# Patient Record
Sex: MALE | Race: WHITE | Employment: UNEMPLOYED | URBAN - NONMETROPOLITAN AREA
[De-identification: names, ages, dates, MRNs, and addresses within clinical notes are randomized per-mention and may not be internally consistent; named-entity substitution may affect disease eponyms.]

---

## 2017-01-10 ENCOUNTER — OFFICE VISIT (OUTPATIENT)
Dept: PRIMARY CARE CLINIC | Age: 37
End: 2017-01-10

## 2017-01-10 VITALS
DIASTOLIC BLOOD PRESSURE: 78 MMHG | BODY MASS INDEX: 25.91 KG/M2 | TEMPERATURE: 99.6 F | SYSTOLIC BLOOD PRESSURE: 118 MMHG | WEIGHT: 161.2 LBS | HEIGHT: 66 IN | HEART RATE: 110 BPM

## 2017-01-10 DIAGNOSIS — R50.9 FEVER, UNSPECIFIED FEVER CAUSE: ICD-10-CM

## 2017-01-10 DIAGNOSIS — R52 BODY ACHES: ICD-10-CM

## 2017-01-10 DIAGNOSIS — R05.9 COUGH: ICD-10-CM

## 2017-01-10 DIAGNOSIS — J11.1 INFLUENZA-LIKE ILLNESS: Primary | ICD-10-CM

## 2017-01-10 LAB
INFLUENZA A ANTIBODY: NORMAL
INFLUENZA B ANTIBODY: NORMAL

## 2017-01-10 PROCEDURE — 87804 INFLUENZA ASSAY W/OPTIC: CPT | Performed by: NURSE PRACTITIONER

## 2017-01-10 PROCEDURE — 99213 OFFICE O/P EST LOW 20 MIN: CPT | Performed by: NURSE PRACTITIONER

## 2017-01-10 RX ORDER — BENZONATATE 100 MG/1
100 CAPSULE ORAL 3 TIMES DAILY PRN
Qty: 30 CAPSULE | Refills: 1 | Status: SHIPPED | OUTPATIENT
Start: 2017-01-10 | End: 2017-08-03 | Stop reason: ALTCHOICE

## 2017-01-10 RX ORDER — OSELTAMIVIR PHOSPHATE 75 MG/1
75 CAPSULE ORAL 2 TIMES DAILY
Qty: 10 CAPSULE | Refills: 0 | Status: SHIPPED | OUTPATIENT
Start: 2017-01-10 | End: 2017-01-15

## 2017-01-10 RX ORDER — CEPHALEXIN 500 MG/1
CAPSULE ORAL
COMMUNITY
Start: 2016-10-27 | End: 2016-10-31

## 2017-01-10 RX ORDER — PREDNISONE 20 MG/1
TABLET ORAL
COMMUNITY
Start: 2016-12-31 | End: 2017-01-06

## 2017-01-10 RX ORDER — DEXTROMETHORPHAN HYDROBROMIDE AND PROMETHAZINE HYDROCHLORIDE 15; 6.25 MG/5ML; MG/5ML
SYRUP ORAL
COMMUNITY
Start: 2016-12-31 | End: 2017-08-03 | Stop reason: ALTCHOICE

## 2017-01-10 ASSESSMENT — ENCOUNTER SYMPTOMS
VOMITING: 0
RHINORRHEA: 1
WHEEZING: 0
DIARRHEA: 0
SINUS PRESSURE: 1
COUGH: 1
SHORTNESS OF BREATH: 0
SORE THROAT: 1
NAUSEA: 0

## 2017-02-25 DIAGNOSIS — G47.9 SLEEP DIFFICULTIES: ICD-10-CM

## 2017-02-25 DIAGNOSIS — F41.9 ANXIETY, MILD: ICD-10-CM

## 2017-03-01 RX ORDER — CLONAZEPAM 0.5 MG/1
TABLET ORAL
Qty: 30 TABLET | Refills: 2 | Status: SHIPPED | OUTPATIENT
Start: 2017-03-01 | End: 2017-06-14 | Stop reason: SDUPTHER

## 2017-03-10 ENCOUNTER — PROCEDURE VISIT (OUTPATIENT)
Dept: NEUROLOGY | Age: 37
End: 2017-03-10

## 2017-03-10 DIAGNOSIS — F32.A DEPRESSION, UNSPECIFIED DEPRESSION TYPE: ICD-10-CM

## 2017-03-10 DIAGNOSIS — R53.83 FATIGUE, UNSPECIFIED TYPE: ICD-10-CM

## 2017-03-10 DIAGNOSIS — G44.89 OTHER HEADACHE SYNDROME: ICD-10-CM

## 2017-03-10 DIAGNOSIS — G43.901 STATUS MIGRAINOSUS: ICD-10-CM

## 2017-03-10 DIAGNOSIS — G43.009 MIGRAINE WITHOUT AURA AND WITHOUT STATUS MIGRAINOSUS, NOT INTRACTABLE: ICD-10-CM

## 2017-03-10 DIAGNOSIS — R56.9 CONVULSIONS, UNSPECIFIED CONVULSION TYPE (HCC): ICD-10-CM

## 2017-03-10 DIAGNOSIS — R42 DIZZINESS: ICD-10-CM

## 2017-03-10 DIAGNOSIS — S09.90XS HEAD INJURY, SEQUELA: ICD-10-CM

## 2017-03-10 DIAGNOSIS — G40.909 SEIZURE DISORDER (HCC): ICD-10-CM

## 2017-03-10 DIAGNOSIS — Z91.81 H/O FALL: ICD-10-CM

## 2017-03-10 DIAGNOSIS — F41.9 ANXIETY, MILD: ICD-10-CM

## 2017-03-10 DIAGNOSIS — R53.1 WEAKNESS: ICD-10-CM

## 2017-03-10 DIAGNOSIS — G47.9 DISTURBANCE, SLEEP: ICD-10-CM

## 2017-03-10 DIAGNOSIS — M54.12 CERVICAL RADICULOPATHY: ICD-10-CM

## 2017-03-10 DIAGNOSIS — R41.0 CONFUSION: Primary | ICD-10-CM

## 2017-03-10 DIAGNOSIS — G40.909 RECURRENT SEIZURES (HCC): ICD-10-CM

## 2017-03-10 DIAGNOSIS — R41.3 MEMORY LOSS: ICD-10-CM

## 2017-03-10 DIAGNOSIS — G47.9 SLEEP DIFFICULTIES: ICD-10-CM

## 2017-03-10 PROCEDURE — 95957 EEG DIGITAL ANALYSIS: CPT | Performed by: PSYCHIATRY & NEUROLOGY

## 2017-03-10 PROCEDURE — 95813 EEG EXTND MNTR 61-119 MIN: CPT | Performed by: PSYCHIATRY & NEUROLOGY

## 2017-03-11 ENCOUNTER — HOSPITAL ENCOUNTER (EMERGENCY)
Age: 37
Discharge: HOME | End: 2017-03-11
Attending: EMERGENCY MEDICINE
Payer: COMMERCIAL

## 2017-03-11 VITALS
HEART RATE: 82 BPM | OXYGEN SATURATION: 95 % | SYSTOLIC BLOOD PRESSURE: 201 MMHG | DIASTOLIC BLOOD PRESSURE: 71 MMHG | TEMPERATURE: 99 F | RESPIRATION RATE: 14 BRPM | WEIGHT: 155 LBS | BODY MASS INDEX: 25.02 KG/M2

## 2017-03-11 DIAGNOSIS — G40.919 BREAKTHROUGH SEIZURE (HCC): Primary | ICD-10-CM

## 2017-03-11 LAB
ABSOLUTE EOS #: 0 K/UL (ref 0–0.4)
ABSOLUTE LYMPH #: 2.3 K/UL (ref 1–4.8)
ABSOLUTE MONO #: 0.5 K/UL (ref 0.1–1.2)
ANION GAP SERPL CALCULATED.3IONS-SCNC: 13 MMOL/L (ref 9–17)
BASOPHILS # BLD: 1 % (ref 0–2)
BASOPHILS ABSOLUTE: 0 K/UL (ref 0–0.2)
BUN BLDV-MCNC: 13 MG/DL (ref 6–20)
BUN/CREAT BLD: 15 (ref 9–20)
CALCIUM SERPL-MCNC: 8.9 MG/DL (ref 8.6–10.4)
CHLORIDE BLD-SCNC: 105 MMOL/L (ref 98–107)
CO2: 21 MMOL/L (ref 20–31)
CREAT SERPL-MCNC: 0.85 MG/DL (ref 0.7–1.2)
DIFFERENTIAL TYPE: ABNORMAL
EOSINOPHILS RELATIVE PERCENT: 1 % (ref 1–4)
GFR AFRICAN AMERICAN: >60 ML/MIN
GFR NON-AFRICAN AMERICAN: >60 ML/MIN
GFR SERPL CREATININE-BSD FRML MDRD: NORMAL ML/MIN/{1.73_M2}
GFR SERPL CREATININE-BSD FRML MDRD: NORMAL ML/MIN/{1.73_M2}
GLUCOSE BLD-MCNC: 94 MG/DL (ref 70–99)
HCT VFR BLD CALC: 43.9 % (ref 41–53)
HEMOGLOBIN: 14.8 G/DL (ref 13.5–17.5)
LYMPHOCYTES # BLD: 33 % (ref 24–44)
MCH RBC QN AUTO: 34.2 PG (ref 26–34)
MCHC RBC AUTO-ENTMCNC: 33.8 G/DL (ref 31–37)
MCV RBC AUTO: 101.3 FL (ref 80–100)
MONOCYTES # BLD: 8 % (ref 1–7)
PDW BLD-RTO: 13.2 % (ref 11–14.5)
PLATELET # BLD: 165 K/UL (ref 140–450)
PLATELET ESTIMATE: ABNORMAL
PMV BLD AUTO: 7.9 FL (ref 6–12)
POTASSIUM SERPL-SCNC: 3.9 MMOL/L (ref 3.7–5.3)
RBC # BLD: 4.34 M/UL (ref 4.5–5.9)
RBC # BLD: ABNORMAL 10*6/UL
SEG NEUTROPHILS: 57 % (ref 36–66)
SEGMENTED NEUTROPHILS ABSOLUTE COUNT: 4.1 K/UL (ref 1.8–7.7)
SODIUM BLD-SCNC: 139 MMOL/L (ref 135–144)
VALPROIC ACID LEVEL: 95 UG/ML (ref 50–100)
VALPROIC DATE LAST DOSE: NORMAL
VALPROIC DOSE AMOUNT: NORMAL
VALPROIC TIME LAST DOSE: NORMAL
WBC # BLD: 7 K/UL (ref 3.5–11)
WBC # BLD: ABNORMAL 10*3/UL

## 2017-03-11 PROCEDURE — 6360000002 HC RX W HCPCS: Performed by: EMERGENCY MEDICINE

## 2017-03-11 PROCEDURE — 36415 COLL VENOUS BLD VENIPUNCTURE: CPT

## 2017-03-11 PROCEDURE — 80164 ASSAY DIPROPYLACETIC ACD TOT: CPT

## 2017-03-11 PROCEDURE — 6370000000 HC RX 637 (ALT 250 FOR IP): Performed by: EMERGENCY MEDICINE

## 2017-03-11 PROCEDURE — 85025 COMPLETE CBC W/AUTO DIFF WBC: CPT

## 2017-03-11 PROCEDURE — 80048 BASIC METABOLIC PNL TOTAL CA: CPT

## 2017-03-11 PROCEDURE — 99284 EMERGENCY DEPT VISIT MOD MDM: CPT

## 2017-03-11 PROCEDURE — 96374 THER/PROPH/DIAG INJ IV PUSH: CPT

## 2017-03-11 RX ORDER — LORAZEPAM 1 MG/1
0.5 TABLET ORAL EVERY 6 HOURS PRN
Qty: 10 TABLET | Refills: 0 | Status: SHIPPED | OUTPATIENT
Start: 2017-03-11 | End: 2017-03-21

## 2017-03-11 RX ORDER — ACETAMINOPHEN 500 MG
1000 TABLET ORAL ONCE
Status: COMPLETED | OUTPATIENT
Start: 2017-03-11 | End: 2017-03-11

## 2017-03-11 RX ORDER — ONDANSETRON 2 MG/ML
4 INJECTION INTRAMUSCULAR; INTRAVENOUS ONCE
Status: COMPLETED | OUTPATIENT
Start: 2017-03-11 | End: 2017-03-11

## 2017-03-11 RX ADMIN — ONDANSETRON 4 MG: 2 INJECTION INTRAMUSCULAR; INTRAVENOUS at 17:14

## 2017-03-11 RX ADMIN — ACETAMINOPHEN 1000 MG: 500 TABLET ORAL at 17:13

## 2017-03-11 ASSESSMENT — PAIN DESCRIPTION - LOCATION: LOCATION: HEAD

## 2017-03-11 ASSESSMENT — PAIN SCALES - GENERAL
PAINLEVEL_OUTOF10: 2
PAINLEVEL_OUTOF10: 5
PAINLEVEL_OUTOF10: 5
PAINLEVEL_OUTOF10: 2

## 2017-04-03 ENCOUNTER — OFFICE VISIT (OUTPATIENT)
Dept: NEUROLOGY | Age: 37
End: 2017-04-03
Payer: MEDICARE

## 2017-04-03 VITALS
DIASTOLIC BLOOD PRESSURE: 66 MMHG | HEART RATE: 87 BPM | BODY MASS INDEX: 26.13 KG/M2 | HEIGHT: 66 IN | WEIGHT: 162.6 LBS | SYSTOLIC BLOOD PRESSURE: 110 MMHG

## 2017-04-03 DIAGNOSIS — Z91.81 H/O FALL: ICD-10-CM

## 2017-04-03 DIAGNOSIS — F41.9 ANXIETY, MILD: ICD-10-CM

## 2017-04-03 DIAGNOSIS — I49.3 VENTRICULAR PREMATURE BEATS: ICD-10-CM

## 2017-04-03 DIAGNOSIS — F32.A DEPRESSION, UNSPECIFIED DEPRESSION TYPE: ICD-10-CM

## 2017-04-03 DIAGNOSIS — G47.9 SLEEP DIFFICULTIES: ICD-10-CM

## 2017-04-03 DIAGNOSIS — R56.9 CONVULSIONS, UNSPECIFIED CONVULSION TYPE (HCC): ICD-10-CM

## 2017-04-03 DIAGNOSIS — G44.1 OTHER VASCULAR HEADACHE: ICD-10-CM

## 2017-04-03 DIAGNOSIS — R42 DIZZINESS: ICD-10-CM

## 2017-04-03 DIAGNOSIS — S33.5XXS LUMBAR SPRAIN, SEQUELA: ICD-10-CM

## 2017-04-03 DIAGNOSIS — M54.12 CERVICAL RADICULOPATHY: ICD-10-CM

## 2017-04-03 DIAGNOSIS — G47.9 DISTURBANCE, SLEEP: ICD-10-CM

## 2017-04-03 DIAGNOSIS — Z72.0 TOBACCO USE: ICD-10-CM

## 2017-04-03 DIAGNOSIS — G43.901 STATUS MIGRAINOSUS: ICD-10-CM

## 2017-04-03 DIAGNOSIS — R41.3 MEMORY LOSS: ICD-10-CM

## 2017-04-03 DIAGNOSIS — R53.83 FATIGUE, UNSPECIFIED TYPE: ICD-10-CM

## 2017-04-03 DIAGNOSIS — G40.909 SEIZURE DISORDER (HCC): ICD-10-CM

## 2017-04-03 DIAGNOSIS — M54.5 LOW BACK PAIN, UNSPECIFIED BACK PAIN LATERALITY, UNSPECIFIED CHRONICITY, WITH SCIATICA PRESENCE UNSPECIFIED: ICD-10-CM

## 2017-04-03 DIAGNOSIS — G44.89 OTHER HEADACHE SYNDROME: ICD-10-CM

## 2017-04-03 DIAGNOSIS — G43.009 MIGRAINE WITHOUT AURA AND WITHOUT STATUS MIGRAINOSUS, NOT INTRACTABLE: ICD-10-CM

## 2017-04-03 DIAGNOSIS — G40.009 PARTIAL IDIOPATHIC EPILEPSY WITH SEIZURES OF LOCALIZED ONSET, NOT INTRACTABLE, WITHOUT STATUS EPILEPTICUS (HCC): Primary | ICD-10-CM

## 2017-04-03 DIAGNOSIS — K21.9 GASTROESOPHAGEAL REFLUX DISEASE WITHOUT ESOPHAGITIS: ICD-10-CM

## 2017-04-03 DIAGNOSIS — R53.1 WEAKNESS: ICD-10-CM

## 2017-04-03 PROCEDURE — 99215 OFFICE O/P EST HI 40 MIN: CPT | Performed by: PSYCHIATRY & NEUROLOGY

## 2017-04-03 RX ORDER — CITALOPRAM 20 MG/1
20 TABLET ORAL DAILY
Qty: 30 TABLET | Refills: 2 | Status: SHIPPED | OUTPATIENT
Start: 2017-04-03 | End: 2017-07-06 | Stop reason: SDUPTHER

## 2017-04-03 RX ORDER — DIVALPROEX SODIUM 250 MG/1
TABLET, DELAYED RELEASE ORAL
Qty: 180 TABLET | Refills: 5 | Status: SHIPPED | OUTPATIENT
Start: 2017-04-03 | End: 2017-09-28 | Stop reason: SDUPTHER

## 2017-04-03 RX ORDER — SUMATRIPTAN 50 MG/1
50 TABLET, FILM COATED ORAL
Qty: 30 TABLET | Refills: 2 | Status: SHIPPED | OUTPATIENT
Start: 2017-04-03 | End: 2017-07-06 | Stop reason: SDUPTHER

## 2017-04-03 ASSESSMENT — ENCOUNTER SYMPTOMS
SHORTNESS OF BREATH: 0
FACIAL SWELLING: 0
VOICE CHANGE: 0
COLOR CHANGE: 0
APNEA: 0
BLOOD IN STOOL: 0
CHEST TIGHTNESS: 0
EYE DISCHARGE: 0
CHOKING: 0
EYE ITCHING: 0
WHEEZING: 0
TROUBLE SWALLOWING: 0
CONSTIPATION: 0
ABDOMINAL DISTENTION: 0
DIARRHEA: 0

## 2017-04-11 ENCOUNTER — TELEPHONE (OUTPATIENT)
Dept: NEUROLOGY | Age: 37
End: 2017-04-11

## 2017-05-19 ENCOUNTER — TELEPHONE (OUTPATIENT)
Dept: NEUROLOGY | Age: 37
End: 2017-05-19

## 2017-06-02 ENCOUNTER — INITIAL CONSULT (OUTPATIENT)
Dept: SURGERY | Age: 37
End: 2017-06-02
Payer: MEDICARE

## 2017-06-02 VITALS
DIASTOLIC BLOOD PRESSURE: 96 MMHG | HEIGHT: 66 IN | WEIGHT: 164 LBS | RESPIRATION RATE: 16 BRPM | HEART RATE: 96 BPM | TEMPERATURE: 98.1 F | BODY MASS INDEX: 26.36 KG/M2 | SYSTOLIC BLOOD PRESSURE: 104 MMHG

## 2017-06-02 DIAGNOSIS — K40.91 RECURRENT LEFT INGUINAL HERNIA: Primary | ICD-10-CM

## 2017-06-02 DIAGNOSIS — K57.30 SIGMOID DIVERTICULOSIS: ICD-10-CM

## 2017-06-02 DIAGNOSIS — G40.909 SEIZURE DISORDER (HCC): ICD-10-CM

## 2017-06-02 DIAGNOSIS — K59.01 SLOW TRANSIT CONSTIPATION: ICD-10-CM

## 2017-06-02 PROCEDURE — G8419 CALC BMI OUT NRM PARAM NOF/U: HCPCS | Performed by: SURGERY

## 2017-06-02 PROCEDURE — G8427 DOCREV CUR MEDS BY ELIG CLIN: HCPCS | Performed by: SURGERY

## 2017-06-02 PROCEDURE — 4004F PT TOBACCO SCREEN RCVD TLK: CPT | Performed by: SURGERY

## 2017-06-02 PROCEDURE — 99214 OFFICE O/P EST MOD 30 MIN: CPT | Performed by: SURGERY

## 2017-06-05 ENCOUNTER — HOSPITAL ENCOUNTER (OUTPATIENT)
Age: 37
Setting detail: SPECIMEN
Discharge: HOME | End: 2017-06-05
Payer: MEDICARE

## 2017-06-05 LAB
FOLATE: 3 NG/ML
VITAMIN B-12: 427 PG/ML (ref 211–946)

## 2017-06-08 LAB — LACOSAMIDE: 1.6 UG/ML (ref 5–10)

## 2017-06-14 DIAGNOSIS — F41.9 ANXIETY, MILD: ICD-10-CM

## 2017-06-14 DIAGNOSIS — G47.9 SLEEP DIFFICULTIES: ICD-10-CM

## 2017-06-19 RX ORDER — CLONAZEPAM 0.5 MG/1
TABLET ORAL
Qty: 30 TABLET | Refills: 2 | Status: SHIPPED | OUTPATIENT
Start: 2017-06-19 | End: 2017-09-28 | Stop reason: SDUPTHER

## 2017-07-06 ENCOUNTER — OFFICE VISIT (OUTPATIENT)
Dept: NEUROLOGY | Age: 37
End: 2017-07-06
Payer: MEDICARE

## 2017-07-06 ENCOUNTER — HOSPITAL ENCOUNTER (OUTPATIENT)
Age: 37
Setting detail: SPECIMEN
Discharge: HOME | End: 2017-07-06
Payer: MEDICARE

## 2017-07-06 VITALS
DIASTOLIC BLOOD PRESSURE: 76 MMHG | HEART RATE: 80 BPM | HEIGHT: 66 IN | BODY MASS INDEX: 25.55 KG/M2 | WEIGHT: 159 LBS | SYSTOLIC BLOOD PRESSURE: 118 MMHG

## 2017-07-06 DIAGNOSIS — F32.A DEPRESSION, UNSPECIFIED DEPRESSION TYPE: ICD-10-CM

## 2017-07-06 DIAGNOSIS — R41.0 CONFUSION: ICD-10-CM

## 2017-07-06 DIAGNOSIS — G43.009 MIGRAINE WITHOUT AURA AND WITHOUT STATUS MIGRAINOSUS, NOT INTRACTABLE: ICD-10-CM

## 2017-07-06 DIAGNOSIS — R09.89 OTHER SPECIFIED SYMPTOMS AND SIGNS INVOLVING THE CIRCULATORY AND RESPIRATORY SYSTEMS: ICD-10-CM

## 2017-07-06 DIAGNOSIS — G93.89 OTHER SPECIFIED DISORDERS OF BRAIN: ICD-10-CM

## 2017-07-06 DIAGNOSIS — G43.901 STATUS MIGRAINOSUS: ICD-10-CM

## 2017-07-06 DIAGNOSIS — G44.1 OTHER VASCULAR HEADACHE: ICD-10-CM

## 2017-07-06 DIAGNOSIS — G47.9 SLEEP DIFFICULTIES: ICD-10-CM

## 2017-07-06 DIAGNOSIS — Z86.79 PERSONAL HISTORY OF OTHER DISEASES OF THE CIRCULATORY SYSTEM: ICD-10-CM

## 2017-07-06 DIAGNOSIS — F41.9 ANXIETY, MILD: ICD-10-CM

## 2017-07-06 DIAGNOSIS — Z72.0 TOBACCO USE: ICD-10-CM

## 2017-07-06 DIAGNOSIS — G44.89 OTHER HEADACHE SYNDROME: ICD-10-CM

## 2017-07-06 DIAGNOSIS — R42 DIZZINESS: ICD-10-CM

## 2017-07-06 DIAGNOSIS — M54.12 CERVICAL RADICULOPATHY: ICD-10-CM

## 2017-07-06 DIAGNOSIS — G45.9 TRANSIENT CEREBRAL ISCHEMIA, UNSPECIFIED TYPE: Primary | ICD-10-CM

## 2017-07-06 DIAGNOSIS — G40.909 RECURRENT SEIZURES (HCC): ICD-10-CM

## 2017-07-06 DIAGNOSIS — R56.9 CONVULSIONS, UNSPECIFIED CONVULSION TYPE (HCC): ICD-10-CM

## 2017-07-06 DIAGNOSIS — M54.5 LOW BACK PAIN, UNSPECIFIED BACK PAIN LATERALITY, UNSPECIFIED CHRONICITY, WITH SCIATICA PRESENCE UNSPECIFIED: ICD-10-CM

## 2017-07-06 DIAGNOSIS — G47.9 DISTURBANCE, SLEEP: ICD-10-CM

## 2017-07-06 DIAGNOSIS — G40.009 PARTIAL IDIOPATHIC EPILEPSY WITH SEIZURES OF LOCALIZED ONSET, NOT INTRACTABLE, WITHOUT STATUS EPILEPTICUS (HCC): ICD-10-CM

## 2017-07-06 DIAGNOSIS — Z91.81 H/O FALL: ICD-10-CM

## 2017-07-06 DIAGNOSIS — R41.3 MEMORY LOSS: ICD-10-CM

## 2017-07-06 PROBLEM — G45.0 VBI (VERTEBROBASILAR INSUFFICIENCY): Status: ACTIVE | Noted: 2017-07-06

## 2017-07-06 PROCEDURE — 99215 OFFICE O/P EST HI 40 MIN: CPT | Performed by: PSYCHIATRY & NEUROLOGY

## 2017-07-06 PROCEDURE — 4004F PT TOBACCO SCREEN RCVD TLK: CPT | Performed by: PSYCHIATRY & NEUROLOGY

## 2017-07-06 PROCEDURE — 93886 INTRACRANIAL COMPLETE STUDY: CPT | Performed by: PSYCHIATRY & NEUROLOGY

## 2017-07-06 PROCEDURE — G8419 CALC BMI OUT NRM PARAM NOF/U: HCPCS | Performed by: PSYCHIATRY & NEUROLOGY

## 2017-07-06 PROCEDURE — G8427 DOCREV CUR MEDS BY ELIG CLIN: HCPCS | Performed by: PSYCHIATRY & NEUROLOGY

## 2017-07-06 RX ORDER — HYDROCODONE BITARTRATE AND ACETAMINOPHEN 5; 325 MG/1; MG/1
1 TABLET ORAL
COMMUNITY
Start: 2017-07-01 | End: 2017-07-11

## 2017-07-06 RX ORDER — CITALOPRAM 20 MG/1
20 TABLET ORAL DAILY
Qty: 30 TABLET | Refills: 2 | Status: SHIPPED | OUTPATIENT
Start: 2017-07-06 | End: 2017-10-03

## 2017-07-06 RX ORDER — PANTOPRAZOLE SODIUM 20 MG/1
20 TABLET, DELAYED RELEASE ORAL
COMMUNITY
Start: 2017-07-01 | End: 2017-10-03

## 2017-07-06 RX ORDER — SUMATRIPTAN 50 MG/1
50 TABLET, FILM COATED ORAL
Qty: 30 TABLET | Refills: 2 | Status: SHIPPED | OUTPATIENT
Start: 2017-07-06 | End: 2017-09-28 | Stop reason: SDUPTHER

## 2017-07-06 ASSESSMENT — ENCOUNTER SYMPTOMS
SHORTNESS OF BREATH: 0
COLOR CHANGE: 0
BLOOD IN STOOL: 0
TROUBLE SWALLOWING: 0
FACIAL SWELLING: 0
DIARRHEA: 0
EYE DISCHARGE: 0
CONSTIPATION: 0
EYE ITCHING: 0
CHEST TIGHTNESS: 0
APNEA: 0
CHOKING: 0
ABDOMINAL DISTENTION: 0
WHEEZING: 0
VOICE CHANGE: 0

## 2017-07-07 ENCOUNTER — TELEPHONE (OUTPATIENT)
Dept: NEUROLOGY | Age: 37
End: 2017-07-07

## 2017-07-07 LAB
HOMOCYSTEINE: 27.7 UMOL/L
RHEUMATOID FACTOR: <10 IU/ML

## 2017-07-10 LAB
ANA REFERENCE RANGE:: ABNORMAL
ANTI DNA DOUBLE STRANDED: 61 IU/ML
ANTI JO-1 IGG: 23 U/ML
ANTI RNP AB: 27 U/ML
ANTI SSA: 150 U/ML
ANTI SSB: 12 U/ML
ANTI-CENTROMERE: 11 U/ML
ANTI-NUCLEAR ANTIBODY (ANA): POSITIVE
ANTI-SCLERODERMA: 19 U/ML
ANTI-SMITH: 32 U/ML
HISTONE ANTIBODY: 23 U/ML

## 2017-07-14 LAB
FACTOR V LEIDEN MUTATION: NORMAL
PROTEIN C ACTIVITY: 93 %
PROTEIN S ACTIVITY: 127 % (ref 77–116)

## 2017-07-28 RX ORDER — LACOSAMIDE 200 MG/1
TABLET, FILM COATED ORAL
Qty: 60 TABLET | Refills: 0 | Status: SHIPPED | OUTPATIENT
Start: 2017-07-28 | End: 2017-09-07 | Stop reason: SDUPTHER

## 2017-08-03 ENCOUNTER — OFFICE VISIT (OUTPATIENT)
Dept: FAMILY MEDICINE CLINIC | Age: 37
End: 2017-08-03
Payer: MEDICARE

## 2017-08-03 VITALS
HEART RATE: 68 BPM | DIASTOLIC BLOOD PRESSURE: 64 MMHG | HEIGHT: 66 IN | SYSTOLIC BLOOD PRESSURE: 116 MMHG | BODY MASS INDEX: 25.55 KG/M2 | WEIGHT: 158.95 LBS

## 2017-08-03 DIAGNOSIS — D22.9 BENIGN NEVUS: ICD-10-CM

## 2017-08-03 DIAGNOSIS — M54.2 NECK PAIN ON RIGHT SIDE: ICD-10-CM

## 2017-08-03 DIAGNOSIS — G56.22 CUBITAL TUNNEL SYNDROME ON LEFT: Primary | ICD-10-CM

## 2017-08-03 PROCEDURE — G8427 DOCREV CUR MEDS BY ELIG CLIN: HCPCS | Performed by: FAMILY MEDICINE

## 2017-08-03 PROCEDURE — 99214 OFFICE O/P EST MOD 30 MIN: CPT | Performed by: FAMILY MEDICINE

## 2017-08-03 PROCEDURE — G8419 CALC BMI OUT NRM PARAM NOF/U: HCPCS | Performed by: FAMILY MEDICINE

## 2017-08-03 PROCEDURE — 4004F PT TOBACCO SCREEN RCVD TLK: CPT | Performed by: FAMILY MEDICINE

## 2017-08-03 ASSESSMENT — ENCOUNTER SYMPTOMS
COLOR CHANGE: 1
GASTROINTESTINAL NEGATIVE: 1
RESPIRATORY NEGATIVE: 1
ALLERGIC/IMMUNOLOGIC NEGATIVE: 1
EYES NEGATIVE: 1

## 2017-08-04 ENCOUNTER — PROCEDURE VISIT (OUTPATIENT)
Dept: NEUROLOGY | Age: 37
End: 2017-08-04
Payer: MEDICARE

## 2017-08-04 DIAGNOSIS — G43.709 CHRONIC MIGRAINE WITHOUT AURA WITHOUT STATUS MIGRAINOSUS, NOT INTRACTABLE: ICD-10-CM

## 2017-08-04 DIAGNOSIS — I67.82 CEREBRAL ISCHEMIA: ICD-10-CM

## 2017-08-04 DIAGNOSIS — R42 VERTIGO: ICD-10-CM

## 2017-08-04 DIAGNOSIS — G45.8 OTHER SPECIFIED TRANSIENT CEREBRAL ISCHEMIAS: Primary | ICD-10-CM

## 2017-08-04 DIAGNOSIS — G45.0 VBI (VERTEBROBASILAR INSUFFICIENCY): ICD-10-CM

## 2017-08-04 DIAGNOSIS — G45.9 TRANSIENT CEREBRAL ISCHEMIA, UNSPECIFIED TYPE: ICD-10-CM

## 2017-08-04 DIAGNOSIS — R42 DIZZINESS: ICD-10-CM

## 2017-08-04 DIAGNOSIS — Z87.828 OLD HEAD INJURY: ICD-10-CM

## 2017-08-04 DIAGNOSIS — R47.9 SPEECH DISTURBANCE, UNSPECIFIED TYPE: ICD-10-CM

## 2017-08-04 DIAGNOSIS — G44.209 TENSION HEADACHE: ICD-10-CM

## 2017-08-04 PROCEDURE — 93892 TCD EMBOLI DETECT W/O INJ: CPT | Performed by: PSYCHIATRY & NEUROLOGY

## 2017-08-04 PROCEDURE — 93886 INTRACRANIAL COMPLETE STUDY: CPT | Performed by: PSYCHIATRY & NEUROLOGY

## 2017-08-28 ENCOUNTER — TELEPHONE (OUTPATIENT)
Dept: FAMILY MEDICINE CLINIC | Age: 37
End: 2017-08-28

## 2017-08-28 DIAGNOSIS — M54.2 NECK DISCOMFORT: Primary | ICD-10-CM

## 2017-09-01 ENCOUNTER — HOSPITAL ENCOUNTER (OUTPATIENT)
Dept: GENERAL RADIOLOGY | Age: 37
Discharge: HOME | End: 2017-09-01
Payer: MEDICARE

## 2017-09-01 DIAGNOSIS — M54.2 NECK DISCOMFORT: ICD-10-CM

## 2017-09-01 PROCEDURE — 72050 X-RAY EXAM NECK SPINE 4/5VWS: CPT

## 2017-09-07 RX ORDER — LACOSAMIDE 200 MG/1
TABLET, FILM COATED ORAL
Qty: 60 TABLET | Refills: 3 | Status: SHIPPED | OUTPATIENT
Start: 2017-09-07 | End: 2018-08-29

## 2017-09-28 ENCOUNTER — TELEPHONE (OUTPATIENT)
Dept: FAMILY MEDICINE CLINIC | Age: 37
End: 2017-09-28

## 2017-09-28 ENCOUNTER — HOSPITAL ENCOUNTER (OUTPATIENT)
Dept: LAB | Age: 37
Setting detail: SPECIMEN
Discharge: HOME | End: 2017-09-28
Payer: MEDICARE

## 2017-09-28 ENCOUNTER — TELEPHONE (OUTPATIENT)
Dept: NEUROLOGY | Age: 37
End: 2017-09-28

## 2017-09-28 ENCOUNTER — OFFICE VISIT (OUTPATIENT)
Dept: NEUROLOGY | Age: 37
End: 2017-09-28
Payer: MEDICARE

## 2017-09-28 VITALS
HEART RATE: 101 BPM | HEIGHT: 66 IN | DIASTOLIC BLOOD PRESSURE: 68 MMHG | BODY MASS INDEX: 25.55 KG/M2 | SYSTOLIC BLOOD PRESSURE: 124 MMHG | WEIGHT: 158.95 LBS | OXYGEN SATURATION: 98 %

## 2017-09-28 DIAGNOSIS — R56.9 CONVULSIONS, UNSPECIFIED CONVULSION TYPE (HCC): ICD-10-CM

## 2017-09-28 DIAGNOSIS — R51.9 NONINTRACTABLE HEADACHE, UNSPECIFIED CHRONICITY PATTERN, UNSPECIFIED HEADACHE TYPE: ICD-10-CM

## 2017-09-28 DIAGNOSIS — G47.9 DISTURBANCE, SLEEP: ICD-10-CM

## 2017-09-28 DIAGNOSIS — F41.9 ANXIETY, MILD: ICD-10-CM

## 2017-09-28 DIAGNOSIS — I67.82 CEREBRAL ISCHEMIA: ICD-10-CM

## 2017-09-28 DIAGNOSIS — G44.209 TENSION HEADACHE: ICD-10-CM

## 2017-09-28 DIAGNOSIS — G44.039 EPISODIC PAROXYSMAL HEMICRANIA, NOT INTRACTABLE: ICD-10-CM

## 2017-09-28 DIAGNOSIS — R00.0 TACHYCARDIA: ICD-10-CM

## 2017-09-28 DIAGNOSIS — K21.9 GASTROESOPHAGEAL REFLUX DISEASE WITHOUT ESOPHAGITIS: ICD-10-CM

## 2017-09-28 DIAGNOSIS — Z72.0 TOBACCO USE: ICD-10-CM

## 2017-09-28 DIAGNOSIS — R53.1 WEAKNESS OF RIGHT SIDE OF BODY: Primary | ICD-10-CM

## 2017-09-28 DIAGNOSIS — G43.709 CHRONIC MIGRAINE WITHOUT AURA WITHOUT STATUS MIGRAINOSUS, NOT INTRACTABLE: ICD-10-CM

## 2017-09-28 DIAGNOSIS — M54.12 CERVICAL RADICULOPATHY: ICD-10-CM

## 2017-09-28 DIAGNOSIS — F34.1 DYSTHYMIA: ICD-10-CM

## 2017-09-28 DIAGNOSIS — G43.901 STATUS MIGRAINOSUS: ICD-10-CM

## 2017-09-28 DIAGNOSIS — R41.3 MEMORY LOSS: ICD-10-CM

## 2017-09-28 DIAGNOSIS — G47.9 SLEEP DIFFICULTIES: ICD-10-CM

## 2017-09-28 DIAGNOSIS — R47.9 SPEECH DISTURBANCE, UNSPECIFIED TYPE: ICD-10-CM

## 2017-09-28 DIAGNOSIS — Z87.828 OLD HEAD INJURY: ICD-10-CM

## 2017-09-28 DIAGNOSIS — R42 DIZZINESS: ICD-10-CM

## 2017-09-28 DIAGNOSIS — G43.009 MIGRAINE WITHOUT AURA AND WITHOUT STATUS MIGRAINOSUS, NOT INTRACTABLE: ICD-10-CM

## 2017-09-28 DIAGNOSIS — G44.039 NONINTRACTABLE PAROXYSMAL HEMICRANIA, UNSPECIFIED CHRONICITY PATTERN: ICD-10-CM

## 2017-09-28 DIAGNOSIS — G40.009 PARTIAL IDIOPATHIC EPILEPSY WITH SEIZURES OF LOCALIZED ONSET, NOT INTRACTABLE, WITHOUT STATUS EPILEPTICUS (HCC): ICD-10-CM

## 2017-09-28 DIAGNOSIS — G40.909 RECURRENT SEIZURES (HCC): ICD-10-CM

## 2017-09-28 DIAGNOSIS — M54.40 LOW BACK PAIN WITH SCIATICA, SCIATICA LATERALITY UNSPECIFIED, UNSPECIFIED BACK PAIN LATERALITY, UNSPECIFIED CHRONICITY: ICD-10-CM

## 2017-09-28 DIAGNOSIS — G45.9 TRANSIENT CEREBRAL ISCHEMIA, UNSPECIFIED TYPE: Primary | ICD-10-CM

## 2017-09-28 DIAGNOSIS — K44.9 HIATAL HERNIA: ICD-10-CM

## 2017-09-28 DIAGNOSIS — G45.9 TRANSIENT CEREBRAL ISCHEMIA, UNSPECIFIED TYPE: ICD-10-CM

## 2017-09-28 DIAGNOSIS — G40.909 SEIZURE DISORDER (HCC): ICD-10-CM

## 2017-09-28 DIAGNOSIS — S33.5XXD LUMBAR SPRAIN, SUBSEQUENT ENCOUNTER: ICD-10-CM

## 2017-09-28 DIAGNOSIS — R29.898 WEAKNESS OF RIGHT LOWER EXTREMITY: ICD-10-CM

## 2017-09-28 LAB
ABSOLUTE EOS #: 0.1 K/UL (ref 0–0.4)
ABSOLUTE LYMPH #: 2.5 K/UL (ref 1–4.8)
ABSOLUTE MONO #: 0.6 K/UL (ref 0.1–1.2)
BASOPHILS # BLD: 0 % (ref 0–2)
BASOPHILS ABSOLUTE: 0 K/UL (ref 0–0.2)
DIFFERENTIAL TYPE: ABNORMAL
EOSINOPHILS RELATIVE PERCENT: 1 % (ref 1–8)
HCT VFR BLD CALC: 47.4 % (ref 41–53)
HEMOGLOBIN: 16.1 G/DL (ref 13.5–17.5)
LYMPHOCYTES # BLD: 27 % (ref 15–43)
MCH RBC QN AUTO: 34.1 PG (ref 26–34)
MCHC RBC AUTO-ENTMCNC: 33.9 G/DL (ref 31–37)
MCV RBC AUTO: 100.5 FL (ref 80–100)
MONOCYTES # BLD: 7 % (ref 6–14)
PDW BLD-RTO: 12.9 % (ref 11–14.5)
PHENYTOIN DATE LAST DOSE: ABNORMAL
PHENYTOIN DOSE AMOUNT: ABNORMAL
PHENYTOIN DOSE TIME: ABNORMAL
PHENYTOIN LEVEL: <0.8 UG/ML (ref 10–20)
PLATELET # BLD: 203 K/UL (ref 140–450)
PLATELET ESTIMATE: ABNORMAL
PMV BLD AUTO: 8.5 FL (ref 6–12)
RBC # BLD: 4.71 M/UL (ref 4.5–5.9)
RBC # BLD: ABNORMAL 10*6/UL
SEG NEUTROPHILS: 65 % (ref 44–74)
SEGMENTED NEUTROPHILS ABSOLUTE COUNT: 6 K/UL (ref 1.8–7.7)
WBC # BLD: 9.3 K/UL (ref 3.5–11)
WBC # BLD: ABNORMAL 10*3/UL

## 2017-09-28 PROCEDURE — G8427 DOCREV CUR MEDS BY ELIG CLIN: HCPCS | Performed by: PSYCHIATRY & NEUROLOGY

## 2017-09-28 PROCEDURE — G8417 CALC BMI ABV UP PARAM F/U: HCPCS | Performed by: PSYCHIATRY & NEUROLOGY

## 2017-09-28 PROCEDURE — G0480 DRUG TEST DEF 1-7 CLASSES: HCPCS

## 2017-09-28 PROCEDURE — 99215 OFFICE O/P EST HI 40 MIN: CPT | Performed by: PSYCHIATRY & NEUROLOGY

## 2017-09-28 PROCEDURE — 80185 ASSAY OF PHENYTOIN TOTAL: CPT

## 2017-09-28 PROCEDURE — 85025 COMPLETE CBC W/AUTO DIFF WBC: CPT

## 2017-09-28 PROCEDURE — 4004F PT TOBACCO SCREEN RCVD TLK: CPT | Performed by: PSYCHIATRY & NEUROLOGY

## 2017-09-28 PROCEDURE — 36415 COLL VENOUS BLD VENIPUNCTURE: CPT

## 2017-09-28 RX ORDER — PROMETHAZINE HYDROCHLORIDE 25 MG/1
25 TABLET ORAL EVERY 8 HOURS PRN
Qty: 60 TABLET | Refills: 2 | Status: SHIPPED | OUTPATIENT
Start: 2017-09-28 | End: 2018-08-29

## 2017-09-28 RX ORDER — SUMATRIPTAN 50 MG/1
50 TABLET, FILM COATED ORAL
Qty: 30 TABLET | Refills: 2 | Status: SHIPPED | OUTPATIENT
Start: 2017-09-28 | End: 2018-06-28 | Stop reason: SDUPTHER

## 2017-09-28 RX ORDER — DIVALPROEX SODIUM 250 MG/1
TABLET, DELAYED RELEASE ORAL
Qty: 180 TABLET | Refills: 5 | Status: SHIPPED | OUTPATIENT
Start: 2017-09-28 | End: 2018-05-12 | Stop reason: SDUPTHER

## 2017-09-28 ASSESSMENT — ENCOUNTER SYMPTOMS
TROUBLE SWALLOWING: 0
RHINORRHEA: 0
VISUAL CHANGE: 0
FACIAL SWELLING: 0
EYE REDNESS: 0
EYE ITCHING: 0
VOICE CHANGE: 0
WHEEZING: 0
EYE DISCHARGE: 0
ABDOMINAL DISTENTION: 0
SCALP TENDERNESS: 0
CHEST TIGHTNESS: 0
APNEA: 0
SWOLLEN GLANDS: 0
BACK PAIN: 0
EYE PAIN: 0
CONSTIPATION: 0
SHORTNESS OF BREATH: 0
SINUS PRESSURE: 0
VOMITING: 0
ABDOMINAL PAIN: 0
COUGH: 0
SORE THROAT: 0
EYE WATERING: 0
PHOTOPHOBIA: 1
COLOR CHANGE: 0
NAUSEA: 1
DIARRHEA: 0
CHOKING: 0
BLOOD IN STOOL: 0
CHANGE IN BOWEL HABIT: 0

## 2017-10-02 RX ORDER — CLONAZEPAM 0.5 MG/1
TABLET ORAL
Qty: 30 TABLET | Refills: 1 | Status: SHIPPED | OUTPATIENT
Start: 2017-10-02 | End: 2018-08-29 | Stop reason: ALTCHOICE

## 2017-10-03 ENCOUNTER — OFFICE VISIT (OUTPATIENT)
Dept: CARDIOLOGY | Age: 37
End: 2017-10-03
Payer: MEDICARE

## 2017-10-03 VITALS
BODY MASS INDEX: 26.36 KG/M2 | DIASTOLIC BLOOD PRESSURE: 78 MMHG | WEIGHT: 164 LBS | HEIGHT: 66 IN | HEART RATE: 95 BPM | SYSTOLIC BLOOD PRESSURE: 104 MMHG

## 2017-10-03 DIAGNOSIS — R56.9 CONVULSIONS, UNSPECIFIED CONVULSION TYPE (HCC): ICD-10-CM

## 2017-10-03 DIAGNOSIS — R07.9 CHEST PAIN, UNSPECIFIED TYPE: Primary | ICD-10-CM

## 2017-10-03 DIAGNOSIS — G45.8 OTHER SPECIFIED TRANSIENT CEREBRAL ISCHEMIAS: ICD-10-CM

## 2017-10-03 LAB — LACOSAMIDE: 9.6 UG/ML (ref 5–10)

## 2017-10-03 PROCEDURE — 4004F PT TOBACCO SCREEN RCVD TLK: CPT | Performed by: INTERNAL MEDICINE

## 2017-10-03 PROCEDURE — G8427 DOCREV CUR MEDS BY ELIG CLIN: HCPCS | Performed by: INTERNAL MEDICINE

## 2017-10-03 PROCEDURE — G8484 FLU IMMUNIZE NO ADMIN: HCPCS | Performed by: INTERNAL MEDICINE

## 2017-10-03 PROCEDURE — 93000 ELECTROCARDIOGRAM COMPLETE: CPT | Performed by: INTERNAL MEDICINE

## 2017-10-03 PROCEDURE — 99203 OFFICE O/P NEW LOW 30 MIN: CPT | Performed by: INTERNAL MEDICINE

## 2017-10-03 PROCEDURE — G8417 CALC BMI ABV UP PARAM F/U: HCPCS | Performed by: INTERNAL MEDICINE

## 2017-10-03 RX ORDER — ATORVASTATIN CALCIUM 20 MG/1
20 TABLET, FILM COATED ORAL DAILY
COMMUNITY
End: 2018-08-29

## 2017-10-03 NOTE — PROGRESS NOTES
regular rate and rhythm, S1, S2 normal, no murmur, click, rub or gallop  Abdomen: soft, non-tender; bowel sounds normal; no masses,  no organomegaly  Extremities: extremities normal, atraumatic, no cyanosis or edema  Neurologic: Mental status: Alert, oriented, thought content appropriate      EKG: Normal Sinus Rhythm with no ischemic changes. LAST ECHO:  9/23/17  · Left ventricular ejection fraction is measured at 67%. Normal LV   function. · Estimated right ventricular systolic pressure equals 21 mmHg. · Prior echo 9/19/2013: EF 55% RVSP 33 mmHg. LAST STRESS:    LAST CATH:      Past Medical and Surgical History, Problem List, Allergies, Medications, Labs, Imaging, all reviewed extensively in EMR and with the patient. Assessment and Plan:    1. Hx of cardiac arrhythmia and TIA like symptoms. Check Holter and MITCHELL for PFO, LAXMI, cardioembolic CVAs. 2. Seizures- per neuro  3. Chronic severe migraines  4. Anxiety/Depression  5. Tobacco abuse. Chronic. Discussed extensively and gave options to help with quitting. Thank you for allowing me to participate in the care of this patient, please do not hesitate to call if you have any questions. Mirza Hill, [address] [facility]  [facility]. [facility]  [phone]

## 2017-10-03 NOTE — MR AVS SNAPSHOT
After Visit Summary             Maria R Fernandez   10/3/2017 11:15 AM   Office Visit    Description:  Male : 1980   Provider:  Babar Stuart DO   Department:  [address] Cardiology              Your Follow-Up and Future Appointments         Below is a list of your follow-up and future appointments. This may not be a complete list as you may have made appointments directly with providers that we are not aware of or your providers may have made some for you. Please call your providers to confirm appointments. It is important to keep your appointments. Please bring your current insurance card, photo ID, co-pay, and all medication bottles to your appointment. If self-pay, payment is expected at the time of service. Your To-Do List     Future Appointments Provider Department Dept Phone    4/3/2018 10:30 AM Belen Holland [address] [phone]    Please arrive 15 minutes prior to appointment, bring photo ID and insurance card. Future Orders Complete By Expires    ECHO Transesophageal [[id number] Custom]  10/3/2017 10/3/2018         Information from Your Visit        Department     Name Address Phone Fax    [address] [id number] [facility] Ino Jessica [phone]      You Were Seen for:         Comments    Chest pain, unspecified type   [[id number]]         Vital Signs     Blood Pressure Pulse Height Weight Body Mass Index Smoking Status    104/78 95 5' 6\" (1.676 m) 164 lb (74.4 kg) 26.47 kg/m2 Current Every Day Smoker      Additional Information about your Body Mass Index (BMI)           Your BMI as listed above is considered overweight (25.0-29.9). BMI is an estimate of body fat, calculated from your height and weight. The higher your BMI, the greater your risk of heart disease, high blood pressure, type 2 diabetes, stroke, gallstones, arthritis, sleep apnea, and certain cancers. BMI is not perfect.   It may overestimate body fat in athletes and people who are more muscular. If your body fat is high you can improve your BMI by decreasing your calorie intake and becoming more physically active. Learn more at: LevelUpco[employer]uk             Today's Medication Changes          These changes are accurate as of: 10/3/17 11:51 AM.  If you have any questions, ask your nurse or doctor. STOP taking these medications           citalopram 20 MG tablet   Commonly known as:  CELEXA   Stopped by: Be Vargas, DO       pantoprazole 20 MG tablet   Commonly known as:  PROTONIX   Stopped by:   Be Barry, DO               Your Current Medications Are              atorvastatin (LIPITOR) 20 MG tablet Take 20 mg by mouth daily    clonazePAM (KLONOPIN) 0.5 MG tablet take 1 tablet by mouth at bedtime    divalproex (DEPAKOTE) 250 MG DR tablet take 3 tablets by mouth twice a day    promethazine (PHENERGAN) 25 MG tablet Take 1 tablet by mouth every 8 hours as needed for Nausea    SUMAtriptan (IMITREX) 50 MG tablet Take 1 tablet by mouth once as needed for Migraine    topiramate (TOPAMAX) 200 MG tablet take 1 tablet by mouth twice a day    VIMPAT 200 MG tablet take 1 tablet by mouth twice a day    acetaminophen (TYLENOL) 500 MG tablet Take 500 mg by mouth every 4 hours as needed for Pain    triamcinolone (ARISTOCORT) 0.5 % cream APPLY TOPICALLY THREE TIMES DAILY      Allergies           No Known Allergies      We Ordered/Performed the following           EKG 12 Lead          Result Summary for EKG 12 Lead      Result Information     Status          Final result (Resulted: 10/3/2017)           10/3/2017 11:32 AM      Scans on Order [id number]            ECG on 10/3/2017 11:32 AM : ECG Report                     Additional Information        Basic Information     Date Of Birth Sex Race Ethnicity Preferred Language    1980 Male White Non-/Non  [place]

## 2017-10-11 ENCOUNTER — HOSPITAL ENCOUNTER (OUTPATIENT)
Dept: NON INVASIVE DIAGNOSTICS | Age: 37
Discharge: HOME | End: 2017-10-11
Payer: MEDICARE

## 2017-10-11 DIAGNOSIS — G45.8 OTHER SPECIFIED TRANSIENT CEREBRAL ISCHEMIAS: ICD-10-CM

## 2017-10-11 DIAGNOSIS — R56.9 CONVULSIONS, UNSPECIFIED CONVULSION TYPE (HCC): ICD-10-CM

## 2017-10-11 DIAGNOSIS — R07.9 CHEST PAIN, UNSPECIFIED TYPE: ICD-10-CM

## 2017-10-11 PROCEDURE — 93226 XTRNL ECG REC<48 HR SCAN A/R: CPT

## 2017-10-11 PROCEDURE — 93225 XTRNL ECG REC<48 HRS REC: CPT

## 2017-10-18 ENCOUNTER — TELEPHONE (OUTPATIENT)
Dept: CARDIOLOGY CLINIC | Age: 37
End: 2017-10-18

## 2017-10-18 NOTE — TELEPHONE ENCOUNTER
Spoke to Clayton at [facility]. She stated patient has [employer] that needs pre authorized. Patient is listed as Medicare on file . Clayton will check with patient and let us know.

## 2017-10-19 ENCOUNTER — TELEPHONE (OUTPATIENT)
Dept: CARDIOLOGY | Age: 37
End: 2017-10-19

## 2017-10-23 NOTE — TELEPHONE ENCOUNTER
Pt returned call. He wants to be seen at [facility]. Scheduled for 10/30 at 10 AM-post MITCHELL per Dr. Maci Patel at [facility]. I was unable to reach pt by phone to notify him of this appt.

## 2017-10-23 NOTE — TELEPHONE ENCOUNTER
Pt is being scheduled at the [facility] office, next available for POST MITCHELL per Sam at [facility].

## 2017-10-24 ENCOUNTER — TELEPHONE (OUTPATIENT)
Dept: FAMILY MEDICINE CLINIC | Age: 37
End: 2017-10-24

## 2017-10-24 NOTE — TELEPHONE ENCOUNTER
Prescription for Vimpat 200 mg tablet printed on 9/7/17 was never picked up from . Prescription shredded.

## 2017-10-30 ENCOUNTER — TELEPHONE (OUTPATIENT)
Dept: CARDIOLOGY | Age: 37
End: 2017-10-30

## 2017-10-30 NOTE — TELEPHONE ENCOUNTER
Nurse called to have writer contact pt who had a no show appt to have him come in for a   wound check and to find out when he was scheduled to see Dr. Aida Flynn in [facility]. Pt phone number is no longer in service,same for emergency contact number. Unable to contact pt at this time will mail letter to have pt call the office.

## 2017-11-02 RX ORDER — ATORVASTATIN CALCIUM 40 MG/1
TABLET, FILM COATED ORAL
Qty: 30 TABLET | Refills: 0 | Status: ON HOLD | OUTPATIENT
Start: 2017-11-02 | End: 2017-11-30 | Stop reason: DRUGHIGH

## 2017-11-06 ENCOUNTER — TELEPHONE (OUTPATIENT)
Dept: FAMILY MEDICINE CLINIC | Age: 37
End: 2017-11-06

## 2017-11-06 DIAGNOSIS — G45.9 TRANSIENT CEREBRAL ISCHEMIA, UNSPECIFIED TYPE: ICD-10-CM

## 2017-11-06 DIAGNOSIS — G40.009 PARTIAL IDIOPATHIC EPILEPSY WITH SEIZURES OF LOCALIZED ONSET, NOT INTRACTABLE, WITHOUT STATUS EPILEPTICUS (HCC): Primary | ICD-10-CM

## 2017-11-06 NOTE — TELEPHONE ENCOUNTER
Patient is request a new neurologist. He states he would like to try and start with Dr. Arielle Rodriguez from [facility] if possible, but please just place external referral to Neurology so a new provider can be found for patient.

## 2017-11-07 ENCOUNTER — TELEPHONE (OUTPATIENT)
Dept: FAMILY MEDICINE CLINIC | Age: 37
End: 2017-11-07

## 2017-11-14 ENCOUNTER — OFFICE VISIT (OUTPATIENT)
Dept: SURGERY | Age: 37
End: 2017-11-14
Payer: MEDICARE

## 2017-11-14 VITALS
WEIGHT: 165 LBS | RESPIRATION RATE: 12 BRPM | SYSTOLIC BLOOD PRESSURE: 126 MMHG | DIASTOLIC BLOOD PRESSURE: 70 MMHG | TEMPERATURE: 97.3 F | HEART RATE: 84 BPM | HEIGHT: 66 IN | BODY MASS INDEX: 26.52 KG/M2

## 2017-11-14 DIAGNOSIS — L73.9 FOLLICULITIS: ICD-10-CM

## 2017-11-14 DIAGNOSIS — K40.91 RECURRENT LEFT INGUINAL HERNIA: Primary | ICD-10-CM

## 2017-11-14 PROCEDURE — G8427 DOCREV CUR MEDS BY ELIG CLIN: HCPCS | Performed by: SURGERY

## 2017-11-14 PROCEDURE — G8484 FLU IMMUNIZE NO ADMIN: HCPCS | Performed by: SURGERY

## 2017-11-14 PROCEDURE — G8417 CALC BMI ABV UP PARAM F/U: HCPCS | Performed by: SURGERY

## 2017-11-14 PROCEDURE — 4004F PT TOBACCO SCREEN RCVD TLK: CPT | Performed by: SURGERY

## 2017-11-14 PROCEDURE — 99215 OFFICE O/P EST HI 40 MIN: CPT | Performed by: SURGERY

## 2017-11-14 RX ORDER — DOXYCYCLINE HYCLATE 100 MG/1
100 CAPSULE ORAL 2 TIMES DAILY
Qty: 20 CAPSULE | Refills: 0 | Status: SHIPPED | OUTPATIENT
Start: 2017-11-14 | End: 2017-11-24

## 2017-11-14 RX ORDER — ACETAMINOPHEN 650 MG
TABLET, EXTENDED RELEASE ORAL
Qty: 472 ML | Refills: 2 | Status: SHIPPED | OUTPATIENT
Start: 2017-11-14 | End: 2017-11-21

## 2017-11-14 NOTE — PROGRESS NOTES
Name:  Jerome Celeste  Age:  44 y.o.   :  1980    Physician: Jessie Grace MD       Chief Complaint: This 44years old white male is seen for a repair of recurrent left inguinal hernia. He had repair of left inguinal hernia several years ago and had recurrent bulge in the left groin for the last 2-3 years. He also complains of lumps in the groin that she usually squeezes and drain. He is known to have a petite mal seizure disorder. History of head injury. Has a history of hiatal hernia repair of hiatal hernia and history of sigmoid diverticulosis and multiple polyps. He smokes a package of cigarettes per day. He drinks alcohol socially    Social History: @  Social History     Social History    Marital status:      Spouse name: N/A    Number of children: N/A    Years of education: N/A     Occupational History    Not on file.      Social History Main Topics    Smoking status: Current Every Day Smoker     Packs/day: 0.75     Years: 20.00     Types: Cigarettes     Last attempt to quit: 2016    Smokeless tobacco: Never Used      Comment: 2-3 weekly    Alcohol use No    Drug use: No    Sexual activity: Not on file     Other Topics Concern    Not on file     Social History Narrative    No narrative on file       Family History: @  Family History   Problem Relation Age of Onset    COPD Mother     High Blood Pressure Mother     Cancer Father      Hodgekin's Lymphoma    High Blood Pressure Father     Eczema Sister     Alzheimer's Disease Maternal Grandmother     COPD Maternal Grandmother     Cancer Maternal Grandmother     Cancer Paternal Grandmother     Cancer Paternal Grandfather     Cancer Sister      colon cancer    High Blood Pressure Sister     Arthritis Sister     Asthma Daughter     Eczema Daughter     Glaucoma Neg Hx        Past Medical History:        Diagnosis Date    Anxiety, mild     Colon polyps     Depression     Dizziness     Eczema     Epilepsy tablet take 1 tablet by mouth twice a day 60 tablet 3    acetaminophen (TYLENOL) 500 MG tablet Take 500 mg by mouth every 4 hours as needed for Pain      triamcinolone (ARISTOCORT) 0.5 % cream APPLY TOPICALLY THREE TIMES DAILY 60 g 0    SUMAtriptan (IMITREX) 50 MG tablet Take 1 tablet by mouth once as needed for Migraine 30 tablet 2     No current facility-administered medications on file prior to visit. No Known Allergies     reports that he has been smoking Cigarettes. He has a 15.00 pack-year smoking history. He has never used smokeless tobacco.  reports that he does not drink alcohol. Review of Systems - History obtained from chart review and the patient  General ROS: negative  Psychological ROS: Depression  Ophthalmic ROS: negative  Hematological and Lymphatic ROS: negative  Endocrine ROS: negative  Respiratory ROS: no cough, shortness of breath, or wheezing  Cardiovascular ROS: no chest pain or dyspnea on exertion  Gastrointestinal ROS: Hiatal hernia repair  Genito-Urinary ROS: no dysuria, trouble voiding, or hematuria  Musculoskeletal ROS: negative  Neurological ROS: History of head injury and seizure disorder  Dermatological ROS: Acne        Physical Exam:    /70   Pulse 84   Temp 97.3 °F (36.3 °C) (Tympanic)   Resp 12   Ht 5' 6\" (1.676 m)   Wt 165 lb (74.8 kg)   BMI 26.63 kg/m²   Weight: 165 lb (74.8 kg)     General Appearance:  awake, alert, oriented, in no acute distress, well developed, well nourished, in no acute distress, cooperative and ambulatory  Head/face:  NCAT  Neck:  neck- supple, no mass, non-tender and no bruits  Lungs:  Normal expansion. Clear to auscultation. No rales, rhonchi, or wheezing. Heart:  Heart sounds are normal.  Regular rate and rhythm without murmur, gallop or rub. Heart regular rate and rhythm  Abdomen:  Soft, non-tender, normal bowel sounds. No bruits, organomegaly or masses.   There is a visible bulge in the left groin with palpable impulse through the floor of the inguinal canal representing a direct recurrent inguinal hernia. There is mild impulse through the internal ring on the right side. There are multiple areas of resolving folliculitis of the left groin and left scrotum. Extremities: Extremities warm to touch, pink, with no edema. Musculoskeletal:  No joint swelling, deformity, or tenderness. Assessment:  Recurrent left inguinal hernia, folliculitis of the left groin and scrotum. The patient is advised to wash with Betadine soap daily. He is advised to have left inguinal hernia repair possibly with mesh. Plan:  Betadine wash daily. Vibra tab 100 mg 2 times daily for 10 days. Return after 2 weeks will scheduled for hernia repair after resolution of the skin infection.     Electronically signed by Corrie Hough MD on 11/14/2017 at 3:47 PM

## 2017-11-28 ENCOUNTER — APPOINTMENT (OUTPATIENT)
Dept: LAB | Age: 37
End: 2017-11-28
Payer: MEDICARE

## 2017-11-28 ENCOUNTER — HOSPITAL ENCOUNTER (OUTPATIENT)
Dept: GENERAL RADIOLOGY | Age: 37
Discharge: HOME | End: 2017-11-28
Payer: MEDICARE

## 2017-11-28 ENCOUNTER — TELEPHONE (OUTPATIENT)
Dept: SURGERY | Age: 37
End: 2017-11-28

## 2017-11-28 ENCOUNTER — OFFICE VISIT (OUTPATIENT)
Dept: SURGERY | Age: 37
End: 2017-11-28
Payer: MEDICARE

## 2017-11-28 VITALS
TEMPERATURE: 98.4 F | DIASTOLIC BLOOD PRESSURE: 78 MMHG | SYSTOLIC BLOOD PRESSURE: 108 MMHG | HEIGHT: 66 IN | WEIGHT: 163 LBS | RESPIRATION RATE: 16 BRPM | BODY MASS INDEX: 26.2 KG/M2 | HEART RATE: 82 BPM

## 2017-11-28 DIAGNOSIS — K40.91 RECURRENT LEFT INGUINAL HERNIA: ICD-10-CM

## 2017-11-28 DIAGNOSIS — Z13.0 SCREENING FOR DISORDER OF BLOOD AND BLOOD-FORMING ORGANS: ICD-10-CM

## 2017-11-28 DIAGNOSIS — Z01.818 PRE-OP TESTING: ICD-10-CM

## 2017-11-28 DIAGNOSIS — Z72.0 TOBACCO USE: Primary | ICD-10-CM

## 2017-11-28 DIAGNOSIS — Z72.0 TOBACCO USE: ICD-10-CM

## 2017-11-28 PROCEDURE — 71020 XR CHEST STANDARD TWO VW: CPT

## 2017-11-28 PROCEDURE — G8427 DOCREV CUR MEDS BY ELIG CLIN: HCPCS | Performed by: SURGERY

## 2017-11-28 PROCEDURE — G8484 FLU IMMUNIZE NO ADMIN: HCPCS | Performed by: SURGERY

## 2017-11-28 PROCEDURE — 4004F PT TOBACCO SCREEN RCVD TLK: CPT | Performed by: SURGERY

## 2017-11-28 PROCEDURE — G8417 CALC BMI ABV UP PARAM F/U: HCPCS | Performed by: SURGERY

## 2017-11-28 PROCEDURE — 99214 OFFICE O/P EST MOD 30 MIN: CPT | Performed by: SURGERY

## 2017-11-28 NOTE — TELEPHONE ENCOUNTER
[facility] SPECIALTY [facility]    Pre-Operative Evaluation/Consultation    Name:  Bautista Steele                                         Age:  44 y.o. MRN:  [id number]       :  1980   Date:  2017         Sex: male    There were no encounter diagnoses. Surgeon:  Dr. Pam Radford  Procedure (Planned):  Left inguinal hernia with mesh   Date Scheduled surgery: 17    Attending : No att. providers found    Primary Physician: Roseanna Barrientos  Cardiologist: Zen Cabrera    Type of Anesthesia Requested: General    Patient Medical history:  No Known Allergies  Patient Active Problem List   Diagnosis    Low back pain    Migraine    Anxiety, mild    Disturbance, sleep    Tobacco use    GERD (gastroesophageal reflux disease)    Hiatal hernia    Lumbar sprain    Head ache    Status migrainosus    Dizziness    Sleep difficulties    Depression    H/O fall    Head injury    Epilepsy ([address]    Seizure disorder ([address]    Astigmatism    Headache    Rectal bleed    Recurrent seizures ([address]    Cervical radiculopathy    Ventricular premature beats    Convulsions ([address]    Nausea and vomiting    Colon polyps    Convulsion, 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  Low back pain     Lumbar sprain     Migraine     Plantar fasciitis, bilateral     Seizure disorder ([address]     Sigmoid diverticulosis     Sleep difficulties     Status migrainosus     Tobacco use     Chronic. Past Surgical History:   Procedure Laterality Date    COLONOSCOPY  2011    Internal hemorrhoids, possible anal fissure, few scattered diverticula.     COLONOSCOPY  2010    Mild sigmoid diverticulosis.  COLONOSCOPY  08/28/2009    Sigmoid diverticulosis, internal hemorrhoids.  COLONOSCOPY  05/27/2008    Internal hemorrhoids.  COLONOSCOPY  11/19/2014    polyp in rectum    COLONOSCOPY  6/1/16    sigmoid diverticulosis, colon polyp, internal hemorrhoids    GASTRIC FUNDOPLICATION  83/11/4530    INGUINAL HERNIA REPAIR Right 05/30/2013    INGUINAL HERNIA REPAIR Left 01/24/2013    INGUINAL HERNIA REPAIR Right 09/24/2015    KNEE ARTHROSCOPY Left     UPPER GASTROINTESTINAL ENDOSCOPY  03/05/2010    Recurrent hiatal hernia.     UPPER GASTROINTESTINAL ENDOSCOPY  6/1/16    S/P brenda fundoplication, good repair, retained gastric fluid    VASECTOMY       Social History   Substance Use Topics    Smoking status: Current Every Day Smoker     Packs/day: 0.75     Years: 20.00     Types: Cigarettes     Last attempt to quit: 5/5/2016    Smokeless tobacco: Never Used      Comment: 2-3 weekly    Alcohol use No     Medications:  Current Outpatient Prescriptions   Medication Sig Dispense Refill    atorvastatin (LIPITOR) 40 MG tablet take 1 tablet by mouth once daily 30 tablet 0    atorvastatin (LIPITOR) 20 MG tablet Take 20 mg by mouth daily      clonazePAM (KLONOPIN) 0.5 MG tablet take 1 tablet by mouth at bedtime (Patient taking differently: Taking 0.25 mg daily) 30 tablet 1    divalproex (DEPAKOTE) 250 MG DR tablet take 3 tablets by mouth twice a day 180 tablet 5    promethazine (PHENERGAN) 25 MG tablet Take 1 tablet by mouth every 8 hours as needed for Nausea 60 tablet 2    SUMAtriptan (IMITREX) 50 MG tablet Take 1 tablet by mouth once as needed for Migraine 30 tablet 2    topiramate (TOPAMAX) 200 MG tablet take 1 tablet by mouth twice a day 60 tablet 0    VIMPAT 200 MG tablet take 1 tablet by mouth twice a day 60 tablet 3    acetaminophen (TYLENOL) 500 MG tablet Take 500 mg by mouth every 4 hours as needed for Pain      triamcinolone (ARISTOCORT) 0.5 % cream APPLY TOPICALLY THREE Medical Clearance:None   Appointment for surgery Clearance scheduled for:None     Preoperative Testing: These are the current and completed labs:  CBC:   Lab Results   Component Value Date    WBC 9.3 09/28/2017    RBC 4.71 09/28/2017    HGB 16.1 09/28/2017    HCT 47.4 09/28/2017    .5 09/28/2017    RDW 12.9 09/28/2017     09/28/2017     CMP:   Lab Results   Component Value Date     06/05/2017    K 4.2 06/05/2017     06/05/2017    CO2 22 06/05/2017    BUN 13 03/11/2017    CREATININE 0.85 03/11/2017    GFRAA >60 03/11/2017    LABGLOM >60 03/11/2017    GLUCOSE 94 03/11/2017    PROT 7.6 04/08/2014    CALCIUM 8.9 03/11/2017    BILITOT 0.45 04/08/2014    ALKPHOS 80 04/08/2014    AST 25 04/04/2016    ALT 32 04/04/2016     POC Tests: No results for input(s): POCGLU, POCNA, POCK, POCCL, POCBUN, POCHEMO, POCHCT in the last 72 hours.   Coags    Lab Results   Component Value Date    PROTIME 10.7 09/16/2015    INR 1.0 09/16/2015    APTT 26.2 37/49/6908     HCG (If Applicable) No results found for: PREGTESTUR, PREGSERUM, HCG, HCGQUANT   ABGs No results found for: PHART, PO2ART, FBP5LGM, XHL0YFQ, BEART, S6BBPSZH   Type & Screen (If Applicable)  No results found for: Emily Rico    Additional ordered pre-operative testing:  [x]CBC    []ABG      [x] BMP   []URINALYSIS   []CMP    []HCG   [x]COAGS PT/INR  []T&C  []LFTs   []TYPE AND SCREEN    [x] EKG  [x] Chest X-Ray  [] Other Radiology    [] Sent to Hospitalist None  [] Sent to Anesthesia for your review: None   [] Additional Orders: None     Comments:see epic   Requests: None    Signed: Ivan Lowe MA 11/28/2017 4:18 PM

## 2017-11-28 NOTE — PROGRESS NOTES
Name:  Carolina Porras  Age:  44 y.o.   :  1980     Physician: Mally Coronado MD         Chief Complaint: This 44years old white male is seen for a repair of recurrent left inguinal hernia. He had repair of left inguinal hernia several years ago and had recurrent bulge in the left groin for the last 2-3 years. He also complains of lumps in the groin that she usually squeezes and drain. He is known to have a petite mal seizure disorder. History of head injury. Has a history of hiatal hernia repair of hiatal hernia and history of sigmoid diverticulosis and multiple polyps. He smokes a package of cigarettes per day.   He drinks alcohol socially     Social History: @  Social History   Social History            Social History    Marital status:        Spouse name: N/A    Number of children: N/A    Years of education: N/A          Occupational History    Not on file.             Social History Main Topics    Smoking status: Current Every Day Smoker       Packs/day: 0.75       Years: 20.00       Types: Cigarettes       Last attempt to quit: 2016    Smokeless tobacco: Never Used         Comment: 2-3 weekly    Alcohol use No    Drug use: No    Sexual activity: Not on file           Other Topics Concern    Not on file          Social History Narrative    No narrative on file            Family History: @  Family History          Family History   Problem Relation Age of Onset    COPD Mother      High Blood Pressure Mother      Cancer Father         Hodgekin's Lymphoma    High Blood Pressure Father      Eczema Sister      Alzheimer's Disease Maternal Grandmother      COPD Maternal Grandmother      Cancer Maternal Grandmother      Cancer Paternal Grandmother      Cancer Paternal Grandfather      Cancer Sister         colon cancer    High Blood Pressure Sister      Arthritis Sister      Asthma Daughter      Eczema Daughter      Glaucoma Neg Hx              Past Medical twice a day 180 tablet 5    promethazine (PHENERGAN) 25 MG tablet Take 1 tablet by mouth every 8 hours as needed for Nausea 60 tablet 2    topiramate (TOPAMAX) 200 MG tablet take 1 tablet by mouth twice a day 60 tablet 0    VIMPAT 200 MG tablet take 1 tablet by mouth twice a day 60 tablet 3    acetaminophen (TYLENOL) 500 MG tablet Take 500 mg by mouth every 4 hours as needed for Pain        triamcinolone (ARISTOCORT) 0.5 % cream APPLY TOPICALLY THREE TIMES DAILY 60 g 0    SUMAtriptan (IMITREX) 50 MG tablet Take 1 tablet by mouth once as needed for Migraine 30 tablet 2      No current facility-administered medications on file prior to visit.          No Known Allergies      reports that he has been smoking Cigarettes. He has a 15.00 pack-year smoking history. He has never used smokeless tobacco.  reports that he does not drink alcohol.       Review of Systems - History obtained from chart review and the patient  General ROS: negative  Psychological ROS: Depression  Ophthalmic ROS: negative  Hematological and Lymphatic ROS: negative  Endocrine ROS: negative  Respiratory ROS: no cough, shortness of breath, or wheezing  Cardiovascular ROS: no chest pain or dyspnea on exertion  Gastrointestinal ROS: Hiatal hernia repair  Genito-Urinary ROS: no dysuria, trouble voiding, or hematuria  Musculoskeletal ROS: negative  Neurological ROS: History of head injury and seizure disorder  Dermatological ROS: Acne           Physical Exam:     /70   Pulse 84   Temp 97.3 °F (36.3 °C) (Tympanic)   Resp 12   Ht 5' 6\" (1.676 m)   Wt 165 lb (74.8 kg)   BMI 26.63 kg/m²   Weight: 165 lb (74.8 kg)      General Appearance:  awake, alert, oriented, in no acute distress, well developed, well nourished, in no acute distress, cooperative and ambulatory  Head/face:  NCAT  Neck:  neck- supple, no mass, non-tender and no bruits  Lungs:  Normal expansion. Clear to auscultation. No rales, rhonchi, or wheezing.   Heart:  Heart sounds are normal.  Regular rate and rhythm without murmur, gallop or rub. Heart regular rate and rhythm  Abdomen:  Soft, non-tender, normal bowel sounds. No bruits, organomegaly or masses. There is a visible bulge in the left groin with palpable impulse through the floor of the inguinal canal representing a direct recurrent inguinal hernia. There is mild impulse through the internal ring on the right side. There are multiple areas of resolving folliculitis of the left groin and left scrotum. Extremities: Extremities warm to touch, pink, with no edema. Musculoskeletal:  No joint swelling, deformity, or tenderness.          Assessment:  Recurrent left inguinal hernia, folliculitis of the left groin and scrotum. The patient is advised to wash with Betadine soap daily. He is advised to have left inguinal hernia repair possibly with mesh.     Plan:  Betadine wash daily. Vibra tab 100 mg 2 times daily for 10 days. Return after 2 weeks will scheduled for hernia repair after resolution of the skin infection.     Electronically signed by Mally Coronado MD on 11/14/2017 at 3:47 PM        Progress note November 28 2017  The patient finished his antibiotic and has been washing the groins with Betadine soap daily and the rash in the groin has completely resolved. He is ready to be scheduled for repair of recurrent left inguinal hernia with mesh. Scheduled for the above.

## 2017-11-29 ENCOUNTER — ANESTHESIA EVENT (OUTPATIENT)
Dept: OPERATING ROOM | Age: 37
End: 2017-11-29
Payer: MEDICARE

## 2017-11-29 NOTE — H&P
Name:  Carolina Porras  Age:  44 y.o.   :  1980     Physician: Mally Coronado MD         Chief Complaint: This 44years old white male is seen for a repair of recurrent left inguinal hernia. He had repair of left inguinal hernia several years ago and had recurrent bulge in the left groin for the last 2-3 years. He also complains of lumps in the groin that she usually squeezes and drain. He is known to have a petite mal seizure disorder. History of head injury. Has a history of hiatal hernia repair of hiatal hernia and history of sigmoid diverticulosis and multiple polyps. He smokes a package of cigarettes per day.   He drinks alcohol socially     Social History: @  Social History   Social History            Social History    Marital status:        Spouse name: N/A    Number of children: N/A    Years of education: N/A          Occupational History    Not on file.             Social History Main Topics    Smoking status: Current Every Day Smoker       Packs/day: 0.75       Years: 20.00       Types: Cigarettes       Last attempt to quit: 2016    Smokeless tobacco: Never Used         Comment: 2-3 weekly    Alcohol use No    Drug use: No    Sexual activity: Not on file           Other Topics Concern    Not on file          Social History Narrative    No narrative on file            Family History: @  Family History          Family History   Problem Relation Age of Onset    COPD Mother      High Blood Pressure Mother      Cancer Father         Hodgekin's Lymphoma    High Blood Pressure Father      Eczema Sister      Alzheimer's Disease Maternal Grandmother      COPD Maternal Grandmother      Cancer Maternal Grandmother      Cancer Paternal Grandmother      Cancer Paternal Grandfather      Cancer Sister         colon cancer    High Blood Pressure Sister      Arthritis Sister      Asthma Daughter      Eczema Daughter      Glaucoma Neg Hx              Past Medical are normal.  Regular rate and rhythm without murmur, gallop or rub. Heart regular rate and rhythm  Abdomen:  Soft, non-tender, normal bowel sounds. No bruits, organomegaly or masses. There is a visible bulge in the left groin with palpable impulse through the floor of the inguinal canal representing a direct recurrent inguinal hernia. There is mild impulse through the internal ring on the right side. There are multiple areas of resolving folliculitis of the left groin and left scrotum. Extremities: Extremities warm to touch, pink, with no edema. Musculoskeletal:  No joint swelling, deformity, or tenderness.          Assessment:  Recurrent left inguinal hernia, folliculitis of the left groin and scrotum. The patient is advised to wash with Betadine soap daily. He is advised to have left inguinal hernia repair possibly with mesh.     Plan:  Betadine wash daily. Vibra tab 100 mg 2 times daily for 10 days. Return after 2 weeks will scheduled for hernia repair after resolution of the skin infection. Plan:  For repair of recurrent left inguinal hernia with mesh

## 2017-11-30 ENCOUNTER — HOSPITAL ENCOUNTER (OUTPATIENT)
Age: 37
Setting detail: OUTPATIENT SURGERY
Discharge: HOME | End: 2017-11-30
Attending: SURGERY | Admitting: SURGERY
Payer: MEDICARE

## 2017-11-30 ENCOUNTER — ANESTHESIA (OUTPATIENT)
Dept: OPERATING ROOM | Age: 37
End: 2017-11-30
Payer: MEDICARE

## 2017-11-30 VITALS
RESPIRATION RATE: 16 BRPM | SYSTOLIC BLOOD PRESSURE: 113 MMHG | WEIGHT: 162.4 LBS | TEMPERATURE: 99.2 F | DIASTOLIC BLOOD PRESSURE: 81 MMHG | OXYGEN SATURATION: 97 % | HEIGHT: 66 IN | HEART RATE: 87 BPM | BODY MASS INDEX: 26.1 KG/M2

## 2017-11-30 VITALS
SYSTOLIC BLOOD PRESSURE: 106 MMHG | TEMPERATURE: 86.9 F | DIASTOLIC BLOOD PRESSURE: 59 MMHG | RESPIRATION RATE: 3 BRPM | OXYGEN SATURATION: 97 %

## 2017-11-30 PROCEDURE — 7100000001 HC PACU RECOVERY - ADDTL 15 MIN: Performed by: SURGERY

## 2017-11-30 PROCEDURE — 6360000002 HC RX W HCPCS: Performed by: NURSE ANESTHETIST, CERTIFIED REGISTERED

## 2017-11-30 PROCEDURE — 6370000000 HC RX 637 (ALT 250 FOR IP): Performed by: SURGERY

## 2017-11-30 PROCEDURE — A6402 STERILE GAUZE <= 16 SQ IN: HCPCS | Performed by: SURGERY

## 2017-11-30 PROCEDURE — 00830 ANES HERNIA RPR LWR ABD NOS: CPT | Performed by: NURSE ANESTHETIST, CERTIFIED REGISTERED

## 2017-11-30 PROCEDURE — 3600000002 HC SURGERY LEVEL 2 BASE: Performed by: SURGERY

## 2017-11-30 PROCEDURE — 3600000012 HC SURGERY LEVEL 2 ADDTL 15MIN: Performed by: SURGERY

## 2017-11-30 PROCEDURE — 2500000003 HC RX 250 WO HCPCS: Performed by: NURSE ANESTHETIST, CERTIFIED REGISTERED

## 2017-11-30 PROCEDURE — 7100000000 HC PACU RECOVERY - FIRST 15 MIN: Performed by: SURGERY

## 2017-11-30 PROCEDURE — 2580000003 HC RX 258: Performed by: SURGERY

## 2017-11-30 PROCEDURE — 88302 TISSUE EXAM BY PATHOLOGIST: CPT

## 2017-11-30 PROCEDURE — 3700000001 HC ADD 15 MINUTES (ANESTHESIA): Performed by: SURGERY

## 2017-11-30 PROCEDURE — 7100000010 HC PHASE II RECOVERY - FIRST 15 MIN: Performed by: SURGERY

## 2017-11-30 PROCEDURE — 7100000011 HC PHASE II RECOVERY - ADDTL 15 MIN: Performed by: SURGERY

## 2017-11-30 PROCEDURE — A6257 TRANSPARENT FILM <= 16 SQ IN: HCPCS | Performed by: SURGERY

## 2017-11-30 PROCEDURE — C1781 MESH (IMPLANTABLE): HCPCS | Performed by: SURGERY

## 2017-11-30 PROCEDURE — 3700000000 HC ANESTHESIA ATTENDED CARE: Performed by: SURGERY

## 2017-11-30 PROCEDURE — 49520 REREPAIR ING HERNIA REDUCE: CPT | Performed by: SURGERY

## 2017-11-30 PROCEDURE — 2500000003 HC RX 250 WO HCPCS

## 2017-11-30 DEVICE — MESH HERN W7.5XL15CM INGUINAL POLYPR SYN FLAT L PORE MFIL: Type: IMPLANTABLE DEVICE | Site: GROIN | Status: FUNCTIONAL

## 2017-11-30 RX ORDER — METOCLOPRAMIDE HYDROCHLORIDE 5 MG/ML
INJECTION INTRAMUSCULAR; INTRAVENOUS PRN
Status: DISCONTINUED | OUTPATIENT
Start: 2017-11-30 | End: 2017-11-30 | Stop reason: SDUPTHER

## 2017-11-30 RX ORDER — FENTANYL CITRATE 50 UG/ML
INJECTION, SOLUTION INTRAMUSCULAR; INTRAVENOUS PRN
Status: DISCONTINUED | OUTPATIENT
Start: 2017-11-30 | End: 2017-11-30 | Stop reason: SDUPTHER

## 2017-11-30 RX ORDER — SODIUM CHLORIDE, SODIUM LACTATE, POTASSIUM CHLORIDE, CALCIUM CHLORIDE 600; 310; 30; 20 MG/100ML; MG/100ML; MG/100ML; MG/100ML
INJECTION, SOLUTION INTRAVENOUS CONTINUOUS
Status: DISCONTINUED | OUTPATIENT
Start: 2017-11-30 | End: 2017-11-30 | Stop reason: HOSPADM

## 2017-11-30 RX ORDER — HYDROCODONE BITARTRATE AND ACETAMINOPHEN 5; 325 MG/1; MG/1
1 TABLET ORAL EVERY 6 HOURS PRN
Qty: 20 TABLET | Refills: 0 | Status: SHIPPED | OUTPATIENT
Start: 2017-11-30 | End: 2017-12-05

## 2017-11-30 RX ORDER — MORPHINE SULFATE 10 MG/ML
INJECTION, SOLUTION INTRAMUSCULAR; INTRAVENOUS PRN
Status: DISCONTINUED | OUTPATIENT
Start: 2017-11-30 | End: 2017-11-30 | Stop reason: SDUPTHER

## 2017-11-30 RX ORDER — ONDANSETRON 2 MG/ML
INJECTION INTRAMUSCULAR; INTRAVENOUS PRN
Status: DISCONTINUED | OUTPATIENT
Start: 2017-11-30 | End: 2017-11-30 | Stop reason: SDUPTHER

## 2017-11-30 RX ORDER — MIDAZOLAM HYDROCHLORIDE 1 MG/ML
INJECTION INTRAMUSCULAR; INTRAVENOUS PRN
Status: DISCONTINUED | OUTPATIENT
Start: 2017-11-30 | End: 2017-11-30 | Stop reason: SDUPTHER

## 2017-11-30 RX ORDER — HYDROCODONE BITARTRATE AND ACETAMINOPHEN 5; 325 MG/1; MG/1
2 TABLET ORAL ONCE
Status: COMPLETED | OUTPATIENT
Start: 2017-11-30 | End: 2017-11-30

## 2017-11-30 RX ORDER — PROPOFOL 10 MG/ML
INJECTION, EMULSION INTRAVENOUS PRN
Status: DISCONTINUED | OUTPATIENT
Start: 2017-11-30 | End: 2017-11-30 | Stop reason: SDUPTHER

## 2017-11-30 RX ORDER — LIDOCAINE HYDROCHLORIDE 40 MG/ML
INJECTION, SOLUTION RETROBULBAR; TOPICAL PRN
Status: DISCONTINUED | OUTPATIENT
Start: 2017-11-30 | End: 2017-11-30 | Stop reason: SDUPTHER

## 2017-11-30 RX ORDER — CEFAZOLIN SODIUM 1 G/3ML
INJECTION, POWDER, FOR SOLUTION INTRAMUSCULAR; INTRAVENOUS PRN
Status: DISCONTINUED | OUTPATIENT
Start: 2017-11-30 | End: 2017-11-30 | Stop reason: SDUPTHER

## 2017-11-30 RX ADMIN — PROPOFOL 150 MG: 10 INJECTION, EMULSION INTRAVENOUS at 14:07

## 2017-11-30 RX ADMIN — SODIUM CHLORIDE, POTASSIUM CHLORIDE, SODIUM LACTATE AND CALCIUM CHLORIDE: 600; 310; 30; 20 INJECTION, SOLUTION INTRAVENOUS at 14:40

## 2017-11-30 RX ADMIN — MIDAZOLAM HYDROCHLORIDE 2 MG: 1 INJECTION, SOLUTION INTRAMUSCULAR; INTRAVENOUS at 14:00

## 2017-11-30 RX ADMIN — FENTANYL CITRATE 50 MCG: 50 INJECTION, SOLUTION INTRAMUSCULAR; INTRAVENOUS at 14:00

## 2017-11-30 RX ADMIN — FENTANYL CITRATE 50 MCG: 50 INJECTION, SOLUTION INTRAMUSCULAR; INTRAVENOUS at 14:36

## 2017-11-30 RX ADMIN — MORPHINE SULFATE 3 MG: 10 INJECTION, SOLUTION INTRAMUSCULAR; INTRAVENOUS at 15:47

## 2017-11-30 RX ADMIN — SODIUM CHLORIDE, POTASSIUM CHLORIDE, SODIUM LACTATE AND CALCIUM CHLORIDE: 600; 310; 30; 20 INJECTION, SOLUTION INTRAVENOUS at 13:32

## 2017-11-30 RX ADMIN — LIDOCAINE HYDROCHLORIDE 80 MG: 40 INJECTION, SOLUTION RETROBULBAR; TOPICAL at 14:07

## 2017-11-30 RX ADMIN — HYDROCODONE BITARTRATE AND ACETAMINOPHEN 2 TABLET: 5; 325 TABLET ORAL at 16:31

## 2017-11-30 RX ADMIN — METOCLOPRAMIDE 10 MG: 5 INJECTION, SOLUTION INTRAMUSCULAR; INTRAVENOUS at 14:19

## 2017-11-30 RX ADMIN — ONDANSETRON 4 MG: 2 INJECTION INTRAMUSCULAR; INTRAVENOUS at 14:28

## 2017-11-30 RX ADMIN — CEFAZOLIN 1000 MG: 1 INJECTION, POWDER, FOR SOLUTION INTRAMUSCULAR; INTRAVENOUS; PARENTERAL at 15:52

## 2017-11-30 RX ADMIN — MORPHINE SULFATE 3 MG: 10 INJECTION, SOLUTION INTRAMUSCULAR; INTRAVENOUS at 15:50

## 2017-11-30 RX ADMIN — MORPHINE SULFATE 4 MG: 10 INJECTION, SOLUTION INTRAMUSCULAR; INTRAVENOUS at 15:12

## 2017-11-30 ASSESSMENT — PULMONARY FUNCTION TESTS
PIF_VALUE: 13
PIF_VALUE: 11
PIF_VALUE: 16
PIF_VALUE: 14
PIF_VALUE: 16
PIF_VALUE: 13
PIF_VALUE: 0
PIF_VALUE: 0
PIF_VALUE: 13
PIF_VALUE: 16
PIF_VALUE: 14
PIF_VALUE: 13
PIF_VALUE: 14
PIF_VALUE: 13
PIF_VALUE: 13
PIF_VALUE: 1
PIF_VALUE: 13
PIF_VALUE: 2
PIF_VALUE: 16
PIF_VALUE: 13
PIF_VALUE: 13
PIF_VALUE: 16
PIF_VALUE: 16
PIF_VALUE: 15
PIF_VALUE: 13
PIF_VALUE: 13
PIF_VALUE: 11
PIF_VALUE: 13
PIF_VALUE: 13
PIF_VALUE: 16
PIF_VALUE: 13
PIF_VALUE: 13
PIF_VALUE: 11
PIF_VALUE: 2
PIF_VALUE: 14
PIF_VALUE: 15
PIF_VALUE: 13
PIF_VALUE: 11
PIF_VALUE: 13
PIF_VALUE: 16
PIF_VALUE: 13
PIF_VALUE: 16
PIF_VALUE: 13
PIF_VALUE: 16
PIF_VALUE: 13
PIF_VALUE: 4
PIF_VALUE: 13
PIF_VALUE: 11
PIF_VALUE: 14
PIF_VALUE: 14
PIF_VALUE: 17
PIF_VALUE: 13
PIF_VALUE: 14
PIF_VALUE: 14
PIF_VALUE: 3
PIF_VALUE: 13
PIF_VALUE: 16
PIF_VALUE: 13
PIF_VALUE: 1
PIF_VALUE: 11
PIF_VALUE: 13
PIF_VALUE: 11
PIF_VALUE: 13
PIF_VALUE: 10
PIF_VALUE: 17
PIF_VALUE: 10
PIF_VALUE: 13
PIF_VALUE: 16
PIF_VALUE: 14
PIF_VALUE: 16
PIF_VALUE: 14
PIF_VALUE: 16
PIF_VALUE: 13
PIF_VALUE: 0
PIF_VALUE: 14
PIF_VALUE: 2
PIF_VALUE: 17
PIF_VALUE: 15
PIF_VALUE: 16
PIF_VALUE: 16
PIF_VALUE: 14
PIF_VALUE: 13
PIF_VALUE: 16
PIF_VALUE: 13
PIF_VALUE: 13
PIF_VALUE: 14
PIF_VALUE: 16
PIF_VALUE: 13
PIF_VALUE: 13
PIF_VALUE: 14
PIF_VALUE: 13

## 2017-11-30 ASSESSMENT — PAIN DESCRIPTION - LOCATION
LOCATION: GROIN

## 2017-11-30 ASSESSMENT — PAIN SCALES - GENERAL
PAINLEVEL_OUTOF10: 5
PAINLEVEL_OUTOF10: 5
PAINLEVEL_OUTOF10: 6
PAINLEVEL_OUTOF10: 4
PAINLEVEL_OUTOF10: 5
PAINLEVEL_OUTOF10: 2
PAINLEVEL_OUTOF10: 4

## 2017-11-30 ASSESSMENT — PAIN DESCRIPTION - ORIENTATION
ORIENTATION: LEFT

## 2017-11-30 ASSESSMENT — PAIN - FUNCTIONAL ASSESSMENT: PAIN_FUNCTIONAL_ASSESSMENT: 0-10

## 2017-11-30 NOTE — ANESTHESIA PRE PROCEDURE
Department of Anesthesiology  Preprocedure Note       Name:  Nazario Dyer   Age:  44 y.o.  :  1980                                          MRN:  [id number]         Date:  2017      Surgeon: Celia Park):  Caroline Geiger MD    Procedure: Procedure(s):  Left Inguinal Hernia Repair w/ Mesh    Medications prior to admission:   Prior to Admission medications    Medication Sig Start Date End Date Taking?  Authorizing Provider   atorvastatin (LIPITOR) 20 MG tablet Take 20 mg by mouth daily   Yes Historical Provider, MD   clonazePAM (KLONOPIN) 0.5 MG tablet take 1 tablet by mouth at bedtime  Patient taking differently: Taking 0.25 mg daily 10/2/17  Yes Gordy Oliver MD   divalproex (DEPAKOTE) 250 MG DR tablet take 3 tablets by mouth twice a day 17  Yes Gordy Oliver MD   promethazine (PHENERGAN) 25 MG tablet Take 1 tablet by mouth every 8 hours as needed for Nausea 17  Yes Gordy Oliver MD   SUMAtriptan (IMITREX) 50 MG tablet Take 1 tablet by mouth once as needed for Migraine 17 35/32/04 Yes Gordy Oliver MD   topiramate (TOPAMAX) 200 MG tablet take 1 tablet by mouth twice a day 17  Yes Gordy Oliver MD   VIMPAT 200 MG tablet take 1 tablet by mouth twice a day 17  Yes Johnnie Patel MD   acetaminophen (TYLENOL) 500 MG tablet Take 500 mg by mouth every 4 hours as needed for Pain   Yes Historical Provider, MD   triamcinolone (ARISTOCORT) 0.5 % cream APPLY TOPICALLY THREE TIMES DAILY 9/24/15   Johnnie Patel MD       Current medications:    Current Facility-Administered Medications   Medication Dose Route Frequency Provider Last Rate Last Dose    lactated ringers infusion   Intravenous Continuous Sourashad Ramos  mL/hr at 17 1332         Allergies:  No Known Allergies    Problem List:    Patient Active Problem List   Diagnosis Code    Low back pain M54.5    Migraine G43.909    Anxiety, mild F41.9    Disturbance, sleep G47.9    Tobacco use Z72.0    GERD (gastroesophageal reflux disease) K21.9    Hiatal hernia K44.9    Lumbar sprain S33. 5XXA    Head ache R51    Status migrainosus G43.901    Dizziness R42    Sleep difficulties G47.9    Depression F32.9    H/O fall Z91.81    Head injury S09.90XA    Epilepsy (HCC) G40.909    Seizure disorder ([facility]) G40.909    Astigmatism H52.209    Headache R51    Rectal bleed K62.5    Recurrent seizures ([facility]) G40.909    Cervical radiculopathy M54.12    Ventricular premature beats I49.3    Convulsions ([facility]) R56.9    Nausea and vomiting R11.2    Colon polyps K63.5    Convulsion, non-epileptic ([address] R56.9    TIA (transient ischemic attack) G45.9    VBI (vertebrobasilar insufficiency) G45.0       Past Medical History:        Diagnosis Date    Anxiety, mild     Colon polyps     Depression     Dizziness     Eczema     Epilepsy (HCC)     GERD (gastroesophageal reflux disease)     H/O fall     Head ache     Head injury     Hiatal hernia     Inguinal hernia     Right.  Low back pain     Lumbar sprain     Migraine     Plantar fasciitis, bilateral     Seizure disorder ([address]     Sigmoid diverticulosis     Sleep difficulties     Status migrainosus     TIA (transient ischemic attack) 09/2017    Tobacco use     Chronic. Past Surgical History:        Procedure Laterality Date    COLONOSCOPY  04/29/2011    Internal hemorrhoids, possible anal fissure, few scattered diverticula.  COLONOSCOPY  06/04/2010    Mild sigmoid diverticulosis.  COLONOSCOPY  08/28/2009    Sigmoid diverticulosis, internal hemorrhoids.  COLONOSCOPY  05/27/2008    Internal hemorrhoids.     COLONOSCOPY  11/19/2014    polyp in rectum    COLONOSCOPY  6/1/16    sigmoid diverticulosis, colon polyp, internal hemorrhoids    GASTRIC FUNDOPLICATION  21/02/8641    INGUINAL HERNIA REPAIR Right 05/30/2013    INGUINAL HERNIA REPAIR Left 01/24/2013    INGUINAL HERNIA REPAIR Right 09/24/2015    KNEE medications is complete and the patient has returned to baseline status. Electronically signed by:  John Cerrato CRNA,MSN,11/30/2017 1:47 PM  Staff Anesthetist  [facility]  Dept of Anesthesia  )  Induction: intravenous. Anesthetic plan and risks discussed with patient. Use of blood products discussed with patient whom consented to blood products.    Plan discussed with surgical team.                  John Cerrato CRNA   11/30/2017

## 2017-11-30 NOTE — OP NOTE
Operative note    [facility]  YOB: 1980  [id number]    Pre-operative Diagnosis: Recurrent left inguinal hernia    Post-operative Diagnosis: Same    Procedure: Repair of recurrent left inguinal hernia with insertion of soft mesh overlay    Anesthesia: General with local infiltration with 1% Xylocaine solution 0.5% Marcaine solution using approximately 30 mL    Anesthetist: YOLANDE Reynoso    Surgeons/Assistants: Bing Arredondo    Estimated Blood Loss: Less than 25 mL    Complications: None    Specimens: Was Obtained: Direct hernia sac and scar tissue    Findings: There was the hernia sac through the floor of the inguinal canal and defect approximately 2-1/2 cm in diameter below the internal inguinal ring. There was significant scarring from previous repair    The procedure: The patient was put in supine position and given general anesthesia. The lower abdomen and genitalia were prepped with Betadine soap and solution and draped in the usual manner exposing the left groin. The skin and subcu tissue were infiltrated at the level of the external inguinal ring or the anesthetic solution and a transverse incision was made through the previous scar at the level of the external inguinal ring. The skin was carried through the subcutaneous tissue down to the fascia. The spermatic cord was identified and was elevated off of the floor of the inguinal canal where the hernia defect is identified and dissected free completely from the surrounding fascia and the overlying cord. The sac was then excised and the hernia was reduced utilizing a #0 Vicryl pursestring suture. The edges of the fascia wasn't enforced with a continuous #0 Vicryl suture applied to the transversalis fascia.   A Lin T piece of soft mesh was then inserted and an overlay fashion over the repair anchoring the mesh laterally to the shelving edge of the Poupart ligament and medially to the sinus transversalis fascia and conjoined tendon. The mesh was also extended around the internal inguinal ring loosely. The wound was then irrigated with saline and with Ancef solution utilizing 1 g and 500 mL of saline. The spermatic cord was then laid in its natural position and the septal incision was closed with a continuous and interrupted #3-0 Vicryl sutures. The skin was closed with staples proper sterile dry dressing was applied and the patient was transferred to the recovery room in satisfactory condition    Recommendations: Discharge home on Norco 5/325 mg every 6 hours as needed for pain and the patient may use Tylenol or ibuprofen between the Norco doses. He is to review the dressing on until she comes back to the office for removal of skin staples. He is to avoid strenuous efforts for 6 weeks. .    Electronically signed by Malachi Cuello MD on 11/30/2017 at 4:03 PM

## 2017-12-01 NOTE — TELEPHONE ENCOUNTER
Last Appt:  9/28/2017  Next Appt:   Visit date not found  Med verified in [facility] 10-2-17

## 2017-12-04 LAB — SURGICAL PATHOLOGY REPORT: NORMAL

## 2017-12-08 ENCOUNTER — OFFICE VISIT (OUTPATIENT)
Dept: SURGERY | Age: 37
End: 2017-12-08

## 2017-12-08 VITALS
HEIGHT: 66 IN | WEIGHT: 170 LBS | BODY MASS INDEX: 27.32 KG/M2 | SYSTOLIC BLOOD PRESSURE: 102 MMHG | RESPIRATION RATE: 16 BRPM | DIASTOLIC BLOOD PRESSURE: 78 MMHG | HEART RATE: 82 BPM | TEMPERATURE: 98 F

## 2017-12-08 DIAGNOSIS — Z87.19 STATUS POST INGUINAL HERNIA REPAIR: Primary | ICD-10-CM

## 2017-12-08 DIAGNOSIS — Z98.890 STATUS POST INGUINAL HERNIA REPAIR: Primary | ICD-10-CM

## 2017-12-08 PROCEDURE — 99024 POSTOP FOLLOW-UP VISIT: CPT | Performed by: SURGERY

## 2017-12-08 RX ORDER — HYDROCODONE BITARTRATE AND ACETAMINOPHEN 5; 325 MG/1; MG/1
1 TABLET ORAL EVERY 6 HOURS PRN
Qty: 28 TABLET | Refills: 0 | Status: SHIPPED | OUTPATIENT
Start: 2017-12-08 | End: 2017-12-15

## 2017-12-08 NOTE — PROGRESS NOTES
This patient is status one-week post repair of  recurrent left inguinal hernia with mesh. He complains of soreness in the operative site. He smokes and he has been coughing. He complains of constipation and slow flow of his urine. His wound is clean and dry and all staples were removed and Steri-Strips were applied. There is moderate induration in the area of the operative field however the repair appears to be intact. There is no swelling in the scrotum. He is advised to avoid strenuous efforts for the next 4  He is advised to return after 4 weeks for follow-up.

## 2017-12-08 NOTE — PATIENT INSTRUCTIONS
1 tablespoon of metamucil three times a day in a 8 ounce glass of juice or water. Return in 1 month.

## 2017-12-15 RX ORDER — ATORVASTATIN CALCIUM 40 MG/1
TABLET, FILM COATED ORAL
Qty: 30 TABLET | Refills: 5 | Status: SHIPPED | OUTPATIENT
Start: 2017-12-15 | End: 2018-01-17 | Stop reason: SDUPTHER

## 2018-01-17 ENCOUNTER — OFFICE VISIT (OUTPATIENT)
Dept: FAMILY MEDICINE CLINIC | Age: 38
End: 2018-01-17
Payer: MEDICARE

## 2018-01-17 ENCOUNTER — HOSPITAL ENCOUNTER (OUTPATIENT)
Dept: PHYSICAL THERAPY | Age: 38
Setting detail: THERAPIES SERIES
Discharge: HOME | End: 2018-01-17
Payer: MEDICARE

## 2018-01-17 VITALS
RESPIRATION RATE: 16 BRPM | HEIGHT: 65 IN | HEART RATE: 96 BPM | DIASTOLIC BLOOD PRESSURE: 76 MMHG | WEIGHT: 163 LBS | BODY MASS INDEX: 27.16 KG/M2 | SYSTOLIC BLOOD PRESSURE: 112 MMHG

## 2018-01-17 DIAGNOSIS — M54.2 CHRONIC MIDLINE POSTERIOR NECK PAIN: Primary | ICD-10-CM

## 2018-01-17 DIAGNOSIS — G89.29 CHRONIC MIDLINE POSTERIOR NECK PAIN: Primary | ICD-10-CM

## 2018-01-17 PROCEDURE — G8427 DOCREV CUR MEDS BY ELIG CLIN: HCPCS | Performed by: FAMILY MEDICINE

## 2018-01-17 PROCEDURE — 97110 THERAPEUTIC EXERCISES: CPT | Performed by: PHYSICAL THERAPIST

## 2018-01-17 PROCEDURE — G8484 FLU IMMUNIZE NO ADMIN: HCPCS | Performed by: FAMILY MEDICINE

## 2018-01-17 PROCEDURE — G8417 CALC BMI ABV UP PARAM F/U: HCPCS | Performed by: FAMILY MEDICINE

## 2018-01-17 PROCEDURE — 97162 PT EVAL MOD COMPLEX 30 MIN: CPT | Performed by: PHYSICAL THERAPIST

## 2018-01-17 PROCEDURE — G8982 BODY POS GOAL STATUS: HCPCS | Performed by: PHYSICAL THERAPIST

## 2018-01-17 PROCEDURE — 4004F PT TOBACCO SCREEN RCVD TLK: CPT | Performed by: FAMILY MEDICINE

## 2018-01-17 PROCEDURE — G8981 BODY POS CURRENT STATUS: HCPCS | Performed by: PHYSICAL THERAPIST

## 2018-01-17 PROCEDURE — 99214 OFFICE O/P EST MOD 30 MIN: CPT | Performed by: FAMILY MEDICINE

## 2018-01-17 ASSESSMENT — PATIENT HEALTH QUESTIONNAIRE - PHQ9
4. FEELING TIRED OR HAVING LITTLE ENERGY: 1
3. TROUBLE FALLING OR STAYING ASLEEP: 1
9. THOUGHTS THAT YOU WOULD BE BETTER OFF DEAD, OR OF HURTING YOURSELF: 0
1. LITTLE INTEREST OR PLEASURE IN DOING THINGS: 0
2. FEELING DOWN, DEPRESSED OR HOPELESS: 2
SUM OF ALL RESPONSES TO PHQ9 QUESTIONS 1 & 2: 2
6. FEELING BAD ABOUT YOURSELF - OR THAT YOU ARE A FAILURE OR HAVE LET YOURSELF OR YOUR FAMILY DOWN: 1
5. POOR APPETITE OR OVEREATING: 0
8. MOVING OR SPEAKING SO SLOWLY THAT OTHER PEOPLE COULD HAVE NOTICED. OR THE OPPOSITE, BEING SO FIGETY OR RESTLESS THAT YOU HAVE BEEN MOVING AROUND A LOT MORE THAN USUAL: 1
10. IF YOU CHECKED OFF ANY PROBLEMS, HOW DIFFICULT HAVE THESE PROBLEMS MADE IT FOR YOU TO DO YOUR WORK, TAKE CARE OF THINGS AT HOME, OR GET ALONG WITH OTHER PEOPLE: 1
SUM OF ALL RESPONSES TO PHQ QUESTIONS 1-9: 7
7. TROUBLE CONCENTRATING ON THINGS, SUCH AS READING THE NEWSPAPER OR WATCHING TELEVISION: 1

## 2018-01-17 ASSESSMENT — PAIN DESCRIPTION - ORIENTATION: ORIENTATION: RIGHT;LEFT

## 2018-01-17 ASSESSMENT — PAIN DESCRIPTION - FREQUENCY: FREQUENCY: CONTINUOUS

## 2018-01-17 ASSESSMENT — ENCOUNTER SYMPTOMS
ALLERGIC/IMMUNOLOGIC NEGATIVE: 1
COLOR CHANGE: 0
GASTROINTESTINAL NEGATIVE: 1
RESPIRATORY NEGATIVE: 1
EYES NEGATIVE: 1

## 2018-01-17 ASSESSMENT — PAIN DESCRIPTION - PROGRESSION: CLINICAL_PROGRESSION: NOT CHANGED

## 2018-01-17 ASSESSMENT — PAIN DESCRIPTION - PAIN TYPE: TYPE: CHRONIC PAIN

## 2018-01-17 ASSESSMENT — PAIN DESCRIPTION - ONSET: ONSET: ON-GOING

## 2018-01-17 ASSESSMENT — PAIN DESCRIPTION - LOCATION: LOCATION: NECK;HEAD

## 2018-01-17 ASSESSMENT — PAIN DESCRIPTION - DESCRIPTORS: DESCRIPTORS: ACHING;SHARP

## 2018-01-17 ASSESSMENT — PAIN SCALES - GENERAL: PAINLEVEL_OUTOF10: 7

## 2018-01-17 NOTE — PROGRESS NOTES
dizziness  Joint Mobility  Spine: OA joint flexion with sub occ mm tightness    Strength RUE  Comment: 4-  Strength LUE  Comment: 4-     Additional Measures  Special Tests: Mechanical exam yields to caution due to sensation of dizziness and history of seizures  Other: Relief with supine manual traction     Assessment   Conditions Requiring Skilled Therapeutic Intervention  Body structures, Functions, Activity limitations: Decreased ROM  Assessment: posture  syndrome  Treatment Diagnosis: M54.2, G89.29 chronic neck pain  Prognosis: Fair  Decision Making: Medium Complexity  History: Seizures  Clinical Presentation: c/o dizziness with C-spine AROM test  REQUIRES PT FOLLOW UP: Yes  Activity Tolerance  Activity Tolerance: Patient limited by pain  Activity Tolerance: History of seizures         Plan   Plan  Times per week: 2  Current Treatment Recommendations: ROM, Home Exercise Program    G-Code  PT G-Codes  Functional Assessment Tool Used: Neck pain Disability Index  Score: 33/50 = 66%  Functional Limitation: Changing and maintaining body position  Changing and Maintaining Body Position Current Status (): At least 60 percent but less than 80 percent impaired, limited or restricted  Changing and Maintaining Body Position Goal Status ():  At least 1 percent but less than 20 percent impaired, limited or restricted              Goals  Short term goals  Time Frame for Short term goals: 1 week  Short term goal 1: Start light HEP for posture correction  Long term goals  Time Frame for Long term goals : 4 weeks  Long term goal 1: Active rotation thru 60 deg B  Long term goal 2: Pain controlled 3/10 for basic ADL completeion  Long term goal 3: Able to lift 2# thru shld height       Therapy Time   Individual Concurrent Group Co-treatment   Time In 1410         Time Out 1455         Minutes 45                 [place], [place]

## 2018-01-17 NOTE — PROGRESS NOTES
tissue masses or abnormal fluid collections.       No cerebellar tonsillar ectopia. Visualized portions of posterior cranial fossa are normal.       Bone marrow, disc, CSF and cord signal is normal on all sequences.       Impression:       C6-C7: Broad-based disc protrusion asymmetric to the right has mass effect on the anterior roots of bilateral C7 nerves.          VIEWS OF THE CERVICAL SPINE       9/1/2017 11:14 am       COMPARISON:   None.       HISTORY:   ORDERING SYSTEM PROVIDED HISTORY: Neck discomfort   TECHNOLOGIST PROVIDED HISTORY:   With flexion ext   Reason for exam:->R/O atlanto occipital instability   Ordering Physician Provided Reason for Exam: Posterior neck pain for 2-3   months, no known injury, hx seizures   Acuity: Acute   Type of Exam: Initial       FINDINGS:   There is no fracture or traumatic malalignment of the cervical spine.  Spinal   alignment is unchanged in flexion and extension.  The vertebral heights are   normal.  The disc spaces are normal.  A small calcification is in the ventral   disc space at C5-6.  Prevertebral soft tissues are normal.  The lung apices   are clear.           Impression   No acute abnormality.       No change in alignment during flexion and extension             Review of Systems   Constitutional: Negative. Eyes: Negative. Respiratory: Negative. Cardiovascular: Negative. Gastrointestinal: Negative. Endocrine: Negative. Genitourinary: Negative. Musculoskeletal: Positive for neck pain and neck stiffness (harder to turn to the right.). Skin: Negative. Negative for color change. Allergic/Immunologic: Negative. Neurological: Positive for numbness (pain/numbness, increased sensitivity left lateral hand) and headaches. Hematological: Negative. Psychiatric/Behavioral: Negative. Objective:   Physical Exam   Constitutional: He is oriented to person, place, and time. He appears well-developed and well-nourished. No distress.    HENT: Head: Normocephalic and atraumatic. Eyes: Pupils are equal, round, and reactive to light. Neck: Neck supple. No thyromegaly present. Cardiovascular: Normal rate. No murmur heard. Pulmonary/Chest: Effort normal. No respiratory distress. Abdominal: He exhibits no distension. Neurological: He is alert and oriented to person, place, and time. Skin: No rash noted. Psychiatric: He has a normal mood and affect. His speech is normal and behavior is normal. Thought content is not delusional. Cognition and memory are normal. He does not express impulsivity (anxiety plays a role in his thought patterns. ). /76 (Site: Left Arm, Position: Sitting, Cuff Size: Medium Adult)   Pulse 96   Resp 16   Ht 5' 5\" (1.651 m)   Wt 163 lb (73.9 kg)   BMI 27.12 kg/m²     Assessment:      Acute on chronic neck pain. Prior MRI and xrays as above. Complicated by anxiety/perseveration/some malingering       Plan:          Cont. nsaids in the absence of any side effects. Rec. Continuing to focus on mechanical interventions: chiropractic and massage. Cont. Strengthening of neck musculature, stretching/improving rom. His insurance/disability wont pay for chiropractic. Ordering PT.

## 2018-01-17 NOTE — FLOWSHEET NOTE
coordination, kinesthetic sense, posture, motor skill, proprioception. (02061)    Therapeutic Activities:     [] Therapeutic activities, direct (one-on-one) patient contact (use of dynamic activities to improve functional performance). (87648)    Gait:   [] Provided training and instruction to the patient for ambulation re-education. (60456)    Self-Care/ADL's  [] Self-care/home management training and compensatory training, meal preparation, safety procedures, and instructions in use of assistive technology devices/adaptive equipment, direct one-on-one contact. (02394)    Home Exercise Program:   Sitting posture correction  [] Reviewed/Progressed HEP activities related to strengthening, flexibility, endurance, ROM. (49817)  [] Reviewed/Progressed HEP activities related to improving balance, coordination, kinesthetic sense, posture, motor skill, proprioception.  (69272)    Manual Treatments:    [] Provided manual therapy to mobilize soft tissue/joints for the purpose of modulating pain, promoting relaxation,  increasing ROM, reducing/eliminating soft tissue swelling/inflammation/restriction, improving soft tissue extensibility. (63614)    Service Based Modalities:      Timed Code Treatment Minutes:    There ex 25'    Total Treatment Minutes:   25'    Treatment/Activity Tolerance:  [] Patient tolerated treatment well [] Patient limited by fatique  [x] Patient limited by pain  [] Patient limited by other medical complications  [] Other:     Prognosis: [] Good [x] Fair  [] Poor    Patient Requires Follow-up: [x] Yes  [] No      Goals:  Short term goals  Time Frame for Short term goals: 1 week  Short term goal 1: Start light HEP for posture correction    Long term goals  Time Frame for Long term goals : 4 weeks  Long term goal 1: Active rotation thru 60 deg B  Long term goal 2: Pain controlled 3/10 for basic ADL completeion  Long term goal 3: Able to lift 2# thru shld height          Plan:   [] Continue per plan of

## 2018-01-23 ENCOUNTER — TELEPHONE (OUTPATIENT)
Dept: FAMILY MEDICINE CLINIC | Age: 38
End: 2018-01-23

## 2018-01-23 DIAGNOSIS — M54.2 CHRONIC MIDLINE POSTERIOR NECK PAIN: Primary | ICD-10-CM

## 2018-01-23 DIAGNOSIS — G89.29 CHRONIC MIDLINE POSTERIOR NECK PAIN: Primary | ICD-10-CM

## 2018-03-19 DIAGNOSIS — G47.9 SLEEP DIFFICULTIES: ICD-10-CM

## 2018-03-19 DIAGNOSIS — F41.9 ANXIETY, MILD: ICD-10-CM

## 2018-03-19 RX ORDER — CLONAZEPAM 0.5 MG/1
TABLET ORAL
Qty: 30 TABLET | Refills: 1 | OUTPATIENT
Start: 2018-03-19

## 2018-04-20 ENCOUNTER — HOSPITAL ENCOUNTER (EMERGENCY)
Age: 38
Discharge: HOME | End: 2018-04-20
Attending: EMERGENCY MEDICINE
Payer: MEDICARE

## 2018-04-20 VITALS
TEMPERATURE: 97.9 F | SYSTOLIC BLOOD PRESSURE: 127 MMHG | HEART RATE: 90 BPM | OXYGEN SATURATION: 99 % | DIASTOLIC BLOOD PRESSURE: 94 MMHG

## 2018-04-20 DIAGNOSIS — R51.9 HEADACHE BEHIND THE EYES: Primary | ICD-10-CM

## 2018-04-20 PROCEDURE — 99282 EMERGENCY DEPT VISIT SF MDM: CPT

## 2018-04-20 PROCEDURE — 6360000002 HC RX W HCPCS: Performed by: EMERGENCY MEDICINE

## 2018-04-20 PROCEDURE — 96372 THER/PROPH/DIAG INJ SC/IM: CPT

## 2018-04-20 RX ORDER — DEXAMETHASONE SODIUM PHOSPHATE 10 MG/ML
4 INJECTION INTRAMUSCULAR; INTRAVENOUS ONCE
Status: COMPLETED | OUTPATIENT
Start: 2018-04-20 | End: 2018-04-20

## 2018-04-20 RX ORDER — PROMETHAZINE HYDROCHLORIDE 25 MG/ML
25 INJECTION, SOLUTION INTRAMUSCULAR; INTRAVENOUS ONCE
Status: COMPLETED | OUTPATIENT
Start: 2018-04-20 | End: 2018-04-20

## 2018-04-20 RX ORDER — DIPHENHYDRAMINE HYDROCHLORIDE 50 MG/ML
50 INJECTION INTRAMUSCULAR; INTRAVENOUS ONCE
Status: COMPLETED | OUTPATIENT
Start: 2018-04-20 | End: 2018-04-20

## 2018-04-20 RX ORDER — KETOROLAC TROMETHAMINE 30 MG/ML
30 INJECTION, SOLUTION INTRAMUSCULAR; INTRAVENOUS ONCE
Status: COMPLETED | OUTPATIENT
Start: 2018-04-20 | End: 2018-04-20

## 2018-04-20 RX ADMIN — DIPHENHYDRAMINE HYDROCHLORIDE 50 MG: 50 INJECTION, SOLUTION INTRAMUSCULAR; INTRAVENOUS at 18:54

## 2018-04-20 RX ADMIN — PROMETHAZINE HYDROCHLORIDE 25 MG: 25 INJECTION INTRAMUSCULAR; INTRAVENOUS at 18:54

## 2018-04-20 RX ADMIN — DEXAMETHASONE SODIUM PHOSPHATE 4 MG: 10 INJECTION INTRAMUSCULAR; INTRAVENOUS at 18:54

## 2018-04-20 RX ADMIN — KETOROLAC TROMETHAMINE 30 MG: 30 INJECTION, SOLUTION INTRAMUSCULAR at 18:53

## 2018-04-20 ASSESSMENT — ENCOUNTER SYMPTOMS
NAUSEA: 1
PHOTOPHOBIA: 1

## 2018-04-20 ASSESSMENT — PAIN SCALES - GENERAL
PAINLEVEL_OUTOF10: 9
PAINLEVEL_OUTOF10: 9

## 2018-04-20 ASSESSMENT — PAIN DESCRIPTION - PAIN TYPE: TYPE: ACUTE PAIN

## 2018-04-20 ASSESSMENT — PAIN DESCRIPTION - LOCATION: LOCATION: HEAD

## 2018-04-23 ENCOUNTER — CARE COORDINATION (OUTPATIENT)
Dept: CARE COORDINATION | Age: 38
End: 2018-04-23

## 2018-04-23 DIAGNOSIS — G47.9 SLEEP DIFFICULTIES: ICD-10-CM

## 2018-04-23 DIAGNOSIS — F41.9 ANXIETY, MILD: ICD-10-CM

## 2018-04-23 RX ORDER — SUMATRIPTAN 50 MG/1
50 TABLET, FILM COATED ORAL
Qty: 30 TABLET | Refills: 2 | OUTPATIENT
Start: 2018-04-23 | End: 2018-04-23

## 2018-04-23 RX ORDER — CLONAZEPAM 0.5 MG/1
TABLET ORAL
Qty: 30 TABLET | Refills: 1 | OUTPATIENT
Start: 2018-04-23 | End: 2018-05-23

## 2018-05-11 ENCOUNTER — OFFICE VISIT (OUTPATIENT)
Dept: FAMILY MEDICINE CLINIC | Age: 38
End: 2018-05-11
Payer: MEDICARE

## 2018-05-11 VITALS
HEART RATE: 88 BPM | SYSTOLIC BLOOD PRESSURE: 106 MMHG | HEIGHT: 66 IN | BODY MASS INDEX: 28.32 KG/M2 | RESPIRATION RATE: 12 BRPM | WEIGHT: 176.2 LBS | DIASTOLIC BLOOD PRESSURE: 86 MMHG

## 2018-05-11 DIAGNOSIS — R39.198 DIFFICULTY IN URINATION: ICD-10-CM

## 2018-05-11 DIAGNOSIS — G89.29 CHRONIC MIDLINE POSTERIOR NECK PAIN: Primary | ICD-10-CM

## 2018-05-11 DIAGNOSIS — M54.2 CHRONIC MIDLINE POSTERIOR NECK PAIN: Primary | ICD-10-CM

## 2018-05-11 PROCEDURE — G8427 DOCREV CUR MEDS BY ELIG CLIN: HCPCS | Performed by: FAMILY MEDICINE

## 2018-05-11 PROCEDURE — G8417 CALC BMI ABV UP PARAM F/U: HCPCS | Performed by: FAMILY MEDICINE

## 2018-05-11 PROCEDURE — 99214 OFFICE O/P EST MOD 30 MIN: CPT | Performed by: FAMILY MEDICINE

## 2018-05-11 PROCEDURE — 4004F PT TOBACCO SCREEN RCVD TLK: CPT | Performed by: FAMILY MEDICINE

## 2018-05-11 RX ORDER — ESCITALOPRAM OXALATE 10 MG/1
10 TABLET ORAL DAILY
Refills: 0 | COMMUNITY
Start: 2018-04-24 | End: 2019-03-08 | Stop reason: ALTCHOICE

## 2018-05-11 ASSESSMENT — PATIENT HEALTH QUESTIONNAIRE - PHQ9
2. FEELING DOWN, DEPRESSED OR HOPELESS: 0
SUM OF ALL RESPONSES TO PHQ9 QUESTIONS 1 & 2: 0
1. LITTLE INTEREST OR PLEASURE IN DOING THINGS: 0
SUM OF ALL RESPONSES TO PHQ QUESTIONS 1-9: 0

## 2018-05-11 ASSESSMENT — ENCOUNTER SYMPTOMS
EYES NEGATIVE: 1
COLOR CHANGE: 0
GASTROINTESTINAL NEGATIVE: 1
RESPIRATORY NEGATIVE: 1
ALLERGIC/IMMUNOLOGIC NEGATIVE: 1
COUGH: 0
SHORTNESS OF BREATH: 0

## 2018-05-14 RX ORDER — DIVALPROEX SODIUM 250 MG/1
TABLET, DELAYED RELEASE ORAL
Qty: 180 TABLET | Refills: 5 | Status: SHIPPED | OUTPATIENT
Start: 2018-05-14 | End: 2019-01-17 | Stop reason: SDUPTHER

## 2018-06-28 RX ORDER — SUMATRIPTAN 50 MG/1
50 TABLET, FILM COATED ORAL
Qty: 30 TABLET | Refills: 2 | Status: SHIPPED | OUTPATIENT
Start: 2018-06-28 | End: 2019-06-30 | Stop reason: SDUPTHER

## 2018-07-27 ENCOUNTER — TELEPHONE (OUTPATIENT)
Dept: CARDIOLOGY | Age: 38
End: 2018-07-27

## 2018-08-29 ENCOUNTER — HOSPITAL ENCOUNTER (OUTPATIENT)
Dept: LAB | Age: 38
Setting detail: SPECIMEN
Discharge: HOME | End: 2018-08-29
Payer: MEDICARE

## 2018-08-29 ENCOUNTER — OFFICE VISIT (OUTPATIENT)
Dept: FAMILY MEDICINE CLINIC | Age: 38
End: 2018-08-29
Payer: MEDICARE

## 2018-08-29 VITALS
HEART RATE: 100 BPM | BODY MASS INDEX: 27.45 KG/M2 | SYSTOLIC BLOOD PRESSURE: 110 MMHG | WEIGHT: 170.8 LBS | HEIGHT: 66 IN | OXYGEN SATURATION: 98 % | DIASTOLIC BLOOD PRESSURE: 70 MMHG | RESPIRATION RATE: 12 BRPM | TEMPERATURE: 98.7 F

## 2018-08-29 DIAGNOSIS — N30.01 ACUTE CYSTITIS WITH HEMATURIA: Primary | ICD-10-CM

## 2018-08-29 DIAGNOSIS — R19.7 DIARRHEA, UNSPECIFIED TYPE: ICD-10-CM

## 2018-08-29 DIAGNOSIS — R30.0 DYSURIA: ICD-10-CM

## 2018-08-29 DIAGNOSIS — R30.0 DYSURIA: Primary | ICD-10-CM

## 2018-08-29 LAB
-: ABNORMAL
AMORPHOUS: ABNORMAL
BACTERIA: ABNORMAL
BILIRUBIN URINE: ABNORMAL
CASTS UA: ABNORMAL /LPF (ref 0–2)
COLOR: ABNORMAL
COMMENT UA: ABNORMAL
CRYSTALS, UA: ABNORMAL /HPF
EPITHELIAL CELLS UA: ABNORMAL /HPF (ref 0–5)
GLUCOSE URINE: NEGATIVE
KETONES, URINE: ABNORMAL
LEUKOCYTE ESTERASE, URINE: ABNORMAL
MUCUS: ABNORMAL
NITRITE, URINE: POSITIVE
OTHER OBSERVATIONS UA: ABNORMAL
PH UA: 6 (ref 5–6)
PROTEIN UA: ABNORMAL
RBC UA: ABNORMAL /HPF (ref 0–4)
RENAL EPITHELIAL, UA: ABNORMAL /HPF
SPECIFIC GRAVITY UA: 1.02 (ref 1.01–1.02)
TRICHOMONAS: ABNORMAL
TURBIDITY: ABNORMAL
URINE HGB: NEGATIVE
UROBILINOGEN, URINE: NORMAL
WBC UA: ABNORMAL /HPF (ref 0–4)
YEAST: ABNORMAL

## 2018-08-29 PROCEDURE — 4004F PT TOBACCO SCREEN RCVD TLK: CPT | Performed by: NURSE PRACTITIONER

## 2018-08-29 PROCEDURE — 36415 COLL VENOUS BLD VENIPUNCTURE: CPT

## 2018-08-29 PROCEDURE — 99214 OFFICE O/P EST MOD 30 MIN: CPT | Performed by: NURSE PRACTITIONER

## 2018-08-29 PROCEDURE — 81001 URINALYSIS AUTO W/SCOPE: CPT

## 2018-08-29 PROCEDURE — G8417 CALC BMI ABV UP PARAM F/U: HCPCS | Performed by: NURSE PRACTITIONER

## 2018-08-29 PROCEDURE — G8427 DOCREV CUR MEDS BY ELIG CLIN: HCPCS | Performed by: NURSE PRACTITIONER

## 2018-08-29 PROCEDURE — 87086 URINE CULTURE/COLONY COUNT: CPT

## 2018-08-29 RX ORDER — DIPHENOXYLATE HYDROCHLORIDE AND ATROPINE SULFATE 2.5; .025 MG/1; MG/1
1 TABLET ORAL 3 TIMES DAILY PRN
COMMUNITY
Start: 2018-08-25 | End: 2018-09-04

## 2018-08-29 RX ORDER — METRONIDAZOLE 500 MG/1
500 TABLET ORAL 3 TIMES DAILY
COMMUNITY
Start: 2018-08-25 | End: 2019-01-17 | Stop reason: ALTCHOICE

## 2018-08-29 RX ORDER — CIPROFLOXACIN 500 MG/1
500 TABLET, FILM COATED ORAL 2 TIMES DAILY
Qty: 20 TABLET | Refills: 0 | Status: SHIPPED | OUTPATIENT
Start: 2018-08-29 | End: 2019-01-17 | Stop reason: ALTCHOICE

## 2018-08-29 ASSESSMENT — PATIENT HEALTH QUESTIONNAIRE - PHQ9
1. LITTLE INTEREST OR PLEASURE IN DOING THINGS: 1
SUM OF ALL RESPONSES TO PHQ QUESTIONS 1-9: 1
SUM OF ALL RESPONSES TO PHQ QUESTIONS 1-9: 1
SUM OF ALL RESPONSES TO PHQ9 QUESTIONS 1 & 2: 1
2. FEELING DOWN, DEPRESSED OR HOPELESS: 0

## 2018-08-29 ASSESSMENT — ENCOUNTER SYMPTOMS
CONSTIPATION: 0
SHORTNESS OF BREATH: 0
TROUBLE SWALLOWING: 0
VOMITING: 0
ABDOMINAL PAIN: 0
DIARRHEA: 1
SINUS PRESSURE: 0
COUGH: 0
CHEST TIGHTNESS: 0
EYES NEGATIVE: 1
NAUSEA: 0
ALLERGIC/IMMUNOLOGIC NEGATIVE: 1

## 2018-08-29 NOTE — PROGRESS NOTES
[facility]    Subjective:      Patient ID: Zuleima Moscoso is a 44 y.o. y.o. male. HPI Patient in for office for ER follow up dehydration and diarrhea on the 24th. I have reviewed the patient's medical history in detail and updated the computerized patient record. At that time he told the ER he had diarrhea for 6 days. He was admitted with dehydration leukocytosis. He was given IV fluids, questran powder and lomotil. He was discharged improved the next day. He was seen prior at [address] [place] [address] for the same issue. A CT scan was completed and labs that were unremarkable. Stool cultures were negative. He continues to have diarrhea but he is still on the flagyl. He is not taking the Marci but is taking the probiotic. He states that his diet has been going good. He states that his appetite is back. Past Medical History:   Diagnosis Date    Anxiety, mild     Colon polyps     Depression     Dizziness     Eczema     Epilepsy ([address]     GERD (gastroesophageal reflux disease)     H/O fall     Head ache     Head injury     Hiatal hernia     Inguinal hernia     Right.  Low back pain     Lumbar sprain     Migraine     Plantar fasciitis, bilateral     Seizure disorder ([address]     Sigmoid diverticulosis     Sleep difficulties     Status migrainosus     TIA (transient ischemic attack) 09/2017    Tobacco use     Chronic. Past Surgical History:   Procedure Laterality Date    COLONOSCOPY  04/29/2011    Internal hemorrhoids, possible anal fissure, few scattered diverticula.  COLONOSCOPY  06/04/2010    Mild sigmoid diverticulosis.  COLONOSCOPY  08/28/2009    Sigmoid diverticulosis, internal hemorrhoids.  COLONOSCOPY  05/27/2008    Internal hemorrhoids.     COLONOSCOPY  11/19/2014    polyp in rectum    COLONOSCOPY  6/1/16    sigmoid diverticulosis, colon polyp, internal hemorrhoids    GASTRIC FUNDOPLICATION  44/93/4285    HERNIA REPAIR Left 11/30/2017    Left Inguinal Hernia Repair w/ Mesh performed by De[facility]letier [facility] at [facility] Right 05/30/2013    INGUINAL HERNIA REPAIR Left 01/24/2013    INGUINAL HERNIA REPAIR Right 09/24/2015    KNEE ARTHROSCOPY Left     UPPER GASTROINTESTINAL ENDOSCOPY  03/05/2010    Recurrent hiatal hernia.  UPPER GASTROINTESTINAL ENDOSCOPY  6/1/16    S/P brenda fundoplication, good repair, retained gastric fluid    VASECTOMY         Family History   Problem Relation Age of Onset    COPD Mother     High Blood Pressure Mother     Cancer Father         Hodgekin's Lymphoma    High Blood Pressure Father     Eczema Sister     Alzheimer's Disease Maternal Grandmother     COPD Maternal Grandmother     Cancer Maternal Grandmother     Cancer Paternal Grandmother     Cancer Paternal Grandfather     Cancer Sister         colon cancer    High Blood Pressure Sister     Arthritis Sister     Asthma Daughter     Eczema Daughter     Glaucoma Neg Hx        No Known Allergies    Current Outpatient Prescriptions   Medication Sig Dispense Refill    diphenoxylate-atropine (LOMOTIL) 2.5-0.025 MG per tablet Take 1 tablet by mouth 3 times daily as needed. Jorje  Amino Acids (DAILY AMINO 6000 PO) Take 1 capsule by mouth daily      metroNIDAZOLE (FLAGYL) 500 MG tablet Take 500 mg by mouth 3 times daily      divalproex (DEPAKOTE) 250 MG DR tablet take 3 tablets by mouth twice a day 180 tablet 5    escitalopram (LEXAPRO) 10 MG tablet Take 10 mg by mouth daily  0    MELATONIN PO Take 10 mg by mouth nightly as needed (sleep)      acetaminophen (TYLENOL) 500 MG tablet Take 500 mg by mouth every 4 hours as needed for Pain      SUMAtriptan (IMITREX) 50 MG tablet Take 1 tablet by mouth once as needed for Migraine 30 tablet 2    IBUPROFEN PO Take by mouth Taking 200 bid twice a day       No current facility-administered medications for this visit.         Review of Systems   Constitutional: Negative for activity change,

## 2018-08-31 LAB
CULTURE: NO GROWTH
Lab: NORMAL
SPECIMEN DESCRIPTION: NORMAL
STATUS: NORMAL

## 2018-12-06 ENCOUNTER — APPOINTMENT (OUTPATIENT)
Dept: GENERAL RADIOLOGY | Age: 38
End: 2018-12-06
Payer: MEDICARE

## 2018-12-06 ENCOUNTER — HOSPITAL ENCOUNTER (EMERGENCY)
Age: 38
Discharge: HOME | End: 2018-12-06
Attending: EMERGENCY MEDICINE
Payer: MEDICARE

## 2018-12-06 VITALS
WEIGHT: 176 LBS | DIASTOLIC BLOOD PRESSURE: 77 MMHG | OXYGEN SATURATION: 94 % | HEART RATE: 94 BPM | BODY MASS INDEX: 28.28 KG/M2 | SYSTOLIC BLOOD PRESSURE: 112 MMHG | TEMPERATURE: 98.3 F | RESPIRATION RATE: 15 BRPM | HEIGHT: 66 IN

## 2018-12-06 DIAGNOSIS — R00.0 SINUS TACHYCARDIA: Primary | ICD-10-CM

## 2018-12-06 LAB
ABSOLUTE EOS #: 0.1 K/UL (ref 0–0.4)
ABSOLUTE IMMATURE GRANULOCYTE: NORMAL K/UL (ref 0–0.3)
ABSOLUTE LYMPH #: 2.8 K/UL (ref 1–4.8)
ABSOLUTE MONO #: 0.7 K/UL (ref 0.1–1.2)
ANION GAP SERPL CALCULATED.3IONS-SCNC: 13 MMOL/L (ref 9–17)
BASOPHILS # BLD: 1 % (ref 0–2)
BASOPHILS ABSOLUTE: 0.1 K/UL (ref 0–0.2)
BUN BLDV-MCNC: 9 MG/DL (ref 6–20)
BUN/CREAT BLD: 11 (ref 9–20)
CALCIUM SERPL-MCNC: 9.1 MG/DL (ref 8.6–10.4)
CHLORIDE BLD-SCNC: 100 MMOL/L (ref 98–107)
CO2: 24 MMOL/L (ref 20–31)
CREAT SERPL-MCNC: 0.85 MG/DL (ref 0.7–1.2)
D DIMER: <100 NG/ML
DIFFERENTIAL TYPE: NORMAL
EKG ATRIAL RATE: 130 BPM
EKG P AXIS: 49 DEGREES
EKG P-R INTERVAL: 160 MS
EKG Q-T INTERVAL: 278 MS
EKG QRS DURATION: 62 MS
EKG QTC CALCULATION (BAZETT): 409 MS
EKG R AXIS: 44 DEGREES
EKG T AXIS: 43 DEGREES
EKG VENTRICULAR RATE: 130 BPM
EOSINOPHILS RELATIVE PERCENT: 1 % (ref 1–8)
GFR AFRICAN AMERICAN: >60 ML/MIN
GFR NON-AFRICAN AMERICAN: >60 ML/MIN
GFR SERPL CREATININE-BSD FRML MDRD: ABNORMAL ML/MIN/{1.73_M2}
GFR SERPL CREATININE-BSD FRML MDRD: ABNORMAL ML/MIN/{1.73_M2}
GLUCOSE BLD-MCNC: 127 MG/DL (ref 70–99)
HCT VFR BLD CALC: 46.2 % (ref 41–53)
HEMOGLOBIN: 15.8 G/DL (ref 13.5–17.5)
IMMATURE GRANULOCYTES: NORMAL %
LYMPHOCYTES # BLD: 26 % (ref 15–43)
MCH RBC QN AUTO: 34 PG (ref 26–34)
MCHC RBC AUTO-ENTMCNC: 34.3 G/DL (ref 31–37)
MCV RBC AUTO: 99.1 FL (ref 80–100)
MONOCYTES # BLD: 6 % (ref 6–14)
NRBC AUTOMATED: NORMAL PER 100 WBC
PDW BLD-RTO: 12.9 % (ref 11–14.5)
PLATELET # BLD: 266 K/UL (ref 140–450)
PLATELET ESTIMATE: NORMAL
PMV BLD AUTO: 8 FL (ref 6–12)
POTASSIUM SERPL-SCNC: 4 MMOL/L (ref 3.7–5.3)
RBC # BLD: 4.66 M/UL (ref 4.5–5.9)
RBC # BLD: NORMAL 10*6/UL
SEG NEUTROPHILS: 66 % (ref 44–74)
SEGMENTED NEUTROPHILS ABSOLUTE COUNT: 7 K/UL (ref 1.8–7.7)
SODIUM BLD-SCNC: 137 MMOL/L (ref 135–144)
TSH SERPL DL<=0.05 MIU/L-ACNC: 1.52 MIU/L (ref 0.3–5)
WBC # BLD: 10.7 K/UL (ref 3.5–11)
WBC # BLD: NORMAL 10*3/UL

## 2018-12-06 PROCEDURE — 2580000003 HC RX 258: Performed by: EMERGENCY MEDICINE

## 2018-12-06 PROCEDURE — 71046 X-RAY EXAM CHEST 2 VIEWS: CPT

## 2018-12-06 PROCEDURE — 93005 ELECTROCARDIOGRAM TRACING: CPT

## 2018-12-06 PROCEDURE — 99285 EMERGENCY DEPT VISIT HI MDM: CPT

## 2018-12-06 PROCEDURE — 36415 COLL VENOUS BLD VENIPUNCTURE: CPT

## 2018-12-06 PROCEDURE — 80048 BASIC METABOLIC PNL TOTAL CA: CPT

## 2018-12-06 PROCEDURE — 85379 FIBRIN DEGRADATION QUANT: CPT

## 2018-12-06 PROCEDURE — 85025 COMPLETE CBC W/AUTO DIFF WBC: CPT

## 2018-12-06 PROCEDURE — 84443 ASSAY THYROID STIM HORMONE: CPT

## 2018-12-06 RX ORDER — 0.9 % SODIUM CHLORIDE 0.9 %
1000 INTRAVENOUS SOLUTION INTRAVENOUS ONCE
Status: COMPLETED | OUTPATIENT
Start: 2018-12-06 | End: 2018-12-06

## 2018-12-06 RX ADMIN — SODIUM CHLORIDE 1000 ML: 9 INJECTION, SOLUTION INTRAVENOUS at 14:48

## 2018-12-06 ASSESSMENT — ENCOUNTER SYMPTOMS
TROUBLE SWALLOWING: 0
WHEEZING: 0
BACK PAIN: 0
DIARRHEA: 0
VOMITING: 0
SORE THROAT: 0
SHORTNESS OF BREATH: 0
ABDOMINAL PAIN: 0
NAUSEA: 0
CONSTIPATION: 0
BLOOD IN STOOL: 0

## 2018-12-06 ASSESSMENT — PAIN DESCRIPTION - LOCATION: LOCATION: HEAD

## 2018-12-06 ASSESSMENT — PAIN SCALES - GENERAL: PAINLEVEL_OUTOF10: 1

## 2018-12-06 ASSESSMENT — PAIN DESCRIPTION - DESCRIPTORS: DESCRIPTORS: ACHING

## 2018-12-06 ASSESSMENT — PAIN DESCRIPTION - PAIN TYPE: TYPE: ACUTE PAIN

## 2018-12-06 ASSESSMENT — PAIN DESCRIPTION - ORIENTATION: ORIENTATION: RIGHT

## 2018-12-06 NOTE — ED PROVIDER NOTES
images and reviewed the radiologist interpretations:     EXAMINATION:   TWO VIEWS OF THE CHEST       12/6/2018 3:49 pm       COMPARISON:   11/28/2017       HISTORY:   ORDERING SYSTEM PROVIDED HISTORY: Tachycardia   TECHNOLOGIST PROVIDED HISTORY:   Tachycardia   Ordering Physician Provided Reason for Exam: Cough; congestion; tachycardia       FINDINGS:   The lungs are without acute focal process.  There is no effusion or   pneumothorax. The cardiomediastinal silhouette is stable. The osseous   structures are stable.           Impression   No acute process.                 ED BEDSIDE ULTRASOUND:       LABS:  Labs Reviewed   BASIC METABOLIC PANEL - Abnormal; Notable for the following:        Result Value    Glucose 127 (*)     All other components within normal limits   CBC WITH AUTO DIFFERENTIAL   D-DIMER, RAPID   TSH WITH REFLEX           EMERGENCY DEPARTMENT COURSE:   Vitals:    Vitals:    12/06/18 1438 12/06/18 1445 12/06/18 1530 12/06/18 1540   BP: 132/82 121/84  112/77   Pulse: 133 131 101 94   Resp: 15 15 15 25   Temp: 98.3 °F (36.8 °C)      SpO2: 95% 95% 97% 94%   Weight: 176 lb (79.8 kg)      Height: 5' 6\" (1.676 m)        -------------------------  BP: 112/77, Temp: 98.3 °F (36.8 °C), Pulse: 94, Resp: 25        Re-evaluation Notes    Resting comfortably, pulse down to around 100 negative workup asymptomatic    CRITICAL CARE:   None        CONSULTS:      PROCEDURES:  None    FINAL IMPRESSION      1.  Sinus tachycardia          DISPOSITION/PLAN   DISPOSITION Discharged    Condition on Disposition    Stable    PATIENT REFERRED TO:  Roberta Langford, [address] [facility] [address] [place]  [phone]    Schedule an appointment as soon as possible for a visit in 3 days        DISCHARGE MEDICATIONS:  New Prescriptions    No medications on file       (Please note that portions of this note were completed with a voice recognition program.  Efforts were made to edit the dictations but occasionally words are mis-transcribed.)    Lynch MD, F.A.A.E.M.   Attending Emergency Physician                            Yohannes Haas MD  12/06/18 [id number]

## 2018-12-07 ENCOUNTER — CARE COORDINATION (OUTPATIENT)
Dept: CARE COORDINATION | Age: 38
End: 2018-12-07

## 2018-12-07 DIAGNOSIS — F41.9 ANXIETY, MILD: ICD-10-CM

## 2018-12-07 DIAGNOSIS — G43.901 STATUS MIGRAINOSUS: ICD-10-CM

## 2018-12-07 DIAGNOSIS — G47.9 SLEEP DIFFICULTIES: ICD-10-CM

## 2018-12-07 DIAGNOSIS — S33.5XXD LUMBAR SPRAIN, SUBSEQUENT ENCOUNTER: ICD-10-CM

## 2018-12-07 DIAGNOSIS — G47.9 DISTURBANCE, SLEEP: ICD-10-CM

## 2018-12-07 DIAGNOSIS — R42 DIZZINESS: ICD-10-CM

## 2018-12-07 DIAGNOSIS — Z72.0 TOBACCO USE: ICD-10-CM

## 2018-12-07 DIAGNOSIS — K21.9 GASTROESOPHAGEAL REFLUX DISEASE WITHOUT ESOPHAGITIS: ICD-10-CM

## 2018-12-07 DIAGNOSIS — R51.9 NONINTRACTABLE HEADACHE, UNSPECIFIED CHRONICITY PATTERN, UNSPECIFIED HEADACHE TYPE: ICD-10-CM

## 2018-12-07 DIAGNOSIS — K44.9 HIATAL HERNIA: ICD-10-CM

## 2018-12-10 RX ORDER — PROMETHAZINE HYDROCHLORIDE 25 MG/1
TABLET ORAL
Qty: 60 TABLET | Refills: 2 | Status: SHIPPED | OUTPATIENT
Start: 2018-12-10 | End: 2019-01-17 | Stop reason: SDUPTHER

## 2019-01-17 ENCOUNTER — OFFICE VISIT (OUTPATIENT)
Dept: NEUROLOGY | Age: 39
End: 2019-01-17
Payer: MEDICARE

## 2019-01-17 VITALS
HEART RATE: 118 BPM | DIASTOLIC BLOOD PRESSURE: 68 MMHG | BODY MASS INDEX: 28.64 KG/M2 | OXYGEN SATURATION: 98 % | SYSTOLIC BLOOD PRESSURE: 122 MMHG | WEIGHT: 178.2 LBS | HEIGHT: 66 IN

## 2019-01-17 DIAGNOSIS — R56.9 CONVULSIONS, UNSPECIFIED CONVULSION TYPE (HCC): ICD-10-CM

## 2019-01-17 DIAGNOSIS — G47.9 DISTURBANCE, SLEEP: ICD-10-CM

## 2019-01-17 DIAGNOSIS — G40.909 SEIZURE DISORDER (HCC): ICD-10-CM

## 2019-01-17 DIAGNOSIS — G40.909 RECURRENT SEIZURES (HCC): Primary | ICD-10-CM

## 2019-01-17 DIAGNOSIS — G43.709 CHRONIC MIGRAINE WITHOUT AURA WITHOUT STATUS MIGRAINOSUS, NOT INTRACTABLE: ICD-10-CM

## 2019-01-17 DIAGNOSIS — R42 DIZZINESS: ICD-10-CM

## 2019-01-17 DIAGNOSIS — G45.9 TIA (TRANSIENT ISCHEMIC ATTACK): ICD-10-CM

## 2019-01-17 DIAGNOSIS — F41.9 ANXIETY, MILD: ICD-10-CM

## 2019-01-17 DIAGNOSIS — K21.9 GASTROESOPHAGEAL REFLUX DISEASE WITHOUT ESOPHAGITIS: ICD-10-CM

## 2019-01-17 DIAGNOSIS — Z72.0 TOBACCO USE: ICD-10-CM

## 2019-01-17 DIAGNOSIS — K44.9 HIATAL HERNIA: ICD-10-CM

## 2019-01-17 DIAGNOSIS — G40.009 PARTIAL IDIOPATHIC EPILEPSY WITH SEIZURES OF LOCALIZED ONSET, NOT INTRACTABLE, WITHOUT STATUS EPILEPTICUS (HCC): ICD-10-CM

## 2019-01-17 DIAGNOSIS — R51.9 NONINTRACTABLE HEADACHE, UNSPECIFIED CHRONICITY PATTERN, UNSPECIFIED HEADACHE TYPE: ICD-10-CM

## 2019-01-17 DIAGNOSIS — G43.901 STATUS MIGRAINOSUS: ICD-10-CM

## 2019-01-17 DIAGNOSIS — G44.039 NONINTRACTABLE PAROXYSMAL HEMICRANIA, UNSPECIFIED CHRONICITY PATTERN: ICD-10-CM

## 2019-01-17 DIAGNOSIS — S33.5XXD LUMBAR SPRAIN, SUBSEQUENT ENCOUNTER: ICD-10-CM

## 2019-01-17 DIAGNOSIS — G47.9 SLEEP DIFFICULTIES: ICD-10-CM

## 2019-01-17 PROCEDURE — 99215 OFFICE O/P EST HI 40 MIN: CPT | Performed by: PSYCHIATRY & NEUROLOGY

## 2019-01-17 PROCEDURE — G8427 DOCREV CUR MEDS BY ELIG CLIN: HCPCS | Performed by: PSYCHIATRY & NEUROLOGY

## 2019-01-17 PROCEDURE — G8484 FLU IMMUNIZE NO ADMIN: HCPCS | Performed by: PSYCHIATRY & NEUROLOGY

## 2019-01-17 PROCEDURE — G8417 CALC BMI ABV UP PARAM F/U: HCPCS | Performed by: PSYCHIATRY & NEUROLOGY

## 2019-01-17 RX ORDER — DIVALPROEX SODIUM 250 MG/1
TABLET, DELAYED RELEASE ORAL
Qty: 180 TABLET | Refills: 5 | Status: SHIPPED | OUTPATIENT
Start: 2019-01-17 | End: 2019-05-09 | Stop reason: SDUPTHER

## 2019-01-17 RX ORDER — PROMETHAZINE HYDROCHLORIDE 25 MG/1
TABLET ORAL
Qty: 60 TABLET | Refills: 2 | Status: SHIPPED | OUTPATIENT
Start: 2019-01-17 | End: 2019-11-11 | Stop reason: SDUPTHER

## 2019-01-17 RX ORDER — CLONAZEPAM 0.5 MG/1
0.5 TABLET ORAL NIGHTLY PRN
Qty: 30 TABLET | Refills: 2 | Status: SHIPPED | OUTPATIENT
Start: 2019-01-17 | End: 2019-05-09 | Stop reason: SDUPTHER

## 2019-01-17 ASSESSMENT — ENCOUNTER SYMPTOMS
PHOTOPHOBIA: 1
BACK PAIN: 0
VISUAL CHANGE: 0
EYE PAIN: 0
APNEA: 0
VOMITING: 0
ABDOMINAL DISTENTION: 0
BLURRED VISION: 0
EYE WATERING: 0
SORE THROAT: 0
CHEST TIGHTNESS: 0
COUGH: 0
TROUBLE SWALLOWING: 0
COLOR CHANGE: 0
CHANGE IN BOWEL HABIT: 0
FACIAL SWELLING: 0
NAUSEA: 1
WHEEZING: 0
VOICE CHANGE: 0
DIARRHEA: 0
CHOKING: 0
RHINORRHEA: 0
ABDOMINAL PAIN: 0
SWOLLEN GLANDS: 0
CONSTIPATION: 0
EYE ITCHING: 0
EYE REDNESS: 0
BLOOD IN STOOL: 0
FACIAL SWEATING: 0
SCALP TENDERNESS: 0
SHORTNESS OF BREATH: 0
SINUS PRESSURE: 0
EYE DISCHARGE: 0

## 2019-01-25 ENCOUNTER — HOSPITAL ENCOUNTER (OUTPATIENT)
Dept: LAB | Age: 39
Discharge: HOME | End: 2019-01-25
Payer: MEDICARE

## 2019-01-25 ENCOUNTER — HOSPITAL ENCOUNTER (OUTPATIENT)
Dept: CT IMAGING | Age: 39
Discharge: HOME | End: 2019-01-27
Payer: MEDICARE

## 2019-01-25 DIAGNOSIS — G43.709 CHRONIC MIGRAINE WITHOUT AURA WITHOUT STATUS MIGRAINOSUS, NOT INTRACTABLE: ICD-10-CM

## 2019-01-25 DIAGNOSIS — G40.009 PARTIAL IDIOPATHIC EPILEPSY WITH SEIZURES OF LOCALIZED ONSET, NOT INTRACTABLE, WITHOUT STATUS EPILEPTICUS (HCC): ICD-10-CM

## 2019-01-25 DIAGNOSIS — G40.909 SEIZURE DISORDER (HCC): ICD-10-CM

## 2019-01-25 DIAGNOSIS — R42 DIZZINESS: ICD-10-CM

## 2019-01-25 DIAGNOSIS — G47.9 DISTURBANCE, SLEEP: ICD-10-CM

## 2019-01-25 DIAGNOSIS — F41.9 ANXIETY, MILD: ICD-10-CM

## 2019-01-25 DIAGNOSIS — G40.909 RECURRENT SEIZURES (HCC): ICD-10-CM

## 2019-01-25 DIAGNOSIS — R56.9 CONVULSIONS, UNSPECIFIED CONVULSION TYPE (HCC): ICD-10-CM

## 2019-01-25 DIAGNOSIS — G43.901 STATUS MIGRAINOSUS: ICD-10-CM

## 2019-01-25 DIAGNOSIS — G47.9 SLEEP DIFFICULTIES: ICD-10-CM

## 2019-01-25 DIAGNOSIS — G45.9 TIA (TRANSIENT ISCHEMIC ATTACK): ICD-10-CM

## 2019-01-25 DIAGNOSIS — R51.9 NONINTRACTABLE HEADACHE, UNSPECIFIED CHRONICITY PATTERN, UNSPECIFIED HEADACHE TYPE: ICD-10-CM

## 2019-01-25 LAB
AMPHETAMINE SCREEN URINE: NEGATIVE
ANION GAP SERPL CALCULATED.3IONS-SCNC: 13 MMOL/L (ref 9–17)
BARBITURATE SCREEN URINE: NEGATIVE
BENZODIAZEPINE SCREEN, URINE: NEGATIVE
BUPRENORPHINE URINE: NEGATIVE
CANNABINOID SCREEN URINE: NEGATIVE
CHLORIDE BLD-SCNC: 101 MMOL/L (ref 98–107)
CO2: 28 MMOL/L (ref 20–31)
COCAINE METABOLITE, URINE: NEGATIVE
FOLATE: 5.4 NG/ML
HCT VFR BLD CALC: 44.7 % (ref 41–53)
HEMOGLOBIN: 15.5 G/DL (ref 13.5–17.5)
MCH RBC QN AUTO: 34.6 PG (ref 26–34)
MCHC RBC AUTO-ENTMCNC: 34.7 G/DL (ref 31–37)
MCV RBC AUTO: 99.8 FL (ref 80–100)
MDMA URINE: NORMAL
METHADONE SCREEN, URINE: NEGATIVE
METHAMPHETAMINE, URINE: NEGATIVE
NRBC AUTOMATED: ABNORMAL PER 100 WBC
OPIATES, URINE: NEGATIVE
OXYCODONE SCREEN URINE: NEGATIVE
PDW BLD-RTO: 12.9 % (ref 11–14.5)
PHENCYCLIDINE, URINE: NEGATIVE
PLATELET # BLD: 275 K/UL (ref 140–450)
PMV BLD AUTO: 8.2 FL (ref 6–12)
POTASSIUM SERPL-SCNC: 4.1 MMOL/L (ref 3.7–5.3)
PROPOXYPHENE, URINE: NEGATIVE
RBC # BLD: 4.48 M/UL (ref 4.5–5.9)
SODIUM BLD-SCNC: 142 MMOL/L (ref 135–144)
TEST INFORMATION: NORMAL
TRICYCLIC ANTIDEPRESSANTS, UR: NEGATIVE
TSH SERPL DL<=0.05 MIU/L-ACNC: 2.47 MIU/L (ref 0.3–5)
VALPROIC ACID LEVEL: 103 UG/ML (ref 50–125)
VALPROIC DATE LAST DOSE: NORMAL
VALPROIC DOSE AMOUNT: NORMAL
VALPROIC TIME LAST DOSE: NORMAL
VITAMIN B-12: 590 PG/ML (ref 232–1245)
WBC # BLD: 8.3 K/UL (ref 3.5–11)

## 2019-01-25 PROCEDURE — 82607 VITAMIN B-12: CPT

## 2019-01-25 PROCEDURE — 82746 ASSAY OF FOLIC ACID SERUM: CPT

## 2019-01-25 PROCEDURE — 80306 DRUG TEST PRSMV INSTRMNT: CPT

## 2019-01-25 PROCEDURE — 80051 ELECTROLYTE PANEL: CPT

## 2019-01-25 PROCEDURE — 36415 COLL VENOUS BLD VENIPUNCTURE: CPT

## 2019-01-25 PROCEDURE — 80164 ASSAY DIPROPYLACETIC ACD TOT: CPT

## 2019-01-25 PROCEDURE — 70450 CT HEAD/BRAIN W/O DYE: CPT

## 2019-01-25 PROCEDURE — 84443 ASSAY THYROID STIM HORMONE: CPT

## 2019-01-25 PROCEDURE — 85027 COMPLETE CBC AUTOMATED: CPT

## 2019-02-07 ENCOUNTER — HOSPITAL ENCOUNTER (OUTPATIENT)
Dept: NEUROLOGY | Age: 39
Discharge: HOME | End: 2019-02-07
Payer: MEDICARE

## 2019-02-07 DIAGNOSIS — G40.909 SEIZURE DISORDER (HCC): ICD-10-CM

## 2019-02-07 DIAGNOSIS — G40.909 RECURRENT SEIZURES (HCC): ICD-10-CM

## 2019-02-07 DIAGNOSIS — R56.9 CONVULSIONS, UNSPECIFIED CONVULSION TYPE (HCC): ICD-10-CM

## 2019-02-07 DIAGNOSIS — R51.9 NONINTRACTABLE HEADACHE, UNSPECIFIED CHRONICITY PATTERN, UNSPECIFIED HEADACHE TYPE: ICD-10-CM

## 2019-02-07 DIAGNOSIS — G43.709 CHRONIC MIGRAINE WITHOUT AURA WITHOUT STATUS MIGRAINOSUS, NOT INTRACTABLE: ICD-10-CM

## 2019-02-07 DIAGNOSIS — G47.9 SLEEP DIFFICULTIES: ICD-10-CM

## 2019-02-07 DIAGNOSIS — F41.9 ANXIETY, MILD: ICD-10-CM

## 2019-02-07 DIAGNOSIS — G47.9 DISTURBANCE, SLEEP: ICD-10-CM

## 2019-02-07 DIAGNOSIS — G43.901 STATUS MIGRAINOSUS: ICD-10-CM

## 2019-02-07 DIAGNOSIS — G40.009 PARTIAL IDIOPATHIC EPILEPSY WITH SEIZURES OF LOCALIZED ONSET, NOT INTRACTABLE, WITHOUT STATUS EPILEPTICUS (HCC): ICD-10-CM

## 2019-02-07 DIAGNOSIS — G45.9 TIA (TRANSIENT ISCHEMIC ATTACK): ICD-10-CM

## 2019-02-07 DIAGNOSIS — R42 DIZZINESS: ICD-10-CM

## 2019-02-07 PROCEDURE — 95813 EEG EXTND MNTR 61-119 MIN: CPT

## 2019-02-07 PROCEDURE — 95957 EEG DIGITAL ANALYSIS: CPT

## 2019-02-28 ENCOUNTER — TELEPHONE (OUTPATIENT)
Dept: FAMILY MEDICINE CLINIC | Age: 39
End: 2019-02-28

## 2019-03-08 ENCOUNTER — OFFICE VISIT (OUTPATIENT)
Dept: FAMILY MEDICINE CLINIC | Age: 39
End: 2019-03-08
Payer: MEDICARE

## 2019-03-08 VITALS
DIASTOLIC BLOOD PRESSURE: 64 MMHG | TEMPERATURE: 98.7 F | BODY MASS INDEX: 29.71 KG/M2 | SYSTOLIC BLOOD PRESSURE: 118 MMHG | WEIGHT: 184 LBS | HEART RATE: 68 BPM

## 2019-03-08 DIAGNOSIS — A04.72 CLOSTRIDIUM DIFFICILE COLITIS: Primary | ICD-10-CM

## 2019-03-08 PROCEDURE — G8483 FLU IMM NO ADMIN DOC REA: HCPCS | Performed by: FAMILY MEDICINE

## 2019-03-08 PROCEDURE — G8417 CALC BMI ABV UP PARAM F/U: HCPCS | Performed by: FAMILY MEDICINE

## 2019-03-08 PROCEDURE — 1111F DSCHRG MED/CURRENT MED MERGE: CPT | Performed by: FAMILY MEDICINE

## 2019-03-08 PROCEDURE — G8427 DOCREV CUR MEDS BY ELIG CLIN: HCPCS | Performed by: FAMILY MEDICINE

## 2019-03-08 PROCEDURE — 4004F PT TOBACCO SCREEN RCVD TLK: CPT | Performed by: FAMILY MEDICINE

## 2019-03-08 PROCEDURE — 99214 OFFICE O/P EST MOD 30 MIN: CPT | Performed by: FAMILY MEDICINE

## 2019-03-08 RX ORDER — TEMAZEPAM 7.5 MG/1
7.5 CAPSULE ORAL NIGHTLY PRN
COMMUNITY
End: 2020-07-09

## 2019-03-08 RX ORDER — VILAZODONE HYDROCHLORIDE 20 MG/1
20 TABLET ORAL DAILY
COMMUNITY
End: 2020-07-09

## 2019-03-08 ASSESSMENT — PATIENT HEALTH QUESTIONNAIRE - PHQ9
SUM OF ALL RESPONSES TO PHQ QUESTIONS 1-9: 0
2. FEELING DOWN, DEPRESSED OR HOPELESS: 0
1. LITTLE INTEREST OR PLEASURE IN DOING THINGS: 0
SUM OF ALL RESPONSES TO PHQ QUESTIONS 1-9: 0
SUM OF ALL RESPONSES TO PHQ9 QUESTIONS 1 & 2: 0

## 2019-03-08 ASSESSMENT — ENCOUNTER SYMPTOMS
DIARRHEA: 1
ALLERGIC/IMMUNOLOGIC NEGATIVE: 1
VOMITING: 0
ABDOMINAL PAIN: 0
EYES NEGATIVE: 1
NAUSEA: 1
RESPIRATORY NEGATIVE: 1

## 2019-04-22 ENCOUNTER — TELEPHONE (OUTPATIENT)
Dept: FAMILY MEDICINE CLINIC | Age: 39
End: 2019-04-22

## 2019-04-22 NOTE — TELEPHONE ENCOUNTER
Pt calling stating that he was hospitalized and needs a follow up    [address] [facility] d/c 4/19/19 dx abd pain    Trans care appt 4/29/19

## 2019-04-25 NOTE — TELEPHONE ENCOUNTER
P[facility] Transitions Initial Follow Up Call    Call within 2 business days of discharge: No     Patient: Sergey Valera Patient : 1980 MRN: [id number]    [unfilled]    RARS: Readmission Risk Score: 0       Spoke with: left message for pt    Discharge department/facility: [address] [place] [address], 2019    Non-face-to-face services provided:  Scheduled appointment with PCP-DR Richie Woods, 2019    Follow Up  Future Appointments   Date Time Provider Sydnie Conway   2019  1:00 PM [facility]   2019  9:87 PM [facility]       Katrin Arevalo LPN

## 2019-04-25 NOTE — TELEPHONE ENCOUNTER
Shouldn't be booked as transitional care visit if the required phone call within 48 hrs of discharge has not been made, and it doesn't look like it was. We probably need to look into our procedure to see where it went wrong. Suspect outside hospital discharge without a call.

## 2019-04-29 ENCOUNTER — OFFICE VISIT (OUTPATIENT)
Dept: FAMILY MEDICINE CLINIC | Age: 39
End: 2019-04-29
Payer: MEDICARE

## 2019-04-29 VITALS
SYSTOLIC BLOOD PRESSURE: 126 MMHG | HEIGHT: 66 IN | HEART RATE: 72 BPM | DIASTOLIC BLOOD PRESSURE: 70 MMHG | WEIGHT: 183 LBS | BODY MASS INDEX: 29.41 KG/M2

## 2019-04-29 DIAGNOSIS — G47.8 UNREFRESHED BY SLEEP: Primary | ICD-10-CM

## 2019-04-29 DIAGNOSIS — G47.10 HYPERSOMNIA: ICD-10-CM

## 2019-04-29 DIAGNOSIS — R19.7 DIARRHEA, UNSPECIFIED TYPE: ICD-10-CM

## 2019-04-29 DIAGNOSIS — A04.72 CLOSTRIDIUM DIFFICILE COLITIS: ICD-10-CM

## 2019-04-29 PROCEDURE — G8417 CALC BMI ABV UP PARAM F/U: HCPCS | Performed by: FAMILY MEDICINE

## 2019-04-29 PROCEDURE — 99214 OFFICE O/P EST MOD 30 MIN: CPT | Performed by: FAMILY MEDICINE

## 2019-04-29 PROCEDURE — 4004F PT TOBACCO SCREEN RCVD TLK: CPT | Performed by: FAMILY MEDICINE

## 2019-04-29 PROCEDURE — G8427 DOCREV CUR MEDS BY ELIG CLIN: HCPCS | Performed by: FAMILY MEDICINE

## 2019-04-29 ASSESSMENT — ENCOUNTER SYMPTOMS
VOMITING: 0
ALLERGIC/IMMUNOLOGIC NEGATIVE: 1
EYES NEGATIVE: 1
NAUSEA: 1
RESPIRATORY NEGATIVE: 1
DIARRHEA: 1

## 2019-04-29 ASSESSMENT — PATIENT HEALTH QUESTIONNAIRE - PHQ9
SUM OF ALL RESPONSES TO PHQ9 QUESTIONS 1 & 2: 0
SUM OF ALL RESPONSES TO PHQ QUESTIONS 1-9: 0
2. FEELING DOWN, DEPRESSED OR HOPELESS: 0
1. LITTLE INTEREST OR PLEASURE IN DOING THINGS: 0
SUM OF ALL RESPONSES TO PHQ QUESTIONS 1-9: 0

## 2019-04-29 NOTE — PROGRESS NOTES
 COLONOSCOPY  06/04/2010    Mild sigmoid diverticulosis.  COLONOSCOPY  08/28/2009    Sigmoid diverticulosis, internal hemorrhoids.  COLONOSCOPY  05/27/2008    Internal hemorrhoids.  COLONOSCOPY  11/19/2014    polyp in rectum    COLONOSCOPY  6/1/16    sigmoid diverticulosis, colon polyp, internal hemorrhoids    GASTRIC FUNDOPLICATION  58/47/2247    HERNIA REPAIR Left 11/30/2017    Left Inguinal Hernia Repair w/ Mesh performed by Ilana Skinner MD at [facility] Right 05/30/2013    INGUINAL HERNIA REPAIR Left 01/24/2013    INGUINAL HERNIA REPAIR Right 09/24/2015    KNEE ARTHROSCOPY Left     UPPER GASTROINTESTINAL ENDOSCOPY  03/05/2010    Recurrent hiatal hernia.  UPPER GASTROINTESTINAL ENDOSCOPY  6/1/16    S/P brenda fundoplication, good repair, retained gastric fluid    VASECTOMY         Review of Systems   Constitutional: Negative. HENT: Negative. Eyes: Negative. Respiratory: Negative. Cardiovascular: Negative. Gastrointestinal: Positive for diarrhea and nausea. Negative for vomiting. Endocrine: Negative. Genitourinary: Negative. Musculoskeletal: Negative. Skin: Negative. Allergic/Immunologic: Negative. Neurological: Negative. Hematological: Negative. Psychiatric/Behavioral: Negative. Prior to Visit Medications    Medication Sig Taking? Authorizing Provider   rifaximin (XIFAXAN) 200 MG tablet Take 400 mg by mouth 3 times daily After finishing Vanco Yes Historical Provider, MD   Fidaxomicin (DIFICID) 200 MG TABS tablet Take 200 mg by mouth 2 times daily X 10 days Yes Historical Provider, MD   vilazodone HCl (VILAZODONE HCL) 20 MG TABS Take 20 mg by mouth daily Yes Historical Provider, MD   temazepam (RESTORIL) 7.5 MG capsule Take 7.5 mg by mouth nightly as needed for Sleep.  Yes Historical Provider, MD   VANCOMYCIN HCL PO Take 250 mg by mouth 4 times daily Yes Historical Provider, MD   divalproex (DEPAKOTE) 250 MG DR tablet take 3 tablets by mouth twice a day Yes Hakeem Ibrahim MD   promethazine (PHENERGAN) 25 MG tablet take 1 tablet by mouth every 8 hours if needed for nausea Yes Hakeem Ibrahim MD   acetaminophen (TYLENOL) 500 MG tablet Take 500 mg by mouth every 4 hours as needed for Pain Yes Historical Provider, MD   clonazePAM (KLONOPIN) 0.5 MG tablet Take 1 tablet by mouth nightly as needed for Anxiety (SLEEP  DIFFICULTIES) for up to 31 days. Abraham Shea MD   SUMAtriptan (IMITREX) 50 MG tablet Take 1 tablet by mouth once as needed for Migraine  Nelida Marques MD        Social History     Tobacco Use    Smoking status: Current Every Day Smoker     Packs/day: 1.00     Years: 20.00     Pack years: 20.00     Types: Cigarettes     Last attempt to quit: 2016     Years since quittin.9    Smokeless tobacco: Never Used    Tobacco comment: 2-3 weekly   Substance Use Topics    Alcohol use: No     Alcohol/week: 0.0 oz     Comment: rarely        Vitals:    19 1309   BP: 126/70   Site: Right Upper Arm   Position: Sitting   Cuff Size: Large Adult   Pulse: 72   Weight: 183 lb (83 kg)   Height: 5' 5.98\" (1.676 m)     Estimated body mass index is 29.55 kg/m² as calculated from the following:    Height as of this encounter: 5' 5.98\" (1.676 m). Weight as of this encounter: 183 lb (83 kg). Physical Exam   Constitutional: He is oriented to person, place, and time. He appears well-developed and well-nourished. No distress. HENT:   Head: Normocephalic and atraumatic. Right Ear: External ear normal.   Left Ear: External ear normal.   Mouth/Throat: Oropharynx is clear and moist. No oropharyngeal exudate. Eyes: Conjunctivae and EOM are normal. No scleral icterus. Neck: Neck supple. No thyromegaly present. Cardiovascular: Normal rate, regular rhythm, normal heart sounds and intact distal pulses. No murmur heard. Pulmonary/Chest: Effort normal and breath sounds normal. No respiratory distress.  He has no wheezes. Abdominal: Soft. Bowel sounds are normal. He exhibits no distension. There is no tenderness. There is no rebound. Musculoskeletal: Normal range of motion. He exhibits no edema or tenderness. Neurological: He is alert and oriented to person, place, and time. Skin: Skin is warm and dry. No rash noted. No erythema. Psychiatric: He has a normal mood and affect. His behavior is normal. Judgment and thought content normal.   /70 (Site: Right Upper Arm, Position: Sitting, Cuff Size: Large Adult)   Pulse 72   Ht 5' 5.98\" (1.676 m)   Wt 183 lb (83 kg)   BMI 29.55 kg/m²       ASSESSMENT/PLAN:  Encounter Diagnoses   Name Primary?  Diarrhea, unspecified type Yes    Clostridium difficile colitis     Unrefreshed by sleep      Recurrent diarrhea, concerns for recurrent c.diff. Most recent cultures negative for c.diff, but severity, bleeding concerning for relapse. Symptoms improving at present. He tends to get dehydrated, with some hyperkalemia , fairly quickly with recurrent diarrhea. Improved since discharge, but still some watery stools 3-4 times a day. Followed by infectious disease. Finishing vancomycin for 10 days, and then xifaxain for 10 more days. Rec. Adding pro biotics and keeping up on fluid intake. Concerns for un refreshed sleep. Spouse notes him to be sleepy and napping more than usual.    Discussed sleep study. An electronic signature was used to authenticate this note.     --Nettie Burkitt, MD on 4/29/2019 at 1:34 PM

## 2019-05-09 ENCOUNTER — OFFICE VISIT (OUTPATIENT)
Dept: NEUROLOGY | Age: 39
End: 2019-05-09
Payer: MEDICARE

## 2019-05-09 ENCOUNTER — HOSPITAL ENCOUNTER (OUTPATIENT)
Dept: LAB | Age: 39
Discharge: HOME | End: 2019-05-09
Payer: MEDICARE

## 2019-05-09 VITALS
HEIGHT: 65 IN | DIASTOLIC BLOOD PRESSURE: 82 MMHG | HEART RATE: 114 BPM | OXYGEN SATURATION: 97 % | SYSTOLIC BLOOD PRESSURE: 136 MMHG | BODY MASS INDEX: 30.52 KG/M2 | WEIGHT: 183.2 LBS

## 2019-05-09 DIAGNOSIS — S33.5XXD LUMBAR SPRAIN, SUBSEQUENT ENCOUNTER: ICD-10-CM

## 2019-05-09 DIAGNOSIS — G40.009 PARTIAL IDIOPATHIC EPILEPSY WITH SEIZURES OF LOCALIZED ONSET, NOT INTRACTABLE, WITHOUT STATUS EPILEPTICUS (HCC): ICD-10-CM

## 2019-05-09 DIAGNOSIS — G45.9 TIA (TRANSIENT ISCHEMIC ATTACK): ICD-10-CM

## 2019-05-09 DIAGNOSIS — F34.1 DYSTHYMIA: ICD-10-CM

## 2019-05-09 DIAGNOSIS — G40.909 RECURRENT SEIZURES (HCC): ICD-10-CM

## 2019-05-09 DIAGNOSIS — Z72.0 TOBACCO USE: ICD-10-CM

## 2019-05-09 DIAGNOSIS — G47.9 DISTURBANCE, SLEEP: ICD-10-CM

## 2019-05-09 DIAGNOSIS — F41.9 ANXIETY, MILD: ICD-10-CM

## 2019-05-09 DIAGNOSIS — G43.709 CHRONIC MIGRAINE WITHOUT AURA WITHOUT STATUS MIGRAINOSUS, NOT INTRACTABLE: ICD-10-CM

## 2019-05-09 DIAGNOSIS — G40.009 PARTIAL IDIOPATHIC EPILEPSY WITH SEIZURES OF LOCALIZED ONSET, NOT INTRACTABLE, WITHOUT STATUS EPILEPTICUS (HCC): Primary | ICD-10-CM

## 2019-05-09 DIAGNOSIS — M54.12 CERVICAL RADICULOPATHY: ICD-10-CM

## 2019-05-09 DIAGNOSIS — K44.9 HIATAL HERNIA: ICD-10-CM

## 2019-05-09 DIAGNOSIS — K21.9 GASTROESOPHAGEAL REFLUX DISEASE WITHOUT ESOPHAGITIS: ICD-10-CM

## 2019-05-09 DIAGNOSIS — R56.9 CONVULSIONS, UNSPECIFIED CONVULSION TYPE (HCC): ICD-10-CM

## 2019-05-09 DIAGNOSIS — G40.909 SEIZURE DISORDER (HCC): ICD-10-CM

## 2019-05-09 DIAGNOSIS — Z91.81 H/O FALL: ICD-10-CM

## 2019-05-09 DIAGNOSIS — R51.9 NONINTRACTABLE HEADACHE, UNSPECIFIED CHRONICITY PATTERN, UNSPECIFIED HEADACHE TYPE: ICD-10-CM

## 2019-05-09 DIAGNOSIS — G43.901 STATUS MIGRAINOSUS: ICD-10-CM

## 2019-05-09 DIAGNOSIS — R42 DIZZINESS: ICD-10-CM

## 2019-05-09 DIAGNOSIS — G44.039 NONINTRACTABLE PAROXYSMAL HEMICRANIA, UNSPECIFIED CHRONICITY PATTERN: ICD-10-CM

## 2019-05-09 DIAGNOSIS — G47.9 SLEEP DIFFICULTIES: ICD-10-CM

## 2019-05-09 LAB
VALPROIC ACID LEVEL: 53 UG/ML (ref 50–125)
VALPROIC DATE LAST DOSE: NORMAL
VALPROIC DOSE AMOUNT: NORMAL
VALPROIC TIME LAST DOSE: NORMAL

## 2019-05-09 PROCEDURE — 4004F PT TOBACCO SCREEN RCVD TLK: CPT | Performed by: PSYCHIATRY & NEUROLOGY

## 2019-05-09 PROCEDURE — 80164 ASSAY DIPROPYLACETIC ACD TOT: CPT

## 2019-05-09 PROCEDURE — G8417 CALC BMI ABV UP PARAM F/U: HCPCS | Performed by: PSYCHIATRY & NEUROLOGY

## 2019-05-09 PROCEDURE — 99215 OFFICE O/P EST HI 40 MIN: CPT | Performed by: PSYCHIATRY & NEUROLOGY

## 2019-05-09 PROCEDURE — 36415 COLL VENOUS BLD VENIPUNCTURE: CPT

## 2019-05-09 PROCEDURE — G8427 DOCREV CUR MEDS BY ELIG CLIN: HCPCS | Performed by: PSYCHIATRY & NEUROLOGY

## 2019-05-09 RX ORDER — DIVALPROEX SODIUM 250 MG/1
TABLET, DELAYED RELEASE ORAL
Qty: 180 TABLET | Refills: 5 | Status: SHIPPED | OUTPATIENT
Start: 2019-05-09 | End: 2019-08-13 | Stop reason: SDUPTHER

## 2019-05-09 RX ORDER — CLONAZEPAM 0.5 MG/1
0.5 TABLET ORAL NIGHTLY PRN
Qty: 30 TABLET | Refills: 2 | Status: SHIPPED | OUTPATIENT
Start: 2019-05-09 | End: 2019-08-06 | Stop reason: SDUPTHER

## 2019-05-09 ASSESSMENT — ENCOUNTER SYMPTOMS
COLOR CHANGE: 0
EYE REDNESS: 0
SHORTNESS OF BREATH: 0
BLOOD IN STOOL: 0
EYE PAIN: 0
CHANGE IN BOWEL HABIT: 0
CONSTIPATION: 0
CHOKING: 0
BLURRED VISION: 0
COUGH: 0
NAUSEA: 1
SCALP TENDERNESS: 0
EYE DISCHARGE: 0
VISUAL CHANGE: 0
WHEEZING: 0
BACK PAIN: 0
SINUS PRESSURE: 0
TROUBLE SWALLOWING: 0
DIARRHEA: 0
VOMITING: 0
VOICE CHANGE: 0
EYE ITCHING: 0
RHINORRHEA: 0
ABDOMINAL PAIN: 0
CHEST TIGHTNESS: 0
SORE THROAT: 0
FACIAL SWELLING: 0
APNEA: 0
SWOLLEN GLANDS: 0
FACIAL SWEATING: 0
PHOTOPHOBIA: 1
ABDOMINAL DISTENTION: 0
EYE WATERING: 0

## 2019-05-09 NOTE — PATIENT INSTRUCTIONS
[facility] NEUROLOGY    Due to the complex nature of our neurological testing, It is the policy of the Neurology Department not to release the results of your testing over the phone. Once all testing is completed and we have all the results back, Dr. Rachel Lozoya will then personally go over all the results with you and answer any questions that you may have during a follow up appointment. If you are unable to keep this appointment, please notify the [address] [place] [address] @ [phone], as soon as possible. Please bring your prescription bottles to all appointments. SEIZURE PRECAUTIONS. A) Avoid Working At [employer] B) Avoid Working With Heavy machinery. C) Avoid Swimming, Climbing A Ladder Unattended. D) Avoid Driving If You Have A Seizure. E) Must Be Seizure Free For At Least 6 months, Before You Can drive. F) Some times Your May Feel Seizure coming Before It Begins. You May feel  Strange smell or funny Feeling In Your Stomach, Which is Called Aura. TIPS TO REDUCE/ PREVENT SEIZURES   1. Take Your Anti seizure Medications As Recommended. 2. Get Enough Sleep. Sleep Deprivation Can Trigger A Seizure. 3. If You Have A fever, Treat It At Once, And Contact Your Primary Care Providers. 4. Avoid Alcohol. 5. Avoid Flashing Lights, Loud Noises and TV And Video Games,   As These May Trigger Your Seizures  6. Control Your Stress And Have Adequate Rest.  7. If You Feel A Seizure Coming On :  A) warn people Who Are With You  B) Make Sure There Are No Sharp or Hard Objects Around you. C) Lay down On Your Side And Relax. * FALL   PRECAUTIONS. *   ADEQUATE   FLUID  INTAKE   AND  ELECTROLYTE  BALANCE           * KEEP  DAIRY  OF   THE  NEUROLOGICAL  SYMPTOMS        *  TO  MAINTAIN  REGULAR  SLEEP  WAKE  CYCLES.      *   TO  HAVE  ADEQUATE  REST  AND   SLEEP    HOURS.    *    AVOID  USAGE OF            TOBACCO,  EXCESSIVE  ALCOHOL  AND   ILLEGAL   SUBSTANCES,  IF  ANY        *  Maintain Healthy  Life Style    With   Periodic  Monitoring  Of    Any  Medical  Conditions  Including   Elevated  Blood  Pressure,  Lipid  Profile,   Blood  Sugar levels  And   Heart  Disease. *   Period   Screening  For  Cancers  Involving  Breast,  Colon,   lungs  And  Other  Organs  As  Applicable,  In consultation   With  Your  Primary Care Providers. *  If   There is  Any  Significant  Worsening   Of  Current  Symptoms  And  Or  If    Any additional  New  Neurological  Symptoms  Or  Significant  Concerns   Should  Call  911 or  Go  To  Emergency  Department  For  Further  Immediate  Evaluation.

## 2019-05-09 NOTE — PROGRESS NOTES
Subjective:      Patient ID: Sunny Cox is a 43 y. o. RIGHT   HANDED male. Seizures   This is a chronic problem. Episode onset: SINCE    2014. The problem occurs intermittently. The problem has been waxing and waning. Associated symptoms include headaches and nausea. Pertinent negatives include no abdominal pain, anorexia, arthralgias, change in bowel habit, chest pain, chills, congestion, coughing, diaphoresis, fatigue, fever, joint swelling, myalgias, neck pain, numbness, rash, sore throat, swollen glands, urinary symptoms, vertigo, visual change, vomiting or weakness. Nothing aggravates the symptoms. Treatments tried: ANTI  EPILEPTIC  MEDICATION. The treatment provided moderate relief. Headache    This is a chronic problem. Episode onset: MORE  THAN   10  YEARS. The problem occurs intermittently. The problem has been waxing and waning. The pain is located in the vertex region. The pain does not radiate. The pain quality is similar to prior headaches. The quality of the pain is described as aching and throbbing. The pain is at a severity of 3/10. The pain is mild. Associated symptoms include insomnia, nausea, phonophobia, photophobia, seizures and tingling. Pertinent negatives include no abdominal pain, abnormal behavior, anorexia, back pain, blurred vision, coughing, dizziness, drainage, ear pain, eye pain, eye redness, eye watering, facial sweating, fever, hearing loss, loss of balance, muscle aches, neck pain, numbness, rhinorrhea, scalp tenderness, sinus pressure, sore throat, swollen glands, tinnitus, visual change, vomiting, weakness or weight loss. The symptoms are aggravated by unknown. He has tried darkened room, NSAIDs, Excedrin and triptans for the symptoms. The treatment provided moderate relief. His past medical history is significant for migraine headaches and migraines in the family.  There is no history of cancer, cluster headaches, hypertension, immunosuppression, obesity, pseudotumor ON  DEPAKOTE,  VIMPAT  AND  TOPAMAX          5)   INTOLERANCE  TO  KEPPRA                    DUE  TO  IRRITABILITY  AND MOOD PROBLEMS          6)  H/O   CHRONIC  ANXIETY  AND  DEPRESSION                    -     STABLE    ON  LEXAPRO                BEING  FOLLOWED  BY  HIS  MENTAL  HEALTH  PROFESSIONALS           7)  H/O  PREVIOUS  MULTIPLE MILD  HEAD  INJURIES                             DUE  TO   FALLS        8)   H/O     CHRONIC    SLEEP  DIFFICULTIES                      AND  DISTURBED  SLEEP  WAKE  CYCLES          9)    PREVIOUS  H/O  DIZZY  EPISODES                          -  NO  RECURRENCE          10)   EMU  AT  [address]   IN  2014                  -  POSSIBLE  FRONTAL    FOCUS        11)   EMU   AT  [address] 7  TO  NOV. 11, 2016    SHOWED :              6  EPISODES  OF  PSYCHOGENIC  NON EPILEPTIC SPELLS. EEG  DURING  THE SPELLS  AND  INTERICTAL  PERIODS                         WAS REPORTED  TO BE  NORMAL. 12)    H/O    BRIEF  SEIZURES  AND  MEMORY  LOSS  INTERMITTENTLY          13)   EPISODES   OF  RIGHT  SIDED  NUMBNESS   AND  SPEECH  PROBLEMS. H/O  HOSPITALIZATION   FROM  SEPT. 2017     AT  [place] [id number]                 D/D    TIA          -    HAD   WORK  UP  WITH  NO  SIGNIFICANT  ABNORMALITIES  .           14)  H/O  CARDIAC  ARRYTHMIA                         TO    FOLLOW  WITH    CARDOLOGY              15)   H/O    HAD  COGNITIVE  BEHAVIOR THERAPY  AT  [facility]  IN  THE  PAST                        PATIENT  TO  FOLLOW  UP   WITH  MENTAL  HEALTH  PROFESSIONALS                        FOR   EVALUATIONS  OF                    HIS  ANXIETY,  DEPRESSION,    NON  EPILEPTIC  PSYCHOGENIC  SPELLS,                          CONVERSION   DISORDER        16)    PATIENT     STOPPED     VIMPAT       AND  TOPAMAX    IN    October 2017             17)    H/O   [facility]  CLINIC   NEUROLOGY    FOLLOW  UP Plantar fasciitis, bilateral     Seizure disorder ([facility] [address]     Sigmoid diverticulosis     Sleep difficulties     Status migrainosus     TIA (transient ischemic attack) 09/2017    Tobacco use     Chronic. Past Surgical History:   Procedure Laterality Date    COLONOSCOPY  04/29/2011    Internal hemorrhoids, possible anal fissure, few scattered diverticula.  COLONOSCOPY  06/04/2010    Mild sigmoid diverticulosis.  COLONOSCOPY  08/28/2009    Sigmoid diverticulosis, internal hemorrhoids.  COLONOSCOPY  05/27/2008    Internal hemorrhoids.  COLONOSCOPY  11/19/2014    polyp in rectum    COLONOSCOPY  6/1/16    sigmoid diverticulosis, colon polyp, internal hemorrhoids    COLONOSCOPY  04/19/2019    [id number]    GASTRIC FUNDOPLICATION  90/99/0080    HERNIA REPAIR Left 11/30/2017    Left Inguinal Hernia Repair w/ Mesh performed by Maci Castaneda MD at [address] [facility] Right 05/30/2013    INGUINAL HERNIA REPAIR Left 01/24/2013    INGUINAL HERNIA REPAIR Right 09/24/2015    KNEE ARTHROSCOPY Left     UPPER GASTROINTESTINAL ENDOSCOPY  03/05/2010    Recurrent hiatal hernia.  UPPER GASTROINTESTINAL ENDOSCOPY  6/1/16    S/P brenda fundoplication, good repair, retained gastric fluid    VASECTOMY                   Current Outpatient Medications   Medication Sig Dispense Refill    clonazePAM (KLONOPIN) 0.5 MG tablet Take 1 tablet by mouth nightly as needed for Anxiety (SLEEP  DIFFICULTIES) for up to 31 days. 30 tablet 2    divalproex (DEPAKOTE) 250 MG DR tablet take 3 tablets by mouth twice a day 180 tablet 5    vilazodone HCl (VILAZODONE HCL) 20 MG TABS Take 20 mg by mouth daily      temazepam (RESTORIL) 7.5 MG capsule Take 7.5 mg by mouth nightly as needed for Sleep.       promethazine (PHENERGAN) 25 MG tablet take 1 tablet by mouth every 8 hours if needed for nausea 60 tablet 2    acetaminophen (TYLENOL) 500 MG tablet Take 500 mg by mouth every 4 hours as needed for Pain      rifaximin (XIFAXAN) 200 MG tablet Take 400 mg by mouth 3 times daily After finishing Vanco      SUMAtriptan (IMITREX) 50 MG tablet Take 1 tablet by mouth once as needed for Migraine 30 tablet 2     No current facility-administered medications for this visit.             No Known Allergies            Family History   Problem Relation Age of Onset    COPD Mother     High Blood Pressure Mother    Pebbles Gallardo Cancer Father         Hodgekin's Lymphoma    High Blood Pressure Father     Eczema Sister     Alzheimer's Disease Maternal Grandmother     COPD Maternal Grandmother     Cancer Maternal Grandmother     Cancer Paternal Grandmother     Cancer Paternal Grandfather     Cancer Sister         colon cancer    High Blood Pressure Sister     Arthritis Sister     Asthma Daughter     Eczema Daughter     Glaucoma Neg Hx              Social History     Socioeconomic History    Marital status:      Spouse name: Not on file    Number of children: Not on file    Years of education: Not on file    Highest education level: Not on file   Occupational History    Not on file   Social Needs    Financial resource strain: Not on file    Food insecurity:     Worry: Not on file     Inability: Not on file    Transportation needs:     Medical: Not on file     Non-medical: Not on file   Tobacco Use    Smoking status: Current Every Day Smoker     Packs/day: 1.00     Years: 20.00     Pack years: 20.00     Types: Cigarettes     Last attempt to quit: 5/5/2016     Years since quitting: 3.0    Smokeless tobacco: Never Used    Tobacco comment: 2-3 weekly   Substance and Sexual Activity    Alcohol use: No     Alcohol/week: 0.0 oz     Comment: rarely    Drug use: No    Sexual activity: Not on file   Lifestyle    Physical activity:     Days per week: Not on file     Minutes per session: Not on file    Stress: Not on file   Relationships    Social connections:     Talks on phone: Not on file     Gets together: Not on file     Attends Spiritism service: Not on file     Active member of club or organization: Not on file     Attends meetings of clubs or organizations: Not on file     Relationship status: Not on file    Intimate partner violence:     Fear of current or ex partner: Not on file     Emotionally abused: Not on file     Physically abused: Not on file     Forced sexual activity: Not on file   Other Topics Concern    Not on file   Social History Narrative    Not on file         Vitals:    05/09/19 1432   BP: 136/82   Pulse: 114   SpO2: 97%         Wt Readings from Last 3 Encounters:   05/09/19 183 lb 3.2 oz (83.1 kg)   04/29/19 183 lb (83 kg)   03/08/19 184 lb (83.5 kg)         BP Readings from Last 3 Encounters:   05/09/19 136/82   04/29/19 126/70   03/08/19 118/64       Hematology and Coagulation  Lab Results   Component Value Date    WBC 8.3 01/25/2019    RBC 4.48 01/25/2019    HGB 15.5 01/25/2019    HCT 44.7 01/25/2019    MCV 99.8 01/25/2019    MCH 34.6 01/25/2019    MCHC 34.7 01/25/2019    RDW 12.9 01/25/2019     01/25/2019    MPV 8.2 01/25/2019         Chemistries  Lab Results   Component Value Date     01/25/2019    K 4.1 01/25/2019     01/25/2019    CO2 28 01/25/2019    BUN 9 12/06/2018    CREATININE 0.85 12/06/2018    CALCIUM 9.1 12/06/2018    PROT 7.6 04/08/2014    LABALBU 4.5 04/08/2014    BILITOT 0.45 04/08/2014    ALKPHOS 80 04/08/2014    AST 25 04/04/2016    ALT 32 04/04/2016     Lab Results   Component Value Date    ALKPHOS 80 04/08/2014    ALT 32 04/04/2016    AST 25 04/04/2016    PROT 7.6 04/08/2014    BILITOT 0.45 04/08/2014    LABALBU 4.5 04/08/2014     Lab Results   Component Value Date    BUN 9 12/06/2018    CREATININE 0.85 12/06/2018     Lab Results   Component Value Date    CALCIUM 9.1 12/06/2018    MG 1.8 09/18/2013     Lab Results   Component Value Date    AST 25 04/04/2016    ALT 32 04/04/2016         Lab Results   Component Value Date    CKTOTAL 67 dysphoric mood, hallucinations, self-injury and suicidal ideas. The patient is nervous/anxious and has insomnia. The patient is not hyperactive. Objective:   Physical Exam   Constitutional: He appears well-developed and well-nourished. He is cooperative. HENT:   Head: Normocephalic and atraumatic. Head is without raccoon's eyes and without Vaughn's sign. Right Ear: External ear normal.   Left Ear: External ear normal.   Nose: Nose normal.   Mouth/Throat: Oropharynx is clear and moist.   Eyes: Pupils are equal, round, and reactive to light. Conjunctivae and EOM are normal.   Neck: Normal range of motion. Neck supple. No muscular tenderness present. Carotid bruit is not present. No neck rigidity. No tracheal deviation and normal range of motion present. No Brudzinski's sign and no Kernig's sign noted. No thyroid mass and no thyromegaly present. Cardiovascular: Normal rate and regular rhythm. Pulmonary/Chest: Effort normal.   Musculoskeletal: He exhibits no edema or tenderness. Skin: Skin is warm. No rash noted. No cyanosis or erythema. No pallor. Nails show no clubbing. Psychiatric: His mood appears anxious. His affect is blunt. His affect is not labile and not inappropriate. His speech is not rapid and/or pressured, not delayed, not tangential and not slurred. He is slowed. He is not agitated, not aggressive, not hyperactive, not withdrawn, not actively hallucinating and not combative. Thought content is not paranoid and not delusional. Cognition and memory are impaired. He does not express impulsivity or inappropriate judgment. He exhibits a depressed mood. He expresses no homicidal and no suicidal ideation. He expresses no suicidal plans and no homicidal plans. He is communicative. He exhibits normal recent memory and normal remote memory. He is attentive. NEUROLOGICAL EXAMINATION :    A) MENTAL STATUS:                   Alert and  oriented  To time, place  And  Person.                 No Aphasia. No  Dysarthria. Able   To  Follow three  Step commands without   Any  Difficulty. No right  To left confusion. Normal  Speech  And language function. Insight and  Judgment ,Fund  Of  Knowledge  WNL              Recent  And  Remote memory  DECREASED              Attention &Concentration are  DECREASED                                                B) CRANIAL NERVES :             2 CN : Visual  Acuity  And  Visual fields  within normal limits                      Fundi  Could  Not  Be  Could  Not  Be  Evaluated. 3,4,6 CN : Both  Pupils are   Reactive and  Equal.                          Extraocular   Movements  Are  Intact. No  Nystagmus. No  MOO. No  Afferent  Pupillary  Defect noted. 5 CN :  Normal  Facial sensations and Corneal  Reflexes         7 CN :  Normal  Facial  Symmetry  And  Strength. No facial  Weakness. 8 CN :  Hearing  Appears within normal limits        9, 10 CN: Normal spontaneous, reflex palate movements       11 CN:   Normal  Shoulder shrug and  strength       12 CN :   Normal  Tongue movements and  Tongue  In midline                      No tongue   Fasciculations or atrophy     C) MOTOR  EXAM:                 Strength  In upper  And  Lower extremities   within normal limits             No  Drift. No  Atrophy             Rapid alternating  And  repetitions  Movements  within normal limits               Muscle  Tone  In upper  And  Lower  Extremities  within normal limits              No rigidity. No  Spasticity. Bradykinesia   absent               No  Asterixis.             Sustention  Tremor , Resting  Tremor   absent                  No other  Abnormal  Movements noted         D) SENSORY :             light touch, pinprick, position  And  Vibration DECREASED   IN   HANDS    E) REFLEXES:                 Deep  Tendon  Reflexes within normal limits intact. IMPRESSION:    No evidence for acute intracranial pathology. No bleed, mass effect, or    midline shift. Procedure: [facility] 06/02/2014 [id number] MRI BRAIN COMBINED    Reason for Exam: \      FULL RESULT: MRI brain with and without intravenous contrast, 6/2/2014      History: Migraines      Technique: Multiplanar multisequence MR imaging of the brain was    performed before and after intravenous administration of 16 mL Magnevist.      Findings: No evidence for shift of midline structures, mass effect or    compression of the ventricles noted. No acute intra-or extra-axial    hemorrhage is seen. No abnormal intracranial fluid collections are    identified. The basal cisterns are patent. Posterior fossa structures demonstrate no    discrete mass. Few scattered foci of T2/FLAIR hyperintensity noted in the    periventricular and subcortical white matter which essentially are    nonspecific in imaging appearance however differential considerations    include demyelinating or inflammatory process, migraine induced changes,    chronic small vessel disease or vascular disease. No associated restricted diffusion or abnormal enhancing seen. A small    left occipital lobe developmental venous anomaly seen. No abnormal    enhancing intracranial mass seen. Visualized orbital contents appear unremarkable. Mild mucosal thickening of the ethmoid air cells noted. Skull base flow voids are patent. Superior sagittal sinus and straight sinus flow voids are patent. Heterogeneity of the T1-weighted marrow signal intensity seen. IMPRESSION:    1. No acute or subacute ischemic insult noted. No abnormal enhancing    intracranial mass seen.    2.scattered foci of T2/FLAIR hyperintensity noted in the periventricular    and subcortical white matter which essentially are nonspecific in imaging    appearance however differential considerations include demyelinating or    inflammatory process, migraine induced changes, chronic small vessel    disease or vascular disease. Procedure: [facility] 10/08/2014 [id number] CT BRAIN WITHOUT CONTRAST    Reason for Exam: \      FULL RESULT: EXAM: Brain CT without contrast dated 10/8/2014. HISTORY: Headache after head injury. COMPARISON: Brain CT dated 4/16/2014. TECHNIQUE: Multi-detector axial images are obtained through the head. Findings: There is no evidence for acute intracranial hemorrhage,    midline shift or mass effect. Ventricular and cistern spaces are normal    and symmetric. Clarke Capuchin and white matter differentiation is well preserved. No intra- or extra-axial fluid collections or mass occupying lesions    seen. Bilateral cerebellopontine angles are normal. Visualized orbital    contents are normal. Study viewed in bone windows shows no    abnormalities. Mild left ethmoid sinusitis otherwise all visualized    sinuses are normally aerated. Bilateral temporomandibular joints are    normally aligned. IMPRESSION: No acute intracranial abnormalities. Mild left ethmoid    sinusitis. Plan:           VISITING DIAGNOSIS:      ICD-10-CM    1. Partial idiopathic epilepsy with seizures of localized onset, not intractable, without status epilepticus (HCC) G40.009 clonazePAM (KLONOPIN) 0.5 MG tablet     divalproex (DEPAKOTE) 250 MG DR tablet   2. Anxiety, mild F41.9 clonazePAM (KLONOPIN) 0.5 MG tablet     divalproex (DEPAKOTE) 250 MG DR tablet   3. Disturbance, sleep G47.9 clonazePAM (KLONOPIN) 0.5 MG tablet     divalproex (DEPAKOTE) 250 MG DR tablet   4. Tobacco use Z72.0 clonazePAM (KLONOPIN) 0.5 MG tablet     divalproex (DEPAKOTE) 250 MG DR tablet   5. Gastroesophageal reflux disease without esophagitis K21.9 clonazePAM (KLONOPIN) 0.5 MG tablet     divalproex (DEPAKOTE) 250 MG DR tablet   6.  Hiatal hernia K44.9 clonazePAM (KLONOPIN) 0.5 MG tablet     divalproex (DEPAKOTE) 250 MG DR tablet   7. Lumbar sprain, subsequent encounter S33. 5XXD clonazePAM (KLONOPIN) 0.5 MG tablet     divalproex (DEPAKOTE) 250 MG DR tablet   8. Nonintractable headache, unspecified chronicity pattern, unspecified headache type R51 clonazePAM (KLONOPIN) 0.5 MG tablet     divalproex (DEPAKOTE) 250 MG DR tablet   9. Status migrainosus G43.901 clonazePAM (KLONOPIN) 0.5 MG tablet     divalproex (DEPAKOTE) 250 MG DR tablet   10. Dizziness R42 clonazePAM (KLONOPIN) 0.5 MG tablet     divalproex (DEPAKOTE) 250 MG DR tablet   11. Sleep difficulties G47.9 clonazePAM (KLONOPIN) 0.5 MG tablet     divalproex (DEPAKOTE) 250 MG DR tablet   12. Seizure disorder (HCC) G40.909 clonazePAM (KLONOPIN) 0.5 MG tablet     divalproex (DEPAKOTE) 250 MG DR tablet   13. Recurrent seizures (HCC) G40.909 clonazePAM (KLONOPIN) 0.5 MG tablet     divalproex (DEPAKOTE) 250 MG DR tablet   14. Nonintractable paroxysmal hemicrania, unspecified chronicity pattern G44.039 clonazePAM (KLONOPIN) 0.5 MG tablet     divalproex (DEPAKOTE) 250 MG DR tablet   15. Convulsions, unspecified convulsion type ([facility] [address] R56.9 clonazePAM (KLONOPIN) 0.5 MG tablet     divalproex (DEPAKOTE) 250 MG DR tablet   16. Chronic migraine without aura without status migrainosus, not intractable G43.709 clonazePAM (KLONOPIN) 0.5 MG tablet     divalproex (DEPAKOTE) 250 MG DR tablet   17. Dysthymia F34.1    18. H/O fall Z91.81    19. TIA (transient ischemic attack) G45.9    20. Cervical radiculopathy M54.12               CONCERNS   &   INCREASED   RISK   FOR        * TIA,  CEREBRO  VASCULAR  ISCHEMIA, STROKE       *  SEIZURE  ACTIVITY,  EPILEPSY ,        *  Poorly  Controlled Chronic  Headaches &  Migraines      *   COGNITIVE  &   MEMORY PROBLEMS  AND  DEMENTIAS                 VARIOUS  RISK   FACTORS   WERE  REVIEWED   AND   DISCUSSED. *  PATIENT   HAS  MULTIPLE   MEDICAL, MENTAL HEALTH         &      NEUROLOGICAL   PROBLEMS .          PATIENT'S   MANAGEMENT  IS CHALLENGING. PLAN:       Angela Verduzco  Of  The  Diagnoses,  The  Management & Treatment  Options           AND    Care  plan  Were        Reviewed and   Discussed   With  patient. * Goals  And  Expectations  Of  The  Therapy  Discussed   And  Reviewed. *   Benefits   And   Side  Effect  Profile  Of  Medication/s   Were   Discussed             * Need   For  Further   Follow up For  The  Various  Problems  Were discussed. * Results  Of  The  Previous  Diagnostic tests were reviewed and questions answered. patient  understand the same. Medical  Decision  Making  Was  Made  Based on the   Complexity  Of  Above  Mentioned  Diagnoses,    Data reviewed   & diagnostic  Tests  Reviewed,  Risk  Of  Significant   Co morbidities and complicating   Factors. Medical  Decision  Was   High  Complexity  Due   To  The  Patient's  Multiple  Symptoms,  Advancing   Disease,  Complex  Treatment  Regimen,  Multiple medications and   Risk  Of   Side  Effects,  Difficulty  In  Medication  Management  And  Diagnostic  Challenges   In  View  Of  The  Associated   Co  Morbid  Conditions   And  Problems. * FALL   PRECAUTIONS. THESE  REVIEWED   AND  DISCUSSED      *   ABSOLUTELY   NO  DRIVING      *   BE  CAREFUL  WITH  ACTIVITIES         *   ADEQUATE   FLUID  INTAKE   AND  ELECTROLYTE  BALANCE         * KEEP  DAIRY  OF   THE  NEUROLOGICAL  SYMPTOMS      RECORDING THE    DURATION  AND  FREQUENCY. *  AVOID    CONDITIONS  AND  FACTORS   THAT  MAKE   NEUROLOGICAL  SYMPTOMS  WORSE. *   SEIZURE  PRECAUTIONS. A)  Avoid  Working  At   [employer] B)  Avoid  Working  With  Heavy machinery. C)  Avoid   Swimming,  Climbing  A  Ladder   Unattended. D)  Avoid   Driving   If  You   Have  A  Seizure. E)  Must   Be  Seizure  Free   For  At   Least   6 months,  Before   You  Can drive. Discussed     *  PATIENT  IS  ALSO   COUNSELED   TO  KEEP    ACTIVITIES       A)   SIMPLE      B)  ORGANIZED      C)  WRITE   DOWN                 *  EVALUATIONS  AND  FOLLOW UP:                                          * CARDIOLOGY               * EPILEPSY  MONITORING   UNIT,   IF  PATIENT  IS  WILLING                              *       PATIENT  WANTED    TO  CONTINUE     DEPAKOTE  FOR  SEIZURES                                       AND  MIGRAINE  PROPHYLAXIS                                              *    PATIENT     REQUESTS  PRESCRIPTIONS  FOR   KLONOPIN       FOR                            SEIZURE  CONTROL   AND  SLEEP  DIFFICULTIES                          *        H/O    SEIZURE   FREQUENCY    INCREASE    IN   [facility]   AND  April 2019                              DUE  TO  C. DIFF     DIARRHEA,    AND   HOSPITALIZATIONS. Controlled Substances Monitoring: Attestation: The Prescription Monitoring Report for this patient was reviewed today. Aarti Castañeda MD)  Chronic Pain Routine Monitoring: Possible medication side effects, risk of tolerance/dependence & alternative treatments discussed. Aarti Castañeda MD)            Orders Placed This Encounter   Procedures    Valproic acid level, total       Orders Placed This Encounter   Medications    clonazePAM (KLONOPIN) 0.5 MG tablet     Sig: Take 1 tablet by mouth nightly as needed for Anxiety (SLEEP  DIFFICULTIES) for up to 31 days.      Dispense:  30 tablet     Refill:  2    divalproex (DEPAKOTE) 250 MG DR tablet     Sig: take 3 tablets by mouth twice a day     Dispense:  180 tablet     Refill:  5               *PATIENT   TO  FOLLOW  UP  WITH   PRIMARY  CARE   AND   OTHER  CONSULTANTS  AS  BEFORE.           *TO  FOLLOW  WITH   MENTAL  HEALTH  PROFESSIONALS ,  INCLUDING          PSYCHOLOGICAL  COUNSELING   AND  PSYCHIATRIC  EVALUTIONS        *  Maintain   Healthy  Life Style    With   Periodic  Monitoring  Of doctor before starting an exercise program.  Keep moving. [place]issatou Davila the lawn, work in the garden, or [employer]. Take the stairs instead of the elevator at work. If you smoke, quit. People who smoke have an increased risk for heart attack, stroke, cancer, and other lung illnesses. Quitting is hard, but there are ways to boost your chance of quitting tobacco for good. Use nicotine gum, patches, or lozenges. Ask your doctor about stop-smoking programs and medicines. Keep trying. In addition to reducing your risk of diseases in the future, you will notice some benefits soon after you stop using tobacco. If you have shortness of breath or asthma symptoms, they will likely get better within a few weeks after you quit. Limit how much alcohol you drink. Moderate amounts of alcohol (up to 2 drinks a day for men, 1 drink a day for women) are okay. But drinking too much can lead to liver problems, high blood pressure, and other health problems. Family health  If you have a family, there are many things you can do together to improve your health. Eat meals together as a family as often as possible. Eat healthy foods. This includes fruits, vegetables, lean meats and dairy, and whole grains. Include your family in your fitness plan. Most people think of activities such as jogging or tennis as the way to fitness, but there are many ways you and your family can be more active. Anything that makes you breathe hard and gets your heart pumping is exercise. Here are some tips:  Walk to do errands or to take your child to school or the bus. Go for a family bike ride after dinner instead of watching TV. Where can you learn more? Go to [email or web address] org and sign in to your [employer] account. Enter R489 in the [facility] box to learn more about \"A Healthy Lifestyle: Care Instructions. \"     If you do not have an account, please click on the \"Sign Up Now\" link.   Current as of: July 26, 2016  Content Version: 11.2  © 9602-3094 [employer]. Care instructions adapted under license by [facility] ([facility]). If you have questions about a medical condition or this instruction, always ask your healthcare professional. [address] any warranty or liability for your use of this information.

## 2019-06-07 ENCOUNTER — OFFICE VISIT (OUTPATIENT)
Dept: SURGERY | Age: 39
End: 2019-06-07
Payer: MEDICARE

## 2019-06-07 VITALS
SYSTOLIC BLOOD PRESSURE: 120 MMHG | DIASTOLIC BLOOD PRESSURE: 84 MMHG | HEIGHT: 65 IN | WEIGHT: 184 LBS | BODY MASS INDEX: 30.66 KG/M2 | HEART RATE: 100 BPM | TEMPERATURE: 98.4 F

## 2019-06-07 DIAGNOSIS — R19.7 DIARRHEA, UNSPECIFIED TYPE: Primary | ICD-10-CM

## 2019-06-07 PROCEDURE — 4004F PT TOBACCO SCREEN RCVD TLK: CPT | Performed by: SURGERY

## 2019-06-07 PROCEDURE — G8427 DOCREV CUR MEDS BY ELIG CLIN: HCPCS | Performed by: SURGERY

## 2019-06-07 PROCEDURE — G8417 CALC BMI ABV UP PARAM F/U: HCPCS | Performed by: SURGERY

## 2019-06-07 PROCEDURE — 99213 OFFICE O/P EST LOW 20 MIN: CPT | Performed by: SURGERY

## 2019-06-07 RX ORDER — NAPROXEN 500 MG/1
TABLET ORAL
Refills: 0 | COMMUNITY
Start: 2019-06-02 | End: 2020-08-03

## 2019-06-07 RX ORDER — VILAZODONE HYDROCHLORIDE 20 MG/1
40 TABLET ORAL
COMMUNITY
End: 2019-06-07 | Stop reason: ALTCHOICE

## 2019-06-07 RX ORDER — CYCLOBENZAPRINE HCL 10 MG
TABLET ORAL
Refills: 0 | COMMUNITY
Start: 2019-06-02 | End: 2020-08-03

## 2019-06-07 NOTE — PROGRESS NOTES
Jocelyn Velázquez is a 43 y.o. male          CC:diarrhea 4-5 x daily-x 7-8 months- no pain    . Diarrhea (comes- goes, some better,x 7-8 months)      HISTORY OF PRESENT ILLNESS:    Patient has been having trouble with diarrhea for the last 3-6 months. He's been hospitalized many times. He's been treated for C. difficile. He's had 3 trials Vanco and appeared to fail them all. However, the last one he treated was not positive for C. difficile. They treated on symptoms only. She had a negative test.  He was seen by infectious disease until [facility]. They treated him with  fadaxomicin for 10 days. He Says that he is better but still has a couple episodes of watery diarrhea every several days. He does not have a follow-up with infectious disease. Bonita Zhu He has had a EGD and colonoscopy which were essentially normal.  He was scheduled to possibly have a capsule endoscopy. However, I think at this point with the question of C. difficile. I would hold off on any further workup. 2.  We decide about his C. difficile.       Review of Systems:    General:  Fever: neg  Weight Change:Negative  Night Sweats: Negative    Eye:  Blurry Vision:Negative  Double Vision: Negative    Ent:  Headaches: Negative  Sore throat: Negative    Allergy/Immunology:  Hives: Negative  Latex allergy: Negative    Hematology/Lymphatic:  Bleeding Problems: Negative  Blood Clots: Negative  Swollen Lymph Nodes: Negative    Lungs:  Cough: Negative  SOB: Negative  Wheezing:Negative    Cardiovascular:  Chest Pain: Negative  Palpitations:Negative    GI:   Decreased Appetite: Negative  Heartburn: Negative  Dysphagia: Negative  Nausea/Vomiting: Negative  Abdominal Pain: Negative  Change in Bowels:Negative  Constipation: Negative  Diarrhea: Positive  Rectal Bleeding: Negative    :   Dysuria: Negative  Increase Urinary Frequency/Urgency: Negative    Neuro:  Seizures: Positive- last 6/3/2019  Confusion: Negative        PAST MEDICAL 0    naproxen (NAPROSYN) 500 MG tablet take 1 tablet by mouth twice a day with food  0    clonazePAM (KLONOPIN) 0.5 MG tablet Take 1 tablet by mouth nightly as needed for Anxiety (SLEEP  DIFFICULTIES) for up to 31 days. 30 tablet 2    divalproex (DEPAKOTE) 250 MG DR tablet take 3 tablets by mouth twice a day 180 tablet 5    rifaximin (XIFAXAN) 200 MG tablet Take 400 mg by mouth 3 times daily After finishing Vanco      vilazodone HCl (VILAZODONE HCL) 20 MG TABS Take 20 mg by mouth daily      temazepam (RESTORIL) 7.5 MG capsule Take 7.5 mg by mouth nightly as needed for Sleep.  promethazine (PHENERGAN) 25 MG tablet take 1 tablet by mouth every 8 hours if needed for nausea 60 tablet 2    acetaminophen (TYLENOL) 500 MG tablet Take 500 mg by mouth every 4 hours as needed for Pain      SUMAtriptan (IMITREX) 50 MG tablet Take 1 tablet by mouth once as needed for Migraine 30 tablet 2     No current facility-administered medications on file prior to visit. Allergies as of 06/07/2019    (No Known Allergies)           PHYSICAL EXAM:    Blood pressure 120/84, pulse 100, temperature 98.4 °F (36.9 °C), temperature source Temporal, height 5' 4.96\" (1.65 m), weight 184 lb (83.5 kg). Gen:  A and O x 3, NAD, well nourished  Eyes:  Sclera non icterus, PERRL  Head:  Normocephalic, non-tender  Neck:  Supple, no adenopathy, thyroid non tender and no masses,no carotid bruits  Lungs:  CTA, symmetrical  Chest:  RRR, no murmurs  Abd:  Soft, NT, ND, no HSM, no hernias, no bruits  Ext:  No edema, no cyanosis  Psych: reveals appropriate mood, memory and judgment,  Neuro:  Reveals no gross motor or sensory deficits,   Msk:  5/5 strength all 4 extremities, no joint tenderness      ASSESS MENT:Plan:    Feels good today. He doesn't have any diarrhea today. He only had one stool yesterday. However, 2 nights ago. He had a lot of watery diarrhea. It comes and goes. It is definitely better since he is treated.     At this

## 2019-07-01 RX ORDER — SUMATRIPTAN 50 MG/1
TABLET, FILM COATED ORAL
Qty: 30 TABLET | Refills: 2 | Status: SHIPPED | OUTPATIENT
Start: 2019-07-01 | End: 2019-11-11 | Stop reason: SDUPTHER

## 2019-08-06 DIAGNOSIS — G40.909 SEIZURE DISORDER (HCC): ICD-10-CM

## 2019-08-06 DIAGNOSIS — F41.9 ANXIETY, MILD: ICD-10-CM

## 2019-08-06 DIAGNOSIS — G40.009 PARTIAL IDIOPATHIC EPILEPSY WITH SEIZURES OF LOCALIZED ONSET, NOT INTRACTABLE, WITHOUT STATUS EPILEPTICUS (HCC): ICD-10-CM

## 2019-08-06 DIAGNOSIS — K44.9 HIATAL HERNIA: ICD-10-CM

## 2019-08-06 DIAGNOSIS — K21.9 GASTROESOPHAGEAL REFLUX DISEASE WITHOUT ESOPHAGITIS: ICD-10-CM

## 2019-08-06 DIAGNOSIS — R42 DIZZINESS: ICD-10-CM

## 2019-08-06 DIAGNOSIS — Z72.0 TOBACCO USE: ICD-10-CM

## 2019-08-06 DIAGNOSIS — R51.9 NONINTRACTABLE HEADACHE, UNSPECIFIED CHRONICITY PATTERN, UNSPECIFIED HEADACHE TYPE: ICD-10-CM

## 2019-08-06 DIAGNOSIS — G43.709 CHRONIC MIGRAINE WITHOUT AURA WITHOUT STATUS MIGRAINOSUS, NOT INTRACTABLE: ICD-10-CM

## 2019-08-06 DIAGNOSIS — R56.9 CONVULSIONS, UNSPECIFIED CONVULSION TYPE (HCC): ICD-10-CM

## 2019-08-06 DIAGNOSIS — G47.9 SLEEP DIFFICULTIES: ICD-10-CM

## 2019-08-06 DIAGNOSIS — G40.909 RECURRENT SEIZURES (HCC): ICD-10-CM

## 2019-08-06 DIAGNOSIS — G44.039 NONINTRACTABLE PAROXYSMAL HEMICRANIA, UNSPECIFIED CHRONICITY PATTERN: ICD-10-CM

## 2019-08-06 DIAGNOSIS — G47.9 DISTURBANCE, SLEEP: ICD-10-CM

## 2019-08-06 DIAGNOSIS — S33.5XXD LUMBAR SPRAIN, SUBSEQUENT ENCOUNTER: ICD-10-CM

## 2019-08-06 DIAGNOSIS — G43.901 STATUS MIGRAINOSUS: ICD-10-CM

## 2019-08-06 RX ORDER — CLONAZEPAM 0.5 MG/1
TABLET ORAL
Qty: 14 TABLET | Refills: 0 | Status: SHIPPED | OUTPATIENT
Start: 2019-08-06 | End: 2019-08-13 | Stop reason: SDUPTHER

## 2019-08-13 ENCOUNTER — OFFICE VISIT (OUTPATIENT)
Dept: NEUROLOGY | Age: 39
End: 2019-08-13
Payer: MEDICARE

## 2019-08-13 VITALS
WEIGHT: 184.08 LBS | DIASTOLIC BLOOD PRESSURE: 62 MMHG | BODY MASS INDEX: 29.58 KG/M2 | HEIGHT: 66 IN | HEART RATE: 76 BPM | RESPIRATION RATE: 16 BRPM | SYSTOLIC BLOOD PRESSURE: 122 MMHG

## 2019-08-13 DIAGNOSIS — R42 DIZZINESS: ICD-10-CM

## 2019-08-13 DIAGNOSIS — G45.9 TIA (TRANSIENT ISCHEMIC ATTACK): ICD-10-CM

## 2019-08-13 DIAGNOSIS — M54.12 CERVICAL RADICULOPATHY: ICD-10-CM

## 2019-08-13 DIAGNOSIS — G40.909 RECURRENT SEIZURES (HCC): ICD-10-CM

## 2019-08-13 DIAGNOSIS — R51.9 NONINTRACTABLE HEADACHE, UNSPECIFIED CHRONICITY PATTERN, UNSPECIFIED HEADACHE TYPE: ICD-10-CM

## 2019-08-13 DIAGNOSIS — M54.40 LOW BACK PAIN WITH SCIATICA, SCIATICA LATERALITY UNSPECIFIED, UNSPECIFIED BACK PAIN LATERALITY, UNSPECIFIED CHRONICITY: ICD-10-CM

## 2019-08-13 DIAGNOSIS — F34.1 DYSTHYMIA: ICD-10-CM

## 2019-08-13 DIAGNOSIS — G40.009 PARTIAL IDIOPATHIC EPILEPSY WITH SEIZURES OF LOCALIZED ONSET, NOT INTRACTABLE, WITHOUT STATUS EPILEPTICUS (HCC): Primary | ICD-10-CM

## 2019-08-13 DIAGNOSIS — K21.9 GASTROESOPHAGEAL REFLUX DISEASE WITHOUT ESOPHAGITIS: ICD-10-CM

## 2019-08-13 DIAGNOSIS — G45.0 VBI (VERTEBROBASILAR INSUFFICIENCY): ICD-10-CM

## 2019-08-13 DIAGNOSIS — G43.901 STATUS MIGRAINOSUS: ICD-10-CM

## 2019-08-13 DIAGNOSIS — S09.90XS INJURY OF HEAD, SEQUELA: ICD-10-CM

## 2019-08-13 DIAGNOSIS — K44.9 HIATAL HERNIA: ICD-10-CM

## 2019-08-13 DIAGNOSIS — Z91.81 H/O FALL: ICD-10-CM

## 2019-08-13 DIAGNOSIS — Z72.0 TOBACCO USE: ICD-10-CM

## 2019-08-13 DIAGNOSIS — G40.909 SEIZURE DISORDER (HCC): ICD-10-CM

## 2019-08-13 DIAGNOSIS — G47.9 DISTURBANCE, SLEEP: ICD-10-CM

## 2019-08-13 DIAGNOSIS — G47.9 SLEEP DIFFICULTIES: ICD-10-CM

## 2019-08-13 DIAGNOSIS — G43.709 CHRONIC MIGRAINE WITHOUT AURA WITHOUT STATUS MIGRAINOSUS, NOT INTRACTABLE: ICD-10-CM

## 2019-08-13 DIAGNOSIS — F41.9 ANXIETY, MILD: ICD-10-CM

## 2019-08-13 DIAGNOSIS — G44.039 NONINTRACTABLE PAROXYSMAL HEMICRANIA, UNSPECIFIED CHRONICITY PATTERN: ICD-10-CM

## 2019-08-13 DIAGNOSIS — R56.9 CONVULSIONS, UNSPECIFIED CONVULSION TYPE (HCC): ICD-10-CM

## 2019-08-13 DIAGNOSIS — S33.5XXS LUMBAR SPRAIN, SEQUELA: ICD-10-CM

## 2019-08-13 DIAGNOSIS — S33.5XXD LUMBAR SPRAIN, SUBSEQUENT ENCOUNTER: ICD-10-CM

## 2019-08-13 PROCEDURE — G8417 CALC BMI ABV UP PARAM F/U: HCPCS | Performed by: PSYCHIATRY & NEUROLOGY

## 2019-08-13 PROCEDURE — 4004F PT TOBACCO SCREEN RCVD TLK: CPT | Performed by: PSYCHIATRY & NEUROLOGY

## 2019-08-13 PROCEDURE — G8427 DOCREV CUR MEDS BY ELIG CLIN: HCPCS | Performed by: PSYCHIATRY & NEUROLOGY

## 2019-08-13 PROCEDURE — 99215 OFFICE O/P EST HI 40 MIN: CPT | Performed by: PSYCHIATRY & NEUROLOGY

## 2019-08-13 RX ORDER — CLONAZEPAM 0.5 MG/1
TABLET ORAL
Qty: 30 TABLET | Refills: 2 | Status: SHIPPED | OUTPATIENT
Start: 2019-08-13 | End: 2019-11-11 | Stop reason: SDUPTHER

## 2019-08-13 RX ORDER — DIVALPROEX SODIUM 250 MG/1
TABLET, DELAYED RELEASE ORAL
Qty: 180 TABLET | Refills: 5 | Status: SHIPPED | OUTPATIENT
Start: 2019-08-13 | End: 2019-12-13 | Stop reason: SDUPTHER

## 2019-08-13 ASSESSMENT — ENCOUNTER SYMPTOMS
SCALP TENDERNESS: 0
DIARRHEA: 0
TROUBLE SWALLOWING: 0
EYE PAIN: 0
SORE THROAT: 0
PHOTOPHOBIA: 1
CONSTIPATION: 0
EYE WATERING: 0
FACIAL SWELLING: 0
BACK PAIN: 0
FACIAL SWEATING: 0
SHORTNESS OF BREATH: 0
CHOKING: 0
VISUAL CHANGE: 0
CHEST TIGHTNESS: 0
RHINORRHEA: 0
APNEA: 0
COLOR CHANGE: 0
SINUS PRESSURE: 0
EYE REDNESS: 0
EYE ITCHING: 0
CHANGE IN BOWEL HABIT: 0
SWOLLEN GLANDS: 0
VOMITING: 0
ABDOMINAL DISTENTION: 0
EYE DISCHARGE: 0
COUGH: 0
WHEEZING: 0
VOICE CHANGE: 0
NAUSEA: 1
BLURRED VISION: 0
BLOOD IN STOOL: 0
ABDOMINAL PAIN: 0

## 2019-08-13 NOTE — PROGRESS NOTES
Was   Reviewed   In   Chronological   [facility]anner. Patient   Indicates   The  Presence   And  The  Absence  Of  The  Following  Associated  And   Additional  Neurological    Symptoms:                                Balance  And coordination problems  absent           Gait problems     absent            Headaches      present              Migraines           present           Memory problems        present           Confusion        absent            Paresthesia numbness          absent           Seizures  And  Starring  Episodes           PRESENT           Syncope,  Near  syncopal episodes         absent           Speech problems           absent             Swallowing  Problems      absent            Dizziness,  Light headedness           present                        Vertigo        absent             Generalized   Weakness    absent              focal  Weakness     absent             Tremors         absent              Sleep  Problems     present             History  Of   Recent   Head  Injury     absent             History  Of   Recent  TIA     absent             History  Of   Recent    Stroke     absent             Neck  Pain and  Neck muscle  Spasms  absent             Radiating  down   And   Weakness           absent            Lower back   Pain  And     Spasms  present            Radiating    Down   And   Weakness          absent                H/O   FALLS        absent               History  Of   Visual  Symptoms    absent                Associated   Diplopia       absent                                                                  Also   Additional   Symptoms   Present    As  Documented    In   The detailed    Review  Of  Systems   And    Please   Refer   To    Them for   Additional  Information.        INFORMATION   REVIEWED:      VITAL  SIGNS,  MEDICATIONS   LIST,   ALLERGIES AND  DRUG  INTOLERANCES,    MEDICAL   HISTORY,     SURGICAL   HISTORY,    FAMILY   HISTORY,  SOCIAL  HISTORY,    PROBLEM mass seen. Visualized orbital contents appear unremarkable. Mild mucosal thickening of the ethmoid air cells noted. Skull base flow voids are patent. Superior sagittal sinus and straight sinus flow voids are patent. Heterogeneity of the T1-weighted marrow signal intensity seen. IMPRESSION:    1. No acute or subacute ischemic insult noted. No abnormal enhancing    intracranial mass seen. 2.scattered foci of T2/FLAIR hyperintensity noted in the periventricular    and subcortical white matter which essentially are nonspecific in imaging    appearance however differential considerations include demyelinating or    inflammatory process, migraine induced changes, chronic small vessel    disease or vascular disease. Procedure: [facility] 10/08/2014 [id number] CT BRAIN WITHOUT CONTRAST    Reason for Exam: \      FULL RESULT: EXAM: Brain CT without contrast dated 10/8/2014. HISTORY: Headache after head injury. COMPARISON: Brain CT dated 4/16/2014. TECHNIQUE: Multi-detector axial images are obtained through the head. Findings: There is no evidence for acute intracranial hemorrhage,    midline shift or mass effect. Ventricular and cistern spaces are normal    and symmetric. Romana Cake and white matter differentiation is well preserved. No intra- or extra-axial fluid collections or mass occupying lesions    seen. Bilateral cerebellopontine angles are normal. Visualized orbital    contents are normal. Study viewed in bone windows shows no    abnormalities. Mild left ethmoid sinusitis otherwise all visualized    sinuses are normally aerated. Bilateral temporomandibular joints are    normally aligned. IMPRESSION: No acute intracranial abnormalities. Mild left ethmoid    sinusitis. Plan:           VISITING DIAGNOSIS:      ICD-10-CM    1.  Partial idiopathic epilepsy with seizures of localized onset, not intractable, without status epilepticus ([facility] [address] Made  Based on the   Complexity  Of  Above  Mentioned  Diagnoses,    Data reviewed   & diagnostic  Tests  Reviewed,  Risk  Of  Significant   Co morbidities and complicating   Factors. Medical  Decision  Was   High  Complexity  Due   To  The  Patient's  Multiple  Symptoms,  Advancing   Disease,  Complex  Treatment  Regimen,  Multiple medications and   Risk  Of   Side  Effects,  Difficulty  In  Medication  Management  And  Diagnostic  Challenges   In  View  Of  The  Associated   Co  Morbid  Conditions   And  Problems. * FALL   PRECAUTIONS. THESE  REVIEWED   AND  DISCUSSED      *   ABSOLUTELY   NO  DRIVING      *   BE  CAREFUL  WITH  ACTIVITIES             *   ADEQUATE   FLUID  INTAKE   AND  ELECTROLYTE  BALANCE             * KEEP  DAIRY  OF   THE  NEUROLOGICAL  SYMPTOMS        RECORDING THE    DURATION  AND  FREQUENCY. *  AVOID    CONDITIONS  AND  FACTORS   THAT  MAKE   NEUROLOGICAL  SYMPTOMS  WORSE. *   SEIZURE  PRECAUTIONS. A)  Avoid  Working  At   [employer] B)  Avoid  Working  With  Heavy machinery. C)  Avoid   Swimming,  Climbing  A  Ladder   Unattended. D)  Avoid   Driving   If  You   Have  A  Seizure. E)  Must   Be  Seizure  Free   For  At   Least   6 months,  Before   You  Can drive. F) Some times  Your  May  Feel  Seizure coming  Before  It  Begins. You  May feel             Strange smell or funny  Feeling  In  Your  Stomach,  Which is  Called   Aura. TIPS  TO  REDUCE/ PREVENT  SEIZURES         1. Take  Your  Anti seizure  Medications   As   Recommended. 2. Get   Enough   Sleep. Sleep  Deprivation   Can  Trigger  A  Seizure. 3. If   You   Have  A fever,  Treat  It  At  Once,  And  Contact   Your  Primary  Care Providers. Periodic Controlled Substance Monitoring: Possible medication side effects, risk of tolerance/dependence & alternative treatments discussed. , Assessed functional status. Rafy Apple MD)            Orders Placed This Encounter   Procedures    CBC    Electrolyte Panel    Valproic acid level, total    Urine Drug Screen       Orders Placed This Encounter   Medications    clonazePAM (KLONOPIN) 0.5 MG tablet     Sig: take 1 tablet by mouth NIGHTLY AS NEEDED FOR ANXIETY (SLEEP DIFFICULTIES)     Dispense:  30 tablet     Refill:  2    divalproex (DEPAKOTE) 250 MG DR tablet     Sig: take 3 tablets by mouth twice a day     Dispense:  180 tablet     Refill:  5                     *PATIENT   TO  FOLLOW  UP  WITH   PRIMARY  CARE   AND   OTHER  CONSULTANTS  AS  BEFORE.           *TO  FOLLOW  WITH   MENTAL  HEALTH  PROFESSIONALS ,  INCLUDING            PSYCHOLOGICAL  COUNSELING   AND  PSYCHIATRIC  EVALUTIONS            *  Maintain   Healthy  Life Style    With   Periodic  Monitoring  Of    Any  Medical  Conditions  Including   Elevated  Blood  Pressure,  Lipid  Profile,   Blood  Sugar levels  And   Heart  Disease. *   Period   Screening  For  Cancers  Involving  Breast,  Colon,  Prostate, lungs  And  Other  Organs  As  Applicable,  In consultation   With  Your  Primary Care Providers. * Second  Neurological  Opinion  And  Evaluations  In  [facility]  Setting  If  Patient  Is  Interested. *  If  The  Patient remains  Neurologically  Stable   Return   To  [facility] Neurology Department       IN  2-3  MONTHS  TIME   FOR  FURTHER  FOLLOW UP.                 *  If   There is  Any  Significant  Worsening   Of  Current  Symptoms  And      Or  If patient  Develops   Any additional  New  Neurological  Symptoms  Or  Significant  Concerns       Should  Call  911 or  Go  To  Emergency  Department  For  Further  Immediate  Evaluation.                        *   The fruits and vegetables. Eat a healthy amount of food. A 3-ounce serving of meat, for example, is about the size of a deck of cards. Fill the rest of your plate with vegetables and whole grains. Limit the amount of soda and sports drinks you have every day. Drink more water when you are thirsty. Eat at least 5 servings of fruits and vegetables every day. It may seem like a lot, but it is not hard to reach this goal. A serving or helping is 1 piece of fruit, 1 cup of vegetables, or 2 cups of leafy, raw vegetables. Have an apple or some carrot sticks as an afternoon snack instead of a candy bar. Try to have fruits and/or vegetables at every meal.  Make exercise part of your daily routine. You may want to start with simple activities, such as walking, bicycling, or slow swimming. Try to be active 30 to 60 minutes every day. You do not need to do all 30 to 60 minutes all at once. For example, you can exercise 3 times a day for 10 or 20 minutes. Moderate exercise is safe for most people, but it is always a good idea to talk to your doctor before starting an exercise program.  Keep moving. [employer]i [employer] the lawn, work in the garden, or [employer]. Take the stairs instead of the elevator at work. If you smoke, quit. People who smoke have an increased risk for heart attack, stroke, cancer, and other lung illnesses. Quitting is hard, but there are ways to boost your chance of quitting tobacco for good. Use nicotine gum, patches, or lozenges. Ask your doctor about stop-smoking programs and medicines. Keep trying. In addition to reducing your risk of diseases in the future, you will notice some benefits soon after you stop using tobacco. If you have shortness of breath or asthma symptoms, they will likely get better within a few weeks after you quit. Limit how much alcohol you drink. Moderate amounts of alcohol (up to 2 drinks a day for men, 1 drink a day for women) are okay.  But drinking too much can lead to liver

## 2019-08-13 NOTE — PATIENT INSTRUCTIONS
*   SEIZURE  PRECAUTIONS. A)  Avoid  Working  At   [employer] B)  Avoid  Working  With  Heavy machinery. C)  Avoid   Swimming,  Climbing  A  Ladder   Unattended. D)  Avoid   Driving   If  You   Have  A  Seizure. E)  Must   Be  Seizure  Free   For  At   Least   6 months,  Before   You  Can drive. F) Some times  Your  May  Feel  Seizure coming  Before  It  Begins. You  May feel             Strange smell or funny  Feeling  In  Your  Stomach,  Which is  Called   Aura. TIPS  TO  REDUCE/ PREVENT  SEIZURES         1. Take  Your  Anti seizure  Medications   As   Recommended. 2. Get   Enough   Sleep. Sleep  Deprivation   Can  Trigger  A  Seizure. 3. If   You   Have  A fever,  Treat  It  At  Once,  And  Contact   Your  Primary  Care Providers. 4. Avoid   Alcohol. 5. Avoid  Flashing  Lights,  Loud  Noises and  TV  And  Video  Games,           As   These  May  Trigger   Your  Seizures       6. Control  Your  Stress  And   Have  Adequate  Rest.       7.   If  You  Feel  A  Seizure  Coming   On :           A) warn people  Who  Are  With  You           B)  Make  Sure  There  Are  No  Sharp or  Hard  Objects  Around you. C)  Lay down  On  Your  Side  And  Relax. *   ADEQUATE   FLUID  INTAKE   AND  ELECTROLYTE  BALANCE           * KEEP  DAIRY  OF   THE  NEUROLOGICAL  SYMPTOMS          *  TO  MAINTAIN  REGULAR  SLEEP  WAKE  CYCLES.      *   TO  HAVE  ADEQUATE  REST  AND   SLEEP    HOURS.        *    AVOID  USAGE OF   TOBACCO,  EXCESSIVE  ALCOHOL                AND   ILLEGAL   SUBSTANCES,  IF  ANY          *  Maintain   Healthy  Life Style    With   Periodic Monitoring  Of      Any  Medical  Conditions  Including   Elevated  Blood  Pressure,  Lipid  Profile,     Blood  Sugar levels  And   Heart  Disease. *   Period   Screening  For  Cancers  Involving  Breast,  Colon,   lungs  And  Other  Organs  As  Applicable,  In consultation   With  Your  Primary Care Providers. *  If   There is  Any  Significant  Worsening   Of  Current  Symptoms  And  Or  If    Any additional  New  Neurological  Symptoms                 Or  Significant  Concerns   Should  Call  911 or  Go  To  Emergency  Department  For  Further  Immediate  Evaluation.

## 2019-08-14 ENCOUNTER — HOSPITAL ENCOUNTER (OUTPATIENT)
Dept: SLEEP CENTER | Age: 39
Discharge: HOME | End: 2019-08-16
Payer: MEDICARE

## 2019-08-14 DIAGNOSIS — G47.8 UNREFRESHED BY SLEEP: ICD-10-CM

## 2019-08-14 DIAGNOSIS — G47.10 HYPERSOMNIA: ICD-10-CM

## 2019-08-14 PROCEDURE — 95810 POLYSOM 6/> YRS 4/> PARAM: CPT

## 2019-08-22 NOTE — PROGRESS NOTES
patient had a total of 5  respiratory events, which were partial obstruction. The apnea-hypopnea  index was 1 per hour. The lowest oxygen saturation during the night  being 87%, it was transient. Movement disorder evaluation did not reveal any significant degree of  periodic leg movements. IMPRESSION:  Sleep study does not reveal any significant apnea. RECOMMENDATIONS:  The patient does not have any significant apnea. A  clinical correlation should be obtained.         Joe Taylor    D: 08/21/2019 10:06:30       T: 08/21/2019 13:41:37     [id number]  Job#: [id number]     Doc#: [id number]    CC:  [facility]

## 2019-11-11 ENCOUNTER — OFFICE VISIT (OUTPATIENT)
Dept: NEUROLOGY | Age: 39
End: 2019-11-11
Payer: MEDICARE

## 2019-11-11 VITALS
RESPIRATION RATE: 20 BRPM | WEIGHT: 187 LBS | BODY MASS INDEX: 30.05 KG/M2 | DIASTOLIC BLOOD PRESSURE: 66 MMHG | SYSTOLIC BLOOD PRESSURE: 128 MMHG | HEIGHT: 66 IN | HEART RATE: 100 BPM

## 2019-11-11 DIAGNOSIS — G45.0 VBI (VERTEBROBASILAR INSUFFICIENCY): ICD-10-CM

## 2019-11-11 DIAGNOSIS — R42 DIZZINESS: ICD-10-CM

## 2019-11-11 DIAGNOSIS — G47.9 SLEEP DIFFICULTIES: ICD-10-CM

## 2019-11-11 DIAGNOSIS — G40.909 RECURRENT SEIZURES (HCC): ICD-10-CM

## 2019-11-11 DIAGNOSIS — F34.1 DYSTHYMIA: ICD-10-CM

## 2019-11-11 DIAGNOSIS — Z91.81 H/O FALL: ICD-10-CM

## 2019-11-11 DIAGNOSIS — F41.9 ANXIETY, MILD: ICD-10-CM

## 2019-11-11 DIAGNOSIS — K44.9 HIATAL HERNIA: ICD-10-CM

## 2019-11-11 DIAGNOSIS — M54.40 LOW BACK PAIN WITH SCIATICA, SCIATICA LATERALITY UNSPECIFIED, UNSPECIFIED BACK PAIN LATERALITY, UNSPECIFIED CHRONICITY: ICD-10-CM

## 2019-11-11 DIAGNOSIS — R51.9 NONINTRACTABLE HEADACHE, UNSPECIFIED CHRONICITY PATTERN, UNSPECIFIED HEADACHE TYPE: ICD-10-CM

## 2019-11-11 DIAGNOSIS — G40.009 PARTIAL IDIOPATHIC EPILEPSY WITH SEIZURES OF LOCALIZED ONSET, NOT INTRACTABLE, WITHOUT STATUS EPILEPTICUS (HCC): ICD-10-CM

## 2019-11-11 DIAGNOSIS — S33.5XXD LUMBAR SPRAIN, SUBSEQUENT ENCOUNTER: ICD-10-CM

## 2019-11-11 DIAGNOSIS — M54.12 CERVICAL RADICULOPATHY: ICD-10-CM

## 2019-11-11 DIAGNOSIS — G45.9 TIA (TRANSIENT ISCHEMIC ATTACK): ICD-10-CM

## 2019-11-11 DIAGNOSIS — Z72.0 TOBACCO USE: ICD-10-CM

## 2019-11-11 DIAGNOSIS — K21.9 GASTROESOPHAGEAL REFLUX DISEASE WITHOUT ESOPHAGITIS: ICD-10-CM

## 2019-11-11 DIAGNOSIS — R56.9 CONVULSIONS, UNSPECIFIED CONVULSION TYPE (HCC): ICD-10-CM

## 2019-11-11 DIAGNOSIS — G40.909 SEIZURE DISORDER (HCC): Primary | ICD-10-CM

## 2019-11-11 DIAGNOSIS — S09.90XS INJURY OF HEAD, SEQUELA: ICD-10-CM

## 2019-11-11 DIAGNOSIS — G43.709 CHRONIC MIGRAINE WITHOUT AURA WITHOUT STATUS MIGRAINOSUS, NOT INTRACTABLE: ICD-10-CM

## 2019-11-11 DIAGNOSIS — G44.039 NONINTRACTABLE PAROXYSMAL HEMICRANIA, UNSPECIFIED CHRONICITY PATTERN: ICD-10-CM

## 2019-11-11 DIAGNOSIS — G47.9 DISTURBANCE, SLEEP: ICD-10-CM

## 2019-11-11 DIAGNOSIS — G43.901 STATUS MIGRAINOSUS: ICD-10-CM

## 2019-11-11 PROCEDURE — G8427 DOCREV CUR MEDS BY ELIG CLIN: HCPCS | Performed by: PSYCHIATRY & NEUROLOGY

## 2019-11-11 PROCEDURE — G8417 CALC BMI ABV UP PARAM F/U: HCPCS | Performed by: PSYCHIATRY & NEUROLOGY

## 2019-11-11 PROCEDURE — 99215 OFFICE O/P EST HI 40 MIN: CPT | Performed by: PSYCHIATRY & NEUROLOGY

## 2019-11-11 PROCEDURE — G8483 FLU IMM NO ADMIN DOC REA: HCPCS | Performed by: PSYCHIATRY & NEUROLOGY

## 2019-11-11 PROCEDURE — 4004F PT TOBACCO SCREEN RCVD TLK: CPT | Performed by: PSYCHIATRY & NEUROLOGY

## 2019-11-11 RX ORDER — LACOSAMIDE 100 MG/1
100 TABLET ORAL 2 TIMES DAILY
Qty: 60 TABLET | Refills: 2 | Status: SHIPPED | OUTPATIENT
Start: 2019-11-11 | End: 2019-12-13 | Stop reason: SDUPTHER

## 2019-11-11 RX ORDER — CLONAZEPAM 0.5 MG/1
TABLET ORAL
Qty: 30 TABLET | Refills: 2 | Status: SHIPPED | OUTPATIENT
Start: 2019-11-11 | End: 2019-11-19 | Stop reason: SDUPTHER

## 2019-11-11 RX ORDER — PROMETHAZINE HYDROCHLORIDE 25 MG/1
TABLET ORAL
Qty: 60 TABLET | Refills: 2 | Status: SHIPPED | OUTPATIENT
Start: 2019-11-11 | End: 2020-06-12 | Stop reason: SDUPTHER

## 2019-11-11 RX ORDER — SUMATRIPTAN 50 MG/1
TABLET, FILM COATED ORAL
Qty: 30 TABLET | Refills: 2 | Status: SHIPPED | OUTPATIENT
Start: 2019-11-11 | End: 2020-06-12 | Stop reason: SDUPTHER

## 2019-11-11 ASSESSMENT — ENCOUNTER SYMPTOMS
ABDOMINAL PAIN: 0
SINUS PRESSURE: 0
VOICE CHANGE: 0
EYE DISCHARGE: 0
SHORTNESS OF BREATH: 0
CHOKING: 0
CHANGE IN BOWEL HABIT: 0
PHOTOPHOBIA: 1
SCALP TENDERNESS: 0
WHEEZING: 0
FACIAL SWEATING: 0
RHINORRHEA: 0
NAUSEA: 1
BACK PAIN: 0
APNEA: 0
SORE THROAT: 0
BLURRED VISION: 0
EYE PAIN: 0
EYE REDNESS: 0
COLOR CHANGE: 0
EYE WATERING: 0
CHEST TIGHTNESS: 0
SWOLLEN GLANDS: 0
VOMITING: 0
DIARRHEA: 0
TROUBLE SWALLOWING: 0
FACIAL SWELLING: 0
VISUAL CHANGE: 0
COUGH: 0
BLOOD IN STOOL: 0
EYE ITCHING: 0
ABDOMINAL DISTENTION: 0
CONSTIPATION: 0

## 2019-11-15 ENCOUNTER — HOSPITAL ENCOUNTER (OUTPATIENT)
Dept: CT IMAGING | Age: 39
Discharge: HOME | End: 2019-11-17
Payer: MEDICARE

## 2019-11-15 ENCOUNTER — HOSPITAL ENCOUNTER (OUTPATIENT)
Dept: LAB | Age: 39
Discharge: HOME | End: 2019-11-15
Payer: MEDICARE

## 2019-11-15 DIAGNOSIS — G45.0 VBI (VERTEBROBASILAR INSUFFICIENCY): ICD-10-CM

## 2019-11-15 DIAGNOSIS — G43.709 CHRONIC MIGRAINE WITHOUT AURA WITHOUT STATUS MIGRAINOSUS, NOT INTRACTABLE: ICD-10-CM

## 2019-11-15 DIAGNOSIS — S09.90XS INJURY OF HEAD, SEQUELA: ICD-10-CM

## 2019-11-15 DIAGNOSIS — G40.909 SEIZURE DISORDER (HCC): ICD-10-CM

## 2019-11-15 DIAGNOSIS — G44.039 NONINTRACTABLE PAROXYSMAL HEMICRANIA, UNSPECIFIED CHRONICITY PATTERN: ICD-10-CM

## 2019-11-15 DIAGNOSIS — M54.12 CERVICAL RADICULOPATHY: ICD-10-CM

## 2019-11-15 DIAGNOSIS — F34.1 DYSTHYMIA: ICD-10-CM

## 2019-11-15 DIAGNOSIS — G40.909 RECURRENT SEIZURES (HCC): ICD-10-CM

## 2019-11-15 DIAGNOSIS — G43.901 STATUS MIGRAINOSUS: ICD-10-CM

## 2019-11-15 DIAGNOSIS — Z91.81 H/O FALL: ICD-10-CM

## 2019-11-15 DIAGNOSIS — R56.9 CONVULSIONS, UNSPECIFIED CONVULSION TYPE (HCC): ICD-10-CM

## 2019-11-15 DIAGNOSIS — G45.9 TIA (TRANSIENT ISCHEMIC ATTACK): ICD-10-CM

## 2019-11-15 LAB
ALT SERPL-CCNC: 30 U/L (ref 5–41)
ANION GAP SERPL CALCULATED.3IONS-SCNC: 13 MMOL/L (ref 9–17)
AST SERPL-CCNC: 17 U/L
CHLORIDE BLD-SCNC: 101 MMOL/L (ref 98–107)
CO2: 26 MMOL/L (ref 20–31)
HCT VFR BLD CALC: 45.2 % (ref 41–53)
HEMOGLOBIN: 15.9 G/DL (ref 13.5–17.5)
MCH RBC QN AUTO: 34.7 PG (ref 26–34)
MCHC RBC AUTO-ENTMCNC: 35.1 G/DL (ref 31–37)
MCV RBC AUTO: 98.8 FL (ref 80–100)
NRBC AUTOMATED: ABNORMAL PER 100 WBC
PDW BLD-RTO: 13.1 % (ref 11–14.5)
PLATELET # BLD: 264 K/UL (ref 140–450)
PMV BLD AUTO: 7.6 FL (ref 6–12)
POTASSIUM SERPL-SCNC: 4.1 MMOL/L (ref 3.7–5.3)
RBC # BLD: 4.58 M/UL (ref 4.5–5.9)
SODIUM BLD-SCNC: 140 MMOL/L (ref 135–144)
VALPROIC ACID LEVEL: 68 UG/ML (ref 50–125)
VALPROIC DATE LAST DOSE: NORMAL
VALPROIC DOSE AMOUNT: NORMAL
VALPROIC TIME LAST DOSE: NORMAL
WBC # BLD: 6.5 K/UL (ref 3.5–11)

## 2019-11-15 PROCEDURE — 80164 ASSAY DIPROPYLACETIC ACD TOT: CPT

## 2019-11-15 PROCEDURE — 70450 CT HEAD/BRAIN W/O DYE: CPT

## 2019-11-15 PROCEDURE — 84460 ALANINE AMINO (ALT) (SGPT): CPT

## 2019-11-15 PROCEDURE — 72125 CT NECK SPINE W/O DYE: CPT

## 2019-11-15 PROCEDURE — 85027 COMPLETE CBC AUTOMATED: CPT

## 2019-11-15 PROCEDURE — 84450 TRANSFERASE (AST) (SGOT): CPT

## 2019-11-15 PROCEDURE — 80051 ELECTROLYTE PANEL: CPT

## 2019-11-15 PROCEDURE — 36415 COLL VENOUS BLD VENIPUNCTURE: CPT

## 2019-11-19 DIAGNOSIS — G40.009 PARTIAL IDIOPATHIC EPILEPSY WITH SEIZURES OF LOCALIZED ONSET, NOT INTRACTABLE, WITHOUT STATUS EPILEPTICUS (HCC): ICD-10-CM

## 2019-11-19 DIAGNOSIS — F41.9 ANXIETY, MILD: ICD-10-CM

## 2019-11-19 DIAGNOSIS — G40.909 SEIZURE DISORDER (HCC): Primary | ICD-10-CM

## 2019-11-19 RX ORDER — CLONAZEPAM 0.5 MG/1
TABLET ORAL
Qty: 30 TABLET | Refills: 2 | Status: SHIPPED | OUTPATIENT
Start: 2019-11-19 | End: 2020-07-09

## 2019-12-08 ENCOUNTER — HOSPITAL ENCOUNTER (EMERGENCY)
Age: 39
Discharge: LEFT BEFORE DISCHARGE | End: 2019-12-08
Attending: EMERGENCY MEDICINE
Payer: MEDICARE

## 2019-12-08 VITALS
OXYGEN SATURATION: 94 % | RESPIRATION RATE: 18 BRPM | WEIGHT: 183 LBS | HEIGHT: 66 IN | SYSTOLIC BLOOD PRESSURE: 116 MMHG | HEART RATE: 121 BPM | TEMPERATURE: 97.7 F | DIASTOLIC BLOOD PRESSURE: 75 MMHG | BODY MASS INDEX: 29.41 KG/M2

## 2019-12-08 DIAGNOSIS — T50.901A ACCIDENTAL DRUG OVERDOSE, INITIAL ENCOUNTER: ICD-10-CM

## 2019-12-08 DIAGNOSIS — R00.0 TACHYCARDIA: Primary | ICD-10-CM

## 2019-12-08 LAB
ABSOLUTE EOS #: 0.1 K/UL (ref 0–0.4)
ABSOLUTE IMMATURE GRANULOCYTE: ABNORMAL K/UL (ref 0–0.3)
ABSOLUTE LYMPH #: 3.6 K/UL (ref 1–4.8)
ABSOLUTE MONO #: 0.9 K/UL (ref 0.1–1.2)
ACETAMINOPHEN LEVEL: <5 UG/ML (ref 10–30)
ALBUMIN SERPL-MCNC: 4.8 G/DL (ref 3.5–5.2)
ALBUMIN/GLOBULIN RATIO: 1.6 (ref 1–2.5)
ALP BLD-CCNC: 75 U/L (ref 40–129)
ALT SERPL-CCNC: 26 U/L (ref 5–41)
AMYLASE: 52 U/L (ref 28–100)
ANION GAP SERPL CALCULATED.3IONS-SCNC: 17 MMOL/L (ref 9–17)
AST SERPL-CCNC: 19 U/L
BASOPHILS # BLD: 0 % (ref 0–2)
BASOPHILS ABSOLUTE: 0 K/UL (ref 0–0.2)
BILIRUB SERPL-MCNC: 0.34 MG/DL (ref 0.3–1.2)
BILIRUBIN DIRECT: 0.11 MG/DL
BILIRUBIN, INDIRECT: 0.23 MG/DL (ref 0–1)
BUN BLDV-MCNC: 15 MG/DL (ref 6–20)
BUN/CREAT BLD: 12 (ref 9–20)
CALCIUM SERPL-MCNC: 9.8 MG/DL (ref 8.6–10.4)
CHLORIDE BLD-SCNC: 97 MMOL/L (ref 98–107)
CO2: 21 MMOL/L (ref 20–31)
CREAT SERPL-MCNC: 1.26 MG/DL (ref 0.7–1.2)
DIFFERENTIAL TYPE: ABNORMAL
EKG ATRIAL RATE: 148 BPM
EKG P AXIS: 28 DEGREES
EKG P-R INTERVAL: 128 MS
EKG Q-T INTERVAL: 270 MS
EKG QRS DURATION: 70 MS
EKG QTC CALCULATION (BAZETT): 423 MS
EKG R AXIS: 7 DEGREES
EKG T AXIS: 41 DEGREES
EKG VENTRICULAR RATE: 148 BPM
EOSINOPHILS RELATIVE PERCENT: 1 % (ref 1–8)
ETHANOL PERCENT: NORMAL %
ETHANOL: <10 MG/DL
GFR AFRICAN AMERICAN: >60 ML/MIN
GFR NON-AFRICAN AMERICAN: >60 ML/MIN
GFR SERPL CREATININE-BSD FRML MDRD: ABNORMAL ML/MIN/{1.73_M2}
GFR SERPL CREATININE-BSD FRML MDRD: ABNORMAL ML/MIN/{1.73_M2}
GLOBULIN: 3 G/DL (ref 1.5–3.8)
GLUCOSE BLD-MCNC: 139 MG/DL (ref 70–99)
HCT VFR BLD CALC: 47.3 % (ref 41–53)
HEMOGLOBIN: 16.6 G/DL (ref 13.5–17.5)
IMMATURE GRANULOCYTES: ABNORMAL %
LACTIC ACID: 2.6 MMOL/L (ref 0.5–2.2)
LIPASE: 36 U/L (ref 13–60)
LYMPHOCYTES # BLD: 37 % (ref 15–43)
MCH RBC QN AUTO: 34.7 PG (ref 26–34)
MCHC RBC AUTO-ENTMCNC: 35.1 G/DL (ref 31–37)
MCV RBC AUTO: 98.7 FL (ref 80–100)
MONOCYTES # BLD: 9 % (ref 6–14)
NRBC AUTOMATED: ABNORMAL PER 100 WBC
PDW BLD-RTO: 12.9 % (ref 11–14.5)
PLATELET # BLD: 298 K/UL (ref 140–450)
PLATELET ESTIMATE: ABNORMAL
PMV BLD AUTO: 8.6 FL (ref 6–12)
POTASSIUM SERPL-SCNC: 3.8 MMOL/L (ref 3.7–5.3)
RBC # BLD: 4.79 M/UL (ref 4.5–5.9)
RBC # BLD: ABNORMAL 10*6/UL
SALICYLATE LEVEL: <1 MG/DL (ref 3–10)
SEG NEUTROPHILS: 53 % (ref 44–74)
SEGMENTED NEUTROPHILS ABSOLUTE COUNT: 5.1 K/UL (ref 1.8–7.7)
SODIUM BLD-SCNC: 135 MMOL/L (ref 135–144)
TOTAL PROTEIN: 7.8 G/DL (ref 6.4–8.3)
TROPONIN INTERP: NORMAL
TROPONIN T: NORMAL NG/ML
TROPONIN, HIGH SENSITIVITY: 6 NG/L (ref 0–22)
WBC # BLD: 9.8 K/UL (ref 3.5–11)
WBC # BLD: ABNORMAL 10*3/UL

## 2019-12-08 PROCEDURE — 80307 DRUG TEST PRSMV CHEM ANLYZR: CPT

## 2019-12-08 PROCEDURE — G0480 DRUG TEST DEF 1-7 CLASSES: HCPCS

## 2019-12-08 PROCEDURE — 83690 ASSAY OF LIPASE: CPT

## 2019-12-08 PROCEDURE — 80048 BASIC METABOLIC PNL TOTAL CA: CPT

## 2019-12-08 PROCEDURE — 82150 ASSAY OF AMYLASE: CPT

## 2019-12-08 PROCEDURE — 93005 ELECTROCARDIOGRAM TRACING: CPT | Performed by: EMERGENCY MEDICINE

## 2019-12-08 PROCEDURE — 85025 COMPLETE CBC W/AUTO DIFF WBC: CPT

## 2019-12-08 PROCEDURE — 96374 THER/PROPH/DIAG INJ IV PUSH: CPT

## 2019-12-08 PROCEDURE — 2580000003 HC RX 258: Performed by: EMERGENCY MEDICINE

## 2019-12-08 PROCEDURE — 80076 HEPATIC FUNCTION PANEL: CPT

## 2019-12-08 PROCEDURE — 99284 EMERGENCY DEPT VISIT MOD MDM: CPT

## 2019-12-08 PROCEDURE — 6360000002 HC RX W HCPCS: Performed by: EMERGENCY MEDICINE

## 2019-12-08 PROCEDURE — 84484 ASSAY OF TROPONIN QUANT: CPT

## 2019-12-08 PROCEDURE — 36415 COLL VENOUS BLD VENIPUNCTURE: CPT

## 2019-12-08 PROCEDURE — 83605 ASSAY OF LACTIC ACID: CPT

## 2019-12-08 RX ORDER — 0.9 % SODIUM CHLORIDE 0.9 %
1000 INTRAVENOUS SOLUTION INTRAVENOUS ONCE
Status: DISCONTINUED | OUTPATIENT
Start: 2019-12-08 | End: 2019-12-08 | Stop reason: HOSPADM

## 2019-12-08 RX ORDER — 0.9 % SODIUM CHLORIDE 0.9 %
1000 INTRAVENOUS SOLUTION INTRAVENOUS ONCE
Status: COMPLETED | OUTPATIENT
Start: 2019-12-08 | End: 2019-12-08

## 2019-12-08 RX ORDER — ONDANSETRON 2 MG/ML
4 INJECTION INTRAMUSCULAR; INTRAVENOUS ONCE
Status: COMPLETED | OUTPATIENT
Start: 2019-12-08 | End: 2019-12-08

## 2019-12-08 RX ADMIN — ONDANSETRON 4 MG: 2 INJECTION INTRAMUSCULAR; INTRAVENOUS at 17:34

## 2019-12-08 RX ADMIN — SODIUM CHLORIDE 1000 ML: 9 INJECTION, SOLUTION INTRAVENOUS at 17:34

## 2019-12-13 ENCOUNTER — OFFICE VISIT (OUTPATIENT)
Dept: NEUROLOGY | Age: 39
End: 2019-12-13
Payer: MEDICARE

## 2019-12-13 VITALS
HEART RATE: 84 BPM | DIASTOLIC BLOOD PRESSURE: 64 MMHG | BODY MASS INDEX: 29.96 KG/M2 | SYSTOLIC BLOOD PRESSURE: 120 MMHG | HEIGHT: 66 IN | WEIGHT: 186.4 LBS | RESPIRATION RATE: 18 BRPM

## 2019-12-13 DIAGNOSIS — G40.909 SEIZURE DISORDER (HCC): ICD-10-CM

## 2019-12-13 DIAGNOSIS — G45.9 TIA (TRANSIENT ISCHEMIC ATTACK): ICD-10-CM

## 2019-12-13 DIAGNOSIS — F41.9 ANXIETY, MILD: ICD-10-CM

## 2019-12-13 DIAGNOSIS — G40.909 RECURRENT SEIZURES (HCC): ICD-10-CM

## 2019-12-13 DIAGNOSIS — M54.40 LOW BACK PAIN WITH SCIATICA, SCIATICA LATERALITY UNSPECIFIED, UNSPECIFIED BACK PAIN LATERALITY, UNSPECIFIED CHRONICITY: ICD-10-CM

## 2019-12-13 DIAGNOSIS — G43.901 STATUS MIGRAINOSUS: ICD-10-CM

## 2019-12-13 DIAGNOSIS — K44.9 HIATAL HERNIA: ICD-10-CM

## 2019-12-13 DIAGNOSIS — R51.9 NONINTRACTABLE HEADACHE, UNSPECIFIED CHRONICITY PATTERN, UNSPECIFIED HEADACHE TYPE: ICD-10-CM

## 2019-12-13 DIAGNOSIS — Z72.0 TOBACCO USE: ICD-10-CM

## 2019-12-13 DIAGNOSIS — M54.12 CERVICAL RADICULOPATHY: ICD-10-CM

## 2019-12-13 DIAGNOSIS — S33.5XXD LUMBAR SPRAIN, SUBSEQUENT ENCOUNTER: ICD-10-CM

## 2019-12-13 DIAGNOSIS — G47.9 DISTURBANCE, SLEEP: ICD-10-CM

## 2019-12-13 DIAGNOSIS — F34.1 DYSTHYMIA: ICD-10-CM

## 2019-12-13 DIAGNOSIS — G44.039 NONINTRACTABLE PAROXYSMAL HEMICRANIA, UNSPECIFIED CHRONICITY PATTERN: ICD-10-CM

## 2019-12-13 DIAGNOSIS — K21.9 GASTROESOPHAGEAL REFLUX DISEASE WITHOUT ESOPHAGITIS: ICD-10-CM

## 2019-12-13 DIAGNOSIS — G43.709 CHRONIC MIGRAINE WITHOUT AURA WITHOUT STATUS MIGRAINOSUS, NOT INTRACTABLE: ICD-10-CM

## 2019-12-13 DIAGNOSIS — Z91.81 H/O FALL: ICD-10-CM

## 2019-12-13 DIAGNOSIS — G45.0 VBI (VERTEBROBASILAR INSUFFICIENCY): ICD-10-CM

## 2019-12-13 DIAGNOSIS — S09.90XS INJURY OF HEAD, SEQUELA: ICD-10-CM

## 2019-12-13 DIAGNOSIS — S33.5XXS LUMBAR SPRAIN, SEQUELA: ICD-10-CM

## 2019-12-13 DIAGNOSIS — G47.9 SLEEP DIFFICULTIES: ICD-10-CM

## 2019-12-13 DIAGNOSIS — R42 DIZZINESS: ICD-10-CM

## 2019-12-13 DIAGNOSIS — R56.9 CONVULSIONS, UNSPECIFIED CONVULSION TYPE (HCC): ICD-10-CM

## 2019-12-13 DIAGNOSIS — G40.009 PARTIAL IDIOPATHIC EPILEPSY WITH SEIZURES OF LOCALIZED ONSET, NOT INTRACTABLE, WITHOUT STATUS EPILEPTICUS (HCC): Primary | ICD-10-CM

## 2019-12-13 PROCEDURE — G8483 FLU IMM NO ADMIN DOC REA: HCPCS | Performed by: PSYCHIATRY & NEUROLOGY

## 2019-12-13 PROCEDURE — 99214 OFFICE O/P EST MOD 30 MIN: CPT | Performed by: PSYCHIATRY & NEUROLOGY

## 2019-12-13 PROCEDURE — G8427 DOCREV CUR MEDS BY ELIG CLIN: HCPCS | Performed by: PSYCHIATRY & NEUROLOGY

## 2019-12-13 PROCEDURE — 4004F PT TOBACCO SCREEN RCVD TLK: CPT | Performed by: PSYCHIATRY & NEUROLOGY

## 2019-12-13 PROCEDURE — G8417 CALC BMI ABV UP PARAM F/U: HCPCS | Performed by: PSYCHIATRY & NEUROLOGY

## 2019-12-13 RX ORDER — LACOSAMIDE 100 MG/1
100 TABLET ORAL 2 TIMES DAILY
Qty: 60 TABLET | Refills: 2 | Status: SHIPPED | OUTPATIENT
Start: 2019-12-13 | End: 2020-06-12 | Stop reason: SDUPTHER

## 2019-12-13 RX ORDER — DIVALPROEX SODIUM 250 MG/1
TABLET, DELAYED RELEASE ORAL
Qty: 180 TABLET | Refills: 5 | Status: SHIPPED | OUTPATIENT
Start: 2019-12-13 | End: 2020-06-12 | Stop reason: SDUPTHER

## 2019-12-13 ASSESSMENT — ENCOUNTER SYMPTOMS
EYE ITCHING: 0
BLURRED VISION: 0
VOMITING: 0
SCALP TENDERNESS: 0
EYE DISCHARGE: 0
CHANGE IN BOWEL HABIT: 0
EYE WATERING: 0
COUGH: 0
APNEA: 0
VISUAL CHANGE: 0
COLOR CHANGE: 0
SWOLLEN GLANDS: 0
ABDOMINAL DISTENTION: 0
TROUBLE SWALLOWING: 0
SINUS PRESSURE: 0
WHEEZING: 0
FACIAL SWEATING: 0
SORE THROAT: 0
PHOTOPHOBIA: 1
DIARRHEA: 0
BLOOD IN STOOL: 0
NAUSEA: 1
RHINORRHEA: 0
CONSTIPATION: 0
EYE PAIN: 0
EYE REDNESS: 0
SHORTNESS OF BREATH: 0
FACIAL SWELLING: 0
VOICE CHANGE: 0
BACK PAIN: 0
CHOKING: 0
CHEST TIGHTNESS: 0
ABDOMINAL PAIN: 0

## 2020-03-09 ENCOUNTER — OFFICE VISIT (OUTPATIENT)
Dept: PRIMARY CARE CLINIC | Age: 40
End: 2020-03-09
Payer: MEDICARE

## 2020-03-09 VITALS
HEIGHT: 65 IN | HEART RATE: 85 BPM | DIASTOLIC BLOOD PRESSURE: 80 MMHG | SYSTOLIC BLOOD PRESSURE: 116 MMHG | TEMPERATURE: 98.3 F | OXYGEN SATURATION: 98 % | WEIGHT: 185.8 LBS | BODY MASS INDEX: 30.96 KG/M2

## 2020-03-09 LAB
INFLUENZA A ANTIBODY: NORMAL
INFLUENZA B ANTIBODY: NORMAL

## 2020-03-09 PROCEDURE — G8483 FLU IMM NO ADMIN DOC REA: HCPCS | Performed by: FAMILY MEDICINE

## 2020-03-09 PROCEDURE — G8417 CALC BMI ABV UP PARAM F/U: HCPCS | Performed by: FAMILY MEDICINE

## 2020-03-09 PROCEDURE — G8427 DOCREV CUR MEDS BY ELIG CLIN: HCPCS | Performed by: FAMILY MEDICINE

## 2020-03-09 PROCEDURE — 99212 OFFICE O/P EST SF 10 MIN: CPT

## 2020-03-09 PROCEDURE — 87804 INFLUENZA ASSAY W/OPTIC: CPT | Performed by: FAMILY MEDICINE

## 2020-03-09 PROCEDURE — 4004F PT TOBACCO SCREEN RCVD TLK: CPT | Performed by: FAMILY MEDICINE

## 2020-03-09 PROCEDURE — 99213 OFFICE O/P EST LOW 20 MIN: CPT | Performed by: FAMILY MEDICINE

## 2020-03-09 RX ORDER — TIAGABINE HYDROCHLORIDE 2 MG/1
TABLET, FILM COATED ORAL
COMMUNITY
Start: 2019-12-17 | End: 2020-07-09

## 2020-03-09 ASSESSMENT — ENCOUNTER SYMPTOMS
SORE THROAT: 0
EYES NEGATIVE: 1
RHINORRHEA: 0
GASTROINTESTINAL NEGATIVE: 1
COUGH: 1
ALLERGIC/IMMUNOLOGIC NEGATIVE: 1

## 2020-03-09 NOTE — PROGRESS NOTES
UPPER GASTROINTESTINAL ENDOSCOPY  03/05/2010    Recurrent hiatal hernia.  UPPER GASTROINTESTINAL ENDOSCOPY  6/1/16    S/P brenda fundoplication, good repair, retained gastric fluid    VASECTOMY           Review of Systems   Constitutional: Negative. Negative for chills and fever. HENT: Negative. Negative for congestion, rhinorrhea and sore throat. Eyes: Negative. Respiratory: Positive for cough (one month). Cardiovascular: Negative. Gastrointestinal: Negative. Endocrine: Negative. Genitourinary: Negative. Musculoskeletal: Negative. Skin: Positive for wound (gluteal cleft). Allergic/Immunologic: Negative. Neurological: Negative. Hematological: Negative. Psychiatric/Behavioral: Negative. Prior to Visit Medications    Medication Sig Taking? Authorizing Provider   divalproex (DEPAKOTE) 250 MG DR tablet take 3 tablets by mouth twice a day Yes Gualberto Dickson MD   lacosamide (VIMPAT) 100 MG TABS tablet Take 1 tablet by mouth 2 times daily for 30 days.  Yes Gualberto Dickson MD   SUMAtriptan (IMITREX) 50 MG tablet take 1 tablet by mouth once daily if needed for MIGRAINE Yes Gualberto Dickson MD   promethazine (PHENERGAN) 25 MG tablet take 1 tablet by mouth every 8 hours if needed for nausea Yes Gualberto Dickson MD   acetaminophen (TYLENOL) 500 MG tablet Take 500 mg by mouth every 4 hours as needed for Pain Yes Historical Provider, MD   tiaGABine (GABITRIL) 2 MG tablet   Historical Provider, MD   clonazePAM (KLONOPIN) 0.5 MG tablet take 1 tablet by mouth at bedtime if needed for anxiety OR SLEEP DIFFICULTIES  Gualberto Dickson MD   cyclobenzaprine (FLEXERIL) 10 MG tablet take 1 tablet by mouth twice a day if needed  Historical Provider, MD   naproxen (NAPROSYN) 500 MG tablet take 1 tablet by mouth twice a day with food  Historical Provider, MD   rifaximin (XIFAXAN) 200 MG tablet Take 400 mg by mouth 3 times daily After finishing Vanco  Historical Provider, MD   vilazodone HCl (VILAZODONE HCL) 20 MG TABS Take 20 mg by mouth daily  Historical Provider, MD   temazepam (RESTORIL) 7.5 MG capsule Take 7.5 mg by mouth nightly as needed for Sleep. Historical Provider, MD        Social History     Tobacco Use    Smoking status: Current Every Day Smoker     Packs/day: 1.00     Years: 20.00     Pack years: 20.00     Types: Cigarettes     Last attempt to quit: 5/5/2016     Years since quitting: 3.8    Smokeless tobacco: Never Used    Tobacco comment: 2-3 weekly   Substance Use Topics    Alcohol use: No     Alcohol/week: 0.0 standard drinks     Comment: rarely        Vitals:    03/09/20 1444   BP: 116/80   Site: Left Upper Arm   Position: Sitting   Cuff Size: Large Adult   Pulse: 85   Temp: 98.3 °F (36.8 °C)   TempSrc: Tympanic   SpO2: 98%   Weight: 185 lb 12.8 oz (84.3 kg)   Height: 5' 5\" (1.651 m)     Estimated body mass index is 30.92 kg/m² as calculated from the following:    Height as of this encounter: 5' 5\" (1.651 m). Weight as of this encounter: 185 lb 12.8 oz (84.3 kg). Physical Exam  Constitutional:       General: He is not in acute distress. Appearance: He is well-developed. HENT:      Head: Normocephalic and atraumatic. Right Ear: Tympanic membrane and external ear normal.      Left Ear: Tympanic membrane and external ear normal.      Nose: Congestion and rhinorrhea present. Mouth/Throat:      Pharynx: No oropharyngeal exudate. Eyes:      General: No scleral icterus. Conjunctiva/sclera: Conjunctivae normal.   Neck:      Musculoskeletal: Neck supple. Thyroid: No thyromegaly. Cardiovascular:      Rate and Rhythm: Normal rate and regular rhythm. Heart sounds: Normal heart sounds. No murmur. Pulmonary:      Effort: Pulmonary effort is normal. No respiratory distress. Breath sounds: Normal breath sounds. No wheezing or rhonchi. Abdominal:      General: Bowel sounds are normal. There is no distension.       Palpations:

## 2020-06-12 ENCOUNTER — TELEMEDICINE (OUTPATIENT)
Dept: NEUROLOGY | Age: 40
End: 2020-06-12
Payer: COMMERCIAL

## 2020-06-12 PROCEDURE — 4004F PT TOBACCO SCREEN RCVD TLK: CPT | Performed by: PSYCHIATRY & NEUROLOGY

## 2020-06-12 PROCEDURE — G8427 DOCREV CUR MEDS BY ELIG CLIN: HCPCS | Performed by: PSYCHIATRY & NEUROLOGY

## 2020-06-12 PROCEDURE — G8417 CALC BMI ABV UP PARAM F/U: HCPCS | Performed by: PSYCHIATRY & NEUROLOGY

## 2020-06-12 PROCEDURE — 99215 OFFICE O/P EST HI 40 MIN: CPT | Performed by: PSYCHIATRY & NEUROLOGY

## 2020-06-12 RX ORDER — PROMETHAZINE HYDROCHLORIDE 25 MG/1
TABLET ORAL
Qty: 60 TABLET | Refills: 2 | Status: SHIPPED | OUTPATIENT
Start: 2020-06-12 | End: 2021-01-07 | Stop reason: SDUPTHER

## 2020-06-12 RX ORDER — LACOSAMIDE 100 MG/1
100 TABLET ORAL 2 TIMES DAILY
Qty: 60 TABLET | Refills: 2 | Status: SHIPPED | OUTPATIENT
Start: 2020-06-12 | End: 2020-10-02 | Stop reason: SDUPTHER

## 2020-06-12 RX ORDER — SUMATRIPTAN 50 MG/1
TABLET, FILM COATED ORAL
Qty: 30 TABLET | Refills: 2 | Status: SHIPPED | OUTPATIENT
Start: 2020-06-12 | End: 2021-01-07 | Stop reason: SDUPTHER

## 2020-06-12 RX ORDER — DIVALPROEX SODIUM 250 MG/1
TABLET, DELAYED RELEASE ORAL
Qty: 180 TABLET | Refills: 5 | Status: SHIPPED | OUTPATIENT
Start: 2020-06-12 | End: 2021-01-07 | Stop reason: SDUPTHER

## 2020-06-12 ASSESSMENT — ENCOUNTER SYMPTOMS
BLOOD IN STOOL: 0
SCALP TENDERNESS: 0
BLURRED VISION: 0
SORE THROAT: 0
PHOTOPHOBIA: 1
VISUAL CHANGE: 0
SWOLLEN GLANDS: 0
CHANGE IN BOWEL HABIT: 0
APNEA: 0
ABDOMINAL DISTENTION: 0
WHEEZING: 0
EYE PAIN: 0
RHINORRHEA: 0
BACK PAIN: 0
FACIAL SWELLING: 0
CONSTIPATION: 0
EYE REDNESS: 0
COUGH: 0
COLOR CHANGE: 0
SHORTNESS OF BREATH: 0
VOMITING: 0
FACIAL SWEATING: 0
SINUS PRESSURE: 0
CHEST TIGHTNESS: 0
VOICE CHANGE: 0
NAUSEA: 1
CHOKING: 0
DIARRHEA: 0
TROUBLE SWALLOWING: 0
EYE WATERING: 0
EYE DISCHARGE: 0
ABDOMINAL PAIN: 0
EYE ITCHING: 0

## 2020-06-12 NOTE — PROGRESS NOTES
EPILEPTIC  PSYCHOGENIC  SPELLS,                          CONVERSION   DISORDER          16)    PATIENT     STOPPED     VIMPAT       AND  TOPAMAX    IN    October 2017               17)    H/O   [facility]   NEUROLOGY    FOLLOW  UP    EVALUATION     IN      2018                          PATIENT     NOT  PLANNING  TO  RETURN  BACK   TO                                    [facility]                18)     PATIENT  DID NOT  HAVE  NEUROLOGY   FOLLOW  UP    AT  [facility]                           FROM   October 2017     TO  DEC. 2018               19)   PATIENT  INDICATED      HE  HAS   BRIEF   SEIZURE    2-3  PER MONTH                   STARING  EPISODES. 20)          PATIENT   TO  CONTINUE     DEPAKOTE  FOR  SEIZURES                                       AND  MIGRAINE  PROPHYLAXIS                                                       PATIENT   ON     KLONOPIN       FOR                            SEIZURE  CONTROL   AND  SLEEP  DIFFICULTIES                          21)     H/O    SEIZURE   FREQUENCY    INCREASE    IN   [facility]   AND  April 2019                              DUE  TO  C. DIFF     DIARRHEA,    AND   HOSPITALIZATIONS. 25)     H/O    BRIEF  SEIZURES       ON     8/13/2019                            -  PARTIAL  COMPLEX       SEIZURES                          DUE  TO  SLEEP  DEPRIVATION     AND  PROLONGED  TV   WATCHING                     21)      H/O    BRIEF    SEIZURES        WITH    FALLING                                  AND  MILD     HEAD   &   NECK INJURY         TWICE                                  IN  SEPT.     AND October 2019                        24)   H/O      STARING  EPISODE  WITH    HEAD TURNING   TO  THE                           RIGHT  SIDE    LASTING  FOR   ONE  MINUTE          WITH                                LETHARGY    NOTICED   VISIT  On   11/11/ 2019   .                                    25)     CT   HEAD    AND  CT Used    Tobacco comment: 2-3 weekly   Substance and Sexual Activity    Alcohol use: No     Alcohol/week: 0.0 standard drinks     Comment: rarely    Drug use: No    Sexual activity: Not on file   Lifestyle    Physical activity     Days per week: Not on file     Minutes per session: Not on file    Stress: Not on file   Relationships    Social connections     Talks on phone: Not on file     Gets together: Not on file     Attends Mandaeism service: Not on file     Active member of club or organization: Not on file     Attends meetings of clubs or organizations: Not on file     Relationship status: Not on file    Intimate partner violence     Fear of current or ex partner: Not on file     Emotionally abused: Not on file     Physically abused: Not on file     Forced sexual activity: Not on file   Other Topics Concern    Not on file   Social History Narrative    Not on file         There were no vitals filed for this visit.       Wt Readings from Last 3 Encounters:   03/09/20 185 lb 12.8 oz (84.3 kg)   12/13/19 186 lb 6.4 oz (84.6 kg)   12/08/19 183 lb (83 kg)         BP Readings from Last 3 Encounters:   03/09/20 116/80   12/13/19 120/64   12/08/19 116/75       Hematology and Coagulation  Lab Results   Component Value Date    WBC 9.8 12/08/2019    RBC 4.79 12/08/2019    HGB 16.6 12/08/2019    HCT 47.3 12/08/2019    MCV 98.7 12/08/2019    MCH 34.7 12/08/2019    MCHC 35.1 12/08/2019    RDW 12.9 12/08/2019     12/08/2019    MPV 8.6 12/08/2019         Chemistries  Lab Results   Component Value Date     12/08/2019    K 3.8 12/08/2019    CL 97 12/08/2019    CO2 21 12/08/2019    BUN 15 12/08/2019    CREATININE 1.26 12/08/2019    CALCIUM 9.8 12/08/2019    PROT 7.8 12/08/2019    LABALBU 4.8 12/08/2019    BILITOT 0.34 12/08/2019    ALKPHOS 75 12/08/2019    AST 19 12/08/2019    ALT 26 12/08/2019     Lab Results   Component Value Date    ALKPHOS 75 12/08/2019    ALT 26 12/08/2019    AST 19 12/08/2019    PROT 7.8 Shoulder shrug and  strength       12 CN :   Normal  Tongue movements and  Tongue  In midline                      No tongue   Fasciculations or atrophy         C) MOTOR  EXAM:                      D) SENSORY :          E) REFLEXES:                                 F) COORDINATION  AND  GAIT :                            --     LIMITED  DUE  TO  VIDEO  VISIT          Assessment:     Patient Active Problem List   Diagnosis    Low back pain    Migraine    Anxiety, mild    Disturbance, sleep    Tobacco use    GERD (gastroesophageal reflux disease)    Hiatal hernia    Lumbar sprain    Head ache    Status migrainosus    Dizziness    Sleep difficulties    Depression    H/O fall    Head injury    Epilepsy ([address]    Seizure disorder (HCC)    Astigmatism    Headache    Rectal bleed    Recurrent seizures (HCC)    Cervical radiculopathy    Ventricular premature beats    Convulsions (HCC)    Nausea and vomiting    Colon polyps    Convulsion, non-epileptic ([address]    TIA (transient ischemic attack)    VBI (vertebrobasilar insufficiency)           CT OF THE HEAD WITHOUT CONTRAST; CT OF THE CERVICAL SPINE WITHOUT CONTRAST   11/15/2019 10:21 am       TECHNIQUE:   CT of the head was performed without the administration of intravenous   contrast. Dose modulation, iterative reconstruction, and/or weight based   adjustment of the mA/kV was utilized to reduce the radiation dose to as low   as reasonably achievable.; CT of the cervical spine was performed without the   administration of intravenous contrast. Multiplanar reformatted images are   provided for review.  Dose modulation, iterative reconstruction, and/or weight   based adjustment of the mA/kV was utilized to reduce the radiation dose to as   low as reasonably achievable.       COMPARISON:   01/25/2019       HISTORY:   ORDERING SYSTEM PROVIDED HISTORY: Diego ren   TECHNOLOGIST PROVIDED HISTORY:   migraine, seizures   Reason for Exam: Hx chronic migraines. C/o increase headaches since recent   falls   Acuity: Acute   Type of Exam: Initial       FINDINGS:   BRAIN/VENTRICLES: There is no acute intracranial hemorrhage, mass effect or   midline shift.  No abnormal extra-axial fluid collection.  The gray-white   differentiation is maintained without evidence of an acute infarct.  There is   no evidence of hydrocephalus.       ORBITS: The visualized portion of the orbits demonstrate no acute abnormality.       SINUSES: The visualized paranasal sinuses and mastoid air cells demonstrate   no acute abnormality.       SOFT TISSUES/SKULL:  No acute abnormality of the visualized skull or soft   tissues.       CERVICAL SPINE: Cervical spine maintains its normal lordotic curvature. There are subtle degenerative changes C6-C7.  No acute fracture or   malalignment.  Vertebral body heights and intervertebral disc spaces are   preserved.  Overlying soft tissues are unremarkable.  Visualized lung apices   are clear.           Impression   No acute intracranial abnormality.       No acute osseus abnormality of the cervical spine. Procedure: [facility] 11/13/2014 [id number] CT BRAIN WITHOUT CONTRAST    Reason for Exam: \      FULL RESULT: EXAM: CT Head without Contrast - 11/13/2014 7:27 PM      HISTORY: Syncope/Near-Syncope. Seizures. COMPARISON: 10/8/14       TECHNIQUE: Contiguous axial CT images were obtained from the skull base    to the vertex and reviewed in soft tissue, subdural, bone, and brain    windows. FINDINGS: The ventricular system is normal in size and configuration. There are no intra-axial or extra-axial fluid collections or mass    lesions. No acute intracranial hemorrhage or midline shift. Visualized    paranasal sinuses and mastoid air cells are clear. No acute calvarial    fracture is identified.  The brainstem and the relationship of the    craniocervical junction structures are normal. There is incomplete fusion    of the posterior elements of C1, stable from prior study and compatible    with a normal variant. Visualized orbits and globes are intact. IMPRESSION:    No evidence for acute intracranial pathology. No bleed, mass effect, or    midline shift. Procedure: [facility] 06/02/2014 [id number] MRI BRAIN COMBINED    Reason for Exam: \      FULL RESULT: MRI brain with and without intravenous contrast, 6/2/2014      History: Migraines      Technique: Multiplanar multisequence MR imaging of the brain was    performed before and after intravenous administration of 16 mL Magnevist.      Findings: No evidence for shift of midline structures, mass effect or    compression of the ventricles noted. No acute intra-or extra-axial    hemorrhage is seen. No abnormal intracranial fluid collections are    identified. The basal cisterns are patent. Posterior fossa structures demonstrate no    discrete mass. Few scattered foci of T2/FLAIR hyperintensity noted in the    periventricular and subcortical white matter which essentially are    nonspecific in imaging appearance however differential considerations    include demyelinating or inflammatory process, migraine induced changes,    chronic small vessel disease or vascular disease. No associated restricted diffusion or abnormal enhancing seen. A small    left occipital lobe developmental venous anomaly seen. No abnormal    enhancing intracranial mass seen. Visualized orbital contents appear unremarkable. Mild mucosal thickening of the ethmoid air cells noted. Skull base flow voids are patent. Superior sagittal sinus and straight sinus flow voids are patent. Heterogeneity of the T1-weighted marrow signal intensity seen. IMPRESSION:    1. No acute or subacute ischemic insult noted. No abnormal enhancing    intracranial mass seen.    2.scattered foci of T2/FLAIR hyperintensity noted in the periventricular    and subcortical white Hiatal hernia K44.9 divalproex (DEPAKOTE) 250 MG DR tablet     lacosamide (VIMPAT) 100 MG TABS tablet   7. Lumbar sprain, subsequent encounter S33. 5XXD divalproex (DEPAKOTE) 250 MG DR tablet     lacosamide (VIMPAT) 100 MG TABS tablet   8. Status migrainosus G43.901 divalproex (DEPAKOTE) 250 MG DR tablet     lacosamide (VIMPAT) 100 MG TABS tablet   9. Dizziness R42 divalproex (DEPAKOTE) 250 MG DR tablet     lacosamide (VIMPAT) 100 MG TABS tablet   10. Sleep difficulties G47.9 divalproex (DEPAKOTE) 250 MG DR tablet     lacosamide (VIMPAT) 100 MG TABS tablet   11. Seizure disorder (HCC) G40.909 divalproex (DEPAKOTE) 250 MG DR tablet     lacosamide (VIMPAT) 100 MG TABS tablet     SUMAtriptan (IMITREX) 50 MG tablet     promethazine (PHENERGAN) 25 MG tablet   12. Recurrent seizures (HCC) G40.909 divalproex (DEPAKOTE) 250 MG DR tablet     lacosamide (VIMPAT) 100 MG TABS tablet   13. Partial idiopathic epilepsy with seizures of localized onset, not intractable, without status epilepticus (HCC) G40.009 divalproex (DEPAKOTE) 250 MG DR tablet     lacosamide (VIMPAT) 100 MG TABS tablet     SUMAtriptan (IMITREX) 50 MG tablet     promethazine (PHENERGAN) 25 MG tablet   14. Nonintractable paroxysmal hemicrania, unspecified chronicity pattern G44.039 divalproex (DEPAKOTE) 250 MG DR tablet     lacosamide (VIMPAT) 100 MG TABS tablet   15. Convulsions, unspecified convulsion type ([address] R56.9 divalproex (DEPAKOTE) 250 MG DR tablet     lacosamide (VIMPAT) 100 MG TABS tablet   16. Chronic migraine without aura without status migrainosus, not intractable G43.709 divalproex (DEPAKOTE) 250 MG DR tablet     lacosamide (VIMPAT) 100 MG TABS tablet   17. Low back pain with sciatica, sciatica laterality unspecified, unspecified back pain laterality, unspecified chronicity M54.40 lacosamide (VIMPAT) 100 MG TABS tablet   18. Dysthymia F34.1 lacosamide (VIMPAT) 100 MG TABS tablet   19. H/O fall Z91.81 lacosamide (VIMPAT) 100 MG TABS tablet   20. Injury of head, sequela S09.90XS lacosamide (VIMPAT) 100 MG TABS tablet   21. Cervical radiculopathy M54.12 lacosamide (VIMPAT) 100 MG TABS tablet   22. TIA (transient ischemic attack) G45.9 lacosamide (VIMPAT) 100 MG TABS tablet   23. VBI (vertebrobasilar insufficiency) G45.0 lacosamide (VIMPAT) 100 MG TABS tablet              CONCERNS   &   INCREASED   RISK   FOR        * TIA,  CEREBRO  VASCULAR  ISCHEMIA, STROKE       *  SEIZURE  ACTIVITY,  EPILEPSY ,        *  Poorly  Controlled Chronic  Headaches &  Migraines      *   COGNITIVE  &   MEMORY PROBLEMS  AND  DEMENTIAS                     VARIOUS  RISK   FACTORS   WERE  REVIEWED   AND   DISCUSSED. *  PATIENT   HAS  MULTIPLE   MEDICAL, MENTAL HEALTH         &      NEUROLOGICAL   PROBLEMS . PATIENT'S   MANAGEMENT  IS  CHALLENGING. PLAN:       Justyna Bermudez  Of  The  Diagnoses,  The  Management & Treatment  Options           AND    Care  plan  Were        Reviewed and   Discussed   With  patient. * Goals  And  Expectations  Of  The  Therapy  Discussed   And  Reviewed. *   Benefits   And   Side  Effect  Profile  Of  Medication/s   Were   Discussed             * Need   For  Further   Follow up For  The  Various  Problems  Were discussed. * Results  Of  The  Previous  Diagnostic tests were reviewed and questions answered. patient  understand the same. Medical  Decision  Making  Was  Made  Based on the   Complexity  Of  Above  Mentioned  Diagnoses,        Data reviewed   & diagnostic  Tests  Reviewed,  Risk  Of  Significant   Co morbidities and complicating   Factors.              Medical  Decision  Was   High  Complexity  Due   To  The  Patient's  Multiple  Symptoms,  Advancing   Disease,      Complex  Treatment  Regimen,  Multiple medications and   Risk  Of   Side  Effects,  Difficulty  In  Medication  Management      And  Diagnostic  Challenges   In  View  Of  The  Associated   Co  Morbid  Conditions   And you.           C)  Lay down  On  Your  Side  And  Relax. *  TO  MAINTAIN  REGULAR  SLEEP  WAKE  CYCLES. *   TO  HAVE  ADEQUATE  REST  AND   SLEEP    HOURS.          *    AVOID  ANY USAGE OF                   TOBACCO,  EXCESSIVE  ALCOHOL  AND   ILLEGAL   SUBSTANCES          *  CONTINUE MEDICATIONS PRESCRIBED BY NEUROLOGIST AS    RECOMMENDED     *   Compliance   With  Medications   And  Instructions          * CURRENTLY  TOLERATING  THE  PRESCRIBED   MEDICATIONS. WITHOUT  ANY  SIGNIFICANT  SIDE  EFFECTS   &  GETTING BENEFIT. *  May   Use  Pill  Box,    If  Needed      *  MEDICATIONS TO AVOID:    Felizardo Baltimore          *     Antiplatelet  therapy    As   Recommended  Was   Discussed          *    Prophylactic  Use   Of     Vitamin   B   Complex,  Folic  Acid,    Vitamin  B12        Multivitamin   Tablet  Daily    Supplementations   Over  The  Counter  Discussed           *  PATIENT  IS  ALSO   COUNSELED   TO  KEEP    ACTIVITIES         A)   SIMPLE      B)  ORGANIZED      C)  WRITE   DOWN                     *  EVALUATIONS  AND  FOLLOW UP:                                          * CARDIOLOGY               * EPILEPSY  MONITORING   UNIT       -  PATIENT  WANTED  TO  WAIT                               *     H/O    BRIEF  SEIZURES       ON     8/13/2019                            -  PARTIAL  COMPLEX       SEIZURES                          DUE  TO  SLEEP  DEPRIVATION     AND  PROLONGED  TV   WATCHING                      *      H/O    BRIEF    SEIZURES        WITH    FALLING                                  AND  MILD     HEAD   &   NECK INJURY         TWICE                                  IN  SEPT. AND October 2019                      *     CT   HEAD    AND  CT  CERVICAL  SPINE  IN NOV. 2019                                 SHOWED  NO  SIGNIFICANT  ABNORMALITIES                                 VALPROIC  ACID  LEVEL  IN NOV. 2019       NORMAL  LIMITS

## 2020-06-25 ENCOUNTER — OFFICE VISIT (OUTPATIENT)
Dept: OPTOMETRY | Age: 40
End: 2020-06-25
Payer: MEDICARE

## 2020-06-25 PROCEDURE — 99211 OFF/OP EST MAY X REQ PHY/QHP: CPT

## 2020-06-25 PROCEDURE — G8417 CALC BMI ABV UP PARAM F/U: HCPCS | Performed by: OPTOMETRIST

## 2020-06-25 PROCEDURE — G8427 DOCREV CUR MEDS BY ELIG CLIN: HCPCS | Performed by: OPTOMETRIST

## 2020-06-25 PROCEDURE — 99213 OFFICE O/P EST LOW 20 MIN: CPT | Performed by: OPTOMETRIST

## 2020-06-25 PROCEDURE — 4004F PT TOBACCO SCREEN RCVD TLK: CPT | Performed by: OPTOMETRIST

## 2020-06-25 ASSESSMENT — SLIT LAMP EXAM - LIDS
COMMENTS: NORMAL
COMMENTS: NORMAL

## 2020-06-25 ASSESSMENT — REFRACTION_WEARINGRX
OD_SPHERE: PL
OS_AXIS: 105
SPECS_TYPE: SVL
OD_AXIS: 078
OD_CYLINDER: -0.50
OS_CYLINDER: -0.75
OS_SPHERE: PL

## 2020-06-25 ASSESSMENT — TONOMETRY
OD_IOP_MMHG: 14
IOP_METHOD: NON-CONTACT AIR PUFF
OS_IOP_MMHG: 14

## 2020-06-25 ASSESSMENT — ENCOUNTER SYMPTOMS
EYES NEGATIVE: 0
ALLERGIC/IMMUNOLOGIC NEGATIVE: 0
RESPIRATORY NEGATIVE: 0
GASTROINTESTINAL NEGATIVE: 0

## 2020-06-25 ASSESSMENT — REFRACTION_MANIFEST
OS_SPHERE: -0.50
OD_AXIS: 070
OS_AXIS: 095
OS_CYLINDER: -0.50
OD_SPHERE: -0.25
OD_CYLINDER: -0.75

## 2020-06-25 ASSESSMENT — VISUAL ACUITY
OD_SC: 20/30
METHOD: SNELLEN - LINEAR
OS_SC: 20/25

## 2020-07-06 ENCOUNTER — E-VISIT (OUTPATIENT)
Dept: PRIMARY CARE CLINIC | Age: 40
End: 2020-07-06

## 2020-07-09 ENCOUNTER — VIRTUAL VISIT (OUTPATIENT)
Dept: FAMILY MEDICINE CLINIC | Age: 40
End: 2020-07-09
Payer: COMMERCIAL

## 2020-07-09 PROCEDURE — 99213 OFFICE O/P EST LOW 20 MIN: CPT | Performed by: FAMILY MEDICINE

## 2020-07-09 ASSESSMENT — PATIENT HEALTH QUESTIONNAIRE - PHQ9
2. FEELING DOWN, DEPRESSED OR HOPELESS: 0
SUM OF ALL RESPONSES TO PHQ QUESTIONS 1-9: 0
1. LITTLE INTEREST OR PLEASURE IN DOING THINGS: 0
SUM OF ALL RESPONSES TO PHQ QUESTIONS 1-9: 0
SUM OF ALL RESPONSES TO PHQ9 QUESTIONS 1 & 2: 0

## 2020-07-09 ASSESSMENT — ENCOUNTER SYMPTOMS
ALLERGIC/IMMUNOLOGIC NEGATIVE: 1
GASTROINTESTINAL NEGATIVE: 1
RESPIRATORY NEGATIVE: 1
RHINORRHEA: 0
EYES NEGATIVE: 1
SORE THROAT: 0

## 2020-07-09 NOTE — PROGRESS NOTES
2020     Brian Powell (:  1980) is a 35 y.o. male, here for evaluation of the following medical concerns:    HPI   Acute visit scheduled for some newer concerns. Video teleconference visit scheduled today during covid 19 restrictions. Patient consented to use personal cell phone to connect the visit at home, alone. I am in the office using [employer] software to connect. Reporting lesions on lower back that comes and goes, burning and painful by description. Seen 3/9/20 for same. He reports an issues with a sore/raised area at the top of the gluteal cleft over the last 7 months. No drainage, but spouse reports squeezing material from it before. At that time, \"Gluteal cleft with small pit at the top of the cleft. Intermittent swelling and drainage described. Discussed pilonidal cyst potential.  Rec. Observation and surgery follow up for next flare. \"  Monthly flares by report. Also reporting decreased libido. Last year he estimates. Not on ssri in the last year. Testicles feel normal size by self exam/report. No erectile dysfunction concerns. Past Medical History:   Diagnosis Date    Anxiety, mild     Colon polyps     Depression     Dizziness     Eczema     Epilepsy ([address]     GERD (gastroesophageal reflux disease)     H/O fall     Head ache     Head injury     Hiatal hernia     Inguinal hernia     Right.  Low back pain     Lumbar sprain     Migraine     Plantar fasciitis, bilateral     Seizure disorder ([address]     Sigmoid diverticulosis     Sleep difficulties     Status migrainosus     TIA (transient ischemic attack) 2017    Tobacco use     Chronic. Past Surgical History:   Procedure Laterality Date    COLONOSCOPY  2011    Internal hemorrhoids, possible anal fissure, few scattered diverticula.  COLONOSCOPY  2010    Mild sigmoid diverticulosis.  COLONOSCOPY  2009    Sigmoid diverticulosis, internal hemorrhoids.     COLONOSCOPY  05/27/2008    Internal hemorrhoids.  COLONOSCOPY  11/19/2014    polyp in rectum    COLONOSCOPY  6/1/16    sigmoid diverticulosis, colon polyp, internal hemorrhoids    COLONOSCOPY  04/19/2019    [id number]    GASTRIC FUNDOPLICATION  92/29/1420    HERNIA REPAIR Left 11/30/2017    Left Inguinal Hernia Repair w/ Mesh performed [facility]an [facility] Right 05/30/2013    INGUINAL HERNIA REPAIR Left 01/24/2013    INGUINAL HERNIA REPAIR Right 09/24/2015    KNEE ARTHROSCOPY Left     UPPER GASTROINTESTINAL ENDOSCOPY  03/05/2010    Recurrent hiatal hernia.  UPPER GASTROINTESTINAL ENDOSCOPY  6/1/16    S/P brenda fundoplication, good repair, retained gastric fluid    VASECTOMY         Review of Systems   Constitutional: Negative. Negative for chills and fever. HENT: Negative. Negative for congestion, rhinorrhea and sore throat. Eyes: Negative. Respiratory: Negative. Cardiovascular: Negative. Gastrointestinal: Negative. Endocrine: Negative. Genitourinary: Negative. Negative for discharge, penile pain, penile swelling, scrotal swelling and testicular pain. Musculoskeletal: Negative. Skin: Positive for wound (gluteal cleft). Allergic/Immunologic: Negative. Neurological: Negative. Hematological: Negative. Psychiatric/Behavioral: Negative. Current Outpatient Medications   Medication Sig Dispense Refill    divalproex (DEPAKOTE) 250 MG DR tablet take 3 tablets by mouth twice a day 180 tablet 5    lacosamide (VIMPAT) 100 MG TABS tablet Take 1 tablet by mouth 2 times daily for 30 days.  60 tablet 2    SUMAtriptan (IMITREX) 50 MG tablet take 1 tablet by mouth once daily if needed for MIGRAINE 30 tablet 2    acetaminophen (TYLENOL) 500 MG tablet Take 500 mg by mouth every 4 hours as needed for Pain      promethazine (PHENERGAN) 25 MG tablet take 1 tablet by mouth every 8 hours if needed for nausea 60 tablet 2  cyclobenzaprine (FLEXERIL) 10 MG tablet take 1 tablet by mouth twice a day if needed  0    naproxen (NAPROSYN) 500 MG tablet take 1 tablet by mouth twice a day with food  0     No current facility-administered medications for this visit. Social History     Tobacco Use    Smoking status: Current Every Day Smoker     Packs/day: 1.00     Years: 20.00     Pack years: 20.00     Types: Cigarettes     Last attempt to quit: 2016     Years since quittin.1    Smokeless tobacco: Never Used    Tobacco comment: 2-3 weekly   Substance Use Topics    Alcohol use: No     Alcohol/week: 0.0 standard drinks     Comment: rarely        There were no vitals filed for this visit. Estimated body mass index is 30.92 kg/m² as calculated from the following:    Height as of 3/9/20: 5' 5\" (1.651 m). Weight as of 3/9/20: 185 lb 12.8 oz (84.3 kg). Physical Exam  Constitutional:       General: He is not in acute distress. Appearance: Normal appearance. HENT:      Head: Normocephalic and atraumatic. Nose: Nose normal. No congestion or rhinorrhea. Eyes:      Extraocular Movements: Extraocular movements intact. Neck:      Musculoskeletal: Normal range of motion. Pulmonary:      Effort: Pulmonary effort is normal.   Neurological:      Mental Status: He is alert. Mental status is at baseline. Psychiatric:         Mood and Affect: Mood normal.         Behavior: Behavior normal.         Thought Content: Thought content normal.         Judgment: Judgment normal.       Patient-Reported Vitals 2020   Patient-Reported Weight 185 lbs   Patient-Reported Height 5 6           ASSESSMENT/PLAN:  Encounter Diagnoses   Name Primary?  Pilonidal cyst without abscess Yes    Low libido      Plan surgical consult on pilonidal cyst.      Low libido. Not on ssri at present. Plan to check testosterone level with upcoming lab draw for valproic acid level.     An electronic signature was used to authenticate this note.    --Heather Mcdonnell MD on 7/9/2020 at 3:36 PM

## 2020-07-13 ENCOUNTER — INITIAL CONSULT (OUTPATIENT)
Dept: SURGERY | Age: 40
End: 2020-07-13
Payer: MEDICARE

## 2020-07-13 VITALS
WEIGHT: 180 LBS | TEMPERATURE: 98.5 F | BODY MASS INDEX: 29.99 KG/M2 | SYSTOLIC BLOOD PRESSURE: 120 MMHG | HEART RATE: 98 BPM | OXYGEN SATURATION: 96 % | HEIGHT: 65 IN | DIASTOLIC BLOOD PRESSURE: 74 MMHG

## 2020-07-13 PROCEDURE — 4004F PT TOBACCO SCREEN RCVD TLK: CPT | Performed by: SURGERY

## 2020-07-13 PROCEDURE — G8417 CALC BMI ABV UP PARAM F/U: HCPCS | Performed by: SURGERY

## 2020-07-13 PROCEDURE — 99215 OFFICE O/P EST HI 40 MIN: CPT

## 2020-07-13 PROCEDURE — 99203 OFFICE O/P NEW LOW 30 MIN: CPT | Performed by: SURGERY

## 2020-07-13 PROCEDURE — G8427 DOCREV CUR MEDS BY ELIG CLIN: HCPCS | Performed by: SURGERY

## 2020-07-13 NOTE — PATIENT INSTRUCTIONS
Patient Education   Patient Education        Pilonidal Cyst Excision: Before Your Surgery  What is pilonidal cyst excision? Pilonidal cyst excision is a type of surgery. It removes a cyst at the top of the crease of your rear end (buttocks). A cyst is a sac filled with fluid. The doctor makes a cut to remove the cyst and some of the tissue around it. This cut is called an incision. The doctor may close this incision with stitches or he or she may leave it open to heal. Some open incisions take a few weeks to heal. Others can take several months. A closed incision usually takes about 4 weeks to heal. Either way, your incision will leave a scar that will fade over time. You will probably go home several hours after surgery. After 2 to 4 weeks, most people can do most of their normal activities. But it's important not to sit for a long time or do any type of hard or challenging exercise until you are fully healed. Follow-up care is a key part of your treatment and safety. Be sure to make and go to all appointments, and call your doctor if you are having problems. It's also a good idea to know your test results and keep a list of the medicines you take. How do you prepare for surgery? Surgery can be stressful. This information will help you understand what you can expect. And it will help you safely prepare for surgery. Preparing for surgery  · Be sure you have someone to take you home. Anesthesia and pain medicine will make it unsafe for you to drive or get home on your own. · Understand exactly what surgery is planned, along with the risks, benefits, and other options. · If you take aspirin or some other blood thinner, ask your doctor if you should stop taking it before your surgery. Make sure that you understand exactly what your doctor wants you to do. These medicines increase the risk of bleeding. · Tell your doctor ALL the medicines, vitamins, supplements, and herbal remedies you take.  Some may increase the risk of problems during your surgery. Your doctor will tell you if you should stop taking any of them before the surgery and how soon to do it. · Make sure your doctor and the hospital have a copy of your advance directive. If you don't have one, you may want to prepare one. It lets others know your health care wishes. It's a good thing to have before any type of surgery or procedure. What happens on the day of surgery? · Follow the instructions exactly about when to stop eating and drinking. If you don't, your surgery may be canceled. If your doctor told you to take your medicines on the day of surgery, take them with only a sip of water. · Take a bath or shower before you come in for your surgery. Do not apply lotions, perfumes, deodorants, or nail polish. · Do not shave the surgical site yourself. · Take off all jewelry and piercings. And take out contact lenses, if you wear them. At the hospital or surgery center    · Bring a picture ID. · The area for surgery is often marked to make sure there are no errors. · You will be kept comfortable and safe by your anesthesia provider. The anesthesia may make you sleep. Or it may just numb the area being worked on. · The surgery will take about 1 hour. When should you call your doctor? · You have questions or concerns. · You don't understand how to prepare for your surgery. · You become ill before the surgery (such as fever, flu, or a cold). · You need to reschedule or have changed your mind about having the surgery. Where can you learn more? Go to [email or web address]://[email or web address].[email or web address]. org and sign in to your [employer] account. Enter G310 in the [facility] box to learn more about \"Pilonidal Cyst Excision: Before Your Surgery. \"     If you do not have an account, please click on the \"Sign Up Now\" link. Current as of: October 31, 2019               Content Version: 12.5  © 7143-8777 Healthwise, Incorporated.    Care instructions adapted under license by [facility]. If you have questions about a medical condition or this instruction, always ask your healthcare professional. [address] any warranty or liability for your use of this information. Pilonidal Cyst Excision: What to Expect at Home  Your Recovery  The amount of time it will take for you to heal depends on the way your surgery was done. If the cut (incision) was closed with stitches, it will probably take about 4 weeks to completely heal. If your incision is left open, it may take from a few weeks to several months to heal. After the incision has healed, you will have a scar where the cyst was removed. This will fade and become softer with time. Most people can go back to work and most activities after 2 to 4 weeks. Until you have completely healed, you will need to avoid strenuous exercise and activities that require long periods of sitting. This care sheet gives you a general idea about how long it will take for you to recover. But each person recovers at a different pace. Follow the steps below to get better as quickly as possible. How can you care for yourself at home? Activity  · Rest when you feel tired. Getting enough sleep will help you recover. · Try to walk each day. Start by walking a little more than you did the day before. Bit by bit, increase the amount you walk. Walking boosts blood flow and helps prevent pneumonia and constipation. · Shower as usual. Warnell Flavors the area around your incision dry with a towel when you are done. Avoid baths until the wound is completely healed. Keep the area dry and clean. · Ask your doctor when you can drive again. · Avoid sitting for a long time or sitting on hard surfaces until your incision has healed. · Most people are able to return to work within 2 to 4 weeks after surgery. Diet  · You can eat your normal diet.  If your stomach is upset, try bland, low-fat foods like plain rice, broiled chicken, toast, and yogurt. · Drink plenty of fluids (unless your doctor tells you not to). · You may notice that your bowel movements are not regular right after your surgery. This is common. Try to avoid constipation and straining with bowel movements. You may want to take a fiber supplement every day. If you have not had a bowel movement after a couple of days, ask your doctor about taking a mild laxative. Medicines  · Your doctor will tell you if and when you can restart your medicines. He or she will also give you instructions about taking any new medicines. · If you take aspirin or some other blood thinner, ask your doctor if and when to start taking it again. Make sure that you understand exactly what your doctor wants you to do. · Take pain medicines exactly as directed. ? If the doctor gave you a prescription medicine for pain, take it as prescribed. ? If you are not taking a prescription pain medicine, ask your doctor if you can take an over-the-counter medicine. · If your doctor prescribed antibiotics, take them as directed. Do not stop taking them just because you feel better. You need to take the full course of antibiotics. · If you think your pain medicine is making you sick to your stomach:  ? Take your medicine after meals (unless your doctor has told you not to). ? Ask your doctor for a different pain medicine. Incision care  · If your incision was closed with stitches:  ? Wash the area daily with warm, soapy water and pat it dry. Don't use hydrogen peroxide or alcohol, which can slow healing. ? You may cover the area with a gauze bandage if it weeps or rubs against clothing. Change the bandage every day. ? Keep the area clean and dry. · If your incision was left open to heal, change the bandage, called a dressing, as instructed by your doctor. ? Dressing changes may hurt at first. Taking pain medicine about half an hour before you change the dressing can help.   ? If your dressing sticks to your wound, try soaking the dressing in warm water for about 10 minutes before you remove it. You can do this in the shower or by placing a wet washcloth over the dressing. ? You may notice greenish gray fluid from your wound as you start to heal. This is normal. It is a sign that your wound is healing. Other instructions  · Use a doughnut cushion if sitting is uncomfortable. · Keep the area around your wound free from hair. Ask your doctor what method of hair removal will work best.  Follow-up care is a key part of your treatment and safety. Be sure to make and go to all appointments, and call your doctor if you are having problems. It's also a good idea to know your test results and keep a list of the medicines you take. When should you call for help? [id number] anytime you think you may need emergency care. For example, call if:  · You passed out (lost consciousness). · You have sudden chest pain and shortness of breath, or you cough up blood. · You have severe belly pain. Call your doctor now or seek immediate medical care if:  · You have pain that does not get better after you take your pain medicine. · Your incision was closed with stitches and the stitches come loose, or your incision comes open. · Bright red blood has soaked through the bandage over your incision. · You have signs of infection, such as:  ? Increased pain, swelling, warmth, or redness. ? Red streaks leading from the incision. ? Pus draining from the incision. ? A fever. · You have new or worse nausea or vomiting. · You are too sick to your stomach to drink any fluids. · You cannot keep down fluids. Watch closely for changes in your health, and be sure to contact your doctor if you have any problems. Where can you learn more? Go to [email or web address] org and sign in to your [employer] account. Enter C004 in the [facility] box to learn more about \"Pilonidal Cyst Excision: What to Expect at Home. \"

## 2020-07-14 NOTE — PROGRESS NOTES
Name:  Baltazar Poe  Age:  35 y.o.   :  1980    Physician: Keke Miramontes MD       Chief Complaint: Pilonidal Disease      HPI:  Patient has been having intermittent problems with pain and swelling in his  cleft over the past year. He has never had drainage. The pain and swelling last for a day or two then subsides. MEDICAL HISTORY:    Past Medical History:        Diagnosis Date    Anxiety, mild     Colon polyps     Depression     Dizziness     Eczema     Epilepsy ([address]     GERD (gastroesophageal reflux disease)     H/O fall     Head ache     Head injury     Hiatal hernia     Inguinal hernia     Right.  Low back pain     Lumbar sprain     Migraine     Plantar fasciitis, bilateral     Seizure disorder ([address]     Sigmoid diverticulosis     Sleep difficulties     Status migrainosus     TIA (transient ischemic attack) 2017    Tobacco use     Chronic. Past Surgical History:        Procedure Laterality Date    COLONOSCOPY  2011    Internal hemorrhoids, possible anal fissure, few scattered diverticula.  COLONOSCOPY  2010    Mild sigmoid diverticulosis.  COLONOSCOPY  2009    Sigmoid diverticulosis, internal hemorrhoids.  COLONOSCOPY  2008    Internal hemorrhoids.  COLONOSCOPY  2014    polyp in rectum    COLONOSCOPY  16    sigmoid diverticulosis, colon polyp, internal hemorrhoids    COLONOSCOPY  2019    [id number]    GASTRIC FUNDOPLICATION      HERNIA REPAIR Left 2017    Left Inguinal Hernia Repair w/ Mesh [facility] Right 2013    INGUINAL HERNIA REPAIR Left 2013    INGUINAL HERNIA REPAIR Right 2015    KNEE ARTHROSCOPY Left     UPPER GASTROINTESTINAL ENDOSCOPY  2010    Recurrent hiatal hernia.     UPPER GASTROINTESTINAL ENDOSCOPY  16    S/P brenda fundoplication, good repair, retained gastric fluid    VASECTOMY         Prior to Admission medications    Medication Sig Start Date End Date Taking? Authorizing Provider   divalproex (DEPAKOTE) 250 MG DR tablet take 3 tablets by mouth twice a day 2/47/42   Ricardo Shin MD   lacosamide (VIMPAT) 100 MG TABS tablet Take 1 tablet by mouth 2 times daily for 30 days. 6/12/20 0/28/35  Ricardo Shin MD   SUMAtriptan (IMITREX) 50 MG tablet take 1 tablet by mouth once daily if needed for MIGRAINE 5/37/12   Ricardo Shin MD   promethazine (PHENERGAN) 25 MG tablet take 1 tablet by mouth every 8 hours if needed for nausea 6/31/75   Ricardo Shin MD   cyclobenzaprine (FLEXERIL) 10 MG tablet take 1 tablet by mouth twice a day if needed 6/2/19   Historical Provider, MD   naproxen (NAPROSYN) 500 MG tablet take 1 tablet by mouth twice a day with food 6/2/19   Historical Provider, MD   acetaminophen (TYLENOL) 500 MG tablet Take 500 mg by mouth every 4 hours as needed for Pain    Historical Provider, MD       No Known Allergies     reports that he has been smoking cigarettes. He has a 20.00 pack-year smoking history. He has never used smokeless tobacco.  reports no history of alcohol use.     Family History   Problem Relation Age of Onset    COPD Mother     High Blood Pressure Mother    Olam Astrid Cancer Father         Hodgekin's Lymphoma    High Blood Pressure Father     Eczema Sister     Alzheimer's Disease Maternal Grandmother     COPD Maternal Grandmother     Cancer Maternal Grandmother     Cancer Paternal Grandmother     Cancer Paternal Grandfather     Cancer Sister         colon cancer    High Blood Pressure Sister     Arthritis Sister     Asthma Daughter     Eczema Daughter     Glaucoma Neg Hx     Diabetes Neg Hx     Cataracts Neg Hx             REVIEW OF SYSTEMS:  General:  negative  Eye:  negative   ENT:negative  Allergy/Immunology:  negative  Hematology/Lymphatic: negative  Lungs: negative  Cardiovascular: negative  Gastrointestinal: negative  : negative  Neurological: negative      PHYSICAL EXAM:    /74 (Site: Right Upper Arm, Position: Sitting, Cuff Size: Large Adult)   Pulse 98   Temp 98.5 °F (36.9 °C) (Temporal)   Ht 5' 5\" (1.651 m)   Wt 180 lb (81.6 kg)   SpO2 96%   BMI 29.95 kg/m²       Gen: Alert and oriented x3, no acute distress, well-appearing    Eyes: PERRL, Sclera Anicteric    Head: Normocephalic, non tender     Neck: Supple, no significant adenopathy. No carotid bruits, thyroid normal size and no masses    Chest: CTA, no wheezes, no rales, no rhonchi, symmetrical    Heart: Normal rate, regular rhythm, no murmurs    Abdomen: Soft, positive bowel sounds, non tender, non distended, no masses, no hernias, no HSM, no bruits. Buttocks - He has a small puncta in the midline without an obvious sinus tract. Infection, tenderness at this time. This is consistent with very mild pilonidal disease    Neuro: Normal speech, motor/sensory grossly normal bilateral    MSK: No joint tenderness, deformity, or swelling           ASSESSMENT:  1) Very Mild Pilonidal Disease    PLAN:  1) Excision  We discussed a variety of options. The minimal disease and think he would do fine if he wants to just watch this and treated when it flares up. He could also be here in the area which may decrease the severity of disease. Alternately we can do surgery. Explained to him surgery would not be too extensive given the minimal amount of disease. However he still could have wound healing problem sometimes can take weeks or even months to deal with. Includes risk of any significant bleeding infections who all those tend to be fairly minor.   He decided he would like to go ahead and get surgery done so he does not have to worry about it anymore        Electronically signed by Ana Laura Belcher MD on 7/14/2020 at 8:40 AM

## 2020-07-15 ENCOUNTER — PRE-PROCEDURE TELEPHONE (OUTPATIENT)
Dept: PREADMISSION TESTING | Age: 40
End: 2020-07-15

## 2020-07-15 NOTE — TELEPHONE ENCOUNTER
Spoke with patient regarding a need for a PAT covid test prior to his procedure on 7/23. Scheduled testing for 7/20 @ 8:30am.  Gave patient directions and he verbalized understanding.

## 2020-07-20 ENCOUNTER — HOSPITAL ENCOUNTER (OUTPATIENT)
Dept: PREADMISSION TESTING | Age: 40
Setting detail: SPECIMEN
Discharge: HOME | End: 2020-07-24
Payer: MEDICARE

## 2020-07-20 ENCOUNTER — HOSPITAL ENCOUNTER (OUTPATIENT)
Dept: LAB | Age: 40
Discharge: HOME | End: 2020-07-20
Payer: MEDICARE

## 2020-07-20 LAB
ALT SERPL-CCNC: 17 U/L (ref 5–41)
AMPHETAMINE SCREEN URINE: NEGATIVE
ANION GAP SERPL CALCULATED.3IONS-SCNC: 15 MMOL/L (ref 9–17)
AST SERPL-CCNC: 16 U/L
BARBITURATE SCREEN URINE: NEGATIVE
BENZODIAZEPINE SCREEN, URINE: NEGATIVE
BUPRENORPHINE URINE: NEGATIVE
CANNABINOID SCREEN URINE: NEGATIVE
CHLORIDE BLD-SCNC: 102 MMOL/L (ref 98–107)
CO2: 23 MMOL/L (ref 20–31)
COCAINE METABOLITE, URINE: NEGATIVE
HCT VFR BLD CALC: 44.2 % (ref 40.7–50.3)
HEMOGLOBIN: 15.8 G/DL (ref 13–17)
MCH RBC QN AUTO: 34.7 PG (ref 25.2–33.5)
MCHC RBC AUTO-ENTMCNC: 35.7 G/DL (ref 25.2–33.5)
MCV RBC AUTO: 97.1 FL (ref 82.6–102.9)
MDMA URINE: NORMAL
METHADONE SCREEN, URINE: NEGATIVE
METHAMPHETAMINE, URINE: NEGATIVE
NRBC AUTOMATED: 0 PER 100 WBC
OPIATES, URINE: NEGATIVE
OXYCODONE SCREEN URINE: NEGATIVE
PDW BLD-RTO: 12.4 % (ref 11.8–14.4)
PHENCYCLIDINE, URINE: NEGATIVE
PLATELET # BLD: 257 K/UL (ref 138–453)
PMV BLD AUTO: 9.7 FL (ref 8.1–13.5)
POTASSIUM SERPL-SCNC: 4.3 MMOL/L (ref 3.7–5.3)
PROPOXYPHENE, URINE: NEGATIVE
RBC # BLD: 4.55 M/UL (ref 4.21–5.77)
SODIUM BLD-SCNC: 140 MMOL/L (ref 135–144)
TEST INFORMATION: NORMAL
TESTOSTERONE TOTAL: 278 NG/DL (ref 220–1000)
TRICYCLIC ANTIDEPRESSANTS, UR: NEGATIVE
VALPROIC ACID LEVEL: 34 UG/ML (ref 50–125)
VALPROIC DATE LAST DOSE: ABNORMAL
VALPROIC DOSE AMOUNT: ABNORMAL
VALPROIC TIME LAST DOSE: ABNORMAL
WBC # BLD: 8.6 K/UL (ref 3.5–11.3)

## 2020-07-20 PROCEDURE — U0003 INFECTIOUS AGENT DETECTION BY NUCLEIC ACID (DNA OR RNA); SEVERE ACUTE RESPIRATORY SYNDROME CORONAVIRUS 2 (SARS-COV-2) (CORONAVIRUS DISEASE [COVID-19]), AMPLIFIED PROBE TECHNIQUE, MAKING USE OF HIGH THROUGHPUT TECHNOLOGIES AS DESCRIBED BY CMS-2020-01-R: HCPCS

## 2020-07-20 PROCEDURE — 80051 ELECTROLYTE PANEL: CPT

## 2020-07-20 PROCEDURE — 80164 ASSAY DIPROPYLACETIC ACD TOT: CPT

## 2020-07-20 PROCEDURE — 84450 TRANSFERASE (AST) (SGOT): CPT

## 2020-07-20 PROCEDURE — 84403 ASSAY OF TOTAL TESTOSTERONE: CPT

## 2020-07-20 PROCEDURE — 80306 DRUG TEST PRSMV INSTRMNT: CPT

## 2020-07-20 PROCEDURE — 85027 COMPLETE CBC AUTOMATED: CPT

## 2020-07-20 PROCEDURE — 84460 ALANINE AMINO (ALT) (SGPT): CPT

## 2020-07-20 PROCEDURE — 36415 COLL VENOUS BLD VENIPUNCTURE: CPT

## 2020-07-22 ENCOUNTER — ANESTHESIA EVENT (OUTPATIENT)
Dept: OPERATING ROOM | Age: 40
End: 2020-07-22
Payer: MEDICARE

## 2020-07-22 ENCOUNTER — TELEPHONE (OUTPATIENT)
Dept: SURGERY | Age: 40
End: 2020-07-22

## 2020-07-22 LAB — SARS-COV-2, NAA: NOT DETECTED

## 2020-07-22 RX ORDER — HYDROCODONE BITARTRATE AND ACETAMINOPHEN 5; 325 MG/1; MG/1
1 TABLET ORAL PRN
Status: CANCELLED | OUTPATIENT
Start: 2020-07-22 | End: 2020-07-22

## 2020-07-22 RX ORDER — ONDANSETRON 2 MG/ML
4 INJECTION INTRAMUSCULAR; INTRAVENOUS
Status: CANCELLED | OUTPATIENT
Start: 2020-07-22 | End: 2020-07-22

## 2020-07-22 RX ORDER — MORPHINE SULFATE 2 MG/ML
2 INJECTION, SOLUTION INTRAMUSCULAR; INTRAVENOUS EVERY 5 MIN PRN
Status: CANCELLED | OUTPATIENT
Start: 2020-07-22

## 2020-07-22 RX ORDER — HYDROCODONE BITARTRATE AND ACETAMINOPHEN 5; 325 MG/1; MG/1
2 TABLET ORAL PRN
Status: CANCELLED | OUTPATIENT
Start: 2020-07-22 | End: 2020-07-22

## 2020-07-22 RX ORDER — MORPHINE SULFATE 2 MG/ML
1 INJECTION, SOLUTION INTRAMUSCULAR; INTRAVENOUS EVERY 5 MIN PRN
Status: CANCELLED | OUTPATIENT
Start: 2020-07-22

## 2020-07-22 NOTE — TELEPHONE ENCOUNTER
Message left for patient to return call to discuss recommendations from Jairon Snowden, patient to take 1 gram of Tylenol and too take all his seizure medications before coming into the surgery center.

## 2020-07-22 NOTE — TELEPHONE ENCOUNTER
[facility]    Pre-Operative Evaluation/Consultation    Name:  Amy Borden                                         Age:  35 y.o. MRN:  [id number]       :  1980   Date:  2020         Sex: male    There were no encounter diagnoses. Surgeon:  Dr. Mahoney Portal  Procedure (Planned):  Pilonidal Cyst Excision  Date Scheduled surgery: 2020    Attending : No att. providers found    Primary Physician: Jacqueline Sepulveda  Cardiologist: Rosalio Guido    Type of Anesthesia Requested: Anesthesia Provider's Choice    Patient Medical history:  No Known Allergies  Patient Active Problem List   Diagnosis    Low back pain    Migraine    Anxiety, mild    Disturbance, sleep    Tobacco use    GERD (gastroesophageal reflux disease)    Hiatal hernia    Head ache    Status migrainosus    Dizziness    Depression    H/O fall    Head injury    Seizure disorder ([address]    Astigmatism    Rectal bleed    Cervical radiculopathy    Ventricular premature beats    Nausea and vomiting    Colon polyps    TIA (transient ischemic attack)    VBI (vertebrobasilar insufficiency)     Past Medical History:   Diagnosis Date    Anxiety, mild     Colon polyps     Depression     Dizziness     Eczema     Epilepsy ([address]     GERD (gastroesophageal reflux disease)     H/O fall     Head ache     Head injury     Hiatal hernia     Inguinal hernia     Right.  Low back pain     Lumbar sprain     Migraine     Plantar fasciitis, bilateral     Seizure disorder ([address]     Sigmoid diverticulosis     Sleep difficulties     Status migrainosus     TIA (transient ischemic attack) 2017    Tobacco use     Chronic. Past Surgical History:   Procedure Laterality Date    COLONOSCOPY  2011    Internal hemorrhoids, possible anal fissure, few scattered diverticula.  COLONOSCOPY  2010    Mild sigmoid diverticulosis.  COLONOSCOPY  2009    Sigmoid diverticulosis, internal hemorrhoids.     COLONOSCOPY  2008    Internal hemorrhoids.  COLONOSCOPY  2014    polyp in rectum    COLONOSCOPY  16    sigmoid diverticulosis, colon polyp, internal hemorrhoids    COLONOSCOPY  2019    [id number]    GASTRIC FUNDOPLICATION      HERNIA REPAIR Left 2017    Left Inguinal Hernia Repair w/ Mesh performed by Dedra Cui MD at [address] Right 2013    INGUINAL HERNIA REPAIR Left 2013    INGUINAL HERNIA REPAIR Right 2015    KNEE ARTHROSCOPY Left     UPPER GASTROINTESTINAL ENDOSCOPY  2010    Recurrent hiatal hernia.  UPPER GASTROINTESTINAL ENDOSCOPY  16    S/P brenda fundoplication, good repair, retained gastric fluid    VASECTOMY       Social History     Tobacco Use    Smoking status: Current Every Day Smoker     Packs/day: 1.00     Years: 20.00     Pack years: 20.00     Types: Cigarettes     Last attempt to quit: 2016     Years since quittin.2    Smokeless tobacco: Never Used    Tobacco comment: 2-3 weekly   Substance Use Topics    Alcohol use: No     Alcohol/week: 0.0 standard drinks     Comment: rarely    Drug use: No     Medications:  Current Outpatient Medications   Medication Sig Dispense Refill    divalproex (DEPAKOTE) 250 MG DR tablet take 3 tablets by mouth twice a day 180 tablet 5    lacosamide (VIMPAT) 100 MG TABS tablet Take 1 tablet by mouth 2 times daily for 30 days.  60 tablet 2    SUMAtriptan (IMITREX) 50 MG tablet take 1 tablet by mouth once daily if needed for MIGRAINE 30 tablet 2    promethazine (PHENERGAN) 25 MG tablet take 1 tablet by mouth every 8 hours if needed for nausea 60 tablet 2    cyclobenzaprine (FLEXERIL) 10 MG tablet take 1 tablet by mouth twice a day if needed  0    naproxen (NAPROSYN) 500 MG tablet take 1 tablet by mouth twice a day with food  0    acetaminophen (TYLENOL) 500 MG tablet Take 500 mg by mouth every 4 hours as needed for Pain       No current facility-administered medications for this visit. Scheduled Meds:  Continuous Infusions:  PRN Meds:. Prior to Admission medications    Medication Sig Start Date End Date Taking? Authorizing Provider   divalproex (DEPAKOTE) 250 MG DR tablet take 3 tablets by mouth twice a day 3/99/81   Jean Pierre Cantrell MD   lacosamide (VIMPAT) 100 MG TABS tablet Take 1 tablet by mouth 2 times daily for 30 days. 6/12/20 8/57/70  Jean Pierre Cantrell MD   SUMAtriptan (IMITREX) 50 MG tablet take 1 tablet by mouth once daily if needed for MIGRAINE 0/37/60   Jean Pierre Cantrell MD   promethazine (PHENERGAN) 25 MG tablet take 1 tablet by mouth every 8 hours if needed for nausea 1/86/74   Jean Pierre Cantrell MD   cyclobenzaprine (FLEXERIL) 10 MG tablet take 1 tablet by mouth twice a day if needed 6/2/19   Historical Provider, MD   naproxen (NAPROSYN) 500 MG tablet take 1 tablet by mouth twice a day with food 6/2/19   Historical Provider, MD   acetaminophen (TYLENOL) 500 MG tablet Take 500 mg by mouth every 4 hours as needed for Pain    Historical Provider, MD     Vital Signs (Current) [unfilled]    Weight:   Wt Readings from Last 1 Encounters:   07/13/20 180 lb (81.6 kg)     Height:   Ht Readings from Last 1 Encounters:   07/13/20 5' 5\" (1.651 m)      BMI:  There is no height or weight on file to calculate BMI. Estimated body mass index is 29.95 kg/m² as calculated from the following:    Height as of 7/13/20: 5' 5\" (1.651 m). Weight as of 7/13/20: 180 lb (81.6 kg). body mass index is unknown because there is no height or weight on file. Cardiac Clearance: None   Medical Clearance:None   Appointment for surgery Clearance scheduled for:None     Preoperative Testing:   These are the current and completed labs:  CBC:   Lab Results   Component Value Date    WBC 8.6 07/20/2020    RBC 4.55 07/20/2020    HGB 15.8 07/20/2020    HCT 44.2 07/20/2020    MCV 97.1 07/20/2020    RDW 12.4 07/20/2020     07/20/2020

## 2020-07-23 ENCOUNTER — HOSPITAL ENCOUNTER (OUTPATIENT)
Age: 40
Setting detail: OUTPATIENT SURGERY
Discharge: HOME | End: 2020-07-23
Attending: SURGERY | Admitting: SURGERY
Payer: MEDICARE

## 2020-07-23 ENCOUNTER — ANESTHESIA (OUTPATIENT)
Dept: OPERATING ROOM | Age: 40
End: 2020-07-23
Payer: MEDICARE

## 2020-07-23 VITALS
DIASTOLIC BLOOD PRESSURE: 81 MMHG | SYSTOLIC BLOOD PRESSURE: 125 MMHG | OXYGEN SATURATION: 90 % | RESPIRATION RATE: 23 BRPM | TEMPERATURE: 96.7 F

## 2020-07-23 VITALS
RESPIRATION RATE: 14 BRPM | SYSTOLIC BLOOD PRESSURE: 113 MMHG | WEIGHT: 179.4 LBS | TEMPERATURE: 97 F | HEART RATE: 77 BPM | DIASTOLIC BLOOD PRESSURE: 81 MMHG | OXYGEN SATURATION: 94 % | HEIGHT: 66 IN | BODY MASS INDEX: 28.83 KG/M2

## 2020-07-23 PROCEDURE — 3700000000 HC ANESTHESIA ATTENDED CARE: Performed by: SURGERY

## 2020-07-23 PROCEDURE — 2500000003 HC RX 250 WO HCPCS: Performed by: NURSE ANESTHETIST, CERTIFIED REGISTERED

## 2020-07-23 PROCEDURE — 2500000003 HC RX 250 WO HCPCS: Performed by: SURGERY

## 2020-07-23 PROCEDURE — 88304 TISSUE EXAM BY PATHOLOGIST: CPT

## 2020-07-23 PROCEDURE — 6360000002 HC RX W HCPCS: Performed by: SURGERY

## 2020-07-23 PROCEDURE — 2580000003 HC RX 258: Performed by: SURGERY

## 2020-07-23 PROCEDURE — 3700000001 HC ADD 15 MINUTES (ANESTHESIA): Performed by: SURGERY

## 2020-07-23 PROCEDURE — 7100000001 HC PACU RECOVERY - ADDTL 15 MIN: Performed by: SURGERY

## 2020-07-23 PROCEDURE — 3600000002 HC SURGERY LEVEL 2 BASE: Performed by: SURGERY

## 2020-07-23 PROCEDURE — 7100000011 HC PHASE II RECOVERY - ADDTL 15 MIN: Performed by: SURGERY

## 2020-07-23 PROCEDURE — 6360000002 HC RX W HCPCS: Performed by: NURSE ANESTHETIST, CERTIFIED REGISTERED

## 2020-07-23 PROCEDURE — 2709999900 HC NON-CHARGEABLE SUPPLY: Performed by: SURGERY

## 2020-07-23 PROCEDURE — 7100000010 HC PHASE II RECOVERY - FIRST 15 MIN: Performed by: SURGERY

## 2020-07-23 PROCEDURE — 11771 EXC PILONIDAL CYST XTNSV: CPT | Performed by: SURGERY

## 2020-07-23 PROCEDURE — 3600000012 HC SURGERY LEVEL 2 ADDTL 15MIN: Performed by: SURGERY

## 2020-07-23 PROCEDURE — 7100000000 HC PACU RECOVERY - FIRST 15 MIN: Performed by: SURGERY

## 2020-07-23 RX ORDER — DIPHENHYDRAMINE HYDROCHLORIDE 50 MG/ML
INJECTION INTRAMUSCULAR; INTRAVENOUS PRN
Status: DISCONTINUED | OUTPATIENT
Start: 2020-07-23 | End: 2020-07-23 | Stop reason: SDUPTHER

## 2020-07-23 RX ORDER — SODIUM CHLORIDE 0.9 % (FLUSH) 0.9 %
10 SYRINGE (ML) INJECTION PRN
Status: DISCONTINUED | OUTPATIENT
Start: 2020-07-23 | End: 2020-07-23 | Stop reason: HOSPADM

## 2020-07-23 RX ORDER — ESMOLOL HYDROCHLORIDE 10 MG/ML
INJECTION INTRAVENOUS PRN
Status: DISCONTINUED | OUTPATIENT
Start: 2020-07-23 | End: 2020-07-23 | Stop reason: SDUPTHER

## 2020-07-23 RX ORDER — GLYCOPYRROLATE 1 MG/5 ML
SYRINGE (ML) INTRAVENOUS PRN
Status: DISCONTINUED | OUTPATIENT
Start: 2020-07-23 | End: 2020-07-23 | Stop reason: SDUPTHER

## 2020-07-23 RX ORDER — MIDAZOLAM HYDROCHLORIDE 1 MG/ML
INJECTION INTRAMUSCULAR; INTRAVENOUS PRN
Status: DISCONTINUED | OUTPATIENT
Start: 2020-07-23 | End: 2020-07-23 | Stop reason: SDUPTHER

## 2020-07-23 RX ORDER — NEOSTIGMINE METHYLSULFATE 1 MG/ML
INJECTION, SOLUTION INTRAVENOUS PRN
Status: DISCONTINUED | OUTPATIENT
Start: 2020-07-23 | End: 2020-07-23 | Stop reason: SDUPTHER

## 2020-07-23 RX ORDER — PROPOFOL 10 MG/ML
INJECTION, EMULSION INTRAVENOUS PRN
Status: DISCONTINUED | OUTPATIENT
Start: 2020-07-23 | End: 2020-07-23 | Stop reason: SDUPTHER

## 2020-07-23 RX ORDER — SODIUM CHLORIDE, SODIUM LACTATE, POTASSIUM CHLORIDE, CALCIUM CHLORIDE 600; 310; 30; 20 MG/100ML; MG/100ML; MG/100ML; MG/100ML
INJECTION, SOLUTION INTRAVENOUS CONTINUOUS
Status: DISCONTINUED | OUTPATIENT
Start: 2020-07-23 | End: 2020-07-23 | Stop reason: HOSPADM

## 2020-07-23 RX ORDER — GINSENG 100 MG
CAPSULE ORAL PRN
Status: DISCONTINUED | OUTPATIENT
Start: 2020-07-23 | End: 2020-07-23 | Stop reason: ALTCHOICE

## 2020-07-23 RX ORDER — LIDOCAINE HYDROCHLORIDE 20 MG/ML
INJECTION, SOLUTION EPIDURAL; INFILTRATION; INTRACAUDAL; PERINEURAL PRN
Status: DISCONTINUED | OUTPATIENT
Start: 2020-07-23 | End: 2020-07-23 | Stop reason: SDUPTHER

## 2020-07-23 RX ORDER — HYDROCODONE BITARTRATE AND ACETAMINOPHEN 5; 325 MG/1; MG/1
1 TABLET ORAL EVERY 6 HOURS PRN
Qty: 28 TABLET | Refills: 0 | Status: SHIPPED | OUTPATIENT
Start: 2020-07-23 | End: 2020-08-03 | Stop reason: SDUPTHER

## 2020-07-23 RX ORDER — ROCURONIUM BROMIDE 10 MG/ML
INJECTION, SOLUTION INTRAVENOUS PRN
Status: DISCONTINUED | OUTPATIENT
Start: 2020-07-23 | End: 2020-07-23 | Stop reason: SDUPTHER

## 2020-07-23 RX ORDER — SODIUM CHLORIDE 0.9 % (FLUSH) 0.9 %
10 SYRINGE (ML) INJECTION EVERY 12 HOURS SCHEDULED
Status: DISCONTINUED | OUTPATIENT
Start: 2020-07-23 | End: 2020-07-23 | Stop reason: HOSPADM

## 2020-07-23 RX ORDER — DOCUSATE SODIUM 100 MG/1
100 CAPSULE, LIQUID FILLED ORAL 2 TIMES DAILY PRN
Qty: 30 CAPSULE | Refills: 0 | Status: SHIPPED | OUTPATIENT
Start: 2020-07-23 | End: 2021-02-11 | Stop reason: ALTCHOICE

## 2020-07-23 RX ORDER — FENTANYL CITRATE 50 UG/ML
INJECTION, SOLUTION INTRAMUSCULAR; INTRAVENOUS PRN
Status: DISCONTINUED | OUTPATIENT
Start: 2020-07-23 | End: 2020-07-23 | Stop reason: SDUPTHER

## 2020-07-23 RX ORDER — ONDANSETRON 2 MG/ML
INJECTION INTRAMUSCULAR; INTRAVENOUS PRN
Status: DISCONTINUED | OUTPATIENT
Start: 2020-07-23 | End: 2020-07-23 | Stop reason: SDUPTHER

## 2020-07-23 RX ORDER — DEXAMETHASONE SODIUM PHOSPHATE 10 MG/ML
INJECTION INTRAMUSCULAR; INTRAVENOUS PRN
Status: DISCONTINUED | OUTPATIENT
Start: 2020-07-23 | End: 2020-07-23 | Stop reason: SDUPTHER

## 2020-07-23 RX ADMIN — ONDANSETRON 4 MG: 2 INJECTION INTRAMUSCULAR; INTRAVENOUS at 11:10

## 2020-07-23 RX ADMIN — LIDOCAINE HYDROCHLORIDE 100 MG: 20 INJECTION, SOLUTION EPIDURAL; INFILTRATION; INTRACAUDAL; PERINEURAL at 10:40

## 2020-07-23 RX ADMIN — Medication 0.5 MG: at 10:57

## 2020-07-23 RX ADMIN — Medication 2 G: at 10:38

## 2020-07-23 RX ADMIN — ESMOLOL HYDROCHLORIDE 20 MG: 10 INJECTION, SOLUTION INTRAVENOUS at 11:19

## 2020-07-23 RX ADMIN — ESMOLOL HYDROCHLORIDE 20 MG: 10 INJECTION, SOLUTION INTRAVENOUS at 11:04

## 2020-07-23 RX ADMIN — ROCURONIUM BROMIDE 50 MG: 10 INJECTION, SOLUTION INTRAVENOUS at 10:40

## 2020-07-23 RX ADMIN — Medication 0.4 MG: at 11:21

## 2020-07-23 RX ADMIN — DEXAMETHASONE SODIUM PHOSPHATE 10 MG: 10 INJECTION INTRAMUSCULAR; INTRAVENOUS at 10:50

## 2020-07-23 RX ADMIN — Medication 3 MG: at 11:21

## 2020-07-23 RX ADMIN — ESMOLOL HYDROCHLORIDE 20 MG: 10 INJECTION, SOLUTION INTRAVENOUS at 11:27

## 2020-07-23 RX ADMIN — SODIUM CHLORIDE, POTASSIUM CHLORIDE, SODIUM LACTATE AND CALCIUM CHLORIDE: 600; 310; 30; 20 INJECTION, SOLUTION INTRAVENOUS at 11:14

## 2020-07-23 RX ADMIN — MIDAZOLAM HYDROCHLORIDE 2 MG: 1 INJECTION, SOLUTION INTRAMUSCULAR; INTRAVENOUS at 10:38

## 2020-07-23 RX ADMIN — DIPHENHYDRAMINE HYDROCHLORIDE 25 MG: 50 INJECTION, SOLUTION INTRAMUSCULAR; INTRAVENOUS at 10:40

## 2020-07-23 RX ADMIN — SODIUM CHLORIDE, POTASSIUM CHLORIDE, SODIUM LACTATE AND CALCIUM CHLORIDE: 600; 310; 30; 20 INJECTION, SOLUTION INTRAVENOUS at 10:15

## 2020-07-23 RX ADMIN — FENTANYL CITRATE 100 MCG: 50 INJECTION, SOLUTION INTRAMUSCULAR; INTRAVENOUS at 10:40

## 2020-07-23 RX ADMIN — Medication 0.5 MG: at 10:55

## 2020-07-23 RX ADMIN — PROPOFOL 200 MG: 10 INJECTION, EMULSION INTRAVENOUS at 10:40

## 2020-07-23 ASSESSMENT — PULMONARY FUNCTION TESTS
PIF_VALUE: 21
PIF_VALUE: 28
PIF_VALUE: 27
PIF_VALUE: 6
PIF_VALUE: 19
PIF_VALUE: 6
PIF_VALUE: 27
PIF_VALUE: 8
PIF_VALUE: 27
PIF_VALUE: 28
PIF_VALUE: 1
PIF_VALUE: 7
PIF_VALUE: 17
PIF_VALUE: 25
PIF_VALUE: 25
PIF_VALUE: 28
PIF_VALUE: 25
PIF_VALUE: 27
PIF_VALUE: 26
PIF_VALUE: 20
PIF_VALUE: 27
PIF_VALUE: 23
PIF_VALUE: 26
PIF_VALUE: 23
PIF_VALUE: 28
PIF_VALUE: 27
PIF_VALUE: 26
PIF_VALUE: 25
PIF_VALUE: 27
PIF_VALUE: 25
PIF_VALUE: 25
PIF_VALUE: 7
PIF_VALUE: 27
PIF_VALUE: 25
PIF_VALUE: 20
PIF_VALUE: 26
PIF_VALUE: 27
PIF_VALUE: 7
PIF_VALUE: 6
PIF_VALUE: 28
PIF_VALUE: 28
PIF_VALUE: 26
PIF_VALUE: 4
PIF_VALUE: 3
PIF_VALUE: 25
PIF_VALUE: 25
PIF_VALUE: 23
PIF_VALUE: 7
PIF_VALUE: 27
PIF_VALUE: 28
PIF_VALUE: 6
PIF_VALUE: 28
PIF_VALUE: 27
PIF_VALUE: 26
PIF_VALUE: 12
PIF_VALUE: 25
PIF_VALUE: 7
PIF_VALUE: 27
PIF_VALUE: 28
PIF_VALUE: 26
PIF_VALUE: 1
PIF_VALUE: 27
PIF_VALUE: 27
PIF_VALUE: 25
PIF_VALUE: 25
PIF_VALUE: 27
PIF_VALUE: 6
PIF_VALUE: 27
PIF_VALUE: 25
PIF_VALUE: 28
PIF_VALUE: 30
PIF_VALUE: 28

## 2020-07-23 ASSESSMENT — PAIN SCALES - GENERAL
PAINLEVEL_OUTOF10: 0

## 2020-07-23 ASSESSMENT — PAIN - FUNCTIONAL ASSESSMENT: PAIN_FUNCTIONAL_ASSESSMENT: 0-10

## 2020-07-23 ASSESSMENT — LIFESTYLE VARIABLES: SMOKING_STATUS: 1

## 2020-07-23 NOTE — OP NOTE
[address]                 [address] [place] [address]                                OPERATIVE REPORT    PATIENT NAME: Noel Lane                  :        1980  MED REC NO:   [id number]                             ROOM:  ACCOUNT NO:   [de-identified]                           ADMIT DATE: 2020  PROVIDER:     Gordy Henderson    DATE OF PROCEDURE:  2020    SURGEON:  Dr. Gordy Henderson. ASSISTANT:  None. PREOPERATIVE DIAGNOSIS:  Pilonidal cyst.    POSTOPERATIVE DIAGNOSIS:  Pilonidal cyst.    PROCEDURE:  Excision of pilonidal cyst with Karydakis flap closure. ANESTHESIA:  General.    ESTIMATED BLOOD LOSS:  5 mL. FLUIDS:  800 mL of crystalloid. COMPLICATIONS:  None. SPECIMEN:  Excised tissue with pilonidal cyst.    INDICATIONS FOR PROCEDURE: The patient is a 43-year-old gentleman who  was seen by Dr. Bina Gregorio in the General Surgery office for pilonidal cyst.  At that time, the patient reported that he had had problems for at least  1 year and would occasionally have flare-up in this area with drainage. He was evaluated in the office and found to have pilonidal cyst and was  offered surgical excision. The patient did wish to proceed and at that  point, the surgery was explained to the patient and consent was  obtained. Unfortunately, due to unforeseen circumstances, Dr. Bina Gregorio  was not able to complete the case and it was rescheduled with me. After  evaluation of the patient in the preoperative area, he was noted to have  a small pilonidal cyst with no evidence of tracking. We again discussed  the surgical procedure and I also explained to him the risk of the  procedure and consent was obtained. DESCRIPTION OF PROCEDURE:  The patient was brought to the operative  suite, kept on preoperative gurney and placed in supine position. Monitoring devices and SCD cuffs were placed. General endotracheal  anesthesia was induced. After induction of anesthesia, the patient was  transferred to the operative bed in prone position. The patient's  buttocks were taped to retract tissue away from the midline and the area  was then prepped and draped in a sterile fashion. There was a pilonidal  sinus in the midline of the benedicto fold and attempts were made to probe  this with lacrimal probes. It did seem that perhaps it tracked slightly  to the left side. For this reason, I decided to proceed with a  flap-type closure with excision of the majority of tissue on the left  side. Skin marker was used to lupe out an elliptical incision that  favored the left side. The region was then anesthetized with local  anesthetic. Incision was made with 15 blade and dissection was carried  down through and into the subcutaneous tissue using electrocautery. Once we had the incision completed circumferentially, we began to  dissect deeper using electrocautery and the tissue was amputated from  the underlying structures. Once we had opened, there was evidence of a  cyst that seemed to be more left sided and we ensured that we included  all of this cyst in the specimen. Eventually we were able to dissect it  free as well as the surrounding tissue from the specimen and eventually  once it was divided free, it was passed off as the surgical specimen. Hemostasis was obtained within the wound bed using minimal  electrocautery. The wound was then closed in multiple layers first  using 3-0 Vicryl to reapproximate the deep tissues and bites of the  presacral fascia were included to occlude any dead space. Once the  deepest layer sutures were in, a second layer of 3-0 Vicryls were used  to approximate the more superficial subcutaneous tissue. In addition to  this, we used some of these to reapproximate deep dermal layer. A 4-0  Monocryl was then used to close the skin edges in a running subcuticular  fashion.   Once the wound was closed, additional anesthetic was injected  circumferentially around the wound. The skin was then cleansed and  dried and bacitracin was placed across the incision with fluffs, ABD,  and surgical briefs placed to hold dressing in place. At the conclusion  of the procedure, all sponge, instrument, and needle counts were  correct. The patient was awoken from anesthesia and taken to the PACU  in stable condition.         Eden Urbina    D: 07/23/2020 11:41:16       T: 07/23/2020 11:49:34     [email or web address]  Job#: [id number]     Doc#: [id number]    CC:  Primary Care

## 2020-07-23 NOTE — ANESTHESIA PRE PROCEDURE
Department of Anesthesiology  Preprocedure Note       Name:  Zahra Beauchamp   Age:  35 y.o.  :  1980                                          MRN:  [id number]         Date:  2020      Surgeon: Shante Blanc):  Maria E Pacheco DO    Procedure: Procedure(s):  PILONIDAL CYSTECTOMY    Medications prior to admission:   Prior to Admission medications    Medication Sig Start Date End Date Taking? Authorizing Provider   divalproex (DEPAKOTE) 250 MG DR tablet take 3 tablets by mouth twice a day 88   Bernard Iglesias, MD   lacosamide (VIMPAT) 100 MG TABS tablet Take 1 tablet by mouth 2 times daily for 30 days. 20  Bernard Iglesias, MD   SUMAtriptan (IMITREX) 50 MG tablet take 1 tablet by mouth once daily if needed for MIGRAINE    Bernard Loss, MD   promethazine (PHENERGAN) 25 MG tablet take 1 tablet by mouth every 8 hours if needed for nausea    Bernard Kelsie, MD   cyclobenzaprine (FLEXERIL) 10 MG tablet take 1 tablet by mouth twice a day if needed 19   Historical Provider, MD   naproxen (NAPROSYN) 500 MG tablet take 1 tablet by mouth twice a day with food 19   Historical Provider, MD   acetaminophen (TYLENOL) 500 MG tablet Take 500 mg by mouth every 4 hours as needed for Pain    Historical Provider, MD       Current medications:    No current facility-administered medications for this encounter.         Allergies:  No Known Allergies    Problem List:    Patient Active Problem List   Diagnosis Code    Low back pain M54.5    Migraine G43.909    Anxiety, mild F41.9    Disturbance, sleep G47.9    Tobacco use Z72.0    GERD (gastroesophageal reflux disease) K21.9    Hiatal hernia K44.9    Head ache R51    Status migrainosus G43.901    Dizziness R42    Depression F32.9    H/O fall Z91.81    Head injury S09.90XA    Seizure disorder (HCC) G40.909    Astigmatism H52.209    Rectal bleed K62.5    Cervical radiculopathy M54.12    Ventricular premature beats I49.3    Nausea and vomiting R11.2    Colon polyps K63.5    TIA (transient ischemic attack) G45.9    VBI (vertebrobasilar insufficiency) G45.0       Past Medical History:        Diagnosis Date    Anxiety, mild     Colon polyps     Depression     Dizziness     Eczema     Epilepsy (HCC)     GERD (gastroesophageal reflux disease)     H/O fall     Head ache     Head injury     Hiatal hernia     Inguinal hernia     Right.  Low back pain     Lumbar sprain     Migraine     Plantar fasciitis, bilateral     Seizure disorder ([address]     Sigmoid diverticulosis     Sleep difficulties     Status migrainosus     TIA (transient ischemic attack) 09/2017    Tobacco use     Chronic. Past Surgical History:        Procedure Laterality Date    COLONOSCOPY  04/29/2011    Internal hemorrhoids, possible anal fissure, few scattered diverticula.  COLONOSCOPY  06/04/2010    Mild sigmoid diverticulosis.  COLONOSCOPY  08/28/2009    Sigmoid diverticulosis, internal hemorrhoids.  COLONOSCOPY  05/27/2008    Internal hemorrhoids.  COLONOSCOPY  11/19/2014    polyp in rectum    COLONOSCOPY  6/1/16    sigmoid diverticulosis, colon polyp, internal hemorrhoids    COLONOSCOPY  04/19/2019    [id number]    GASTRIC FUNDOPLICATION  50/98/4749    HERNIA REPAIR Left 11/30/2017    Left Inguinal Hernia Repair w/ Mesh performed by Guerda Andre MD at [facility] Right 05/30/2013    INGUINAL HERNIA REPAIR Left 01/24/2013    INGUINAL HERNIA REPAIR Right 09/24/2015    KNEE ARTHROSCOPY Left     UPPER GASTROINTESTINAL ENDOSCOPY  03/05/2010    Recurrent hiatal hernia.     UPPER GASTROINTESTINAL ENDOSCOPY  6/1/16    S/P brenda fundoplication, good repair, retained gastric fluid    VASECTOMY         Social History:    Social History     Tobacco Use    Smoking status: Current Every Day Smoker     Packs/day: 1.00     Years: 20.00     Pack years: 20.00     Types: Cigarettes     Last attempt to quit: 2016     Years since quittin.2    Smokeless tobacco: Never Used    Tobacco comment: 2-3 weekly   Substance Use Topics    Alcohol use: No     Alcohol/week: 0.0 standard drinks     Comment: rarely                                Ready to quit: Not Answered  Counseling given: Not Answered  Comment: 2-3 weekly      Vital Signs (Current): There were no vitals filed for this visit. BP Readings from Last 3 Encounters:   20 120/74   20 116/80   19 120/64       NPO Status:                                                                                 BMI:   Wt Readings from Last 3 Encounters:   20 180 lb (81.6 kg)   20 185 lb 12.8 oz (84.3 kg)   19 186 lb 6.4 oz (84.6 kg)     There is no height or weight on file to calculate BMI.    CBC:   Lab Results   Component Value Date    WBC 8.6 2020    RBC 4.55 2020    HGB 15.8 2020    HCT 44.2 2020    MCV 97.1 2020    RDW 12.4 2020     2020       CMP:   Lab Results   Component Value Date     2020    K 4.3 2020     2020    CO2 23 2020    BUN 15 2019    CREATININE 1.26 2019    GFRAA >60 2019    LABGLOM >60 2019    GLUCOSE 139 2019    PROT 7.8 2019    CALCIUM 9.8 2019    BILITOT 0.34 2019    ALKPHOS 75 2019    AST 16 2020    ALT 17 2020       POC Tests: No results for input(s): POCGLU, POCNA, POCK, POCCL, POCBUN, POCHEMO, POCHCT in the last 72 hours.     Coags:   Lab Results   Component Value Date    PROTIME 10.7 2015    INR 1.0 2015    APTT 26.2 2015       HCG (If Applicable): No results found for: PREGTESTUR, PREGSERUM, HCG, HCGQUANT     ABGs: No results found for: PHART, PO2ART, LUE4QRX, HOU0YOV, BEART, I6MSLYWK     Type & Screen (If Applicable):  No results found for: LABABO, [address]    Drug/Infectious Status (If Applicable):  No results found for: HIV, HEPCAB    COVID-19 Screening (If Applicable):   Lab Results   Component Value Date    COVID19 Not Detected 07/20/2020         Anesthesia Evaluation  Patient summary reviewed and Nursing notes reviewed no history of anesthetic complications:   Airway: Mallampati: II  TM distance: >3 FB   Neck ROM: full  Mouth opening: > = 3 FB Dental: normal exam         Pulmonary:Negative Pulmonary ROS and normal exam  breath sounds clear to auscultation  (+) current smoker          Patient smoked on day of surgery. Cardiovascular:Negative CV ROS  Exercise tolerance: good (>4 METS),           Rhythm: regular  Rate: normal           Beta Blocker:  Not on Beta Blocker         Neuro/Psych:   (+) seizures: well controlled, neuromuscular disease:, TIA, headaches: migraine headaches, psychiatric history: stable with treatmentdepression/anxiety             GI/Hepatic/Renal:   (+) hiatal hernia, GERD: well controlled,           Endo/Other:    (+) : arthritis: OA., . Pt had no PAT visit       Abdominal:       Abdomen: soft. Vascular: negative vascular ROS. Anesthesia Plan      general     ASA 2       Induction: intravenous. MIPS: Postoperative opioids intended and Prophylactic antiemetics administered. Anesthetic plan and risks discussed with patient. Plan discussed with attending, surgical team and CRNA.                   CLAUDIA Gudino - CRNA   7/23/2020

## 2020-07-23 NOTE — H&P
Name:  Grace Meléndez  Age:  35 y.o.   :  1980     Physician: Matt Servin MD         Chief Complaint: Pilonidal Disease        HPI:  Patient has been having intermittent problems with pain and swelling in his  cleft over the past year. He has never had drainage. The pain and swelling last for a day or two then subsides.           MEDICAL HISTORY:     Past Medical History:    Past Medical History             Diagnosis Date    Anxiety, mild      Colon polyps      Depression      Dizziness      Eczema      Epilepsy (HCC)      GERD (gastroesophageal reflux disease)      H/O fall      Head ache      Head injury      Hiatal hernia      Inguinal hernia       Right.  Low back pain      Lumbar sprain      Migraine      Plantar fasciitis, bilateral      Seizure disorder (HCC)      Sigmoid diverticulosis      Sleep difficulties      Status migrainosus      TIA (transient ischemic attack) 2017    Tobacco use       Chronic.           Past Surgical History:    Past Surgical History             Procedure Laterality Date    COLONOSCOPY   2011     Internal hemorrhoids, possible anal fissure, few scattered diverticula.  COLONOSCOPY   2010     Mild sigmoid diverticulosis.  COLONOSCOPY   2009     Sigmoid diverticulosis, internal hemorrhoids.  COLONOSCOPY   2008     Internal hemorrhoids.     COLONOSCOPY   2014     polyp in rectum    COLONOSCOPY   16     sigmoid diverticulosis, colon polyp, internal hemorrhoids    COLONOSCOPY   2019     [id number]-dbp    GASTRIC FUNDOPLICATION       HERNIA REPAIR Left 2017     Left Inguinal Hernia Repair w/ Mesh performed by Sammy Isbell MD at [facility] Right 2013    INGUINAL HERNIA REPAIR Left 2013    INGUINAL HERNIA REPAIR Right 2015    KNEE ARTHROSCOPY Left      UPPER GASTROINTESTINAL ENDOSCOPY   2010     Recurrent hiatal hernia.  UPPER GASTROINTESTINAL ENDOSCOPY   6/1/16     S/P brenda fundoplication, good repair, retained gastric fluid    VASECTOMY               Home Medications           Prior to Admission medications    Medication Sig Start Date End Date Taking? Authorizing Provider   divalproex (DEPAKOTE) 250 MG DR tablet take 3 tablets by mouth twice a day 4/60/08     Edgardo Tapia MD   lacosamide (VIMPAT) 100 MG TABS tablet Take 1 tablet by mouth 2 times daily for 30 days. 6/12/20 8/72/13   Edgardo Tapia MD   SUMAtriptan (IMITREX) 50 MG tablet take 1 tablet by mouth once daily if needed for MIGRAINE 3/19/72     Edgardo Tapia MD   promethazine (PHENERGAN) 25 MG tablet take 1 tablet by mouth every 8 hours if needed for nausea 1/47/84     Edgardo Tapia MD   cyclobenzaprine (FLEXERIL) 10 MG tablet take 1 tablet by mouth twice a day if needed 6/2/19     Historical Provider, MD   naproxen (NAPROSYN) 500 MG tablet take 1 tablet by mouth twice a day with food 6/2/19     Historical Provider, MD   acetaminophen (TYLENOL) 500 MG tablet Take 500 mg by mouth every 4 hours as needed for Pain       Historical Provider, MD           No Known Allergies      reports that he has been smoking cigarettes. He has a 20.00 pack-year smoking history.  He has never used smokeless tobacco.  reports no history of alcohol use.     Family History         Family History   Problem Relation Age of Onset    COPD Mother      High Blood Pressure Mother      Cancer Father           Hodgekin's Lymphoma    High Blood Pressure Father      Eczema Sister      Alzheimer's Disease Maternal Grandmother      COPD Maternal Grandmother      Cancer Maternal Grandmother      Cancer Paternal Grandmother      Cancer Paternal Grandfather      Cancer Sister           colon cancer    High Blood Pressure Sister      Arthritis Sister      Asthma Daughter      Eczema Daughter      Glaucoma Neg Hx      Diabetes Neg Hx      he does not have to worry about it anymore       Electronically signed by Ana Laura Belcher MD on 7/14/2020 at 8:40 AM      The patient was interviewed and examined and there have been no changes since the above History and Physical.  Procedure is discussed with patient and consent is obtained.     Electronically signed by Yaz Sierra DO on 7/22/2020 at 9:27 PM

## 2020-07-23 NOTE — ANESTHESIA POSTPROCEDURE EVALUATION
Department of Anesthesiology  Postprocedure Note    Patient: Zahra Beauchamp  MRN: [id number]  YOB: 1980  Date of evaluation: 7/23/2020  Time:  12:06 PM     Procedure Summary     Date:  07/23/20 Room / Location:  [address] [place] [address]    Anesthesia Start:  4868 Anesthesia Stop:  0382    Procedure:  PILONIDAL CYSTECTOMY (N/A ) Diagnosis:  (pilonidal cyst)    Surgeon:  Maria E Pacheco DO Responsible Provider:  CLAUDIA Gray CRNA    Anesthesia Type:  general ASA Status:  2          Anesthesia Type: general    Denver Phase I: Denver Score: 5    Denver Phase II:      Last vitals: Reviewed and per EMR flowsheets.        Anesthesia Post Evaluation    Patient location during evaluation: PACU  Patient participation: complete - patient participated  Level of consciousness: awake, awake and alert and responsive to light touch  Pain score: 3  Airway patency: patent  Nausea & Vomiting: no nausea and no vomiting  Complications: no  Cardiovascular status: blood pressure returned to baseline, tachycardic and hypertensive  Respiratory status: acceptable, nonlabored ventilation, nasal cannula and spontaneous ventilation  Hydration status: euvolemic

## 2020-07-23 NOTE — BRIEF OP NOTE
Brief Postoperative Note      Patient: John Barton  YOB: 1980  MRN: [id number]    Date of Procedure: 7/23/2020    Pre-Op Diagnosis: pilonidal cyst    Post-Op Diagnosis: Same       Procedure(s):  PILONIDAL CYSTECTOMY    Surgeon(s):  Ky Ye DO    Assistant:  * No surgical staff found *    Anesthesia: General    Estimated Blood Loss (mL): less than 50     Complications: None    Specimens:   ID Type Source Tests Collected by Time Destination   A : pilonidal cyst Tissue Coccyx SURGICAL PATHOLOGY Ky Ye DO 7/23/2020 1108        Implants:  * No implants in log *      Drains: * No LDAs found *    Findings: Pilonidal cyst     Electronically signed by Ky Ye DO on 7/23/2020 at 11:34 AM

## 2020-07-27 LAB — SURGICAL PATHOLOGY REPORT: NORMAL

## 2020-07-31 ENCOUNTER — OFFICE VISIT (OUTPATIENT)
Dept: SURGERY | Age: 40
End: 2020-07-31
Payer: MEDICARE

## 2020-07-31 VITALS
BODY MASS INDEX: 27.97 KG/M2 | WEIGHT: 174 LBS | HEIGHT: 66 IN | TEMPERATURE: 99 F | DIASTOLIC BLOOD PRESSURE: 74 MMHG | HEART RATE: 88 BPM | SYSTOLIC BLOOD PRESSURE: 118 MMHG

## 2020-07-31 PROCEDURE — 99024 POSTOP FOLLOW-UP VISIT: CPT | Performed by: SURGERY

## 2020-07-31 PROCEDURE — 99211 OFF/OP EST MAY X REQ PHY/QHP: CPT

## 2020-07-31 NOTE — PATIENT INSTRUCTIONS
Discontinue previous dressing. Cleanse wound with normal saline or in the shower if allowed. Pat dry. Cut silver alginate to fit wound, pack lightly. (Alginate will swell as it absorbs drainage.) Cover with dry gauze. Hold with tape, roll gauze or coban. Change dressing every day. SIGNS OF INFECTION  - Redness, swelling, skin hot  - Wound bed turns black or stringy yellow  - Foul odor  - Increased drainage or pus  - Increased pain  - Fever greater than 100F    CALL YOUR DOCTOR OR SEEK MEDICAL ATTENTION IF SIGNS OF INFECTION.   DO NOT WAIT UNTIL YOUR NEXT APPOINTMENT    Call the Wound Care Nurse with any other questions or concerns- [phone]    Follow up in 2 weeks
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
## 2020-08-03 ENCOUNTER — OFFICE VISIT (OUTPATIENT)
Dept: PRIMARY CARE CLINIC | Age: 40
End: 2020-08-03
Payer: MEDICARE

## 2020-08-03 ENCOUNTER — TELEPHONE (OUTPATIENT)
Dept: SURGERY | Age: 40
End: 2020-08-03

## 2020-08-03 VITALS
SYSTOLIC BLOOD PRESSURE: 126 MMHG | HEART RATE: 74 BPM | WEIGHT: 178 LBS | BODY MASS INDEX: 28.74 KG/M2 | DIASTOLIC BLOOD PRESSURE: 74 MMHG | TEMPERATURE: 98.2 F | OXYGEN SATURATION: 98 %

## 2020-08-03 PROCEDURE — G8417 CALC BMI ABV UP PARAM F/U: HCPCS | Performed by: PHYSICIAN ASSISTANT

## 2020-08-03 PROCEDURE — G8427 DOCREV CUR MEDS BY ELIG CLIN: HCPCS | Performed by: PHYSICIAN ASSISTANT

## 2020-08-03 PROCEDURE — 99213 OFFICE O/P EST LOW 20 MIN: CPT | Performed by: PHYSICIAN ASSISTANT

## 2020-08-03 PROCEDURE — 99212 OFFICE O/P EST SF 10 MIN: CPT | Performed by: PHYSICIAN ASSISTANT

## 2020-08-03 PROCEDURE — 4004F PT TOBACCO SCREEN RCVD TLK: CPT | Performed by: PHYSICIAN ASSISTANT

## 2020-08-03 RX ORDER — HYDROCODONE BITARTRATE AND ACETAMINOPHEN 5; 325 MG/1; MG/1
1 TABLET ORAL EVERY 6 HOURS PRN
Qty: 6 TABLET | Refills: 0 | Status: SHIPPED | OUTPATIENT
Start: 2020-08-03 | End: 2020-08-05

## 2020-08-03 ASSESSMENT — PATIENT HEALTH QUESTIONNAIRE - PHQ9
SUM OF ALL RESPONSES TO PHQ QUESTIONS 1-9: 0
SUM OF ALL RESPONSES TO PHQ QUESTIONS 1-9: 0
SUM OF ALL RESPONSES TO PHQ9 QUESTIONS 1 & 2: 0
2. FEELING DOWN, DEPRESSED OR HOPELESS: 0
1. LITTLE INTEREST OR PLEASURE IN DOING THINGS: 0

## 2020-08-03 ASSESSMENT — ENCOUNTER SYMPTOMS: RESPIRATORY NEGATIVE: 1

## 2020-08-03 NOTE — PROGRESS NOTES
Subjective:      Patient ID: Danica Palomo is a 35 y.o. male. Wound Check   He was originally treated 10 to 14 days ago. The maximum temperature noted was less than 100.4 F. There has been colored discharge from the wound. The pain has worsened. Patient had pilonidal cystectomy 7/23/2020. He fell one week after the surgery which resulted in opening of wound. He says that his pain is worsening. He is taking Ibuprofen. He contacted his surgeon today but he was out of office. He is packing the wound every other day. Review of Systems   Constitutional: Negative. HENT: Negative. Respiratory: Negative. Cardiovascular: Negative. Skin: Positive for wound. Objective:   Physical Exam  HENT:      Head: Normocephalic. Cardiovascular:      Rate and Rhythm: Normal rate. Pulmonary:      Effort: Pulmonary effort is normal.      Breath sounds: Normal breath sounds. Skin:     General: Skin is warm. Comments: Packed pilonidal cyst wound without erythema or active drainage   Neurological:      General: No focal deficit present. Mental Status: He is alert and oriented to person, place, and time. Psychiatric:         Mood and Affect: Mood normal.         Assessment:      1. S/P surgical removal of pilonidal cyst    2. Post-op pain          Plan:      Wound evaluated without evidence of erythema or drainage. Continue packing as advised by wound care. [place] rx provided for patient. Contact surgeon's office tomorrow to schedule follow-up this week.         MANUELITO Martinez

## 2020-08-03 NOTE — TELEPHONE ENCOUNTER
Patient is calling for a refill of Norco 5-325 mg. He states he is still having constant 5/10 pain with some 7-8/10 sharp pains occassionally. He uses [facility] in [place], [place].  Please call him back at [phone]

## 2020-08-04 NOTE — TELEPHONE ENCOUNTER
Patient went to  last night and they gave him Norco. They advised him to come [facility]rem[facility] V[facility]quez wound care nurse to look at it.

## 2020-08-04 NOTE — TELEPHONE ENCOUNTER
Pt should use OTC medications (ibuprofen, tylenol, etc) for pain control according to label dosing. If pain continues to worsen he should be seen in ER.

## 2020-08-14 ENCOUNTER — OFFICE VISIT (OUTPATIENT)
Dept: SURGERY | Age: 40
End: 2020-08-14
Payer: MEDICARE

## 2020-08-14 VITALS
RESPIRATION RATE: 16 BRPM | HEART RATE: 65 BPM | TEMPERATURE: 97.5 F | BODY MASS INDEX: 28.61 KG/M2 | SYSTOLIC BLOOD PRESSURE: 115 MMHG | WEIGHT: 178 LBS | HEIGHT: 66 IN | DIASTOLIC BLOOD PRESSURE: 70 MMHG

## 2020-08-14 PROCEDURE — 17250 CHEM CAUT OF GRANLTJ TISSUE: CPT | Performed by: SURGERY

## 2020-08-14 NOTE — PATIENT INSTRUCTIONS
Discontinue previous dressing. Apply Collagen to your wound bed every other day or as your doctor orders. Cut or tear the material to fit the wound and lay it on the wound. Moisten with normal saline until it turns gel-like unless you have heavy drainage. Cover with dry gauze and hold in place with tape or roll gauze. Change your dressing whenever drainage comes through the outer dressing even if it is sooner than the time prescribed. SIGNS OF INFECTION  - Redness, swelling, skin hot  - Wound bed turns black or stringy yellow  - Foul odor  - Increased drainage or pus  - Increased pain  - Fever greater than 100F    CALL YOUR DOCTOR OR SEEK MEDICAL ATTENTION IF SIGNS OF INFECTION.   DO NOT WAIT UNTIL YOUR NEXT APPOINTMENT    Call the Wound Care Nurse with any other questions or concerns- [phone]    Follow up with Dr. Mayra Gaxiola 8/28/2020 at 1:40pm.

## 2020-08-14 NOTE — PROGRESS NOTES
Subjective   Mathieu Melendrez is a 35 y.o. male who presents today for recheck of pilonidal wound. Briefly the patient had excision and was doing well but then fell onto the surgical incision and opened at the inferior aspect. We did start silver alginate dressings 1 to 2 weeks ago and he has been doing that every other day. Still having some tenderness at the area. No significant drainage. No other complaints today. Past Medical History:   Diagnosis Date    Anxiety, mild     Colon polyps     Depression     Dizziness     Eczema     Epilepsy ([address]     GERD (gastroesophageal reflux disease)     H/O fall     Head ache     Head injury     Hiatal hernia     Inguinal hernia     Right.  Low back pain     Lumbar sprain     Migraine     Plantar fasciitis, bilateral     Seizure disorder ([address]     Sigmoid diverticulosis     Sleep difficulties     Status migrainosus     TIA (transient ischemic attack) 09/2017    Tobacco use     Chronic. Past Surgical History:   Procedure Laterality Date    COLONOSCOPY  04/29/2011    Internal hemorrhoids, possible anal fissure, few scattered diverticula.  COLONOSCOPY  06/04/2010    Mild sigmoid diverticulosis.  COLONOSCOPY  08/28/2009    Sigmoid diverticulosis, internal hemorrhoids.  COLONOSCOPY  05/27/2008    Internal hemorrhoids.     COLONOSCOPY  11/19/2014    polyp in rectum    COLONOSCOPY  6/1/16    sigmoid diverticulosis, colon polyp, internal hemorrhoids    COLONOSCOPY  04/19/2019    [id number]    GASTRIC FUNDOPLICATION  85/84/6359    HERNIA REPAIR Left 11/30/2017    Left Inguinal Hernia Repair w/ Mesh performed by Starr Funes MD at [facility] Right 05/30/2013    INGUINAL HERNIA REPAIR Left 01/24/2013    INGUINAL HERNIA REPAIR Right 09/24/2015    KNEE ARTHROSCOPY Left     PILONIDAL CYST EXCISION N/A 7/23/2020    PILONIDAL CYSTECTOMY performed by Rhoda Cummings DO at [address] [place] [address] GASTROINTESTINAL ENDOSCOPY  03/05/2010    Recurrent hiatal hernia.  UPPER GASTROINTESTINAL ENDOSCOPY  6/1/16    S/P brenda fundoplication, good repair, retained gastric fluid    VASECTOMY         Current Outpatient Medications   Medication Sig Dispense Refill    docusate sodium (COLACE) 100 MG capsule Take 1 capsule by mouth 2 times daily as needed for Constipation 30 capsule 0    divalproex (DEPAKOTE) 250 MG DR tablet take 3 tablets by mouth twice a day 180 tablet 5    SUMAtriptan (IMITREX) 50 MG tablet take 1 tablet by mouth once daily if needed for MIGRAINE 30 tablet 2    promethazine (PHENERGAN) 25 MG tablet take 1 tablet by mouth every 8 hours if needed for nausea 60 tablet 2    acetaminophen (TYLENOL) 500 MG tablet Take 500 mg by mouth every 4 hours as needed for Pain      lacosamide (VIMPAT) 100 MG TABS tablet Take 1 tablet by mouth 2 times daily for 30 days. 60 tablet 2     No current facility-administered medications for this visit.         No Known Allergies    Family History   Problem Relation Age of Onset    COPD Mother     High Blood Pressure Mother    [facility] Cancer Father         Hodgekin's Lymphoma    High Blood Pressure Father     Eczema Sister     Alzheimer's Disease Maternal Grandmother     COPD Maternal Grandmother     Cancer Maternal Grandmother     Cancer Paternal Grandmother     Cancer Paternal Grandfather     Cancer Sister         colon cancer    High Blood Pressure Sister     Arthritis Sister     Asthma Daughter     Eczema Daughter     Glaucoma Neg Hx     Diabetes Neg Hx     Cataracts Neg Hx        Social History     Socioeconomic History    Marital status:      Spouse name: Not on file    Number of children: Not on file    Years of education: Not on file    Highest education level: Not on file   Occupational History    Not on file   Social Needs    Financial resource strain: Not on file    Food insecurity     Worry: Not on file     Inability: Not on file surrounding erythema no significant drainage noted. Extremity: negative  Neuro: CN II-XII grossly intact      Assessment     1. Status post excision of pilonidal cyst.  Has had wound complication due to trauma to the incision after surgery. Plan     1. At this point the wound is slightly larger and appears to have opened slightly more but the depth is actually less so I do think were getting some healing here. There is no signs of infection today. I am going to switch the patient to a collagen dressing to facilitate tissue growth. Patient will change every other day. He will keep the area clean and wash with soap and water. Follow-up in 2 weeks for recheck. 2.  I did apply silver nitrate to the wound base today to stimulate additional healing.     Electronically signed by Rivas Saldivar DO on 8/14/2020 at 1:49 PM      (Please note that portions of this note were completed with a voice recognition program.  Efforts were made to edit the dictations but occasionally words are mis-transcribed.)

## 2020-08-28 ENCOUNTER — HOSPITAL ENCOUNTER (OUTPATIENT)
Dept: WOUND CARE | Age: 40
Discharge: HOME | End: 2020-08-29
Payer: MEDICARE

## 2020-08-28 PROCEDURE — 99024 POSTOP FOLLOW-UP VISIT: CPT | Performed by: SURGERY

## 2020-08-28 NOTE — PROGRESS NOTES
Subjective   Tonie Agosto is a 35 y.o. male who presents today for follow-up of wound after pilonidal cyst excision. Patient reports that he has had decrease in pain and drainage since last being seen. Has been using the collagen but states over the past 24 to 48 hours he has not used it because wound is decreased in size to the point that he cannot keep it in the wound. Denies any other symptoms. Past Medical History:   Diagnosis Date    Anxiety, mild     Colon polyps     Depression     Dizziness     Eczema     Epilepsy ([address]     GERD (gastroesophageal reflux disease)     H/O fall     Head ache     Head injury     Hiatal hernia     Inguinal hernia     Right.  Low back pain     Lumbar sprain     Migraine     Plantar fasciitis, bilateral     Seizure disorder ([address]     Sigmoid diverticulosis     Sleep difficulties     Status migrainosus     TIA (transient ischemic attack) 09/2017    Tobacco use     Chronic. Past Surgical History:   Procedure Laterality Date    COLONOSCOPY  04/29/2011    Internal hemorrhoids, possible anal fissure, few scattered diverticula.  COLONOSCOPY  06/04/2010    Mild sigmoid diverticulosis.  COLONOSCOPY  08/28/2009    Sigmoid diverticulosis, internal hemorrhoids.  COLONOSCOPY  05/27/2008    Internal hemorrhoids.     COLONOSCOPY  11/19/2014    polyp in rectum    COLONOSCOPY  6/1/16    sigmoid diverticulosis, colon polyp, internal hemorrhoids    COLONOSCOPY  04/19/2019    [id number]    GASTRIC FUNDOPLICATION  06/47/6270    HERNIA REPAIR Left 11/30/2017    Left Inguinal Hernia Repair w/ Mesh performed by Caity Eduardo MD at [address] [facility] Right 05/30/2013    INGUINAL HERNIA REPAIR Left 01/24/2013    INGUINAL HERNIA REPAIR Right 09/24/2015    KNEE ARTHROSCOPY Left     PILONIDAL CYST EXCISION N/A 7/23/2020    PILONIDAL CYSTECTOMY performed by Meena Huerta DO at [email or web address] ENDOSCOPY  03/05/2010    Recurrent hiatal hernia.  UPPER GASTROINTESTINAL ENDOSCOPY  6/1/16    S/P brenda fundoplication, good repair, retained gastric fluid    VASECTOMY         Current Outpatient Medications   Medication Sig Dispense Refill    docusate sodium (COLACE) 100 MG capsule Take 1 capsule by mouth 2 times daily as needed for Constipation 30 capsule 0    divalproex (DEPAKOTE) 250 MG DR tablet take 3 tablets by mouth twice a day 180 tablet 5    SUMAtriptan (IMITREX) 50 MG tablet take 1 tablet by mouth once daily if needed for MIGRAINE 30 tablet 2    promethazine (PHENERGAN) 25 MG tablet take 1 tablet by mouth every 8 hours if needed for nausea 60 tablet 2    acetaminophen (TYLENOL) 500 MG tablet Take 500 mg by mouth every 4 hours as needed for Pain      lacosamide (VIMPAT) 100 MG TABS tablet Take 1 tablet by mouth 2 times daily for 30 days. 60 tablet 2     No current facility-administered medications for this encounter.         No Known Allergies    Family History   Problem Relation Age of Onset    COPD Mother     High Blood Pressure Mother    [facility] Cancer Father         Hodgekin's Lymphoma    High Blood Pressure Father     Eczema Sister     Alzheimer's Disease Maternal Grandmother     COPD Maternal Grandmother     Cancer Maternal Grandmother     Cancer Paternal Grandmother     Cancer Paternal Grandfather     Cancer Sister         colon cancer    High Blood Pressure Sister     Arthritis Sister     Asthma Daughter     Eczema Daughter     Glaucoma Neg Hx     Diabetes Neg Hx     Cataracts Neg Hx        Social History     Socioeconomic History    Marital status:      Spouse name: Not on file    Number of children: Not on file    Years of education: Not on file    Highest education level: Not on file   Occupational History    Not on file   Social Needs    Financial resource strain: Not on file    Food insecurity     Worry: Not on file     Inability: Not on file   [facility] Transportation needs     Medical: Not on file     Non-medical: Not on file   Tobacco Use    Smoking status: Current Every Day Smoker     Packs/day: 1.00     Years: 20.00     Pack years: 20.00     Types: Cigarettes     Last attempt to quit: 2016     Years since quittin.3    Smokeless tobacco: Never Used    Tobacco comment: 2-3 weekly   Substance and Sexual Activity    Alcohol use: No     Alcohol/week: 0.0 standard drinks     Comment: rarely    Drug use: No    Sexual activity: Not on file   Lifestyle    Physical activity     Days per week: Not on file     Minutes per session: Not on file    Stress: Not on file   Relationships    Social connections     Talks on phone: Not on file     Gets together: Not on file     Attends [employer] service: Not on file     Active member of club or organization: Not on file     Attends meetings of clubs or organizations: Not on file     Relationship status: Not on file    Intimate partner violence     Fear of current or ex partner: Not on file     Emotionally abused: Not on file     Physically abused: Not on file     Forced sexual activity: Not on file   Other Topics Concern    Not on file   Social History Narrative    Not on file       ROS:   Review of Systems - Negative except as noted in HPI      Objective   There were no vitals filed for this visit. General:in no apparent distress  Eyes: No gross abnormalities. Ears, Nose, Throat: hearing grossly normal bilaterally  Neck: neck supple and non tender without mass  Lungs: clear to auscultation without wheezes or rales   Heart: S1S2, no mumurs, RRR  Abdomen: soft, nontender, no HSM, no guarding, no rebound, no masses  Extremity: negative  Neuro: CN II-XII grossly intact  Wound:  Wound clean with granulation tissue in base does not probe to any significant depth vastly decreased in size since last seen.   Wound 20 pilonidal wound (Active)   Wound Non-Healing Surgical 20 1402   Dressing Status Old drainage 08/28/20 1402   Dressing Changed Changed/New 08/28/20 1402   Dressing/Treatment Collagen 08/28/20 1402   Wound Cleansed Rinsed/Irrigated with saline 08/28/20 1402   Dressing Change Due 08/29/20 08/28/20 1402   Wound Length (cm) 0.9 cm 08/28/20 1402   Wound Width (cm) 0.2 cm 08/28/20 1402   Wound Depth (cm) 0.3 cm 08/28/20 1402   Wound Surface Area (cm^2) 0.18 cm^2 08/28/20 1402   Change in Wound Size % (l*w) 57.14 08/28/20 1402   Wound Volume (cm^3) 0.05 cm^3 08/28/20 1402   Wound Healing % 87 08/28/20 1402   Post-Procedure Length (cm) 0.9 cm 08/28/20 1402   Post-Procedure Width (cm) 0.2 cm 08/28/20 1402   Post-Procedure Depth (cm) 0.5 cm 08/28/20 1402   Post-Procedure Surface Area (cm^2) 0.18 cm^2 08/28/20 1402   Post-Procedure Volume (cm^3) 0.09 cm^3 08/28/20 1402   Wound Assessment Red 08/28/20 1402   Drainage Amount Scant 08/28/20 1402   Drainage Description Serosanguinous 08/28/20 1402   Odor None 08/28/20 1402   Margins Defined edges 08/28/20 1402   Doris-wound Assessment Intact 08/28/20 1402   Non-staged Wound Description Full thickness 08/28/20 1402   Red%Wound Bed 100 08/28/20 1402   Culture Taken No 08/28/20 1402   Number of days: 28            Assessment     1. Open wound after previous pilonidal cyst excision. Wound opened after patient fell onto the surgical site. Plan     1. I am going to switch to a collagen powder as we are getting good results with a collagen dressing. Because the patient is having some difficulty keeping it in the wound we will switch to powder so that we can continue adequate wound care. 2.  To see the patient back in 2 weeks for recheck.     Electronically signed by Jessica Forrest DO on 8/28/2020 at 2:58 PM      (Please note that portions of this note were completed with a voice recognition program.  Efforts were made to edit the dictations but occasionally words are mis-transcribed.)

## 2020-09-11 ENCOUNTER — TELEPHONE (OUTPATIENT)
Dept: WOUND CARE | Age: 40
End: 2020-09-11

## 2020-09-11 NOTE — TELEPHONE ENCOUNTER
LM for patient to call clinic back to reschedule appointment with Dr. Luis Alberto Boateng. Vascular Surgery  Dr. Saez  [id number]    Patient w/ESRD s/p RIJ Permacath placement.    Awaiting bilateral upper extremity vein mapping -- pending results, will plan for OR Thursday 11/9/17 for AV fistula creation.    D/W Dr. Saez.    LUCIUS Villanueva, R3

## 2020-09-11 NOTE — TELEPHONE ENCOUNTER
Patient canceled wound care appointment t with Dr. Alok Oakley on 9/11/2020. Writer unable to book patient under wound care schedule. Please call patient with rescheduled appointment date and time at [phone].

## 2020-10-02 ENCOUNTER — TELEMEDICINE (OUTPATIENT)
Dept: NEUROLOGY | Age: 40
End: 2020-10-02
Payer: MEDICARE

## 2020-10-02 PROBLEM — R56.9 CONVULSIONS (HCC): Status: ACTIVE | Noted: 2020-10-02

## 2020-10-02 PROBLEM — G40.009 PARTIAL IDIOPATHIC EPILEPSY WITH SEIZURES OF LOCALIZED ONSET, NOT INTRACTABLE, WITHOUT STATUS EPILEPTICUS (HCC): Status: ACTIVE | Noted: 2020-10-02

## 2020-10-02 PROBLEM — G47.9 SLEEP DIFFICULTIES: Status: ACTIVE | Noted: 2020-10-02

## 2020-10-02 PROCEDURE — 4004F PT TOBACCO SCREEN RCVD TLK: CPT | Performed by: PSYCHIATRY & NEUROLOGY

## 2020-10-02 PROCEDURE — G8483 FLU IMM NO ADMIN DOC REA: HCPCS | Performed by: PSYCHIATRY & NEUROLOGY

## 2020-10-02 PROCEDURE — 99215 OFFICE O/P EST HI 40 MIN: CPT | Performed by: PSYCHIATRY & NEUROLOGY

## 2020-10-02 PROCEDURE — G8417 CALC BMI ABV UP PARAM F/U: HCPCS | Performed by: PSYCHIATRY & NEUROLOGY

## 2020-10-02 PROCEDURE — G8428 CUR MEDS NOT DOCUMENT: HCPCS | Performed by: PSYCHIATRY & NEUROLOGY

## 2020-10-02 RX ORDER — LACOSAMIDE 100 MG/1
100 TABLET ORAL 2 TIMES DAILY
Qty: 60 TABLET | Refills: 2 | Status: SHIPPED | OUTPATIENT
Start: 2020-10-02 | End: 2021-01-07 | Stop reason: SDUPTHER

## 2020-10-02 ASSESSMENT — ENCOUNTER SYMPTOMS
RHINORRHEA: 0
BACK PAIN: 0
EYE REDNESS: 0
EYE DISCHARGE: 0
COUGH: 0
BLURRED VISION: 0
TROUBLE SWALLOWING: 0
SWOLLEN GLANDS: 0
VOICE CHANGE: 0
BLOOD IN STOOL: 0
DIARRHEA: 0
EYE ITCHING: 0
CHANGE IN BOWEL HABIT: 0
SCALP TENDERNESS: 0
EYE PAIN: 0
EYE WATERING: 0
CHOKING: 0
VOMITING: 0
APNEA: 0
COLOR CHANGE: 0
VISUAL CHANGE: 0
ABDOMINAL DISTENTION: 0
SINUS PRESSURE: 0
FACIAL SWELLING: 0
PHOTOPHOBIA: 1
FACIAL SWEATING: 0
ABDOMINAL PAIN: 0
WHEEZING: 0
CONSTIPATION: 0
CHEST TIGHTNESS: 0
NAUSEA: 1
SORE THROAT: 0
SHORTNESS OF BREATH: 0

## 2020-10-02 NOTE — PROGRESS NOTES
cerebri, recent head traumas, sinus disease or TMJ. Migraine    This is a chronic problem. Episode onset: MORE  THAN   10  YEARS. The problem occurs intermittently. The problem has been waxing and waning. The pain is located in the right unilateral and frontal region. The pain does not radiate. The pain quality is similar to prior headaches. The quality of the pain is described as aching and throbbing. The pain is at a severity of 3/10. The pain is mild. Associated symptoms include insomnia, nausea, phonophobia, photophobia, seizures and tingling. Pertinent negatives include no abdominal pain, abnormal behavior, anorexia, back pain, blurred vision, coughing, dizziness, drainage, ear pain, eye pain, eye redness, eye watering, facial sweating, fever, hearing loss, loss of balance, muscle aches, neck pain, numbness, rhinorrhea, scalp tenderness, sinus pressure, sore throat, swollen glands, tinnitus, visual change, vomiting, weakness or weight loss. The symptoms are aggravated by unknown. He has tried triptans and NSAIDs (TOPAMAX) for the symptoms. The treatment provided moderate relief. His past medical history is significant for migraine headaches and migraines in the family. There is no history of cancer, cluster headaches, hypertension, immunosuppression, obesity, pseudotumor cerebri, recent head traumas, sinus disease or TMJ. History obtained from  The patient          and other  available medical records were  Also  reviewed.             1)   H/O   CHRONIC  SEVERE MIGRAINES                         FOR  MORE  THAN   10  YEARS                           -    STABLE            2)      MIGRAINES  AND TENSION  HEADACHE                        ARE BETTER  CONTROLLED                      -   ON  IMITREX,   PHENERGAN   AS  NEEDED               3)       H/O  SEIZURE  DISORDER /  EPILEPSY    SINCE      2014                               -  INTRACTABLE            4)     SEIZURE  CONTROL  WAS    BETTER ON  DEPAKOTE,  VIMPAT  AND  TOPAMAX          5)      INTOLERANCE  TO  KEPPRA                    DUE  TO  IRRITABILITY  AND MOOD PROBLEMS            6)  H/O   CHRONIC  ANXIETY  AND  DEPRESSION                    -     STABLE    ON  LEXAPRO                BEING  FOLLOWED  BY  [facility]               7)     H/O  PREVIOUS  MULTIPLE MILD  HEAD  INJURIES                             DUE  TO   FALLS            8)      H/O     CHRONIC    SLEEP  DIFFICULTIES                      AND  DISTURBED  SLEEP  WAKE  CYCLES                              -  STABLE                9)    PREVIOUS  H/O  DIZZY  EPISODES                          -  NO  RECURRENCE            10)   EMU  AT  [address]   IN  2014                  -  POSSIBLE  FRONTAL    FOCUS            11)   EMU   AT  [address] 7  TO  NOV. 11, 2016    SHOWED :              6  EPISODES  OF  PSYCHOGENIC  NON EPILEPTIC SPELLS. EEG  DURING  THE SPELLS  AND  INTERICTAL  PERIODS                         WAS REPORTED  TO BE  NORMAL. 12)    H/O    BRIEF  SEIZURES  AND  MEMORY  LOSS  INTERMITTENTLY                         -  PARTIAL  COMPLEX       SEIZURES                          DUE  TO  SLEEP  DEPRIVATION     AND  PROLONGED  TV   WATCHING                                         13)   EPISODES   OF  RIGHT  SIDED  NUMBNESS   AND  SPEECH  PROBLEMS. H/O  HOSPITALIZATION   FROM  SEPT. 2017     AT  [facility]                 D/D    TIA          -    HAD   WORK  UP  WITH  NO  SIGNIFICANT  ABNORMALITIES  .           15)  H/O  CARDIAC  ARRYTHMIA                         TO    FOLLOW  WITH    CARDOLOGY              15)   H/O    HAD  COGNITIVE  BEHAVIOR THERAPY  AT  [facility]  IN  THE  PAST                        PATIENT  TO  FOLLOW  UP   WITH  [facility]                        FOR   EVALUATIONS  OF                    HIS  ANXIETY,  DEPRESSION,    NON CERVICAL  SPINE  IN NOV. 2019                                 SHOWED  NO  SIGNIFICANT  ABNORMALITIES                                 VALPROIC  ACID  LEVEL  IN NOV. 2019       NORMAL  LIMITS                                      26)   PATIENT  WAS  ON  VIMPAT  IN     2017                            VIMPAT     100   MG  BID       RESTARTED  IN  NOV. 2019                                27)      EXPECTATIONS,   GOALS   OF  SEIZURE  MANAGEMENT                           AND  SIDE  EFFECTS  MEDICATIONS    WERE                                 REVIEWED     AND   DISCUSSED    IN    DETAIL. 28)            PATIENT  WIFE  ALSO   REQUESTED      FMLA    FOR  HER                                      AND  WAS  PROVIDED   IN    PAST              34)          PATIENT  DENIES  ANY   SEIZURE  RECURRENCE    SINCE    DEC.   2019                                 MIGRAINES  ARE  ALSO  WELL  CONTROLLED                                      PATIENT  DENIES  ANY  NEW  NEUROLOGICAL  CONCERNS. -                                     30)           VARIOUS  RISK   FACTORS   WERE  REVIEWED   AND   DISCUSSED. PATIENT   HAS  MULTIPLE   MEDICAL, MENTAL HEALTH                                    &      NEUROLOGICAL   PROBLEMS . PATIENT'S   MANAGEMENT  IS  CHALLENGING. The  Duration,  Quality,  Severity,  Location,  Timing,  Context,  Modifying  Factors   Of   The   Chief   Complaint       And  Present  Illness   Was   Reviewed   In   Chronological   [place].              Patient   Indicates   The  Presence   And  The  Absence  Of  The  Following  Associated  And   Additional  Neurological    Symptoms:                                Balance  And coordination problems  absent           Gait problems     absent            Headaches      present              Migraines           present           Memory problems Present             Confusion        absent            Paresthesia numbness          absent           Seizures  And  Starring  Episodes           PRESENT           Syncope,  Near  syncopal episodes         absent           Speech problems           absent             Swallowing  Problems      absent            Dizziness,  Light headedness           present                        Vertigo        absent             Generalized   Weakness    absent              focal  Weakness     absent             Tremors         absent              Sleep  Problems     present             History  Of   Recent   Head  Injury     absent             History  Of   Recent  TIA     absent             History  Of   Recent    Stroke     absent             Neck  Pain and  Neck muscle  Spasms  Absent               Radiating  down   And   Weakness           absent            Lower back   Pain  And     Spasms  Present              Radiating    Down   And   Weakness          absent                H/O   FALLS        absent               History  Of   Visual  Symptoms    Absent                  Associated   Diplopia       absent                                                                  Also   Additional   Symptoms   Present    As  Documented    In   The detailed      Review  Of  Systems   And    Please   Refer   To    Them for   Additional  Information.            INFORMATION   REVIEWED:      VITAL  SIGNS,  MEDICATIONS   LIST,   ALLERGIES AND  DRUG  INTOLERANCES,    MEDICAL   HISTORY,     SURGICAL   HISTORY,    FAMILY   HISTORY,  SOCIAL  HISTORY,    PROBLEM  LIST   FOR  PATIENT  CARE   COORDINATION        RECORDS   REVIEWED:    historical medical records, lab reports and office notes         Past Medical History:   Diagnosis Date    Anxiety, mild     Colon polyps     Depression     Dizziness     Eczema     Epilepsy ([facility] [address]     GERD (gastroesophageal reflux disease)     H/O fall     Head ache     Head injury     Hiatal hernia     Inguinal hernia     Right.  Low back pain     Lumbar sprain     Migraine     Plantar fasciitis, bilateral     Seizure disorder ([address]     Sigmoid diverticulosis     Sleep difficulties     Status migrainosus     TIA (transient ischemic attack) 09/2017    Tobacco use     Chronic. Past Surgical History:   Procedure Laterality Date    COLONOSCOPY  04/29/2011    Internal hemorrhoids, possible anal fissure, few scattered diverticula.  COLONOSCOPY  06/04/2010    Mild sigmoid diverticulosis.  COLONOSCOPY  08/28/2009    Sigmoid diverticulosis, internal hemorrhoids.  COLONOSCOPY  05/27/2008    Internal hemorrhoids.  COLONOSCOPY  11/19/2014    polyp in rectum    COLONOSCOPY  6/1/16    sigmoid diverticulosis, colon polyp, internal hemorrhoids    COLONOSCOPY  04/19/2019    [id number]    GASTRIC FUNDOPLICATION  12/25/8761    HERNIA REPAIR Left 11/30/2017    Left Inguinal Hernia Repair w/ Mesh performed by Yared Ram[facility]rez [facility] at [facility] Right 05/30/2013    INGUINAL HERNIA REPAIR Left 01/24/2013    INGUINAL HERNIA REPAIR Right 09/24/2015    KNEE ARTHROSCOPY Left     PILONIDAL CYST EXCISION N/A 7/23/2020    PILONIDAL CYSTECTOMY performed by Olu Nieto DO at [address] [place] [address]  03/05/2010    Recurrent hiatal hernia.  UPPER GASTROINTESTINAL ENDOSCOPY  6/1/16    S/P brenda fundoplication, good repair, retained gastric fluid    VASECTOMY                   Current Outpatient Medications   Medication Sig Dispense Refill    lacosamide (VIMPAT) 100 MG TABS tablet Take 1 tablet by mouth 2 times daily for 30 days.  60 tablet 2    docusate sodium (COLACE) 100 MG capsule Take 1 capsule by mouth 2 times daily as needed for Constipation 30 capsule 0    divalproex (DEPAKOTE) 250 MG DR tablet take 3 tablets by mouth twice a day 180 tablet 5    SUMAtriptan (IMITREX) 50 MG tablet take 1 tablet by mouth once daily if needed for MIGRAINE 30 tablet 2    promethazine (PHENERGAN) 25 MG tablet take 1 tablet by mouth every 8 hours if needed for nausea 60 tablet 2    acetaminophen (TYLENOL) 500 MG tablet Take 500 mg by mouth every 4 hours as needed for Pain       No current facility-administered medications for this visit.             No Known Allergies            Family History   Problem Relation Age of Onset    COPD Mother     High Blood Pressure Mother    [facility] Cancer Father         Hodgekin's Lymphoma    High Blood Pressure Father     Eczema Sister     Alzheimer's Disease Maternal Grandmother     COPD Maternal Grandmother     Cancer Maternal Grandmother     Cancer Paternal Grandmother     Cancer Paternal Grandfather     Cancer Sister         colon cancer    High Blood Pressure Sister     Arthritis Sister     Asthma Daughter     Eczema Daughter     Glaucoma Neg Hx     Diabetes Neg Hx     Cataracts Neg Hx              Social History     Socioeconomic History    Marital status:      Spouse name: Not on file    Number of children: Not on file    Years of education: Not on file    Highest education level: Not on file   Occupational History    Not on file   Social Needs    Financial resource strain: Not on file    Food insecurity     Worry: Not on file     Inability: Not on file    Transportation needs     Medical: Not on file     Non-medical: Not on file   Tobacco Use    Smoking status: Current Every Day Smoker     Packs/day: 1.00     Years: 20.00     Pack years: 20.00     Types: Cigarettes     Last attempt to quit: 2016     Years since quittin.4    Smokeless tobacco: Never Used    Tobacco comment: 2-3 weekly   Substance and Sexual Activity    Alcohol use: No     Alcohol/week: 0.0 standard drinks     Comment: rarely    Drug use: No    Sexual activity: Not on file   Lifestyle    Physical activity     Days per week: Not on file     Minutes per session: Not on file    Stress: Not on file Relationships    Social connections     Talks on phone: Not on file     Gets together: Not on file     Attends Yazdanism service: Not on file     Active member of club or organization: Not on file     Attends meetings of clubs or organizations: Not on file     Relationship status: Not on file    Intimate partner violence     Fear of current or ex partner: Not on file     Emotionally abused: Not on file     Physically abused: Not on file     Forced sexual activity: Not on file   Other Topics Concern    Not on file   Social History Narrative    Not on file         There were no vitals filed for this visit.       Wt Readings from Last 3 Encounters:   08/14/20 178 lb (80.7 kg)   08/03/20 178 lb (80.7 kg)   07/31/20 174 lb (78.9 kg)         BP Readings from Last 3 Encounters:   08/14/20 115/70   08/03/20 126/74   07/31/20 118/74       Hematology and Coagulation  Lab Results   Component Value Date    WBC 8.6 07/20/2020    RBC 4.55 07/20/2020    HGB 15.8 07/20/2020    HCT 44.2 07/20/2020    MCV 97.1 07/20/2020    MCH 34.7 07/20/2020    MCHC 35.7 07/20/2020    RDW 12.4 07/20/2020     07/20/2020    MPV 9.7 07/20/2020         Chemistries  Lab Results   Component Value Date     07/20/2020    K 4.3 07/20/2020     07/20/2020    CO2 23 07/20/2020    BUN 15 12/08/2019    CREATININE 1.26 12/08/2019    CALCIUM 9.8 12/08/2019    PROT 7.8 12/08/2019    LABALBU 4.8 12/08/2019    BILITOT 0.34 12/08/2019    ALKPHOS 75 12/08/2019    AST 16 07/20/2020    ALT 17 07/20/2020     Lab Results   Component Value Date    ALKPHOS 75 12/08/2019    ALT 17 07/20/2020    AST 16 07/20/2020    PROT 7.8 12/08/2019    BILITOT 0.34 12/08/2019    BILIDIR 0.11 12/08/2019    LABALBU 4.8 12/08/2019     Lab Results   Component Value Date    BUN 15 12/08/2019    CREATININE 1.26 12/08/2019     Lab Results   Component Value Date    CALCIUM 9.8 12/08/2019    MG 1.8 09/18/2013     Lab Results   Component Value Date    AST 16 07/20/2020    ALT 17 07/20/2020         Lab Results   Component Value Date    CKTOTAL 67 09/19/2013     Lab Results   Component Value Date    FLHNIESK34 590 01/25/2019         Drug Levels  Lab Results   Component Value Date    PHENYTOIN <0.8 09/28/2017    PHENYTOIN <0.8 04/04/2016    VALPROATE 34 07/20/2020    VALPROATE 68 11/15/2019           Review of Systems   Constitutional: Negative for appetite change, chills, diaphoresis, fatigue, fever, unexpected weight change and weight loss. HENT: Negative for congestion, dental problem, drooling, ear discharge, ear pain, facial swelling, hearing loss, mouth sores, nosebleeds, postnasal drip, rhinorrhea, sinus pressure, sore throat, tinnitus, trouble swallowing and voice change. Eyes: Positive for photophobia. Negative for blurred vision, pain, discharge, redness, itching and visual disturbance. Respiratory: Negative for apnea, cough, choking, chest tightness, shortness of breath and wheezing. Cardiovascular: Negative for chest pain, palpitations and leg swelling. Gastrointestinal: Positive for nausea. Negative for abdominal distention, abdominal pain, anorexia, blood in stool, change in bowel habit, constipation, diarrhea and vomiting. Endocrine: Negative for cold intolerance, heat intolerance, polydipsia, polyphagia and polyuria. Musculoskeletal: Positive for gait problem. Negative for arthralgias, back pain, joint swelling, myalgias, neck pain and neck stiffness. Skin: Negative for color change, pallor, rash and wound. Allergic/Immunologic: Negative for environmental allergies, food allergies and immunocompromised state. Neurological: Positive for tingling, seizures, light-headedness and headaches. Negative for dizziness, vertigo, tremors, syncope, facial asymmetry, speech difficulty, weakness, numbness and loss of balance. Hematological: Negative for adenopathy. Does not bruise/bleed easily.    Psychiatric/Behavioral: Positive for decreased concentration and sleep disturbance. Negative for agitation, behavioral problems, confusion, dysphoric mood, hallucinations, self-injury and suicidal ideas. The patient is nervous/anxious and has insomnia. The patient is not hyperactive. Objective:     -  LIMITED  DUE  TO  VIDEO  VISIT        NEUROLOGICAL EXAMINATION :                                                                     LIMITED  DUE  TO  VIDEO  VISIT        A) MENTAL STATUS:                   Alert and  oriented  To time, place  And  Person. No Aphasia. No  Dysarthria. Able   To  Follow  commands without   Any  Difficulty. No right  To left confusion. Normal  Speech  And language function. Insight and  Judgment ,Fund  Of  Knowledge  DECREASED                Recent  And  Remote memory  DECREASED                Attention &Concentration are  DECREASED                                                    B) CRANIAL NERVES :             2 CN : Visual  Acuity  And  Visual fields  within normal limits                      Fundi  Could  Not  Be  Could  Not  Be  Evaluated. 3,4,6 CN : Both  Pupils are   Reactive and  Equal.                          Extraocular   Movements  Are  Intact. No  Nystagmus. No  MOO. No  Afferent  Pupillary  Defect noted. 5 CN :  Normal  Facial sensations and Corneal  Reflexes         7 CN :  Normal  Facial  Symmetry  And  Strength. No facial  Weakness.          8 CN :  Hearing  Appears within normal limits        9, 10 CN: Normal spontaneous, reflex palate movements       11 CN:   Normal  Shoulder shrug and  strength       12 CN :   Normal  Tongue movements and  Tongue  In midline                      No tongue   Fasciculations or atrophy         C) MOTOR  EXAM:                      D) SENSORY :          E) REFLEXES:                                 F) COORDINATION  AND  GAIT :                            -- LIMITED  DUE  TO  VIDEO  VISIT          Assessment:     Patient Active Problem List   Diagnosis    Low back pain    Migraine    Anxiety, mild    Disturbance, sleep    Tobacco use    GERD (gastroesophageal reflux disease)    Hiatal hernia    Head ache    Status migrainosus    Dizziness    Depression    H/O fall    Head injury    Seizure disorder (HCC)    Astigmatism    Rectal bleed    Cervical radiculopathy    Ventricular premature beats    Nausea and vomiting    Colon polyps    TIA (transient ischemic attack)    VBI (vertebrobasilar insufficiency)    Pilonidal cyst    Wound dehiscence    Sleep difficulties    Partial idiopathic epilepsy with seizures of localized onset, not intractable, without status epilepticus ([address]    Convulsions ([address]           CT OF THE HEAD WITHOUT CONTRAST; CT OF THE CERVICAL SPINE WITHOUT CONTRAST   11/15/2019 10:21 am       TECHNIQUE:   CT of the head was performed without the administration of intravenous   contrast. Dose modulation, iterative reconstruction, and/or weight based   adjustment of the mA/kV was utilized to reduce the radiation dose to as low   as reasonably achievable.; CT of the cervical spine was performed without the   administration of intravenous contrast. Multiplanar reformatted images are   provided for review. Dose modulation, iterative reconstruction, and/or weight   based adjustment of the mA/kV was utilized to reduce the radiation dose to as   low as reasonably achievable.       COMPARISON:   01/25/2019       HISTORY:   ORDERING SYSTEM PROVIDED HISTORY: Status migrainosus   TECHNOLOGIST PROVIDED HISTORY:   migraine, seizures   Reason for Exam: Hx chronic migraines.  C/o increase headaches since recent   falls   Acuity: Acute   Type of Exam: Initial       FINDINGS:   BRAIN/VENTRICLES: There is no acute intracranial hemorrhage, mass effect or   midline shift.  No abnormal extra-axial fluid collection.  The gray-white   differentiation is MRI BRAIN COMBINED    Reason for Exam: \      FULL RESULT: MRI brain with and without intravenous contrast, 6/2/2014      History: Migraines      Technique: Multiplanar multisequence MR imaging of the brain was    performed before and after intravenous administration of 16 mL Magnevist.      Findings: No evidence for shift of midline structures, mass effect or    compression of the ventricles noted. No acute intra-or extra-axial    hemorrhage is seen. No abnormal intracranial fluid collections are    identified. The basal cisterns are patent. Posterior fossa structures demonstrate no    discrete mass. Few scattered foci of T2/FLAIR hyperintensity noted in the    periventricular and subcortical white matter which essentially are    nonspecific in imaging appearance however differential considerations    include demyelinating or inflammatory process, migraine induced changes,    chronic small vessel disease or vascular disease. No associated restricted diffusion or abnormal enhancing seen. A small    left occipital lobe developmental venous anomaly seen. No abnormal    enhancing intracranial mass seen. Visualized orbital contents appear unremarkable. Mild mucosal thickening of the ethmoid air cells noted. Skull base flow voids are patent. Superior sagittal sinus and straight sinus flow voids are patent. Heterogeneity of the T1-weighted marrow signal intensity seen. IMPRESSION:    1. No acute or subacute ischemic insult noted. No abnormal enhancing    intracranial mass seen. 2.scattered foci of T2/FLAIR hyperintensity noted in the periventricular    and subcortical white matter which essentially are nonspecific in imaging    appearance however differential considerations include demyelinating or    inflammatory process, migraine induced changes, chronic small vessel    disease or vascular disease.      Procedure: [facility] 10/08/2014 [id number] CT BRAIN WITHOUT CONTRAST Reason for Exam: \      FULL RESULT: EXAM: Brain CT without contrast dated 10/8/2014. HISTORY: Headache after head injury. COMPARISON: Brain CT dated 4/16/2014. TECHNIQUE: Multi-detector axial images are obtained through the head. Findings: There is no evidence for acute intracranial hemorrhage,    midline shift or mass effect. Ventricular and cistern spaces are normal    and symmetric. Geoff Spotted and white matter differentiation is well preserved. No intra- or extra-axial fluid collections or mass occupying lesions    seen. Bilateral cerebellopontine angles are normal. Visualized orbital    contents are normal. Study viewed in bone windows shows no    abnormalities. Mild left ethmoid sinusitis otherwise all visualized    sinuses are normally aerated. Bilateral temporomandibular joints are    normally aligned. IMPRESSION: No acute intracranial abnormalities. Mild left ethmoid    sinusitis. Plan:           VISITING DIAGNOSIS:      ICD-10-CM    1. Seizure disorder (HCC)  G40.909 lacosamide (VIMPAT) 100 MG TABS tablet   2. Anxiety, mild  F41.9 lacosamide (VIMPAT) 100 MG TABS tablet   3. Disturbance, sleep  G47.9 lacosamide (VIMPAT) 100 MG TABS tablet   4. Tobacco use  Z72.0 lacosamide (VIMPAT) 100 MG TABS tablet   5. Gastroesophageal reflux disease without esophagitis  K21.9 lacosamide (VIMPAT) 100 MG TABS tablet   6. Hiatal hernia  K44.9 lacosamide (VIMPAT) 100 MG TABS tablet   7. Lumbar sprain, subsequent encounter  S33. 5XXD lacosamide (VIMPAT) 100 MG TABS tablet   8. Nonintractable headache, unspecified chronicity pattern, unspecified headache type  R51.9 lacosamide (VIMPAT) 100 MG TABS tablet   9. Status migrainosus  G43.901 lacosamide (VIMPAT) 100 MG TABS tablet   10. Dizziness  R42 lacosamide (VIMPAT) 100 MG TABS tablet   11. Sleep difficulties  G47.9 lacosamide (VIMPAT) 100 MG TABS tablet   12.  Recurrent seizures (HCC)  G40.909 lacosamide (VIMPAT) 100 MG TABS tablet 13. Partial idiopathic epilepsy with seizures of localized onset, not intractable, without status epilepticus ([address]  G40.009 lacosamide (VIMPAT) 100 MG TABS tablet   14. Nonintractable paroxysmal hemicrania, unspecified chronicity pattern  G44.039 lacosamide (VIMPAT) 100 MG TABS tablet   15. Convulsions, unspecified convulsion type (HCC)  R56.9 lacosamide (VIMPAT) 100 MG TABS tablet   16. Chronic migraine without aura without status migrainosus, not intractable  G43.709 lacosamide (VIMPAT) 100 MG TABS tablet   17. Low back pain with sciatica, sciatica laterality unspecified, unspecified back pain laterality, unspecified chronicity  M54.40 lacosamide (VIMPAT) 100 MG TABS tablet   18. Dysthymia  F34.1 lacosamide (VIMPAT) 100 MG TABS tablet   19. H/O fall  Z91.81 lacosamide (VIMPAT) 100 MG TABS tablet   20. Injury of head, sequela  S09.90XS lacosamide (VIMPAT) 100 MG TABS tablet   21. Cervical radiculopathy  M54.12 lacosamide (VIMPAT) 100 MG TABS tablet   22. TIA (transient ischemic attack)  G45.9 lacosamide (VIMPAT) 100 MG TABS tablet   23. VBI (vertebrobasilar insufficiency)  G45.0 lacosamide (VIMPAT) 100 MG TABS tablet              CONCERNS   &   INCREASED   RISK   FOR        * TIA,  CEREBRO  VASCULAR  ISCHEMIA, STROKE       *  SEIZURE  ACTIVITY,  EPILEPSY ,        *  Poorly  Controlled Chronic  Headaches &  Migraines      *   COGNITIVE  &   MEMORY PROBLEMS  AND  DEMENTIAS                     VARIOUS  RISK   FACTORS   WERE  REVIEWED   AND   DISCUSSED. *  PATIENT   HAS  MULTIPLE   MEDICAL, MENTAL HEALTH         &      NEUROLOGICAL   PROBLEMS . PATIENT'S   MANAGEMENT  IS  CHALLENGING. PLAN:       Drew Loosen  Of  The  Diagnoses,  The  Management & Treatment  Options           AND    Care  plan  Were        Reviewed and   Discussed   With  patient. * Goals  And  Expectations  Of  The  Therapy  Discussed   And  Reviewed.        *   Benefits   And   Side  Effect  Profile  Of  Medication/s Were   Discussed             * Need   For  Further   Follow up For  The  Various  Problems  Were discussed. * Results  Of  The  Previous  Diagnostic tests were reviewed and questions answered. patient  understand the same. Medical  Decision  Making  Was  Made  Based on the   Complexity  Of  Above  Mentioned  Diagnoses,        Data reviewed   & diagnostic  Tests  Reviewed,  Risk  Of  Significant   Co morbidities and complicating   Factors. Medical  Decision  Was   High  Complexity  Due   To  The  Patient's  Multiple  Symptoms,  Advancing   Disease,      Complex  Treatment  Regimen,  Multiple medications and   Risk  Of   Side  Effects,  Difficulty  In  Medication  Management      And  Diagnostic  Challenges   In  View  Of  The  Associated   Co  Morbid  Conditions   And  Problems. * FALL   PRECAUTIONS. THESE  REVIEWED   AND  DISCUSSED      *   ABSOLUTELY   NO  DRIVING    -       REVIEWED     WITH  PATIENT  AND  HIS  WIFE      *   BE  CAREFUL  WITH  ACTIVITIES             *   ADEQUATE   FLUID  INTAKE   AND  ELECTROLYTE  BALANCE             * KEEP  DAIRY  OF   THE  NEUROLOGICAL  SYMPTOMS        RECORDING THE    DURATION  AND  FREQUENCY. *  AVOID    CONDITIONS  AND  FACTORS   THAT  MAKE   NEUROLOGICAL  SYMPTOMS  WORSE. *   SEIZURE  PRECAUTIONS.  -   DISCUSSED                  A)  Avoid  Working  At   [employer] B)  Avoid  Working  With  Heavy machinery. C)  Avoid   Swimming,  Climbing  A  Ladder   Unattended. D)  Avoid   Driving   If  You   Have  A  Seizure. E)  Must   Be  Seizure  Free   For  At   Least   6 months,  Before   You  Can drive. F) Some times  Your  May  Feel  Seizure coming  Before  It  Begins.   You  May feel             Strange smell or funny  Feeling  In  Your  Stomach,  Which is  MOBEXO. TIPS  TO  REDUCE/ PREVENT  SEIZURES         1. Take  Your  Anti seizure  Medications   As   Recommended. 2. Get   Enough   Sleep. Sleep  Deprivation   Can  Trigger  A  Seizure. 3. If   You   Have  A fever,  Treat  It  At  Once,  And  Contact   Your  Primary  Care Providers. 4. Avoid   Alcohol. 5. Avoid  Flashing  Lights,  Loud  Noises and  TV  And  Video  Games,           As   These  May  Trigger   Your  Seizures     6. Control  Your  Stress  And   Have  Adequate  Rest.     7.   If  You  Feel  A  Seizure  Coming   On :           A) warn people  Who  Are  With  You           B)  Make  Sure  There  Are  No  Sharp or  Hard  Objects  Around you. C)  Lay down  On  Your  Side  And  Relax. *  TO  MAINTAIN  REGULAR  SLEEP  WAKE  CYCLES. *   TO  HAVE  ADEQUATE  REST  AND   SLEEP    HOURS.          *    AVOID  ANY USAGE OF                   TOBACCO,  EXCESSIVE  ALCOHOL  AND   ILLEGAL   SUBSTANCES          *  CONTINUE MEDICATIONS PRESCRIBED BY NEUROLOGIST AS    RECOMMENDED     *   Compliance   With  Medications   And  Instructions          * CURRENTLY  TOLERATING  THE  PRESCRIBED   MEDICATIONS. WITHOUT  ANY  SIGNIFICANT  SIDE  EFFECTS   &  GETTING BENEFIT.           *  May   Use  Pill  Box,    If  Needed      *  MEDICATIONS TO AVOID:    WELLBUTRIN,  ULTRAM          *     Antiplatelet  therapy    As   Recommended  Was   Discussed          *    Prophylactic  Use   Of     Vitamin   B   Complex,  Folic  Acid,    Vitamin  B12        Multivitamin   Tablet  Daily    Supplementations   Over  The  Counter  Discussed           *  PATIENT  IS  ALSO   COUNSELED   TO  KEEP    ACTIVITIES         A)   SIMPLE      B)  ORGANIZED      C)  WRITE   DOWN                     *  EVALUATIONS  AND  FOLLOW UP: * CARDIOLOGY               * EPILEPSY  MONITORING   UNIT       -  PATIENT  NOT  INTERESTED                                      *      H/O    BRIEF    SEIZURES        WITH    FALLING                                  AND  MILD     HEAD   &   NECK INJURY         TWICE                                  IN  SEPT. AND October 2019                      *     CT   HEAD    AND  CT  CERVICAL  SPINE  IN NOV. 2019                                 SHOWED  NO  SIGNIFICANT  ABNORMALITIES                                 VALPROIC  ACID  LEVEL  IN NOV. 2019       NORMAL  LIMITS                                                         *      VIMPAT     100   MG  BID       RESTARTED  IN  NOV. 2019                                  *         EXPECTATIONS,   GOALS   OF  SEIZURE  MANAGEMENT                           AND  SIDE  EFFECTS  MEDICATIONS    WERE                                 REVIEWED     AND   DISCUSSED    IN    DETAIL. *     PATIENT  DENIES  ANY   SEIZURE  RECURRENCE  SINCE    DEC.   2019                             -                                             *        EXPECTATIONS,   GOALS   OF  SEIZURE  MANAGEMENT                           AND  SIDE  EFFECTS  MEDICATIONS    WERE                                 REVIEWED     AND   DISCUSSED    IN    DETAIL. Controlled Substances Monitoring: Periodic Controlled Substance Monitoring: Possible medication side effects, risk of tolerance/dependence & alternative treatments discussed. , Assessed functional status. Aurelio Soria MD)            Orders Placed This Encounter   Medications    lacosamide (VIMPAT) 100 MG TABS tablet     Sig: Take 1 tablet by mouth 2 times daily for 30 days.      Dispense:  60 tablet     Refill:  2                     *PATIENT   TO  FOLLOW  UP  WITH   PRIMARY  CARE   AND   OTHER  CONSULTANTS  AS  BEFORE.           *TO  FOLLOW  WITH   MENTAL  HEALTH  PROFESSIONALS ,  INCLUDING PSYCHOLOGICAL  COUNSELING   AND  PSYCHIATRIC  EVALUTIONS            *  Maintain   Healthy  Life Style    With   Periodic  Monitoring  Of      Any  Medical  Conditions  Including   Elevated  Blood  Pressure,  Lipid  Profile,     Blood  Sugar levels  And   Heart  Disease. *   Period   Screening  For  Cancers  Involving  Breast,  Colon,    Prostate, lungs  And  Other  Organs  As  Applicable,  In consultation   With  Your  Primary Care Providers. * Second  Neurological  Opinion  And  Evaluations  In  [facility]  Setting  If  Patient  Is  Interested. *  If  The  Patient remains  Neurologically  Stable   Return   To  [facility] Neurology Department       IN     3      MONTHS  TIME   FOR  FURTHER  FOLLOW UP.                   *  If   There is  Any  Significant  Worsening   Of  Current  Symptoms  And      Or  If patient  Develops   Any additional  New  Neurological  Symptoms  Or  Significant  Concerns       Should  Call  911 or  Go  To  Emergency  Department  For  Further  Immediate  Evaluation. *   The  Neurological   Findings,  Possible  Diagnosis,  Differential diagnoses   And  Options      For    Further   Investigations   And  management   Are  Discussed  Comprehensively. Medications   And  Prescription   Risks  And  Side effects  Are   Also  Discussed. The  Above  Were  Reviewed  With  patient      questions  Answered  In  Detail. More   Than   50% of face  To face Time   Was  Spent  On  Counseling   And   Coordination  Of  Care       Of   Patient's multiple   Neurological  Problems   And   Comorbid  Medical   Conditions.                  VIRTUAL   VIDEO  VISIT          PATIENT  BEING   EVALUATED   BY  NEUROLOGIST   VIA   a Virtual Visit (video visit) encounter          to address concerns as mentioned  ABOVE        ALSO    nursing   caregiver was present     before,  during  and  after   the visit        Due to this being a TeleHealth encounter (During [id number] public health emergency), evaluation of the following organ systems was limited:     Vitals/Constitutional/EENT/Resp/CV/GI//MS/Neuro/Skin/Heme-Lymph-Imm. Pursuant to the emergency declaration under the [address] [place] [address] and the [employer] Act, this Virtual Visit was conducted with patient's (and/or legal guardian's) consent, to reduce the patient's risk of exposure to COVID-19 and provide necessary medical care. The patient (and/or legal guardian) has also been advised to contact this office for worsening conditions or problems, and seek emergency medical treatment and/or call 911 if deemed necessary. Patient identification was verified at the start of the visit: Yes    Total time spent for this encounter:  ( Time Spent:     40   minutes )    Services were provided through a video synchronous discussion   and  limited    examination  virtually to substitute for in-person clinic visit. Patient    located at    [address] Lynder Sever. MD Megan Elam MD on 57/7/7717 at 2:38 PM    An electronic signature was used to authenticate this note. Electronically signed by Caridad Meigs, MD     Board Certified in  Neurology &  In  [facility] ([facility])      DISCLAIMER:   Although every effort was made to ensure the accuracy of this  electronic transcription, some errors in transcription may have occurred. GENERAL PATIENT INSTRUCTIONS:     A Healthy Lifestyle: Care Instructions  Your Care Instructions  A healthy lifestyle can help you feel good, stay at a healthy weight, and have plenty of energy for both work and play.  A healthy lifestyle is something you can share with your whole family. A healthy lifestyle also can lower your risk for serious health problems, such as high blood pressure, heart disease, and diabetes. You can follow a few steps listed below to improve your health and the health of your family. Follow-up care is a key part of your treatment and safety. Be sure to make and go to all appointments, and call your doctor if you are having problems. Its also a good idea to know your test results and keep a list of the medicines you take. How can you care for yourself at home? Do not eat too much sugar, fat, or fast foods. You can still have dessert and treats now and then. The goal is moderation. Start small to improve your eating habits. Pay attention to portion sizes, drink less juice and soda pop, and eat more fruits and vegetables. Eat a healthy amount of food. A 3-ounce serving of meat, for example, is about the size of a deck of cards. Fill the rest of your plate with vegetables and whole grains. Limit the amount of soda and sports drinks you have every day. Drink more water when you are thirsty. Eat at least 5 servings of fruits and vegetables every day. It may seem like a lot, but it is not hard to reach this goal. A serving or helping is 1 piece of fruit, 1 cup of vegetables, or 2 cups of leafy, raw vegetables. Have an apple or some carrot sticks as an afternoon snack instead of a candy bar. Try to have fruits and/or vegetables at every meal.  Make exercise part of your daily routine. You may want to start with simple activities, such as walking, bicycling, or slow swimming. Try to be active 30 to 60 minutes every day. You do not need to do all 30 to 60 minutes all at once. For example, you can exercise 3 times a day for 10 or 20 minutes. Moderate exercise is safe for most people, but it is always a good idea to talk to your doctor before starting an exercise program.  Keep moving. Karen Nap the lawn, work in the garden, or [employer]. Take the stairs instead of the elevator at work. If you smoke, quit. People who smoke have an increased risk for heart attack, stroke, cancer, and other lung illnesses. Quitting is hard, but there are ways to boost your chance of quitting tobacco for good. Use nicotine gum, patches, or lozenges. Ask your doctor about stop-smoking programs and medicines. Keep trying. In addition to reducing your risk of diseases in the future, you will notice some benefits soon after you stop using tobacco. If you have shortness of breath or asthma symptoms, they will likely get better within a few weeks after you quit. Limit how much alcohol you drink. Moderate amounts of alcohol (up to 2 drinks a day for men, 1 drink a day for women) are okay. But drinking too much can lead to liver problems, high blood pressure, and other health problems. Family health  If you have a family, there are many things you can do together to improve your health. Eat meals together as a family as often as possible. Eat healthy foods. This includes fruits, vegetables, lean meats and dairy, and whole grains. Include your family in your fitness plan. Most people think of activities such as jogging or tennis as the way to fitness, but there are many ways you and your family can be more active. Anything that makes you breathe hard and gets your heart pumping is exercise. Here are some tips:  Walk to do errands or to take your child to school or the bus. Go for a family bike ride after dinner instead of watching TV. Where can you learn more? Go to https://INNFOCUSshruti.[employer]. org and sign in to your [employer] account. Enter R716 in the [facility] box to learn more about \"A Healthy Lifestyle: Care Instructions. \"     If you do not have an account, please click on the \"Sign Up Now\" link. Current as of: July 26, 2016  Content Version: 11.2  © 1622-8591 [employer].  Care instructions adapted under license by [address] [employer]. If you have questions about a medical condition or this instruction, always ask your healthcare professional. [place] [address] any warranty or liability for your use of this information.

## 2020-11-20 ENCOUNTER — HOSPITAL ENCOUNTER (OUTPATIENT)
Age: 40
Setting detail: SPECIMEN
Discharge: HOME | End: 2020-11-20
Payer: MEDICARE

## 2020-11-20 ENCOUNTER — OFFICE VISIT (OUTPATIENT)
Dept: PRIMARY CARE CLINIC | Age: 40
End: 2020-11-20
Payer: MEDICARE

## 2020-11-20 VITALS
WEIGHT: 188.2 LBS | SYSTOLIC BLOOD PRESSURE: 126 MMHG | HEART RATE: 108 BPM | HEIGHT: 66 IN | OXYGEN SATURATION: 96 % | RESPIRATION RATE: 18 BRPM | DIASTOLIC BLOOD PRESSURE: 86 MMHG | TEMPERATURE: 97.9 F | BODY MASS INDEX: 30.25 KG/M2

## 2020-11-20 PROCEDURE — G8483 FLU IMM NO ADMIN DOC REA: HCPCS | Performed by: PHYSICIAN ASSISTANT

## 2020-11-20 PROCEDURE — 99213 OFFICE O/P EST LOW 20 MIN: CPT | Performed by: PHYSICIAN ASSISTANT

## 2020-11-20 PROCEDURE — 4004F PT TOBACCO SCREEN RCVD TLK: CPT | Performed by: PHYSICIAN ASSISTANT

## 2020-11-20 PROCEDURE — G8417 CALC BMI ABV UP PARAM F/U: HCPCS | Performed by: PHYSICIAN ASSISTANT

## 2020-11-20 PROCEDURE — G8427 DOCREV CUR MEDS BY ELIG CLIN: HCPCS | Performed by: PHYSICIAN ASSISTANT

## 2020-11-20 PROCEDURE — 99212 OFFICE O/P EST SF 10 MIN: CPT

## 2020-11-20 PROCEDURE — U0003 INFECTIOUS AGENT DETECTION BY NUCLEIC ACID (DNA OR RNA); SEVERE ACUTE RESPIRATORY SYNDROME CORONAVIRUS 2 (SARS-COV-2) (CORONAVIRUS DISEASE [COVID-19]), AMPLIFIED PROBE TECHNIQUE, MAKING USE OF HIGH THROUGHPUT TECHNOLOGIES AS DESCRIBED BY CMS-2020-01-R: HCPCS

## 2020-11-20 ASSESSMENT — ENCOUNTER SYMPTOMS
SORE THROAT: 1
WHEEZING: 0
COUGH: 1
NAUSEA: 0
RHINORRHEA: 1
CHEST TIGHTNESS: 0
VOMITING: 0
DIARRHEA: 1
SHORTNESS OF BREATH: 0

## 2020-11-20 ASSESSMENT — PATIENT HEALTH QUESTIONNAIRE - PHQ9
SUM OF ALL RESPONSES TO PHQ QUESTIONS 1-9: 0
1. LITTLE INTEREST OR PLEASURE IN DOING THINGS: 0
2. FEELING DOWN, DEPRESSED OR HOPELESS: 0
SUM OF ALL RESPONSES TO PHQ9 QUESTIONS 1 & 2: 0
SUM OF ALL RESPONSES TO PHQ QUESTIONS 1-9: 0
SUM OF ALL RESPONSES TO PHQ QUESTIONS 1-9: 0

## 2020-11-20 NOTE — PROGRESS NOTES
[facility]    Subjective:      Patient ID: Lydia Angulo is a 38 y.o. y.o. male. Patient is seen due to illness for the past 4 days. 3 days ago was very achy. Has HA and last night had fever hot and chills. Fever was 100.6 last night. Took tylenol. Today still HA and now has a cough. He does not feel good and has chest burning and throat burning. Tylenol lowers the HA but will not go away. His wife works at [employer]. She is fine at this time. Past Medical History:   Diagnosis Date    Anxiety, mild     Colon polyps     Depression     Dizziness     Eczema     Epilepsy ([facility]     GERD (gastroesophageal reflux disease)     H/O fall     Head ache     Head injury     Hiatal hernia     Inguinal hernia     Right.  Low back pain     Lumbar sprain     Migraine     Plantar fasciitis, bilateral     Seizure disorder ([facility]     Sigmoid diverticulosis     Sleep difficulties     Status migrainosus     TIA (transient ischemic attack) 09/2017    Tobacco use     Chronic. Past Surgical History:   Procedure Laterality Date    COLONOSCOPY  04/29/2011    Internal hemorrhoids, possible anal fissure, few scattered diverticula.  COLONOSCOPY  06/04/2010    Mild sigmoid diverticulosis.  COLONOSCOPY  08/28/2009    Sigmoid diverticulosis, internal hemorrhoids.  COLONOSCOPY  05/27/2008    Internal hemorrhoids.     COLONOSCOPY  11/19/2014    polyp in rectum    COLONOSCOPY  6/1/16    sigmoid diverticulosis, colon polyp, internal hemorrhoids    COLONOSCOPY  04/19/2019    [id number]    GASTRIC FUNDOPLICATION  31/57/6070    HERNIA REPAIR Left 11/30/2017    Left Inguinal Hernia Repair w/ Mesh [facility] Moon [facility] Right 05/30/2013    INGUINAL HERNIA REPAIR Left 01/24/2013    INGUINAL HERNIA REPAIR Right 09/24/2015    KNEE ARTHROSCOPY Left     PILONIDAL CYST EXCISION N/A 7/23/2020    PILONIDAL CYSTECTOMY performed by Alannah Lorenzo DO at [address]  03/05/2010    Recurrent hiatal hernia.  UPPER GASTROINTESTINAL ENDOSCOPY  6/1/16    S/P brenda fundoplication, good repair, retained gastric fluid    VASECTOMY         Family History   Problem Relation Age of Onset    COPD Mother     High Blood Pressure Mother     Cancer Father         Hodgekin's Lymphoma    High Blood Pressure Father     Eczema Sister     Alzheimer's Disease Maternal Grandmother     COPD Maternal Grandmother     Cancer Maternal Grandmother     Cancer Paternal Grandmother     Cancer Paternal Grandfather     Cancer Sister         colon cancer    High Blood Pressure Sister     Arthritis Sister     Asthma Daughter     Eczema Daughter     Glaucoma Neg Hx     Diabetes Neg Hx     Cataracts Neg Hx        No Known Allergies    Current Outpatient Medications   Medication Sig Dispense Refill    divalproex (DEPAKOTE) 250 MG DR tablet take 3 tablets by mouth twice a day 180 tablet 5    SUMAtriptan (IMITREX) 50 MG tablet take 1 tablet by mouth once daily if needed for MIGRAINE 30 tablet 2    promethazine (PHENERGAN) 25 MG tablet take 1 tablet by mouth every 8 hours if needed for nausea 60 tablet 2    acetaminophen (TYLENOL) 500 MG tablet Take 500 mg by mouth every 4 hours as needed for Pain      lacosamide (VIMPAT) 100 MG TABS tablet Take 1 tablet by mouth 2 times daily for 30 days. 60 tablet 2    docusate sodium (COLACE) 100 MG capsule Take 1 capsule by mouth 2 times daily as needed for Constipation (Patient not taking: Reported on 11/20/2020) 30 capsule 0     No current facility-administered medications for this visit. Review of Systems   Constitutional: Positive for appetite change, chills, diaphoresis, fatigue and fever. Appetite is good today it was down last night. HENT: Positive for postnasal drip, rhinorrhea and sore throat. Negative for congestion and sneezing. Eyes: Negative for visual disturbance. Respiratory: Positive for cough. Negative for chest tightness, shortness of breath and wheezing. Cardiovascular: Positive for chest pain. Negative for palpitations. Gastrointestinal: Positive for diarrhea. Negative for nausea and vomiting. Had slight diarrhea last loose and watery. No tx. Genitourinary: Negative. Musculoskeletal: Positive for myalgias. Skin: Negative for rash. Neurological: Positive for headaches. Negative for dizziness, light-headedness and numbness. Psychiatric/Behavioral: Negative for sleep disturbance. The patient is not nervous/anxious. Objective:      /86 Comment: LC  Pulse 108   Temp 97.9 °F (36.6 °C) (Temporal)   Resp 18   Ht 5' 6\" (1.676 m)   Wt 188 lb 3.2 oz (85.4 kg)   SpO2 96%   BMI 30.38 kg/m²     Physical Exam  Vitals signs and nursing note reviewed. Constitutional:       General: He is not in acute distress. Appearance: He is well-developed. He is ill-appearing. Comments: Looks very tired. HENT:      Head: Normocephalic and atraumatic. Right Ear: Tympanic membrane, ear canal and external ear normal.      Left Ear: Tympanic membrane, ear canal and external ear normal.      Nose: Congestion present. No rhinorrhea. Mouth/Throat:      Mouth: Mucous membranes are moist.      Pharynx: Posterior oropharyngeal erythema present. No oropharyngeal exudate. Eyes:      General: No scleral icterus. Conjunctiva/sclera: Conjunctivae normal.   Neck:      Musculoskeletal: Normal range of motion and neck supple. No neck rigidity or muscular tenderness. Cardiovascular:      Rate and Rhythm: Normal rate and regular rhythm. Heart sounds: Normal heart sounds. No murmur. No gallop. Pulmonary:      Effort: Pulmonary effort is normal. No respiratory distress. Breath sounds: Normal breath sounds. No wheezing, rhonchi or rales.    Abdominal:      General: Bowel sounds are normal. There is no distension. Palpations: Abdomen is soft. There is no mass. Tenderness: There is no abdominal tenderness. There is no guarding or rebound. Hernia: No hernia is present. Musculoskeletal:         General: No swelling, tenderness, deformity or signs of injury. Right lower leg: No edema. Lymphadenopathy:      Cervical: No cervical adenopathy. Skin:     General: Skin is warm and dry. Findings: No erythema or rash. Neurological:      General: No focal deficit present. Mental Status: He is alert and oriented to person, place, and time. Sensory: No sensory deficit. Gait: Gait normal.   Psychiatric:         Mood and Affect: Mood normal.         Behavior: Behavior normal.           Assessment & Plan:     1. Viral illness    - COVID-19 Ambulatory; Future  - COVID-19 Ambulatory;  Future      Fluids tylenol rest  OTC medications prn  Follow up not improving or worsens  Will be notified of all results  Answered questions    MANUELITO Hawkins  11/20/2020 2:52 PM EST    (Pleasenote that portions of this note were completed with a voice recognition program.Efforts were made to edit the dictations but occasionally words are mis-transcribed.)

## 2020-11-22 LAB — SARS-COV-2, NAA: NOT DETECTED

## 2021-01-07 ENCOUNTER — TELEMEDICINE (OUTPATIENT)
Dept: NEUROLOGY | Age: 41
End: 2021-01-07
Payer: MEDICARE

## 2021-01-07 DIAGNOSIS — G40.909 SEIZURE DISORDER (HCC): Primary | ICD-10-CM

## 2021-01-07 DIAGNOSIS — S09.90XS INJURY OF HEAD, SEQUELA: ICD-10-CM

## 2021-01-07 DIAGNOSIS — G43.709 CHRONIC MIGRAINE WITHOUT AURA WITHOUT STATUS MIGRAINOSUS, NOT INTRACTABLE: ICD-10-CM

## 2021-01-07 DIAGNOSIS — G47.9 DISTURBANCE, SLEEP: ICD-10-CM

## 2021-01-07 DIAGNOSIS — R51.9 NONINTRACTABLE HEADACHE, UNSPECIFIED CHRONICITY PATTERN, UNSPECIFIED HEADACHE TYPE: ICD-10-CM

## 2021-01-07 DIAGNOSIS — K21.9 GASTROESOPHAGEAL REFLUX DISEASE WITHOUT ESOPHAGITIS: ICD-10-CM

## 2021-01-07 DIAGNOSIS — G45.9 TIA (TRANSIENT ISCHEMIC ATTACK): ICD-10-CM

## 2021-01-07 DIAGNOSIS — S33.5XXD LUMBAR SPRAIN, SUBSEQUENT ENCOUNTER: ICD-10-CM

## 2021-01-07 DIAGNOSIS — G40.909 RECURRENT SEIZURES (HCC): ICD-10-CM

## 2021-01-07 DIAGNOSIS — G47.9 SLEEP DIFFICULTIES: ICD-10-CM

## 2021-01-07 DIAGNOSIS — G45.0 VBI (VERTEBROBASILAR INSUFFICIENCY): ICD-10-CM

## 2021-01-07 DIAGNOSIS — F34.1 DYSTHYMIA: ICD-10-CM

## 2021-01-07 DIAGNOSIS — G40.009 PARTIAL IDIOPATHIC EPILEPSY WITH SEIZURES OF LOCALIZED ONSET, NOT INTRACTABLE, WITHOUT STATUS EPILEPTICUS (HCC): ICD-10-CM

## 2021-01-07 DIAGNOSIS — R56.9 CONVULSIONS, UNSPECIFIED CONVULSION TYPE (HCC): ICD-10-CM

## 2021-01-07 DIAGNOSIS — K44.9 HIATAL HERNIA: ICD-10-CM

## 2021-01-07 DIAGNOSIS — M54.12 CERVICAL RADICULOPATHY: ICD-10-CM

## 2021-01-07 DIAGNOSIS — Z72.0 TOBACCO USE: ICD-10-CM

## 2021-01-07 DIAGNOSIS — R42 DIZZINESS: ICD-10-CM

## 2021-01-07 DIAGNOSIS — M54.40 LOW BACK PAIN WITH SCIATICA, SCIATICA LATERALITY UNSPECIFIED, UNSPECIFIED BACK PAIN LATERALITY, UNSPECIFIED CHRONICITY: ICD-10-CM

## 2021-01-07 DIAGNOSIS — Z91.81 H/O FALL: ICD-10-CM

## 2021-01-07 DIAGNOSIS — F41.9 ANXIETY, MILD: ICD-10-CM

## 2021-01-07 DIAGNOSIS — G44.039 NONINTRACTABLE PAROXYSMAL HEMICRANIA, UNSPECIFIED CHRONICITY PATTERN: ICD-10-CM

## 2021-01-07 PROCEDURE — 99214 OFFICE O/P EST MOD 30 MIN: CPT | Performed by: PSYCHIATRY & NEUROLOGY

## 2021-01-07 PROCEDURE — G8417 CALC BMI ABV UP PARAM F/U: HCPCS | Performed by: PSYCHIATRY & NEUROLOGY

## 2021-01-07 PROCEDURE — G8427 DOCREV CUR MEDS BY ELIG CLIN: HCPCS | Performed by: PSYCHIATRY & NEUROLOGY

## 2021-01-07 PROCEDURE — 99211 OFF/OP EST MAY X REQ PHY/QHP: CPT

## 2021-01-07 PROCEDURE — G8483 FLU IMM NO ADMIN DOC REA: HCPCS | Performed by: PSYCHIATRY & NEUROLOGY

## 2021-01-07 PROCEDURE — 4004F PT TOBACCO SCREEN RCVD TLK: CPT | Performed by: PSYCHIATRY & NEUROLOGY

## 2021-01-07 RX ORDER — SUMATRIPTAN 50 MG/1
TABLET, FILM COATED ORAL
Qty: 30 TABLET | Refills: 2 | Status: SHIPPED | OUTPATIENT
Start: 2021-01-07 | End: 2021-12-15 | Stop reason: SDUPTHER

## 2021-01-07 RX ORDER — LACOSAMIDE 100 MG/1
100 TABLET ORAL 2 TIMES DAILY
Qty: 60 TABLET | Refills: 2 | Status: SHIPPED | OUTPATIENT
Start: 2021-01-07 | End: 2021-02-11

## 2021-01-07 RX ORDER — PROMETHAZINE HYDROCHLORIDE 25 MG/1
TABLET ORAL
Qty: 60 TABLET | Refills: 2 | Status: SHIPPED | OUTPATIENT
Start: 2021-01-07 | End: 2021-12-15 | Stop reason: SDUPTHER

## 2021-01-07 RX ORDER — DIVALPROEX SODIUM 250 MG/1
TABLET, DELAYED RELEASE ORAL
Qty: 180 TABLET | Refills: 5 | Status: SHIPPED | OUTPATIENT
Start: 2021-01-07 | End: 2021-05-19 | Stop reason: SDUPTHER

## 2021-01-07 ASSESSMENT — ENCOUNTER SYMPTOMS
FACIAL SWEATING: 0
WHEEZING: 0
SCALP TENDERNESS: 0
EYE DISCHARGE: 0
CHEST TIGHTNESS: 0
ABDOMINAL DISTENTION: 0
ABDOMINAL PAIN: 0
EYE ITCHING: 0
CHANGE IN BOWEL HABIT: 0
VOMITING: 0
RHINORRHEA: 0
PHOTOPHOBIA: 1
SHORTNESS OF BREATH: 0
EYE PAIN: 0
BACK PAIN: 0
SWOLLEN GLANDS: 0
COUGH: 0
BLURRED VISION: 0
SORE THROAT: 0
FACIAL SWELLING: 0
NAUSEA: 1
CHOKING: 0
CONSTIPATION: 0
EYE REDNESS: 0
VOICE CHANGE: 0
EYE WATERING: 0
BLOOD IN STOOL: 0
APNEA: 0
TROUBLE SWALLOWING: 0
VISUAL CHANGE: 0
COLOR CHANGE: 0
SINUS PRESSURE: 0
DIARRHEA: 0

## 2021-01-07 NOTE — PROGRESS NOTES
Subjective:      Patient ID: Lacie Pan is a 38 y. o. RIGHT   HANDED male. Seizures  This is a chronic problem. Episode onset: SINCE    2014. The problem occurs intermittently. The problem has been waxing and waning. Associated symptoms include headaches and nausea. Pertinent negatives include no abdominal pain, anorexia, arthralgias, change in bowel habit, chest pain, chills, congestion, coughing, diaphoresis, fatigue, fever, joint swelling, myalgias, neck pain, numbness, rash, sore throat, swollen glands, urinary symptoms, vertigo, visual change, vomiting or weakness. Nothing aggravates the symptoms. Treatments tried: ANTI  EPILEPTIC  MEDICATION. The treatment provided significant relief. Headache   This is a chronic problem. Episode onset: MORE  THAN   10  YEARS. The problem occurs intermittently. The problem has been waxing and waning. The pain is located in the vertex region. The pain does not radiate. The pain quality is similar to prior headaches. The quality of the pain is described as aching and throbbing. The pain is at a severity of 3/10. The pain is mild. Associated symptoms include insomnia, nausea, phonophobia, photophobia, seizures and tingling. Pertinent negatives include no abdominal pain, abnormal behavior, anorexia, back pain, blurred vision, coughing, dizziness, drainage, ear pain, eye pain, eye redness, eye watering, facial sweating, fever, hearing loss, loss of balance, muscle aches, neck pain, numbness, rhinorrhea, scalp tenderness, sinus pressure, sore throat, swollen glands, tinnitus, visual change, vomiting, weakness or weight loss. The symptoms are aggravated by unknown. He has tried darkened room, NSAIDs, Excedrin and triptans for the symptoms. The treatment provided moderate relief. His past medical history is significant for migraine headaches and migraines in the family.  There is no history of cancer, cluster headaches, hypertension, immunosuppression, obesity, pseudotumor cerebri, recent head traumas, sinus disease or TMJ. Migraine   This is a chronic problem. Episode onset: MORE  THAN   10  YEARS. The problem occurs intermittently. The problem has been waxing and waning. The pain is located in the right unilateral and frontal region. The pain does not radiate. The pain quality is similar to prior headaches. The quality of the pain is described as aching and throbbing. The pain is at a severity of 3/10. The pain is mild. Associated symptoms include insomnia, nausea, phonophobia, photophobia, seizures and tingling. Pertinent negatives include no abdominal pain, abnormal behavior, anorexia, back pain, blurred vision, coughing, dizziness, drainage, ear pain, eye pain, eye redness, eye watering, facial sweating, fever, hearing loss, loss of balance, muscle aches, neck pain, numbness, rhinorrhea, scalp tenderness, sinus pressure, sore throat, swollen glands, tinnitus, visual change, vomiting, weakness or weight loss. The symptoms are aggravated by unknown. He has tried triptans and NSAIDs (TOPAMAX) for the symptoms. The treatment provided moderate relief. His past medical history is significant for migraine headaches and migraines in the family. There is no history of cancer, cluster headaches, hypertension, immunosuppression, obesity, pseudotumor cerebri, recent head traumas, sinus disease or TMJ. History obtained from  The patient          and other  available medical records were  Also  reviewed.             1)   H/O   CHRONIC  SEVERE MIGRAINES                         FOR  MORE  THAN   10  YEARS                           -    STABLE            2)      MIGRAINES  AND TENSION  HEADACHE                        ARE BETTER  CONTROLLED                      -   ON  IMITREX,   PHENERGAN   AS  NEEDED               3)       H/O  SEIZURE  DISORDER /  EPILEPSY    SINCE      2014                               -  INTRACTABLE            4)     SEIZURE  CONTROL  WAS    BETTER ON  DEPAKOTE,  VIMPAT  AND  TOPAMAX          5)      INTOLERANCE  TO  KEPPRA                    DUE  TO  IRRITABILITY  AND MOOD PROBLEMS            6)  H/O   CHRONIC  ANXIETY  AND  DEPRESSION                    -     STABLE    ON  LEXAPRO                BEING  FOLLOWED  BY  [facility]  [facility]               7)     H/O  PREVIOUS  MULTIPLE MILD  HEAD  INJURIES                             DUE  TO   FALLS            8)      H/O     CHRONIC    SLEEP  DIFFICULTIES                      AND  DISTURBED  SLEEP  WAKE  CYCLES                              -  STABLE                9)    PREVIOUS  H/O  DIZZY  EPISODES                          -  NO  RECURRENCE            10)   EMU  AT  [address]   IN  2014                  -  POSSIBLE  FRONTAL    FOCUS            11)   EMU   AT  [address] 7  TO  NOV. 11, 2016    SHOWED :              6  EPISODES  OF  PSYCHOGENIC  NON EPILEPTIC SPELLS. EEG  DURING  THE SPELLS  AND  INTERICTAL  PERIODS                         WAS REPORTED  TO BE  NORMAL. 12)    H/O    BRIEF  SEIZURES  AND  MEMORY  LOSS  INTERMITTENTLY                         -  PARTIAL  COMPLEX       SEIZURES                          DUE  TO  SLEEP  DEPRIVATION     AND  PROLONGED  TV   WATCHING                                         13)   EPISODES   OF  RIGHT  SIDED  NUMBNESS   AND  SPEECH  PROBLEMS. H/O  HOSPITALIZATION   FROM  SEPT. 2017     AT  [facility]                 D/D    TIA          -    HAD   WORK  UP  WITH  NO  SIGNIFICANT  ABNORMALITIES  .           15)  H/O  CARDIAC  ARRYTHMIA                         TO    FOLLOW  WITH    CARDOLOGY              15)   H/O    HAD  COGNITIVE  BEHAVIOR THERAPY  AT  [facility]  IN  THE  PAST                        PATIENT  TO  FOLLOW  UP   WITH  [facility]OFESSION[facility]                        FOR   EVALUATIONS  OF                    HIS  ANXIETY,  DEPRESSION,    NON EPILEPTIC  PSYCHOGENIC  SPELLS,                          CONVERSION   DISORDER          16)    PATIENT     STOPPED     VIMPAT       AND  TOPAMAX    IN    October 2017               17)    H/O   [facility]   NEUROLOGY    FOLLOW  UP    EVALUATION     IN      2018                          PATIENT     NOT  PLANNING  TO  RETURN  BACK   TO                                    [facility]                18)     PATIENT  DID NOT  HAVE  NEUROLOGY   FOLLOW  UP    AT  [facility]                           FROM   October 2017     TO  DEC. 2018               19)   PATIENT  INDICATED      HE  HAS   BRIEF   SEIZURE    2-3  PER MONTH                   STARING  EPISODES. 20)          PATIENT   TO  CONTINUE     DEPAKOTE  FOR  SEIZURES                                       AND  MIGRAINE  PROPHYLAXIS                                                       PATIENT   ON     KLONOPIN       FOR                            SEIZURE  CONTROL   AND  SLEEP  DIFFICULTIES                          21)     H/O    SEIZURE   FREQUENCY    INCREASE    IN   [facility]   AND  April 2019                              DUE  TO  C. DIFF     DIARRHEA,    AND   HOSPITALIZATIONS. 25)     H/O    BRIEF  SEIZURES       ON     8/13/2019                            -  PARTIAL  COMPLEX       SEIZURES                          DUE  TO  SLEEP  DEPRIVATION     AND  PROLONGED  TV   WATCHING                     21)      H/O    BRIEF    SEIZURES        WITH    FALLING                                  AND  MILD     HEAD   &   NECK INJURY         TWICE                                  IN  SEPT.     AND October 2019                        24)   H/O      STARING  EPISODE  WITH    HEAD TURNING   TO  THE                           RIGHT  SIDE    LASTING  FOR   ONE  MINUTE          WITH                                LETHARGY    NOTICED   VISIT  On   11/11/ 2019   .                                    25)     CT   HEAD    AND  CT CERVICAL  SPINE  IN NOV. 2019                                 SHOWED  NO  SIGNIFICANT  ABNORMALITIES                                 VALPROIC  ACID  LEVEL  IN NOV. 2019       NORMAL  LIMITS                                      26)   PATIENT  WAS  ON  VIMPAT  IN     2017                            VIMPAT     100   MG  BID       RESTARTED  IN  NOV. 2019                                27)      EXPECTATIONS,   GOALS   OF  SEIZURE  MANAGEMENT                           AND  SIDE  EFFECTS  MEDICATIONS    WERE                                 REVIEWED     AND   DISCUSSED    IN    DETAIL. 28)            PATIENT  WIFE  ALSO   REQUESTED      FMLA    FOR  HER                                      AND  WAS  PROVIDED   IN    PAST              34)          PATIENT  DENIES  ANY   SEIZURE  RECURRENCE    SINCE    DEC.   2019                                 MIGRAINES  ARE  ALSO  WELL  CONTROLLED                                      PATIENT  DENIES  ANY  NEW  NEUROLOGICAL  CONCERNS. 27)        H/O   FRONTAL   HEADACHES  INTERMITTENTLY                           WITH     URI     FOR  ONE  WEEK  IN   JAN. 2021                                PATIENT    ADVISED  TO  FOLLOW  WITH   HER  PCP                                       OR  GO  TO  URGENT  CARE  CENTR                                      31)           VARIOUS  RISK   FACTORS   WERE  REVIEWED   AND   DISCUSSED. PATIENT   HAS  MULTIPLE   MEDICAL, MENTAL HEALTH                                    &      NEUROLOGICAL   PROBLEMS . PATIENT'S   MANAGEMENT  IS  CHALLENGING.                    32)      PATIENT   REQUESTS  REFILLS  FOR  HIS  MULTIPLE   MEDICATIONS                          The  Duration,  Quality,  Severity,  Location,  Timing,  Context,  Modifying  Factors   Of   The   Chief   Complaint       And  Present  Illness   Was   Reviewed   In   Chronological   [place]anner. Patient   Indicates   The  Presence   And  The  Absence  Of  The  Following  Associated  And   Additional  Neurological    Symptoms:                                Balance  And coordination problems  absent           Gait problems     absent            Headaches      present              Migraines           present           Memory problems        Present             Confusion        absent            Paresthesia numbness          absent           Seizures  And  Starring  Episodes           PRESENT           Syncope,  Near  syncopal episodes         absent           Speech problems           absent             Swallowing  Problems      absent            Dizziness,  Light headedness           present                        Vertigo        absent             Generalized   Weakness    absent              focal  Weakness     absent             Tremors         absent              Sleep  Problems     present             History  Of   Recent   Head  Injury     absent             History  Of   Recent  TIA     absent             History  Of   Recent    Stroke     absent             Neck  Pain and  Neck muscle  Spasms  Absent               Radiating  down   And   Weakness           absent            Lower back   Pain  And     Spasms  Present              Radiating    Down   And   Weakness          absent                H/O   FALLS        absent               History  Of   Visual  Symptoms    Absent                  Associated   Diplopia       absent                                                                  Also   Additional   Symptoms   Present    As  Documented    In   The detailed      Review  Of  Systems   And    Please   Refer   To    Them for   Additional  Information.            INFORMATION   REVIEWED:      VITAL  SIGNS,  MEDICATIONS   LIST,   ALLERGIES AND  DRUG  INTOLERANCES,    MEDICAL   HISTORY,     SURGICAL   HISTORY,    FAMILY   HISTORY,  SOCIAL  HISTORY,    PROBLEM  LIST   FOR  PATIENT  CARE COORDINATION        RECORDS   REVIEWED:    historical medical records, lab reports and office notes         Past Medical History:   Diagnosis Date    Anxiety, mild     Colon polyps     Depression     Dizziness     Eczema     [facility] [address] [facility]     GERD (gastroesophageal reflux disease)     H/O fall     Head ache     Head injury     Hiatal hernia     Inguinal hernia     Right.  Low back pain     Lumbar sprain     Migraine     Plantar fasciitis, bilateral     [facility]     Sigmoid diverticulosis     Sleep difficulties     Status migrainosus     TIA (transient ischemic attack) 09/2017    Tobacco use     Chronic. Past Surgical History:   Procedure Laterality Date    COLONOSCOPY  04/29/2011    Internal hemorrhoids, possible anal fissure, few scattered diverticula.  COLONOSCOPY  06/04/2010    Mild sigmoid diverticulosis.  COLONOSCOPY  08/28/2009    Sigmoid diverticulosis, internal hemorrhoids.  COLONOSCOPY  05/27/2008    Internal hemorrhoids.  COLONOSCOPY  11/19/2014    polyp in rectum    COLONOSCOPY  6/1/16    sigmoid diverticulosis, colon polyp, internal hemorrhoids    COLONOSCOPY  04/19/2019    [id number]    GASTRIC FUNDOPLICATION  30/42/7068    HERNIA REPAIR Left 11/30/2017    Left Inguinal Hernia Repair w/ Mesh performed by Irma Solorzano MD at [facility] Right 05/30/2013    INGUINAL HERNIA REPAIR Left 01/24/2013    INGUINAL HERNIA REPAIR Right 09/24/2015    KNEE ARTHROSCOPY Left     PILONIDAL CYST EXCISION N/A 7/23/2020    PILONIDAL CYSTECTOMY performed by Mode Lange DO at [facility].  03/05/2010    Recurrent hiatal hernia.     UPPER GASTROINTESTINAL ENDOSCOPY  6/1/16    S/P brenda fundoplication, good repair, retained gastric fluid    VASECTOMY                   Current Outpatient Medications   Medication Sig Dispense Refill    lacosamide (VIMPAT) 100 MG TABS tablet Take 1 tablet by mouth 2 times daily for 30 days. 60 tablet 2    divalproex (DEPAKOTE) 250 MG DR tablet take 3 tablets by mouth twice a day 180 tablet 5    SUMAtriptan (IMITREX) 50 MG tablet take 1 tablet by mouth once daily if needed for MIGRAINE 30 tablet 2    promethazine (PHENERGAN) 25 MG tablet take 1 tablet by mouth every 8 hours if needed for nausea 60 tablet 2    docusate sodium (COLACE) 100 MG capsule Take 1 capsule by mouth 2 times daily as needed for Constipation (Patient not taking: Reported on 11/20/2020) 30 capsule 0    acetaminophen (TYLENOL) 500 MG tablet Take 500 mg by mouth every 4 hours as needed for Pain       No current facility-administered medications for this visit.             No Known Allergies            Family History   Problem Relation Age of Onset    COPD Mother     High Blood Pressure Mother    Thompson Cancer Father         Hodgekin's Lymphoma    High Blood Pressure Father     Eczema Sister     Alzheimer's Disease Maternal Grandmother     COPD Maternal Grandmother     Cancer Maternal Grandmother     Cancer Paternal Grandmother     Cancer Paternal Grandfather     Cancer Sister         colon cancer    High Blood Pressure Sister     Arthritis Sister     Asthma Daughter     Eczema Daughter     Glaucoma Neg Hx     Diabetes Neg Hx     Cataracts Neg Hx              Social History     Socioeconomic History    Marital status:      Spouse name: Not on file    Number of children: Not on file    Years of education: Not on file    Highest education level: Not on file   Occupational History    Not on file   Social Needs    Financial resource strain: Not on file    Food insecurity     Worry: Not on file     Inability: Not on file    Transportation needs     Medical: Not on file     Non-medical: Not on file   Tobacco Use    Smoking status: Current Every Day Smoker     Packs/day: 1.00     Years: 20.00     Pack years: 20.00     Types: Cigarettes     Last attempt to quit: 2016     Years since quittin.6    Smokeless tobacco: Never Used    Tobacco comment: 2-3 weekly   Substance and Sexual Activity    Alcohol use: No     Alcohol/week: 0.0 standard drinks     Comment: rarely    Drug use: No    Sexual activity: Not on file   Lifestyle    Physical activity     Days per week: Not on file     Minutes per session: Not on file    Stress: Not on file   Relationships    Social connections     Talks on phone: Not on file     Gets together: Not on file     Attends Christian service: Not on file     Active member of club or organization: Not on file     Attends meetings of clubs or organizations: Not on file     Relationship status: Not on file    Intimate partner violence     Fear of current or ex partner: Not on file     Emotionally abused: Not on file     Physically abused: Not on file     Forced sexual activity: Not on file   Other Topics Concern    Not on file   Social History Narrative    Not on file         There were no vitals filed for this visit.       Wt Readings from Last 3 Encounters:   20 188 lb 3.2 oz (85.4 kg)   20 178 lb (80.7 kg)   20 178 lb (80.7 kg)         BP Readings from Last 3 Encounters:   20 126/86   20 115/70   20 126/74       Hematology and Coagulation  Lab Results   Component Value Date    WBC 8.6 2020    RBC 4.55 2020    HGB 15.8 2020    HCT 44.2 2020    MCV 97.1 2020    MCH 34.7 2020    MCHC 35.7 2020    RDW 12.4 2020     2020    MPV 9.7 2020         Chemistries  Lab Results   Component Value Date     2020    K 4.3 2020     2020    CO2 23 2020    BUN 15 2019    CREATININE 1.26 2019    CALCIUM 9.8 2019    PROT 7.8 2019    LABALBU 4.8 2019    BILITOT 0.34 2019    ALKPHOS 75 2019    AST 16 2020    ALT 17 2020     Lab Results   Component Value Date ALKPHOS 75 12/08/2019    ALT 17 07/20/2020    AST 16 07/20/2020    PROT 7.8 12/08/2019    BILITOT 0.34 12/08/2019    BILIDIR 0.11 12/08/2019    LABALBU 4.8 12/08/2019     Lab Results   Component Value Date    BUN 15 12/08/2019    CREATININE 1.26 12/08/2019     Lab Results   Component Value Date    CALCIUM 9.8 12/08/2019    MG 1.8 09/18/2013     Lab Results   Component Value Date    AST 16 07/20/2020    ALT 17 07/20/2020         Lab Results   Component Value Date    CKTOTAL 67 09/19/2013     Lab Results   Component Value Date    [id number] 590 01/25/2019         Drug Levels  Lab Results   Component Value Date    PHENYTOIN <0.8 09/28/2017    PHENYTOIN <0.8 04/04/2016    VALPROATE 34 07/20/2020    VALPROATE 68 11/15/2019           Review of Systems   Constitutional: Negative for appetite change, chills, diaphoresis, fatigue, fever, unexpected weight change and weight loss. HENT: Negative for congestion, dental problem, drooling, ear discharge, ear pain, facial swelling, hearing loss, mouth sores, nosebleeds, postnasal drip, rhinorrhea, sinus pressure, sore throat, tinnitus, trouble swallowing and voice change. Eyes: Positive for photophobia. Negative for blurred vision, pain, discharge, redness, itching and visual disturbance. Respiratory: Negative for apnea, cough, choking, chest tightness, shortness of breath and wheezing. Cardiovascular: Negative for chest pain, palpitations and leg swelling. Gastrointestinal: Positive for nausea. Negative for abdominal distention, abdominal pain, anorexia, blood in stool, change in bowel habit, constipation, diarrhea and vomiting. Endocrine: Negative for cold intolerance, heat intolerance, polydipsia, polyphagia and polyuria. Musculoskeletal: Positive for gait problem. Negative for arthralgias, back pain, joint swelling, myalgias, neck pain and neck stiffness. Skin: Negative for color change, pallor, rash and wound.    Allergic/Immunologic: Negative for environmental allergies, food allergies and immunocompromised state. Neurological: Positive for tingling, seizures, light-headedness and headaches. Negative for dizziness, vertigo, tremors, syncope, facial asymmetry, speech difficulty, weakness, numbness and loss of balance. Hematological: Negative for adenopathy. Does not bruise/bleed easily. Psychiatric/Behavioral: Positive for decreased concentration and sleep disturbance. Negative for agitation, behavioral problems, confusion, dysphoric mood, hallucinations, self-injury and suicidal ideas. The patient is nervous/anxious and has insomnia. The patient is not hyperactive. Objective:     -  LIMITED  DUE  TO  VIDEO  VISIT        NEUROLOGICAL EXAMINATION :                                                                     LIMITED  DUE  TO  VIDEO  VISIT        A) MENTAL STATUS:                   Alert and  oriented  To time, place  And  Person. No Aphasia. No  Dysarthria. Able   To  Follow  commands without   Any  Difficulty. No right  To left confusion. Normal  Speech  And language function.                    Insight and  Judgment ,Fund  Of  Knowledge  DECREASED                Recent  And  Remote memory  DECREASED                Attention &Concentration are  DECREASED                                                    B) CRANIAL NERVES :          C) MOTOR  EXAM:                  D) SENSORY :        E) REFLEXES:                                 F) COORDINATION  AND  GAIT :                            --     LIMITED  DUE  TO  VIDEO  VISIT          Assessment:     Patient Active Problem List   Diagnosis    Low back pain    Migraine    Anxiety, mild    Disturbance, sleep    Tobacco use    GERD (gastroesophageal reflux disease)    Hiatal hernia    Head ache    Status migrainosus    Dizziness    Depression    H/O fall    Head injury    Seizure disorder ([facility] [address]    Astigmatism    Rectal bleed    Cervical radiculopathy    Ventricular premature beats    Nausea and vomiting    Colon polyps    TIA (transient ischemic attack)    VBI (vertebrobasilar insufficiency)    Pilonidal cyst    Wound dehiscence    Sleep difficulties    Partial idiopathic epilepsy with seizures of localized onset, not intractable, without status epilepticus ([address]    Convulsions ([address]           CT OF THE HEAD WITHOUT CONTRAST; CT OF THE CERVICAL SPINE WITHOUT CONTRAST   11/15/2019 10:21 am       TECHNIQUE:   CT of the head was performed without the administration of intravenous   contrast. Dose modulation, iterative reconstruction, and/or weight based   adjustment of the mA/kV was utilized to reduce the radiation dose to as low   as reasonably achievable.; CT of the cervical spine was performed without the   administration of intravenous contrast. Multiplanar reformatted images are   provided for review. Dose modulation, iterative reconstruction, and/or weight   based adjustment of the mA/kV was utilized to reduce the radiation dose to as   low as reasonably achievable.       COMPARISON:   01/25/2019       HISTORY:   ORDERING SYSTEM PROVIDED HISTORY: Status migrainosus   TECHNOLOGIST PROVIDED HISTORY:   migraine, seizures   Reason for Exam: Hx chronic migraines.  C/o increase headaches since recent   falls   Acuity: Acute   Type of Exam: Initial       FINDINGS:   BRAIN/VENTRICLES: There is no acute intracranial hemorrhage, mass effect or   midline shift.  No abnormal extra-axial fluid collection.  The gray-white   differentiation is maintained without evidence of an acute infarct.  There is   no evidence of hydrocephalus.       ORBITS: The visualized portion of the orbits demonstrate no acute abnormality.       SINUSES: The visualized paranasal sinuses and mastoid air cells demonstrate   no acute abnormality.       SOFT TISSUES/SKULL:  No acute abnormality of the visualized skull or soft   tissues.       CERVICAL SPINE: Cervical spine maintains its normal lordotic curvature. There are subtle degenerative changes C6-C7.  No acute fracture or   malalignment.  Vertebral body heights and intervertebral disc spaces are   preserved.  Overlying soft tissues are unremarkable.  Visualized lung apices   are clear.           Impression   No acute intracranial abnormality.       No acute osseus abnormality of the cervical spine. Procedure: [facility] 11/13/2014 [id number] CT BRAIN WITHOUT CONTRAST    Reason for Exam: \      FULL RESULT: EXAM: CT Head without Contrast - 11/13/2014 7:27 PM      HISTORY: Syncope/Near-Syncope. Seizures. COMPARISON: 10/8/14       TECHNIQUE: Contiguous axial CT images were obtained from the skull base    to the vertex and reviewed in soft tissue, subdural, bone, and brain    windows. FINDINGS: The ventricular system is normal in size and configuration. There are no intra-axial or extra-axial fluid collections or mass    lesions. No acute intracranial hemorrhage or midline shift. Visualized    paranasal sinuses and mastoid air cells are clear. No acute calvarial    fracture is identified. The brainstem and the relationship of the    craniocervical junction structures are normal. There is incomplete fusion    of the posterior elements of C1, stable from prior study and compatible    with a normal variant. Visualized orbits and globes are intact. IMPRESSION:    No evidence for acute intracranial pathology. No bleed, mass effect, or    midline shift.               Procedure: [facility] 06/02/2014 [id number] MRI BRAIN COMBINED    Reason for Exam: \      FULL RESULT: MRI brain with and without intravenous contrast, 6/2/2014      History: Migraines      Technique: Multiplanar multisequence MR imaging of the brain was    performed before and after intravenous administration of 16 mL Magnevist.      Findings: No evidence for shift of midline structures, mass effect or    compression of the ventricles noted. No acute intra-or extra-axial    hemorrhage is seen. No abnormal intracranial fluid collections are    identified. The basal cisterns are patent. Posterior fossa structures demonstrate no    discrete mass. Few scattered foci of T2/FLAIR hyperintensity noted in the    periventricular and subcortical white matter which essentially are    nonspecific in imaging appearance however differential considerations    include demyelinating or inflammatory process, migraine induced changes,    chronic small vessel disease or vascular disease. No associated restricted diffusion or abnormal enhancing seen. A small    left occipital lobe developmental venous anomaly seen. No abnormal    enhancing intracranial mass seen. Visualized orbital contents appear unremarkable. Mild mucosal thickening of the ethmoid air cells noted. Skull base flow voids are patent. Superior sagittal sinus and straight sinus flow voids are patent. Heterogeneity of the T1-weighted marrow signal intensity seen. IMPRESSION:    1. No acute or subacute ischemic insult noted. No abnormal enhancing    intracranial mass seen. 2.scattered foci of T2/FLAIR hyperintensity noted in the periventricular    and subcortical white matter which essentially are nonspecific in imaging    appearance however differential considerations include demyelinating or    inflammatory process, migraine induced changes, chronic small vessel    disease or vascular disease. Procedure: [facility] 10/08/2014 [id number] CT BRAIN WITHOUT CONTRAST    Reason for Exam: \      FULL RESULT: EXAM: Brain CT without contrast dated 10/8/2014. HISTORY: Headache after head injury. COMPARISON: Brain CT dated 4/16/2014. TECHNIQUE: Multi-detector axial images are obtained through the head. Findings: There is no evidence for acute intracranial hemorrhage,    midline shift or mass effect.  Ventricular and cistern spaces are normal    and symmetric. [place] and white matter differentiation is well preserved. No intra- or extra-axial fluid collections or mass occupying lesions    seen. Bilateral cerebellopontine angles are normal. Visualized orbital    contents are normal. Study viewed in bone windows shows no    abnormalities. Mild left ethmoid sinusitis otherwise all visualized    sinuses are normally aerated. Bilateral temporomandibular joints are    normally aligned. IMPRESSION: No acute intracranial abnormalities. Mild left ethmoid    sinusitis. Plan:           VISITING DIAGNOSIS:      ICD-10-CM    1. Seizure disorder (HCC)  G40.909 lacosamide (VIMPAT) 100 MG TABS tablet     divalproex (DEPAKOTE) 250 MG DR tablet     SUMAtriptan (IMITREX) 50 MG tablet     promethazine (PHENERGAN) 25 MG tablet   2. Anxiety, mild  F41.9 lacosamide (VIMPAT) 100 MG TABS tablet     divalproex (DEPAKOTE) 250 MG DR tablet   3. Disturbance, sleep  G47.9 lacosamide (VIMPAT) 100 MG TABS tablet     divalproex (DEPAKOTE) 250 MG DR tablet   4. Tobacco use  Z72.0 lacosamide (VIMPAT) 100 MG TABS tablet     divalproex (DEPAKOTE) 250 MG DR tablet   5. Gastroesophageal reflux disease without esophagitis  K21.9 lacosamide (VIMPAT) 100 MG TABS tablet     divalproex (DEPAKOTE) 250 MG DR tablet   6. Hiatal hernia  K44.9 lacosamide (VIMPAT) 100 MG TABS tablet     divalproex (DEPAKOTE) 250 MG DR tablet   7. Lumbar sprain, subsequent encounter  S33. 5XXD lacosamide (VIMPAT) 100 MG TABS tablet     divalproex (DEPAKOTE) 250 MG DR tablet   8. Nonintractable headache, unspecified chronicity pattern, unspecified headache type  R51.9 lacosamide (VIMPAT) 100 MG TABS tablet     divalproex (DEPAKOTE) 250 MG DR tablet   9. Dizziness  R42 lacosamide (VIMPAT) 100 MG TABS tablet     divalproex (DEPAKOTE) 250 MG DR tablet   10. Sleep difficulties  G47.9 lacosamide (VIMPAT) 100 MG TABS tablet     divalproex (DEPAKOTE) 250 MG DR tablet   11.  Recurrent seizures ([facility]  G40.909 lacosamide (VIMPAT) 100 MG TABS tablet     divalproex (DEPAKOTE) 250 MG DR tablet   12. Partial idiopathic epilepsy with seizures of localized onset, not intractable, without status epilepticus (HCC)  G40.009 lacosamide (VIMPAT) 100 MG TABS tablet     divalproex (DEPAKOTE) 250 MG DR tablet     SUMAtriptan (IMITREX) 50 MG tablet     promethazine (PHENERGAN) 25 MG tablet   13. Nonintractable paroxysmal hemicrania, unspecified chronicity pattern  G44.039 lacosamide (VIMPAT) 100 MG TABS tablet     divalproex (DEPAKOTE) 250 MG DR tablet   14. Convulsions, unspecified convulsion type (HCC)  R56.9 lacosamide (VIMPAT) 100 MG TABS tablet     divalproex (DEPAKOTE) 250 MG DR tablet   15. Chronic migraine without aura without status migrainosus, not intractable  G43.709 lacosamide (VIMPAT) 100 MG TABS tablet     divalproex (DEPAKOTE) 250 MG DR tablet   16. Low back pain with sciatica, sciatica laterality unspecified, unspecified back pain laterality, unspecified chronicity  M54.40 lacosamide (VIMPAT) 100 MG TABS tablet   17. Dysthymia  F34.1 lacosamide (VIMPAT) 100 MG TABS tablet   18. H/O fall  Z91.81 lacosamide (VIMPAT) 100 MG TABS tablet   19. Injury of head, sequela  S09.90XS lacosamide (VIMPAT) 100 MG TABS tablet   20. Cervical radiculopathy  M54.12 lacosamide (VIMPAT) 100 MG TABS tablet   21. TIA (transient ischemic attack)  G45.9 lacosamide (VIMPAT) 100 MG TABS tablet   22. VBI (vertebrobasilar insufficiency)  G45.0 lacosamide (VIMPAT) 100 MG TABS tablet              CONCERNS   &   INCREASED   RISK   FOR        * TIA,  CEREBRO  VASCULAR  ISCHEMIA, STROKE       *  SEIZURE  ACTIVITY,  EPILEPSY ,        *  Poorly  Controlled Chronic  Headaches &  Migraines      *   COGNITIVE  &   MEMORY PROBLEMS  AND  DEMENTIAS                     VARIOUS  RISK   FACTORS   WERE  REVIEWED   AND   DISCUSSED. *  PATIENT   HAS  MULTIPLE   MEDICAL, MENTAL HEALTH         &      NEUROLOGICAL   PROBLEMS .          PATIENT'S MANAGEMENT  IS  CHALLENGING. PLAN:       Flaca Simms  Of  The  Diagnoses,  The  Management & Treatment  Options           AND    Care  plan  Were        Reviewed and   Discussed   With  patient. * Goals  And  Expectations  Of  The  Therapy  Discussed   And  Reviewed. *   Benefits   And   Side  Effect  Profile  Of  Medication/s   Were   Discussed             * Need   For  Further   Follow up For  The  Various  Problems  Were discussed. * Results  Of  The  Previous  Diagnostic tests were reviewed and questions answered. patient  understand the same. Medical  Decision  Making  Was  Made  Based on the   Complexity  Of  Above  Mentioned  Diagnoses,        Data reviewed   & diagnostic  Tests  Reviewed,  Risk  Of  Significant   Co morbidities and complicating   Factors. Medical  Decision  Was   High  Complexity  Due   To  The  Patient's  Multiple  Symptoms,  Advancing   Disease,      Complex  Treatment  Regimen,  Multiple medications and   Risk  Of   Side  Effects,  Difficulty  In  Medication  Management      And  Diagnostic  Challenges   In  View  Of  The  Associated   Co  Morbid  Conditions   And  Problems. * FALL   PRECAUTIONS. THESE  REVIEWED   AND  DISCUSSED      *   ABSOLUTELY   NO  DRIVING    -       REVIEWED     WITH  PATIENT  AND  HIS  WIFE      *   BE  CAREFUL  WITH  ACTIVITIES             *   ADEQUATE   FLUID  INTAKE   AND  ELECTROLYTE  BALANCE             * KEEP  DAIRY  OF   THE  NEUROLOGICAL  SYMPTOMS        RECORDING THE    DURATION  AND  FREQUENCY. *  AVOID    CONDITIONS  AND  FACTORS   THAT  MAKE   NEUROLOGICAL  SYMPTOMS  WORSE. *   SEIZURE  PRECAUTIONS.  -   DISCUSSED                  A)  Avoid  Working  At   [employer] B)  Avoid  Working  With  Heavy machinery. C)  Avoid   Swimming,  Climbing  A  Ladder   Unattended. D)  Avoid   Driving   If  You   Have  A  Seizure.         E)  Must   Be Seizure  Free   For  At   Least   6 months,  Before   You  Can drive. F) Some times  Your  May  Feel  Seizure coming  Before  It  Begins. You  May feel             Strange smell or funny  Feeling  In  Your  Stomach,  Which is  Called   Aura. TIPS  TO  REDUCE/ PREVENT  SEIZURES         1. Take  Your  Anti seizure  Medications   As   Recommended. 2. Get   Enough   Sleep. Sleep  Deprivation   Can  Trigger  A  Seizure. 3. If   You   Have  A fever,  Treat  It  At  Once,  And  Contact   Your  Primary  Care Providers. 4. Avoid   Alcohol. 5. Avoid  Flashing  Lights,  Loud  Noises and  TV  And  Video  Games,           As   These  May  Trigger   Your  Seizures     6. Control  Your  Stress  And   Have  Adequate  Rest.     7.   If  You  Feel  A  Seizure  Coming   On :           A) warn people  Who  Are  With  You           B)  Make  Sure  There  Are  No  Sharp or  Hard  Objects  Around you. C)  Lay down  On  Your  Side  And  Relax. *  TO  MAINTAIN  REGULAR  SLEEP  WAKE  CYCLES. *   TO  HAVE  ADEQUATE  REST  AND   SLEEP    HOURS.          *    AVOID  ANY USAGE OF                   TOBACCO,  EXCESSIVE  ALCOHOL  AND   ILLEGAL   SUBSTANCES          *  CONTINUE MEDICATIONS PRESCRIBED BY NEUROLOGIST AS    RECOMMENDED     *   Compliance   With  Medications   And  Instructions          * CURRENTLY  TOLERATING  THE  PRESCRIBED   MEDICATIONS. WITHOUT  ANY  SIGNIFICANT  SIDE  EFFECTS   &  GETTING BENEFIT.           *  May   Use  Pill  Box,    If  Needed      *  MEDICATIONS TO AVOID:    WELLBUTRIN,  ULTRAM          *     Antiplatelet  therapy    As   Recommended  Was   Discussed          *    Prophylactic  Use   Of Vitamin   B   Complex,  Folic  Acid,    Vitamin  B12        Multivitamin   Tablet  Daily    Supplementations   Over  The  Counter  Discussed           *  PATIENT  IS  ALSO   COUNSELED   TO  KEEP    ACTIVITIES         A)   SIMPLE      B)  ORGANIZED      C)  WRITE   DOWN                     *  EVALUATIONS  AND  FOLLOW UP:                                          * CARDIOLOGY               * EPILEPSY  MONITORING   UNIT       -  PATIENT  NOT  INTERESTED                                     *     PATIENT  DENIES  ANY   SEIZURE  RECURRENCE  SINCE    DEC.   2019                             -                   *        EXPECTATIONS,   GOALS   OF  SEIZURE  MANAGEMENT                           AND  SIDE  EFFECTS  MEDICATIONS    WERE                                 REVIEWED     AND   DISCUSSED    IN    DETAIL. Controlled Substances Monitoring: Periodic Controlled Substance Monitoring: Possible medication side effects, risk of tolerance/dependence & alternative treatments discussed. , Assessed functional status. Vira Ospina MD)            Orders Placed This Encounter   Medications    lacosamide (VIMPAT) 100 MG TABS tablet     Sig: Take 1 tablet by mouth 2 times daily for 30 days.      Dispense:  60 tablet     Refill:  2    divalproex (DEPAKOTE) 250 MG DR tablet     Sig: take 3 tablets by mouth twice a day     Dispense:  180 tablet     Refill:  5    SUMAtriptan (IMITREX) 50 MG tablet     Sig: take 1 tablet by mouth once daily if needed for MIGRAINE     Dispense:  30 tablet     Refill:  2    promethazine (PHENERGAN) 25 MG tablet     Sig: take 1 tablet by mouth every 8 hours if needed for nausea     Dispense:  60 tablet     Refill:  2                     *PATIENT   TO  FOLLOW  UP  WITH   PRIMARY  CARE   AND   OTHER  CONSULTANTS  AS  BEFORE.           *TO  FOLLOW  WITH   MENTAL  HEALTH  PROFESSIONALS ,  INCLUDING            PSYCHOLOGICAL  COUNSELING   AND  PSYCHIATRIC  EVALUTIONS            *  Maintain Healthy  Life Style    With   Periodic  Monitoring  Of      Any  Medical  Conditions  Including   Elevated  Blood  Pressure,  Lipid  Profile,     Blood  Sugar levels  And   Heart  Disease. *   Period   Screening  For  Cancers  Involving  Breast,  Colon,    Prostate, lungs  And  Other  Organs  As  Applicable,  In consultation   With  Your  Primary Care Providers. * Second  Neurological  Opinion  And  Evaluations  In  [facility]  Setting  If  Patient  Is  Interested. *  If  The  Patient remains  Neurologically  Stable   Return   To  [facility] Neurology Department       IN     3      MONTHS  TIME   FOR  FURTHER  FOLLOW UP.                   *  If   There is  Any  Significant  Worsening   Of  Current  Symptoms  And      Or  If patient  Develops   Any additional  New  Neurological  Symptoms  Or  Significant  Concerns       Should  Call  911 or  Go  To  Emergency  Department  For  Further  Immediate  Evaluation. *   The  Neurological   Findings,  Possible  Diagnosis,  Differential diagnoses   And  Options      For    Further   Investigations   And  management   Are  Discussed  Comprehensively. Medications   And  Prescription   Risks  And  Side effects  Are   Also  Discussed. The  Above  Were  Reviewed  With  patient      questions  Answered  In  Detail. More   Than   50% of face  To face Time   Was  Spent  On  Counseling   And   Coordination  Of  Care       Of   Patient's multiple   Neurological  Problems   And   Comorbid  Medical   Conditions.                  VIRTUAL   VIDEO  VISIT          PATIENT  BEING   EVALUATED   BY  NEUROLOGIST   VIA   a Virtual Visit (video visit) encounter          to address concerns as mentioned  ABOVE        ALSO    nursing   caregiver was present     before,  during  and  after   the    visit        Due to this being a TeleHealth encounter (During TPP-09 public health emergency), evaluation of the following organ systems was limited:     Vitals/Constitutional/EENT/Resp/CV/GI//MS/Neuro/Skin/Heme-Lymph-Imm. Pursuant to the emergency declaration under the [facility], [address] [place] [address] authority and the [employer] Act, this Virtual Visit was conducted with patient's (and/or legal guardian's) consent, to reduce the patient's risk of exposure to COVID-19 and provide necessary medical care. The patient (and/or legal guardian) has also been advised to contact this office for worsening conditions or problems, and seek emergency medical treatment and/or call 911 if deemed necessary. Patient identification was verified at the start of the visit: Yes    Total time spent for this encounter:  ( Time Spent:     40   minutes )    Services were provided through a video synchronous discussion   and  limited    examination  virtually to substitute for in-person clinic visit. Patient    located at    [address] [place] [address]. Cecille David, MD Annamarie Angelucci, MD on 9/9/3409 at 2:22 PM    An electronic signature was used to authenticate this note. Electronically signed by Leon James MD     Board Certified in  Neurology &  In  [facility] of [facility]iatry and Neurology (ABPN)      DISCLAIMER:   Although every effort was made to ensure the accuracy of this  electronic transcription, some errors in transcription may have occurred. GENERAL PATIENT INSTRUCTIONS:     A Healthy Lifestyle: Care Instructions  Your Care Instructions  A healthy lifestyle can help you feel good, stay at a healthy weight, and have plenty of energy for both work and play. A healthy lifestyle is something you can share with your whole family.   A healthy lifestyle also can lower your risk for serious health problems, such as high blood pressure, heart disease, and diabetes. You can follow a few steps listed below to improve your health and the health of your family. Follow-up care is a key part of your treatment and safety. Be sure to make and go to all appointments, and call your doctor if you are having problems. Its also a good idea to know your test results and keep a list of the medicines you take. How can you care for yourself at home? Do not eat too much sugar, fat, or fast foods. You can still have dessert and treats now and then. The goal is moderation. Start small to improve your eating habits. Pay attention to portion sizes, drink less juice and soda pop, and eat more fruits and vegetables. Eat a healthy amount of food. A 3-ounce serving of meat, for example, is about the size of a deck of cards. Fill the rest of your plate with vegetables and whole grains. Limit the amount of soda and sports drinks you have every day. Drink more water when you are thirsty. Eat at least 5 servings of fruits and vegetables every day. It may seem like a lot, but it is not hard to reach this goal. A serving or helping is 1 piece of fruit, 1 cup of vegetables, or 2 cups of leafy, raw vegetables. Have an apple or some carrot sticks as an afternoon snack instead of a candy bar. Try to have fruits and/or vegetables at every meal.  Make exercise part of your daily routine. You may want to start with simple activities, such as walking, bicycling, or slow swimming. Try to be active 30 to 60 minutes every day. You do not need to do all 30 to 60 minutes all at once. For example, you can exercise 3 times a day for 10 or 20 minutes. Moderate exercise is safe for most people, but it is always a good idea to talk to your doctor before starting an exercise program.  Keep moving. Elda Ingrid the lawn, work in the garden, or [employer]. Take the stairs instead of the elevator at work. If you smoke, quit.  People who smoke have an increased risk for heart attack, stroke, cancer, and other lung illnesses. Quitting is hard, but there are ways to boost your chance of quitting tobacco for good. Use nicotine gum, patches, or lozenges. Ask your doctor about stop-smoking programs and medicines. Keep trying. In addition to reducing your risk of diseases in the future, you will notice some benefits soon after you stop using tobacco. If you have shortness of breath or asthma symptoms, they will likely get better within a few weeks after you quit. Limit how much alcohol you drink. Moderate amounts of alcohol (up to 2 drinks a day for men, 1 drink a day for women) are okay. But drinking too much can lead to liver problems, high blood pressure, and other health problems. Family health  If you have a family, there are many things you can do together to improve your health. Eat meals together as a family as often as possible. Eat healthy foods. This includes fruits, vegetables, lean meats and dairy, and whole grains. Include your family in your fitness plan. Most people think of activities such as jogging or tennis as the way to fitness, but there are many ways you and your family can be more active. Anything that makes you breathe hard and gets your heart pumping is exercise. Here are some tips:  Walk to do errands or to take your child to school or the bus. Go for a family bike ride after dinner instead of watching TV. Where can you learn more? Go to https://[employer]. org and sign in to your Care IT account. Enter L429 in the [facility] box to learn more about \"A Healthy Lifestyle: Care Instructions. \"     If you do not have an account, please click on the \"Sign Up Now\" link. Current as of: July 26, 2016  Content Version: 11.2  © 9251-1370 [employer]. Care instructions adapted under license by [facility] [place] ([facility]).  If you have questions about a medical condition or this instruction, always ask your healthcare professional. [place] [address] any warranty or liability for your use of this information.

## 2021-01-25 ENCOUNTER — TELEPHONE (OUTPATIENT)
Dept: NEUROLOGY | Age: 41
End: 2021-01-25

## 2021-01-25 NOTE — TELEPHONE ENCOUNTER
Left message for patient that PA was denied - informed patient that there are coupon cards in office to help with cost of vimpat; to contact office with further needs.

## 2021-02-11 ENCOUNTER — OFFICE VISIT (OUTPATIENT)
Dept: PRIMARY CARE CLINIC | Age: 41
End: 2021-02-11
Payer: MEDICARE

## 2021-02-11 VITALS
BODY MASS INDEX: 31.18 KG/M2 | HEIGHT: 66 IN | HEART RATE: 100 BPM | TEMPERATURE: 98.7 F | DIASTOLIC BLOOD PRESSURE: 74 MMHG | WEIGHT: 194 LBS | SYSTOLIC BLOOD PRESSURE: 130 MMHG

## 2021-02-11 DIAGNOSIS — R10.31 RIGHT LOWER QUADRANT ABDOMINAL PAIN: Primary | ICD-10-CM

## 2021-02-11 PROCEDURE — 99213 OFFICE O/P EST LOW 20 MIN: CPT | Performed by: FAMILY MEDICINE

## 2021-02-11 PROCEDURE — 99213 OFFICE O/P EST LOW 20 MIN: CPT

## 2021-02-11 PROCEDURE — G8483 FLU IMM NO ADMIN DOC REA: HCPCS | Performed by: FAMILY MEDICINE

## 2021-02-11 PROCEDURE — G8427 DOCREV CUR MEDS BY ELIG CLIN: HCPCS | Performed by: FAMILY MEDICINE

## 2021-02-11 PROCEDURE — 4004F PT TOBACCO SCREEN RCVD TLK: CPT | Performed by: FAMILY MEDICINE

## 2021-02-11 PROCEDURE — G8417 CALC BMI ABV UP PARAM F/U: HCPCS | Performed by: FAMILY MEDICINE

## 2021-02-11 ASSESSMENT — ENCOUNTER SYMPTOMS
VOMITING: 0
ABDOMINAL PAIN: 1
DIARRHEA: 0
RESPIRATORY NEGATIVE: 1
BACK PAIN: 1
EYES NEGATIVE: 1
CONSTIPATION: 0
BLOOD IN STOOL: 0
NAUSEA: 0
ALLERGIC/IMMUNOLOGIC NEGATIVE: 1

## 2021-02-11 NOTE — PROGRESS NOTES
Subjective:      Patient ID: Donald Bear is a 38 y.o. male. HPI Acute urgent care visit for concerns for appendicitis issues. He was at the chiropractor today and during manipulation, he reported moderate to severe pain with pressure applied to the right lower quadrant. He was lying prone with pushing down on the posterior SI areas and he had acute pain and a sense something was going to burst from pressure. Repeated pain lying supine, and pain with trying to raise the right leg against resistance. He reports a low grade fever to 100 on Monday. Went down with ibuprofen and nothing since. He has a history of both inguinal hernias repaired twice in the past.     No nausea or vomiting . Eating well at present. No bowel or bladder concerns. Past Medical History:   Diagnosis Date    Anxiety, mild     Colon polyps     Depression     Dizziness     Eczema     Epilepsy ([address]     GERD (gastroesophageal reflux disease)     H/O fall     Head ache     Head injury     Hiatal hernia     Inguinal hernia     Right.  Low back pain     Lumbar sprain     Migraine     Plantar fasciitis, bilateral     Seizure disorder ([address]     Sigmoid diverticulosis     Sleep difficulties     Status migrainosus     TIA (transient ischemic attack) 09/2017    Tobacco use     Chronic. Past Surgical History:   Procedure Laterality Date    COLONOSCOPY  04/29/2011    Internal hemorrhoids, possible anal fissure, few scattered diverticula.  COLONOSCOPY  06/04/2010    Mild sigmoid diverticulosis.  COLONOSCOPY  08/28/2009    Sigmoid diverticulosis, internal hemorrhoids.  COLONOSCOPY  05/27/2008    Internal hemorrhoids.     COLONOSCOPY  11/19/2014    polyp in rectum    COLONOSCOPY  6/1/16    sigmoid diverticulosis, colon polyp, internal hemorrhoids    COLONOSCOPY  04/19/2019    [id number]    GASTRIC FUNDOPLICATION  51/28/9646    HERNIA REPAIR Left 11/30/2017 Left Inguinal Hernia Repair w/ Mesh performed by Clara Moreno MD at [facility] Right 05/30/2013    INGUINAL HERNIA REPAIR Left 01/24/2013    INGUINAL HERNIA REPAIR Right 09/24/2015    KNEE ARTHROSCOPY Left     PILONIDAL CYST EXCISION N/A 7/23/2020    PILONIDAL CYSTECTOMY performed by Ana Brand DO at [facility]  03/05/2010    Recurrent hiatal hernia.  UPPER GASTROINTESTINAL ENDOSCOPY  6/1/16    S/P brenda fundoplication, good repair, retained gastric fluid    VASECTOMY           Review of Systems   Constitutional: Negative. Negative for fever. HENT: Negative. Eyes: Negative. Respiratory: Negative. Cardiovascular: Negative. Gastrointestinal: Positive for abdominal pain. Negative for blood in stool, constipation, diarrhea, nausea and vomiting. Endocrine: Negative. Genitourinary: Negative. Musculoskeletal: Positive for arthralgias, back pain and neck pain. Skin: Negative. Allergic/Immunologic: Negative. Neurological: Positive for headaches. Hematological: Negative. Psychiatric/Behavioral: Negative. Objective:   Physical Exam  Constitutional:       General: He is not in acute distress. Appearance: Normal appearance. He is not toxic-appearing. HENT:      Head: Normocephalic and atraumatic. Nose: Nose normal. No congestion. Cardiovascular:      Rate and Rhythm: Normal rate. Pulses: Normal pulses. Pulmonary:      Effort: Pulmonary effort is normal. No respiratory distress. Abdominal:      General: Bowel sounds are normal. There is distension (soft, a little tympanic in the right upper quadrant). Palpations: There is no mass. Tenderness: There is abdominal tenderness. There is no guarding or rebound. Hernia: No hernia is present. Musculoskeletal: Normal range of motion. Skin:     General: Skin is warm. Neurological:      General: No focal deficit present. Mental Status: He is alert. Psychiatric:         Mood and Affect: Mood normal.         Behavior: Behavior normal.         Thought Content: Thought content normal.       /74 (Site: Right Upper Arm, Position: Sitting, Cuff Size: Large Adult)   Pulse 100   Temp 98.7 °F (37.1 °C) (Tympanic)   Ht 5' 6\" (1.676 m)   Wt 194 lb (88 kg)   BMI 31.31 kg/m²     Assessment:      Concerns for right lower quadrant pain with chiropractic manipulation today. Concerns raised for appendicitis . No constant pain, no rebound pain. Non acute abdomen on exam.  Active bs. Plan:      Suspect a gas bubble or other source for the pain. Some pain into the inguinal area on the right. No palpable hernia. Cant rule out recurrent hernia on the inside, s/p 2 repairs in the past.      Opting to observe in the absence of fever, vomiting , or constant or progressive pain .           Mehrdad Bagley MD

## 2021-03-31 ENCOUNTER — OFFICE VISIT (OUTPATIENT)
Dept: PRIMARY CARE CLINIC | Age: 41
End: 2021-03-31
Payer: MEDICARE

## 2021-03-31 ENCOUNTER — HOSPITAL ENCOUNTER (OUTPATIENT)
Age: 41
Setting detail: SPECIMEN
Discharge: HOME | End: 2021-03-31
Payer: MEDICARE

## 2021-03-31 VITALS
HEIGHT: 66 IN | OXYGEN SATURATION: 96 % | DIASTOLIC BLOOD PRESSURE: 76 MMHG | WEIGHT: 190.2 LBS | HEART RATE: 110 BPM | RESPIRATION RATE: 18 BRPM | BODY MASS INDEX: 30.57 KG/M2 | TEMPERATURE: 97.8 F | SYSTOLIC BLOOD PRESSURE: 132 MMHG

## 2021-03-31 DIAGNOSIS — L72.8 OTHER FOLLICULAR CYSTS OF THE SKIN AND SUBCUTANEOUS TISSUE: ICD-10-CM

## 2021-03-31 DIAGNOSIS — Z20.822 PERSON UNDER INVESTIGATION FOR COVID-19: ICD-10-CM

## 2021-03-31 DIAGNOSIS — R53.83 FATIGUE, UNSPECIFIED TYPE: ICD-10-CM

## 2021-03-31 DIAGNOSIS — J06.9 VIRAL URI: Primary | ICD-10-CM

## 2021-03-31 DIAGNOSIS — J34.89 RHINORRHEA: ICD-10-CM

## 2021-03-31 DIAGNOSIS — R05.9 COUGH: ICD-10-CM

## 2021-03-31 PROCEDURE — G8483 FLU IMM NO ADMIN DOC REA: HCPCS | Performed by: NURSE PRACTITIONER

## 2021-03-31 PROCEDURE — 99213 OFFICE O/P EST LOW 20 MIN: CPT | Performed by: NURSE PRACTITIONER

## 2021-03-31 PROCEDURE — G8427 DOCREV CUR MEDS BY ELIG CLIN: HCPCS | Performed by: NURSE PRACTITIONER

## 2021-03-31 PROCEDURE — 4004F PT TOBACCO SCREEN RCVD TLK: CPT | Performed by: NURSE PRACTITIONER

## 2021-03-31 PROCEDURE — G8417 CALC BMI ABV UP PARAM F/U: HCPCS | Performed by: NURSE PRACTITIONER

## 2021-03-31 PROCEDURE — U0003 INFECTIOUS AGENT DETECTION BY NUCLEIC ACID (DNA OR RNA); SEVERE ACUTE RESPIRATORY SYNDROME CORONAVIRUS 2 (SARS-COV-2) (CORONAVIRUS DISEASE [COVID-19]), AMPLIFIED PROBE TECHNIQUE, MAKING USE OF HIGH THROUGHPUT TECHNOLOGIES AS DESCRIBED BY CMS-2020-01-R: HCPCS

## 2021-03-31 PROCEDURE — 99213 OFFICE O/P EST LOW 20 MIN: CPT

## 2021-03-31 PROCEDURE — U0005 INFEC AGEN DETEC AMPLI PROBE: HCPCS

## 2021-03-31 ASSESSMENT — PATIENT HEALTH QUESTIONNAIRE - PHQ9
SUM OF ALL RESPONSES TO PHQ9 QUESTIONS 1 & 2: 0
SUM OF ALL RESPONSES TO PHQ QUESTIONS 1-9: 0
1. LITTLE INTEREST OR PLEASURE IN DOING THINGS: 0

## 2021-03-31 ASSESSMENT — ENCOUNTER SYMPTOMS
WHEEZING: 0
DIARRHEA: 0
VOMITING: 0
NAUSEA: 0
RHINORRHEA: 1
SHORTNESS OF BREATH: 1
COUGH: 1
SORE THROAT: 1

## 2021-03-31 NOTE — PROGRESS NOTES
[facility] [place] [facility]  [facility] [place]  [place] [address]  Dept: [phone]  Dept Fax: [phone]  Loc: [phone]        CHIEF COMPLAINT       Chief Complaint   Patient presents with    Fever     cough,sore throat,fatigue,sweats,started monday       Nurses Notes reviewed and I agree except as noted in the HPI. HISTORY OF PRESENT ILLNESS   Cata Motley is a 38 y.o. male who presents to [facility] today (3/31/2021) for evaluation of:   Fever   This is a new problem. The current episode started in the past 7 days (3/29/21). The problem occurs intermittently. The problem has been unchanged. Associated symptoms include coughing and a sore throat. Pertinent negatives include no chest pain, diarrhea, nausea, vomiting or wheezing. He has tried acetaminophen and NSAIDs (cough drops) for the symptoms. The treatment provided mild relief. Risk factors: no sick contacts    Other  This is a new (Pt also requests provider look at \"spot\" in left groin area. States spot has been there fore several weeks. ) problem. The current episode started 1 to 4 weeks ago. The problem occurs intermittently. The problem has been rapidly improving. Associated symptoms include coughing, diaphoresis, fatigue (tmax 101.3), a fever and a sore throat. Pertinent negatives include no chest pain, myalgias, nausea or vomiting. Nothing aggravates the symptoms. Treatments tried: Pt tried squeezing it to drain but only got a small amount of drainage out a week or so ago. Has tried to leave it alone recently and has noticved improvement. The treatment provided moderate relief. REVIEW OF SYSTEMS     Review of Systems   Constitutional: Positive for diaphoresis, fatigue (tmax 101.3) and fever. HENT: Positive for rhinorrhea (intermittent) and sore throat. Respiratory: Positive for cough and shortness of breath (\"winded\" with activity).  Negative for wheezing. Cardiovascular: Negative for chest pain and palpitations. Gastrointestinal: Negative for diarrhea, nausea and vomiting. Musculoskeletal: Negative for myalgias. PAST MEDICAL HISTORY         Diagnosis Date    Anxiety, mild     Colon polyps     Depression     Dizziness     Eczema     Epile[address] [address]     GERD (gastroesophageal reflux disease)     H/O fall     Head ache     Head injury     Hiatal hernia     Inguinal hernia     Right.  Low back pain     Lumbar sprain     Migraine     Plantar fasciitis, bilateral     Seizure disorder ([facility] [address] [facility]     Sigmoid diverticulosis     Sleep difficulties     Status migrainosus     TIA (transient ischemic attack) 09/2017    Tobacco use     Chronic. SURGICAL HISTORY     Patient  has a past surgical history that includes Vasectomy; Knee arthroscopy (Left); Gastric fundoplication (93/20/7043); Upper gastrointestinal endoscopy (03/05/2010); Colonoscopy (04/29/2011); Colonoscopy (06/04/2010); Colonoscopy (08/28/2009); Colonoscopy (05/27/2008); Colonoscopy (11/19/2014); Inguinal hernia repair (Right, 05/30/2013); Inguinal hernia repair (Left, 01/24/2013); Inguinal hernia repair (Right, 09/24/2015); Colonoscopy (6/1/16); Upper gastrointestinal endoscopy (6/1/16); hernia repair (Left, 11/30/2017); Colonoscopy (04/19/2019); and Pilonidal cyst excision (N/A, 7/23/2020). CURRENT MEDICATIONS       Outpatient Medications Prior to Visit   Medication Sig Dispense Refill    divalproex (DEPAKOTE) 250 MG DR tablet take 3 tablets by mouth twice a day 180 tablet 5    SUMAtriptan (IMITREX) 50 MG tablet take 1 tablet by mouth once daily if needed for MIGRAINE 30 tablet 2    promethazine (PHENERGAN) 25 MG tablet take 1 tablet by mouth every 8 hours if needed for nausea 60 tablet 2    acetaminophen (TYLENOL) 500 MG tablet Take 500 mg by mouth every 4 hours as needed for Pain       No facility-administered medications prior to visit.         ALLERGIES Patient is has No Known Allergies. FAMILY HISTORY     Patient's family history includes Alzheimer's Disease in his maternal grandmother; Arthritis in his sister; Asthma in his daughter; COPD in his maternal grandmother and mother; Cancer in his father, maternal grandmother, paternal grandfather, paternal grandmother, and sister; Eczema in his daughter and sister; High Blood Pressure in his father, mother, and sister. SOCIAL HISTORY     Patient  reports that he has been smoking cigarettes. He has a 20.00 pack-year smoking history. He has never used smokeless tobacco. He reports that he does not drink alcohol or use drugs. PHYSICAL EXAM     VITALS  BP: 132/76, Temp: 97.8 °F (36.6 °C), Pulse: 110, Resp: 18, SpO2: 96 %  Physical Exam  Vitals signs reviewed. Chaperone present: Offered to have chaperone come into room for exam, pt declines. Constitutional:       General: He is not in acute distress. Appearance: He is not ill-appearing. HENT:      Right Ear: Tympanic membrane and ear canal normal.      Left Ear: Tympanic membrane and ear canal normal.      Nose: Congestion and rhinorrhea present. Rhinorrhea is clear. Mouth/Throat:      Lips: Pink. Mouth: Mucous membranes are moist.      Pharynx: Oropharynx is clear. Uvula midline. Posterior oropharyngeal erythema (slight) present. No pharyngeal swelling or oropharyngeal exudate. Tonsils: No tonsillar exudate. Eyes:      Pupils: Pupils are equal, round, and reactive to light. Neck:      Musculoskeletal: Normal range of motion and neck supple. No neck rigidity. Cardiovascular:      Rate and Rhythm: Normal rate and regular rhythm. Heart sounds: Normal heart sounds, S1 normal and S2 normal. No murmur. Pulmonary:      Effort: Pulmonary effort is normal. No accessory muscle usage or respiratory distress. Breath sounds: Normal breath sounds. No wheezing or rhonchi.    Abdominal:      Hernia: There is no hernia in the left inguinal area.   Genitourinary:         Comments: 1 cm x 1.5 cm firm cyst-like area palpated under the skin. Area is not erythematous, warm, tender with palpation, or draining. Musculoskeletal: Normal range of motion. Lymphadenopathy:      Cervical: No cervical adenopathy. Lower Body: No left inguinal adenopathy. Skin:     General: Skin is warm and dry. Capillary Refill: Capillary refill takes less than 2 seconds. Neurological:      General: No focal deficit present. Mental Status: He is alert. DIAGNOSTIC RESULTS   Labs:No results found for this visit on 03/31/21. IMAGING:        CLINICAL COURSE:     Vitals:    03/31/21 1059   BP: 132/76   Pulse: 110   Resp: 18   Temp: 97.8 °F (36.6 °C)   TempSrc: Temporal   SpO2: 96%   Weight: 190 lb 3.2 oz (86.3 kg)   Height: 5' 6\" (1.676 m)           PROCEDURES:  None  FINAL IMPRESSION      1. Viral URI    2. Cough    3. Rhinorrhea    4. Fatigue, unspecified type    5. Person under investigation for COVID-19    6. Other follicular cysts of the skin and subcutaneous tissue         DISPOSITION/PLAN     Symptoms appear viral at this time. Covid testing collected and quarantine guidelines reviewed; will notify as soon as results are available. Discussed supportive measures for symptom relief and educated pt on s/s that warrant return to clinic. Encouraged pt to leave area in groin omar as it does not appear to be infected or bothersome at this time. Should area become inflamed, tender, or exhibit signs of infection, pt should return to clinic or follow up with PCP. Patient Instructions     Will notify you of covid test result as soon as available. You should isolate at home in an area away from family. If you must be around family members, please wear a mask. Quarantine at home until result is available. This means do not go to work/school, attend family gatherings, or invite others to your home until you know your test results.   A work/school note will be provided for you. Symptoms appear viral; no antibiotic warranted at this time. The following are measures you can try at home to help provide symptom relief:    --Increase your water intake to help thin secretions and maintain hydration. Goal is 2-3 liters/day. --May try mucinex (guaifenesin) to help with congestion and robitussin (dextromethorphan) for persistent cough. Use cool mist humidifier at bedtime to moisturize air. Use nasal saline rinse as needed for congestion. --Tylenol or ibuprofen for fever/body aches/headache. Warm/cold packs to forehead and back of neck can help with headache pain. Warm baths/showers can sooth body aches also. Practice good hand hygiene and cover your cough and sneeze to prevent passing virus on. If symptoms worsen or fail to improve, please return to clinic. If you develop severe worsening of symptoms such as chest pain or difficulty breathing, please go to ER. Patient Education        Viral Respiratory Infection: Care Instructions  Your Care Instructions     Viruses are very small organisms. They grow in number after they enter your body. There are many types that cause different illnesses, such as colds and the mumps. The symptoms of a viral respiratory infection often start quickly. They include a fever, sore throat, and runny nose. You may also just not feel well. Or you may not want to eat much. Most viral respiratory infections are not serious. They usually get better with time and self-care. Antibiotics are not used to treat a viral infection. That's because antibiotics will not help cure a viral illness. In some cases, antiviral medicine can help your body fight a serious viral infection. Follow-up care is a key part of your treatment and safety. Be sure to make and go to all appointments, and call your doctor if you are having problems. It's also a good idea to know your test results and keep a list of the medicines you take. How can you care for yourself at home? · Rest as much as possible until you feel better. · Be safe with medicines. Take your medicine exactly as prescribed. Call your doctor if you think you are having a problem with your medicine. You will get more details on the specific medicine your doctor prescribes. · Take an over-the-counter pain medicine, such as acetaminophen (Tylenol), ibuprofen (Advil, Motrin), or naproxen (Aleve), as needed for pain and fever. Read and follow all instructions on the label. Do not give aspirin to anyone younger than 20. It has been linked to Reye syndrome, a serious illness. · Drink plenty of fluids. Hot fluids, such as tea or soup, may help relieve congestion in your nose and throat. If you have kidney, heart, or liver disease and have to limit fluids, talk with your doctor before you increase the amount of fluids you drink. · Try to clear mucus from your lungs by breathing deeply and coughing. · Gargle with warm salt water once an hour. This can help reduce swelling and throat pain. Use 1 teaspoon of salt mixed in 1 cup of warm water. · Do not smoke or allow others to smoke around you. If you need help quitting, talk to your doctor about stop-smoking programs and medicines. These can increase your chances of quitting for good. To avoid spreading the virus  · Cough or sneeze into a tissue. Then throw the tissue away. · If you don't have a tissue, use your hand to cover your cough or sneeze. Then clean your hand. You can also cough into your sleeve. · Wash your hands often. Use soap and warm water. Wash for 15 to 20 seconds each time. · If you don't have soap and water near you, you can clean your hands with alcohol wipes or gel. When should you call for help?    Call your doctor now or seek immediate medical care if:    · You have a new or higher fever.     · Your fever lasts more than 48 hours.     · You have trouble breathing.     · You have a fever with a stiff neck or a severe headache.     · You are sensitive to light.     · You feel very sleepy or confused. Watch closely for changes in your health, and be sure to contact your doctor if:    · You do not get better as expected. Where can you learn more? Go to [email or web address] org and sign in to your [employer] account. Enter T524 in the [facility] box to learn more about \"Viral Respiratory Infection: Care Instructions. \"     If you do not have an account, please click on the \"Sign Up Now\" link. Current as of: October 26, 2020               Content Version: 12.8  © 3508-5480 [employer]. Care instructions adapted under license by [facility] ([facility]). If you have questions about a medical condition or this instruction, always ask your healthcare professional. [address] any warranty or liability for your use of this information. Patient Education        Learning About Coronavirus [phone])  What is coronavirus (COVID-19)? COVID-19 is a disease caused by a new type of coronavirus. This illness was first found in December 2019. It has since spread worldwide. Coronaviruses are a large group of viruses. They cause the common cold. They also cause more serious illnesses like [place] respiratory syndrome (MERS) and severe acute respiratory syndrome (SARS). COVID-19 is caused by a novel coronavirus. That means it's a new type that has not been seen in people before. What are the symptoms? Coronavirus (COVID-19) symptoms may include:  · Fever. · Cough. · Trouble breathing. · Chills or repeated shaking with chills. · Muscle pain. · Headache. · Sore throat. · New loss of taste or smell. · Vomiting. · Diarrhea. In severe cases, COVID-19 can cause pneumonia and make it hard to breathe without help from a machine. It can cause death. How is it diagnosed?   COVID-19 is diagnosed with a viral test. This may also be called a PCR test or antigen test. It looks for evidence of the virus in your breathing passages or lungs (respiratory system). The test is most often done on a sample from the nose, throat, or lungs. It's sometimes done on a sample of saliva. One way a sample is collected is by putting a long swab into the back of your nose. How is it treated? Mild cases of COVID-19 can be treated at home. Serious cases need treatment in the hospital. Treatment may include medicines to reduce symptoms, plus breathing support such as oxygen therapy or a ventilator. Some people may be placed on their belly to help their oxygen levels. Treatments that may help people who have COVID-19 include:  Antiviral medicines. These medicines treat viral infections. Remdesivir is an example. Immune-based therapy. These medicines help the immune system fight COVID-19. One example is bamlanivimab. It's a monoclonal antibody. Blood thinners. These medicines help prevent blood clots. People with severe illness are at risk for blood clots. How can you protect yourself and others? The best way to protect yourself from getting sick is to:  · Avoid areas where there is an outbreak. · Avoid contact with people who may be infected. · Avoid crowds and try to stay at least 6 feet away from other people. · Wash your hands often, especially after you cough or sneeze. Use soap and water, and scrub for at least 20 seconds. If soap and water aren't available, use an alcohol-based hand . · Avoid touching your mouth, nose, and eyes. To help avoid spreading the virus to others:  · Stay home if you are sick or have been exposed to the virus. Don't go to school, work, or public areas. And don't use public transportation, ride-shares, or taxis unless you have no choice. · Wear a cloth face cover if you have to go to public areas. · Cover your mouth with a tissue when you cough or sneeze. Then throw the tissue in the trash and wash your hands right away. · If you're sick:  ? Leave your home only if you need to get medical care. But call the doctor's office first so they know you're coming. And wear a face cover. ? Wear the face cover whenever you're around other people. It can help stop the spread of the virus when you cough or sneeze. ? Limit contact with pets and people in your home. If possible, stay in a separate bedroom and use a separate bathroom. ? Clean and disinfect your home every day. Use household  and disinfectant wipes or sprays. Take special care to clean things that you grab with your hands. These include doorknobs, remote controls, phones, and handles on your refrigerator and microwave. And don't forget countertops, tabletops, bathrooms, and computer keyboards. When should you call for help? Call 911 anytime you think you may need emergency care. For example, call if you have life-threatening symptoms, such as:    · You have severe trouble breathing. (You can't talk at all.)     · You have constant chest pain or pressure.     · You are severely dizzy or lightheaded.     · You are confused or can't think clearly.     · Your face and lips have a blue color.     · You pass out (lose consciousness) or are very hard to wake up. Call your doctor now or seek immediate medical care if:    · You have moderate trouble breathing. (You can't speak a full sentence.)     · You are coughing up blood (more than about 1 teaspoon).     · You have signs of low blood pressure. These include feeling lightheaded; being too weak to stand; and having cold, pale, clammy skin. Watch closely for changes in your health, and be sure to contact your doctor if:    · Your symptoms get worse.     · You are not getting better as expected. Call before you go to the doctor's office. Follow their instructions. And wear a cloth face cover. Current as of: December 18, 2020               Content Version: 12.8  © 7850-0183 [employer].    Care instructions adapted under day 180 tablet 5    SUMAtriptan (IMITREX) 50 MG tablet take 1 tablet by mouth once daily if needed for MIGRAINE 30 tablet 2    promethazine (PHENERGAN) 25 MG tablet take 1 tablet by mouth every 8 hours if needed for nausea 60 tablet 2    acetaminophen (TYLENOL) 500 MG tablet Take 500 mg by mouth every 4 hours as needed for Pain       No facility-administered encounter medications on file as of 3/31/2021. Return if symptoms worsen or fail to improve.                 Electronically signed by CLAUDIA Ashton CNP on 3/31/2021 at 12:42 PM

## 2021-03-31 NOTE — PATIENT INSTRUCTIONS
Will notify you of covid test result as soon as available. You should isolate at home in an area away from family. If you must be around family members, please wear a mask. Quarantine at home until result is available. This means do not go to work/school, attend family gatherings, or invite others to your home until you know your test results. A work/school note will be provided for you. Symptoms appear viral; no antibiotic warranted at this time. The following are measures you can try at home to help provide symptom relief:    --Increase your water intake to help thin secretions and maintain hydration. Goal is 2-3 liters/day. --May try mucinex (guaifenesin) to help with congestion and robitussin (dextromethorphan) for persistent cough. Use cool mist humidifier at bedtime to moisturize air. Use nasal saline rinse as needed for congestion. --Tylenol or ibuprofen for fever/body aches/headache. Warm/cold packs to forehead and back of neck can help with headache pain. Warm baths/showers can sooth body aches also. Practice good hand hygiene and cover your cough and sneeze to prevent passing virus on. If symptoms worsen or fail to improve, please return to clinic. If you develop severe worsening of symptoms such as chest pain or difficulty breathing, please go to ER. Patient Education        Viral Respiratory Infection: Care Instructions  Your Care Instructions     Viruses are very small organisms. They grow in number after they enter your body. There are many types that cause different illnesses, such as colds and the mumps. The symptoms of a viral respiratory infection often start quickly. They include a fever, sore throat, and runny nose. You may also just not feel well. Or you may not want to eat much. Most viral respiratory infections are not serious. They usually get better with time and self-care. Antibiotics are not used to treat a viral infection.  That's because antibiotics will not help cure a viral illness. In some cases, antiviral medicine can help your body fight a serious viral infection. Follow-up care is a key part of your treatment and safety. Be sure to make and go to all appointments, and call your doctor if you are having problems. It's also a good idea to know your test results and keep a list of the medicines you take. How can you care for yourself at home? · Rest as much as possible until you feel better. · Be safe with medicines. Take your medicine exactly as prescribed. Call your doctor if you think you are having a problem with your medicine. You will get more details on the specific medicine your doctor prescribes. · Take an over-the-counter pain medicine, such as acetaminophen (Tylenol), ibuprofen (Advil, Motrin), or naproxen (Aleve), as needed for pain and fever. Read and follow all instructions on the label. Do not give aspirin to anyone younger than 20. It has been linked to Reye syndrome, a serious illness. · Drink plenty of fluids. Hot fluids, such as tea or soup, may help relieve congestion in your nose and throat. If you have kidney, heart, or liver disease and have to limit fluids, talk with your doctor before you increase the amount of fluids you drink. · Try to clear mucus from your lungs by breathing deeply and coughing. · Gargle with warm salt water once an hour. This can help reduce swelling and throat pain. Use 1 teaspoon of salt mixed in 1 cup of warm water. · Do not smoke or allow others to smoke around you. If you need help quitting, talk to your doctor about stop-smoking programs and medicines. These can increase your chances of quitting for good. To avoid spreading the virus  · Cough or sneeze into a tissue. Then throw the tissue away. · If you don't have a tissue, use your hand to cover your cough or sneeze. Then clean your hand. You can also cough into your sleeve. · Wash your hands often. Use soap and warm water.  Wash for 15 to 20 seconds each time. · If you don't have soap and water near you, you can clean your hands with alcohol wipes or gel. When should you call for help? Call your doctor now or seek immediate medical care if:    · You have a new or higher fever.     · Your fever lasts more than 48 hours.     · You have trouble breathing.     · You have a fever with a stiff neck or a severe headache.     · You are sensitive to light.     · You feel very sleepy or confused. Watch closely for changes in your health, and be sure to contact your doctor if:    · You do not get better as expected. Where can you learn more? Go to [email or web address]://[email or web address]. org and sign in to your [employer] account. Enter I014 in the [employer] box to learn more about \"Viral Respiratory Infection: Care Instructions. \"     If you do not have an account, please click on the \"Sign Up Now\" link. Current as of: October 26, 2020               Content Version: 12.8  © 2006-2021 [email or web address]. Care instructions adapted under license by [facility] ([facility]). If you have questions about a medical condition or this instruction, always ask your healthcare professional. [place] [address] any warranty or liability for your use of this information. Patient Education        Learning About Coronavirus [phone])  What is coronavirus (COVID-19)? COVID-19 is a disease caused by a new type of coronavirus. This illness was first found in December 2019. It has since spread worldwide. Coronaviruses are a large group of viruses. They cause the common cold. They also cause more serious illnesses like [place] respiratory syndrome (MERS) and severe acute respiratory syndrome (SARS). COVID-19 is caused by a novel coronavirus. That means it's a new type that has not been seen in people before. What are the symptoms? Coronavirus (COVID-19) symptoms may include:  · Fever. · Cough. · Trouble breathing.   · Chills or repeated shaking with chills. · Muscle pain. · Headache. · Sore throat. · New loss of taste or smell. · Vomiting. · Diarrhea. In severe cases, COVID-19 can cause pneumonia and make it hard to breathe without help from a machine. It can cause death. How is it diagnosed? COVID-19 is diagnosed with a viral test. This may also be called a PCR test or antigen test. It looks for evidence of the virus in your breathing passages or lungs (respiratory system). The test is most often done on a sample from the nose, throat, or lungs. It's sometimes done on a sample of saliva. One way a sample is collected is by putting a long swab into the back of your nose. How is it treated? Mild cases of COVID-19 can be treated at home. Serious cases need treatment in the hospital. Treatment may include medicines to reduce symptoms, plus breathing support such as oxygen therapy or a ventilator. Some people may be placed on their belly to help their oxygen levels. Treatments that may help people who have COVID-19 include:  Antiviral medicines. These medicines treat viral infections. Remdesivir is an example. Immune-based therapy. These medicines help the immune system fight COVID-19. One example is bamlanivimab. It's a monoclonal antibody. Blood thinners. These medicines help prevent blood clots. People with severe illness are at risk for blood clots. How can you protect yourself and others? The best way to protect yourself from getting sick is to:  · Avoid areas where there is an outbreak. · Avoid contact with people who may be infected. · Avoid crowds and try to stay at least 6 feet away from other people. · Wash your hands often, especially after you cough or sneeze. Use soap and water, and scrub for at least 20 seconds. If soap and water aren't available, use an alcohol-based hand . · Avoid touching your mouth, nose, and eyes.   To help avoid spreading the virus to others:  · Stay home if you are sick or have been exposed to the virus. Don't go to school, work, or public areas. And don't use public transportation, ride-shares, or taxis unless you have no choice. · Wear a cloth face cover if you have to go to public areas. · Cover your mouth with a tissue when you cough or sneeze. Then throw the tissue in the trash and wash your hands right away. · If you're sick:  ? Leave your home only if you need to get medical care. But call the doctor's office first so they know you're coming. And wear a face cover. ? Wear the face cover whenever you're around other people. It can help stop the spread of the virus when you cough or sneeze. ? Limit contact with pets and people in your home. If possible, stay in a separate bedroom and use a separate bathroom. ? Clean and disinfect your home every day. Use household  and disinfectant wipes or sprays. Take special care to clean things that you grab with your hands. These include doorknobs, remote controls, phones, and handles on your refrigerator and microwave. And don't forget countertops, tabletops, bathrooms, and computer keyboards. When should you call for help? Call 911 anytime you think you may need emergency care. For example, call if you have life-threatening symptoms, such as:    · You have severe trouble breathing. (You can't talk at all.)     · You have constant chest pain or pressure.     · You are severely dizzy or lightheaded.     · You are confused or can't think clearly.     · Your face and lips have a blue color.     · You pass out (lose consciousness) or are very hard to wake up. Call your doctor now or seek immediate medical care if:    · You have moderate trouble breathing. (You can't speak a full sentence.)     · You are coughing up blood (more than about 1 teaspoon).     · You have signs of low blood pressure. These include feeling lightheaded; being too weak to stand; and having cold, pale, clammy skin.    Watch closely for changes in your health, and be sure to contact your doctor if:    · Your symptoms get worse.     · You are not getting better as expected. Call before you go to the doctor's office. Follow their instructions. And wear a cloth face cover. Current as of: December 18, 2020               Content Version: 12.8  © 2006-2021 [employer]. Care instructions adapted under license by [facility]. If you have questions about a medical condition or this instruction, always ask your healthcare professional. [place] [address] any warranty or liability for your use of this information.

## 2021-04-01 ENCOUNTER — TELEPHONE (OUTPATIENT)
Dept: PRIMARY CARE CLINIC | Age: 41
End: 2021-04-01

## 2021-04-01 LAB
SARS-COV-2: NORMAL
SARS-COV-2: NOT DETECTED
SOURCE: NORMAL

## 2021-04-12 RX ORDER — MELOXICAM 15 MG/1
15 TABLET ORAL DAILY
Qty: 30 TABLET | Refills: 1 | Status: SHIPPED | OUTPATIENT
Start: 2021-04-12 | End: 2021-05-26

## 2021-04-12 NOTE — TELEPHONE ENCOUNTER
Pt calling stating he was seen at [facility] and given Mobic 15mg and pt questioning if he can get a refill to loaded pharmacy,

## 2021-05-19 ENCOUNTER — OFFICE VISIT (OUTPATIENT)
Dept: NEUROLOGY | Age: 41
End: 2021-05-19
Payer: MEDICARE

## 2021-05-19 VITALS
HEIGHT: 66 IN | BODY MASS INDEX: 30.7 KG/M2 | OXYGEN SATURATION: 97 % | SYSTOLIC BLOOD PRESSURE: 115 MMHG | WEIGHT: 191 LBS | DIASTOLIC BLOOD PRESSURE: 72 MMHG | HEART RATE: 84 BPM

## 2021-05-19 DIAGNOSIS — Z72.0 TOBACCO USE: ICD-10-CM

## 2021-05-19 DIAGNOSIS — R42 DIZZINESS: ICD-10-CM

## 2021-05-19 DIAGNOSIS — G40.909 SEIZURE DISORDER (HCC): ICD-10-CM

## 2021-05-19 DIAGNOSIS — G47.9 DISTURBANCE, SLEEP: ICD-10-CM

## 2021-05-19 DIAGNOSIS — R56.9 CONVULSIONS, UNSPECIFIED CONVULSION TYPE (HCC): ICD-10-CM

## 2021-05-19 DIAGNOSIS — S09.90XS INJURY OF HEAD, SEQUELA: ICD-10-CM

## 2021-05-19 DIAGNOSIS — K44.9 HIATAL HERNIA: ICD-10-CM

## 2021-05-19 DIAGNOSIS — K21.9 GASTROESOPHAGEAL REFLUX DISEASE WITHOUT ESOPHAGITIS: ICD-10-CM

## 2021-05-19 DIAGNOSIS — G47.9 SLEEP DIFFICULTIES: ICD-10-CM

## 2021-05-19 DIAGNOSIS — G40.009 PARTIAL IDIOPATHIC EPILEPSY WITH SEIZURES OF LOCALIZED ONSET, NOT INTRACTABLE, WITHOUT STATUS EPILEPTICUS (HCC): Primary | ICD-10-CM

## 2021-05-19 DIAGNOSIS — G44.039 NONINTRACTABLE PAROXYSMAL HEMICRANIA, UNSPECIFIED CHRONICITY PATTERN: ICD-10-CM

## 2021-05-19 DIAGNOSIS — R51.9 NONINTRACTABLE HEADACHE, UNSPECIFIED CHRONICITY PATTERN, UNSPECIFIED HEADACHE TYPE: ICD-10-CM

## 2021-05-19 DIAGNOSIS — F34.1 DYSTHYMIA: ICD-10-CM

## 2021-05-19 DIAGNOSIS — Z91.81 H/O FALL: ICD-10-CM

## 2021-05-19 DIAGNOSIS — F41.9 ANXIETY, MILD: ICD-10-CM

## 2021-05-19 DIAGNOSIS — G40.909 RECURRENT SEIZURES (HCC): ICD-10-CM

## 2021-05-19 DIAGNOSIS — G43.709 CHRONIC MIGRAINE WITHOUT AURA WITHOUT STATUS MIGRAINOSUS, NOT INTRACTABLE: ICD-10-CM

## 2021-05-19 DIAGNOSIS — M54.40 LOW BACK PAIN WITH SCIATICA, SCIATICA LATERALITY UNSPECIFIED, UNSPECIFIED BACK PAIN LATERALITY, UNSPECIFIED CHRONICITY: ICD-10-CM

## 2021-05-19 PROCEDURE — G8427 DOCREV CUR MEDS BY ELIG CLIN: HCPCS | Performed by: PSYCHIATRY & NEUROLOGY

## 2021-05-19 PROCEDURE — 4004F PT TOBACCO SCREEN RCVD TLK: CPT | Performed by: PSYCHIATRY & NEUROLOGY

## 2021-05-19 PROCEDURE — 99215 OFFICE O/P EST HI 40 MIN: CPT | Performed by: PSYCHIATRY & NEUROLOGY

## 2021-05-19 PROCEDURE — 99214 OFFICE O/P EST MOD 30 MIN: CPT | Performed by: PSYCHIATRY & NEUROLOGY

## 2021-05-19 PROCEDURE — G8417 CALC BMI ABV UP PARAM F/U: HCPCS | Performed by: PSYCHIATRY & NEUROLOGY

## 2021-05-19 RX ORDER — DIVALPROEX SODIUM 250 MG/1
TABLET, DELAYED RELEASE ORAL
Qty: 180 TABLET | Refills: 5 | Status: SHIPPED | OUTPATIENT
Start: 2021-05-19 | End: 2021-09-22 | Stop reason: SDUPTHER

## 2021-05-19 ASSESSMENT — ENCOUNTER SYMPTOMS
EYE WATERING: 0
APNEA: 0
CHOKING: 0
BLOOD IN STOOL: 0
ABDOMINAL PAIN: 0
EYE ITCHING: 0
CONSTIPATION: 0
DIARRHEA: 0
ABDOMINAL DISTENTION: 0
SCALP TENDERNESS: 0
VISUAL CHANGE: 0
CHANGE IN BOWEL HABIT: 0
TROUBLE SWALLOWING: 0
BACK PAIN: 0
BLURRED VISION: 0
RHINORRHEA: 0
CHEST TIGHTNESS: 0
EYE PAIN: 0
NAUSEA: 1
COUGH: 0
FACIAL SWELLING: 0
EYE REDNESS: 0
SHORTNESS OF BREATH: 0
SWOLLEN GLANDS: 0
VOICE CHANGE: 0
SINUS PRESSURE: 0
SORE THROAT: 0
WHEEZING: 0
VOMITING: 0
EYE DISCHARGE: 0
PHOTOPHOBIA: 1
COLOR CHANGE: 0
FACIAL SWEATING: 0

## 2021-05-19 NOTE — PROGRESS NOTES
This writer contacted scheduling to schedule the following testing: EEG and CT head w/o contrast - patient made aware, will contact office with further needs.

## 2021-05-19 NOTE — PROGRESS NOTES
Subjective:          Patient ID: Mayelin Troy is a 38 y. o. RIGHT   HANDED male. Seizures  This is a chronic problem. Episode onset: SINCE    2014. The problem occurs intermittently. The problem has been waxing and waning. Associated symptoms include headaches and nausea. Pertinent negatives include no abdominal pain, anorexia, arthralgias, change in bowel habit, chest pain, chills, congestion, coughing, diaphoresis, fatigue, fever, joint swelling, myalgias, neck pain, numbness, rash, sore throat, swollen glands, urinary symptoms, vertigo, visual change, vomiting or weakness. Exacerbated by: LACK OF  SLEEP,  PROLONGED   TV ,   STRESS,   LOUD  NOISES   AND  BRIGHT  LIGHTS   Treatments tried: ANTI  EPILEPTIC  MEDICATION. The treatment provided moderate relief. Headache   This is a chronic problem. Episode onset: MORE  THAN   10  YEARS. The problem occurs intermittently. The problem has been waxing and waning. The pain is located in the vertex region. The pain does not radiate. The pain quality is similar to prior headaches. The quality of the pain is described as aching and throbbing. The pain is at a severity of 3/10. The pain is mild. Associated symptoms include insomnia, nausea, phonophobia, photophobia, seizures and tingling. Pertinent negatives include no abdominal pain, abnormal behavior, anorexia, back pain, blurred vision, coughing, dizziness, drainage, ear pain, eye pain, eye redness, eye watering, facial sweating, fever, hearing loss, loss of balance, muscle aches, neck pain, numbness, rhinorrhea, scalp tenderness, sinus pressure, sore throat, swollen glands, tinnitus, visual change, vomiting, weakness or weight loss. The symptoms are aggravated by unknown. He has tried darkened room, NSAIDs, Excedrin and triptans for the symptoms. The treatment provided moderate relief. His past medical history is significant for migraine headaches and migraines in the family.  There is no history of cancer, cluster headaches, hypertension, immunosuppression, obesity, pseudotumor cerebri, recent head traumas, sinus disease or TMJ. Migraine   This is a chronic problem. Episode onset: MORE  THAN   10  YEARS. The problem occurs intermittently. The problem has been waxing and waning. The pain is located in the right unilateral and frontal region. The pain does not radiate. The pain quality is similar to prior headaches. The quality of the pain is described as aching and throbbing. The pain is at a severity of 3/10. The pain is mild. Associated symptoms include insomnia, nausea, phonophobia, photophobia, seizures and tingling. Pertinent negatives include no abdominal pain, abnormal behavior, anorexia, back pain, blurred vision, coughing, dizziness, drainage, ear pain, eye pain, eye redness, eye watering, facial sweating, fever, hearing loss, loss of balance, muscle aches, neck pain, numbness, rhinorrhea, scalp tenderness, sinus pressure, sore throat, swollen glands, tinnitus, visual change, vomiting, weakness or weight loss. The symptoms are aggravated by unknown. He has tried triptans and NSAIDs (TOPAMAX) for the symptoms. The treatment provided moderate relief. His past medical history is significant for migraine headaches and migraines in the family. There is no history of cancer, cluster headaches, hypertension, immunosuppression, obesity, pseudotumor cerebri, recent head traumas, sinus disease or TMJ. History obtained from  The patient          and other  available medical records were  Also  reviewed.                 1)   H/O   CHRONIC  SEVERE MIGRAINES                         FOR  MORE  THAN   10  YEARS                           -    STABLE            2)      MIGRAINES  AND TENSION  HEADACHE                        ARE BETTER  CONTROLLED                      -   ON  IMITREX,   PHENERGAN   AS  NEEDED               3)        H/O  SEIZURE  DISORDER /  EPILEPSY    SINCE      2014                        - STABLE            4)     SEIZURE  CONTROL  WAS    BETTER                 -    WAS   ON  DEPAKOTE,  VIMPAT  AND  TOPAMAX                                 IN   THE    PAST            5)   INTOLERANCE  TO  KEPPRA                    DUE  TO  IRRITABILITY  AND MOOD PROBLEMS            6)     H/O   CHRONIC  ANXIETY  AND  DEPRESSION                    -     STABLE    ON  LEXAPRO                 HAD    FOLLOW    WITH    H[facility]  MENTAL  HEALTH  PROFESSIONALS               7)  H/O  PREVIOUS  MULTIPLE MILD  HEAD  INJURIES                             DUE  TO   FALLS            8)   H/O     CHRONIC    SLEEP  DIFFICULTIES                      AND  DISTURBED  SLEEP  WAKE  CYCLES                              -  STABLE                9)    PREVIOUS  H/O  DIZZY  EPISODES                          -  NO  RECURRENCE            10)   EMU  AT  [address]   IN  2014                  -  POSSIBLE  FRONTAL    FOCUS            11)   EMU   AT  [address] 7  TO  NOV. 11, 2016    SHOWED :              6  EPISODES  OF  PSYCHOGENIC  NON EPILEPTIC SPELLS. EEG  DURING  THE SPELLS  AND  INTERICTAL  PERIODS                         WAS REPORTED  TO BE  NORMAL. 12)     PREVIOUS   H/O    BRIEF  SEIZURES  AND  MEMORY  LOSS                           -  PARTIAL  COMPLEX       SEIZURES                          DUE  TO  SLEEP  DEPRIVATION                        AND  PROLONGED  TV   WATCHING                                         13)   EPISODES   OF  RIGHT  SIDED  NUMBNESS   AND  SPEECH  PROBLEMS. H/O  HOSPITALIZATION   FROM  SEPT. 2017                  AT  [facility] [address]     TIA  /   STROKE                       WITH  NO  SIGNIFICANT  ABNORMALITIES  .           14)         PREVIOUS   H/O  CARDIAC  ARRYTHMIA                         TO    FOLLOW  WITH    CARDOLOGY              15)     PREVIOUS   H/O    HAD  COGNITIVE  BEHAVIOR THERAPY  AT MINUTE          WITH                                LETHARGY    NOTICED   VISIT  On   11/11/ 2019   .                                    25)     CT   HEAD    AND  CT  CERVICAL  SPINE  IN NOV. 2019                                 SHOWED  NO  SIGNIFICANT  ABNORMALITIES                                 VALPROIC  ACID  LEVEL  IN NOV. 2019       NORMAL  LIMITS                       26)      EXPECTATIONS,   GOALS   OF  SEIZURE  MANAGEMENT                           AND  SIDE  EFFECTS  MEDICATIONS    WERE                                 REVIEWED     AND   DISCUSSED    IN    DETAIL. 32)           SEIZURES     WELL  CONTROLLED      SINCE    DEC.   2019                                 MIGRAINES  ARE  ALSO  WELL  CONTROLLED              28)        H/O     TWO   EPISODES  OF    SEIZURE   RECURRENCE   IN    MAY   2021                             NO    DEFINITE   PRECIPITATING  FACTORS     EXCEPT                         PATIENT     VISITING  AND   SPENDING  TIME   IN   [facility]     IN  [place]              29)           PATIENT   RECOMMENDED  :                           A)   SEIZURE  PRECAUTIONS      -    DISCUSSED  IN  DETAIL                           B)     FOLLOW  UP  NEURO  DIAGNOSTIC   EVALUATIONS                           EXPECTATIONS,   GOALS   OF  SEIZURE  MANAGEMENT                           AND  SIDE  EFFECTS  MEDICATIONS    WERE                                 REVIEWED     AND   DISCUSSED    IN    DETAIL                                   30)           VARIOUS  RISK   FACTORS   WERE  REVIEWED   AND   DISCUSSED. PATIENT   HAS  MULTIPLE   MEDICAL, MENTAL HEALTH                                    &      NEUROLOGICAL   PROBLEMS . PATIENT'S   MANAGEMENT  IS  CHALLENGING.                             The  Duration,  Quality,  Severity,  Location,  Timing,  Context,  Modifying  Factors   Of   The   Chief   Complaint       And  Present Illness   Was   Reviewed   In   Chronological   [facility]. Patient   Indicates   The  Presence   And  The  Absence  Of  The  Following  Associated  And       Additional  Neurological    Symptoms:                                Balance  And coordination problems  absent           Gait problems     absent            Headaches      present              Migraines           present           Memory problems        Present             Confusion        absent            Paresthesia numbness          absent           Seizures  And  Starring  Episodes           PRESENT           Syncope,  Near  syncopal episodes         absent           Speech problems           absent             Swallowing  Problems      absent            Dizziness,  Light headedness           present                        Vertigo        absent             Generalized   Weakness    absent              focal  Weakness     absent             Tremors         absent              Sleep  Problems     present             History  Of   Recent   Head  Injury     absent             History  Of   Recent  TIA     absent             History  Of   Recent    Stroke     absent             Neck  Pain and  Neck muscle  Spasms  Absent               Radiating  down   And   Weakness           absent            Lower back   Pain  And     Spasms  Present              Radiating    Down   And   Weakness          absent                H/O   FALLS        absent               History  Of   Visual  Symptoms    Absent                  Associated   Diplopia       absent                                                                  Also   Additional   Symptoms   Present    As  Documented    In   The detailed      Review  Of  Systems   And    Please   Refer   To    Them for   Additional  Information.            INFORMATION   REVIEWED:      VITAL  SIGNS,  MEDICATIONS   LIST,   ALLERGIES AND  DRUG  INTOLERANCES,    MEDICAL   HISTORY,     SURGICAL   HISTORY,    FAMILY   HISTORY, SOCIAL  HISTORY,    PROBLEM  LIST   FOR  PATIENT  CARE   COORDINATION        RECORDS   REVIEWED:    historical medical records, lab reports and office notes         Past Medical History:   Diagnosis Date    Anxiety, mild     Colon polyps     Depression     Dizziness     Eczema     Epilepsy ([facility]     GERD (gastroesophageal reflux disease)     H/O fall     Head ache     Head injury     Hiatal hernia     Inguinal hernia     Right.  Low back pain     Lumbar sprain     Migraine     Plantar fasciitis, bilateral     Seizure disorder ([facility] [address] [facility]     Sigmoid diverticulosis     Sleep difficulties     Status migrainosus     TIA (transient ischemic attack) 09/2017    Tobacco use     Chronic. Past Surgical History:   Procedure Laterality Date    COLONOSCOPY  04/29/2011    Internal hemorrhoids, possible anal fissure, few scattered diverticula.  COLONOSCOPY  06/04/2010    Mild sigmoid diverticulosis.  COLONOSCOPY  08/28/2009    Sigmoid diverticulosis, internal hemorrhoids.  COLONOSCOPY  05/27/2008    Internal hemorrhoids.  COLONOSCOPY  11/19/2014    polyp in rectum    COLONOSCOPY  6/1/16    sigmoid diverticulosis, colon polyp, internal hemorrhoids    COLONOSCOPY  04/19/2019    [id number]    GASTRIC FUNDOPLICATION  73/76/6224    HERNIA REPAIR Left 11/30/2017    Left Inguinal Hernia Repair w/ Mesh performed by Desmond Greenberg [facility] at [facility] Right 05/30/2013    INGUINAL HERNIA REPAIR Left 01/24/2013    INGUINAL HERNIA REPAIR Right 09/24/2015    KNEE ARTHROSCOPY Left     PILONIDAL CYST EXCISION N/A 7/23/2020    PILONIDAL CYSTECTOMY performed by Lydia Mccormick DO at [address] [place] [address]  03/05/2010    Recurrent hiatal hernia.     UPPER GASTROINTESTINAL ENDOSCOPY  6/1/16    S/P brenda fundoplication, good repair, retained gastric fluid    VASECTOMY                   Current Outpatient Medications   Medication Sig Dispense Refill    divalproex (DEPAKOTE) 250 MG DR tablet take 3 tablets by mouth twice a day 180 tablet 5    meloxicam (MOBIC) 15 MG tablet Take 1 tablet by mouth daily 30 tablet 1    SUMAtriptan (IMITREX) 50 MG tablet take 1 tablet by mouth once daily if needed for MIGRAINE 30 tablet 2    promethazine (PHENERGAN) 25 MG tablet take 1 tablet by mouth every 8 hours if needed for nausea 60 tablet 2    acetaminophen (TYLENOL) 500 MG tablet Take 500 mg by mouth every 4 hours as needed for Pain       No current facility-administered medications for this visit.            No Known Allergies            Family History   Problem Relation Age of Onset    COPD Mother     High Blood Pressure Mother    Christy Latin Cancer Father         Hodgekin's Lymphoma    High Blood Pressure Father     Eczema Sister     Alzheimer's Disease Maternal Grandmother     COPD Maternal Grandmother     Cancer Maternal Grandmother     Cancer Paternal Grandmother     Cancer Paternal Grandfather     Cancer Sister         colon cancer    High Blood Pressure Sister     Arthritis Sister     Asthma Daughter     Eczema Daughter     Glaucoma Neg Hx     Diabetes Neg Hx     Cataracts Neg Hx              Social History     Socioeconomic History    Marital status:      Spouse name: Not on file    Number of children: Not on file    Years of education: Not on file    Highest education level: Not on file   Occupational History    Not on file   Tobacco Use    Smoking status: Current Every Day Smoker     Packs/day: 1.00     Years: 20.00     Pack years: 20.00     Types: Cigarettes     Last attempt to quit: 2016     Years since quittin.0    Smokeless tobacco: Never Used    Tobacco comment: 2-3 weekly   Vaping Use    Vaping Use: Never used   Substance and Sexual Activity    Alcohol use: No     Alcohol/week: 0.0 standard drinks     Comment: rarely    Drug use: No    Sexual activity: Not on file   Other Topics Concern    Not on file Social History Narrative    Not on file     Social Determinants of Health     Financial Resource Strain:     Difficulty of Paying Living Expenses:    Food Insecurity:     Worried About Running Out of Food in the Last Year:     [address] in the Last Year:    Transportation Needs:     Lack of Transportation (Medical):      Lack of Transportation (Non-Medical):    Physical Activity:     Days of Exercise per Week:     Minutes of Exercise per Session:    Stress:     Feeling of Stress :    Social Connections:     Frequency of Communication with Friends and Family:     Frequency of Social Gatherings with Friends and Family:     Attends [employer] Services:     Active Member of Clubs or Organizations:     Attends Club or Organization Meetings:     Marital Status:    Intimate Partner Violence:     Fear of Current or Ex-Partner:     Emotionally Abused:     Physically Abused:     Sexually Abused:          Vitals:    05/19/21 1112   BP: 115/72   Pulse: 84   SpO2: 97%         Wt Readings from Last 3 Encounters:   05/19/21 191 lb (86.6 kg)   03/31/21 190 lb 3.2 oz (86.3 kg)   02/11/21 194 lb (88 kg)         BP Readings from Last 3 Encounters:   05/19/21 115/72   03/31/21 132/76   02/11/21 130/74       Hematology and Coagulation  Lab Results   Component Value Date    WBC 8.6 07/20/2020    RBC 4.55 07/20/2020    HGB 15.8 07/20/2020    HCT 44.2 07/20/2020    MCV 97.1 07/20/2020    MCH 34.7 07/20/2020    MCHC 35.7 07/20/2020    RDW 12.4 07/20/2020     07/20/2020    MPV 9.7 07/20/2020         Chemistries  Lab Results   Component Value Date     07/20/2020    K 4.3 07/20/2020     07/20/2020    CO2 23 07/20/2020    BUN 15 12/08/2019    CREATININE 1.26 12/08/2019    CALCIUM 9.8 12/08/2019    PROT 7.8 12/08/2019    LABALBU 4.8 12/08/2019    BILITOT 0.34 12/08/2019    ALKPHOS 75 12/08/2019    AST 16 07/20/2020    ALT 17 07/20/2020     Lab Results   Component Value Date    ALKPHOS 75 12/08/2019 allergies and immunocompromised state. Neurological: Positive for tingling, seizures, light-headedness and headaches. Negative for dizziness, vertigo, tremors, syncope, facial asymmetry, speech difficulty, weakness, numbness and loss of balance. Hematological: Negative for adenopathy. Does not bruise/bleed easily. Psychiatric/Behavioral: Positive for decreased concentration and sleep disturbance. Negative for agitation, behavioral problems, confusion, dysphoric mood, hallucinations, self-injury and suicidal ideas. The patient is nervous/anxious and has insomnia. The patient is not hyperactive. Objective:   Physical Exam  Constitutional:       Appearance: He is well-developed. HENT:      Head: Normocephalic and atraumatic. No raccoon eyes or Vaughn's sign. Right Ear: External ear normal.      Left Ear: External ear normal.      Nose: Nose normal.   Eyes:      Conjunctiva/sclera: Conjunctivae normal.      Pupils: Pupils are equal, round, and reactive to light. Neck:      Thyroid: No thyroid mass or thyromegaly. Vascular: No carotid bruit. Trachea: No tracheal deviation. Meningeal: Brudzinski's sign and Kernig's sign absent. Cardiovascular:      Rate and Rhythm: Normal rate and regular rhythm. Pulmonary:      Effort: Pulmonary effort is normal.   Musculoskeletal:         General: No tenderness. Cervical back: Normal range of motion and neck supple. No rigidity. No muscular tenderness. Normal range of motion. Skin:     General: Skin is warm. Coloration: Skin is not pale. Findings: No erythema or rash. Nails: There is no clubbing. Psychiatric:         Attention and Perception: He is attentive. Mood and Affect: Mood is anxious and depressed. Affect is blunt. Affect is not labile or inappropriate. Speech: He is communicative. Speech is not rapid and pressured, delayed, slurred or tangential.         Behavior: Behavior is slowed.  Behavior is not agitated, aggressive, withdrawn, hyperactive or combative. Behavior is cooperative. Thought Content: Thought content is not paranoid or delusional. Thought content does not include homicidal or suicidal ideation. Thought content does not include homicidal or suicidal plan. Cognition and Memory: Memory is impaired. He does not exhibit impaired recent memory or impaired remote memory. Judgment: Judgment is not impulsive or inappropriate. NEUROLOGICAL EXAMINATION :    A) MENTAL STATUS:                   Alert and  oriented  To time, place  And  Person. No Aphasia. No  Dysarthria. Able   To  Follow  commands without   Any  Difficulty. No right  To left confusion. Normal  Speech  And language function. Insight and  Judgment ,Fund  Of  Knowledge  DECREASED                Recent  And  Remote memory  DECREASED                Attention &Concentration are  DECREASED                                                    B) CRANIAL NERVES :             2 CN : Visual  Acuity  And  Visual fields  within normal limits                        Fundi  Could  Not  Be  Could  Not  Be  Evaluated. 3,4,6 CN : Both  Pupils are   Reactive and  Equal.                            Extraocular   Movements  Are  Intact. No  Nystagmus. No  MOO. No  Afferent  Pupillary  Defect noted. 5 CN :  Normal  Facial sensations and Corneal  Reflexes           7 CN :  Normal  Facial  Symmetry  And  Strength. No facial  Weakness.            8 CN :  Hearing  Appears within normal limits          9, 10 CN: Normal spontaneous, reflex palate movements         11 CN:   Normal  Shoulder shrug and  Strength         12 CN :   Normal  Tongue movements and  Tongue  In midline                        No tongue   Fasciculations or atrophy         C) MOTOR  EXAM:                 Strength  In upper  And Lower extremities   within normal limits               No  Drift. No  Atrophy               Rapid alternating  And  repetitions  Movements  within normal limits                 Muscle  Tone  In upper  And  Lower  Extremities  within normal limits                No rigidity. No  Spasticity. Bradykinesia   absent               No  Asterixis. Sustention  Tremor , Resting  Tremor   absent                    No other  Abnormal  Movements noted             D) SENSORY :               light touch, pinprick, position  And  Vibration DECREASED   IN   HANDS      E) REFLEXES:                   Deep  Tendon  Reflexes within normal limits                    No pathological  Reflexes  Bilaterally.                                   F) COORDINATION  AND  GAIT :                                Station and  Gait  within normal limits                                        Romberg's test negative                          Ataxia negative          Assessment:     Patient Active Problem List   Diagnosis    Low back pain with sciatica    Migraine    Anxiety, mild    Disturbance, sleep    Tobacco use    GERD (gastroesophageal reflux disease)    Hiatal hernia    Head ache    Status migrainosus    Dizziness    Depression    H/O fall    Head injury    Seizure disorder (HCC)    Astigmatism    Rectal bleed    Cervical radiculopathy    Ventricular premature beats    Nausea and vomiting    Colon polyps    TIA (transient ischemic attack)    VBI (vertebrobasilar insufficiency)    Pilonidal cyst    Wound dehiscence    Sleep difficulties    Partial idiopathic epilepsy with seizures of localized onset, not intractable, without status epilepticus (HCC)    Convulsions ([address]    Recurrent seizures (HCC)           CT OF THE HEAD WITHOUT CONTRAST; CT OF THE CERVICAL SPINE WITHOUT CONTRAST   11/15/2019 10:21 am       TECHNIQUE:   CT of the head was performed without the administration of intravenous contrast. Dose modulation, iterative reconstruction, and/or weight based   adjustment of the mA/kV was utilized to reduce the radiation dose to as low   as reasonably achievable.; CT of the cervical spine was performed without the   administration of intravenous contrast. Multiplanar reformatted images are   provided for review. Dose modulation, iterative reconstruction, and/or weight   based adjustment of the mA/kV was utilized to reduce the radiation dose to as   low as reasonably achievable.       COMPARISON:   01/25/2019       HISTORY:   ORDERING SYSTEM PROVIDED HISTORY: Status migrainosus   TECHNOLOGIST PROVIDED HISTORY:   migraine, seizures   Reason for Exam: Hx chronic migraines. C/o increase headaches since recent   falls   Acuity: Acute   Type of Exam: Initial       FINDINGS:   BRAIN/VENTRICLES: There is no acute intracranial hemorrhage, mass effect or   midline shift.  No abnormal extra-axial fluid collection.  The gray-white   differentiation is maintained without evidence of an acute infarct.  There is   no evidence of hydrocephalus.       ORBITS: The visualized portion of the orbits demonstrate no acute abnormality.       SINUSES: The visualized paranasal sinuses and mastoid air cells demonstrate   no acute abnormality.       SOFT TISSUES/SKULL:  No acute abnormality of the visualized skull or soft   tissues.       CERVICAL SPINE: Cervical spine maintains its normal lordotic curvature. There are subtle degenerative changes C6-C7.  No acute fracture or   malalignment.  Vertebral body heights and intervertebral disc spaces are   preserved.  Overlying soft tissues are unremarkable.  Visualized lung apices   are clear.           Impression   No acute intracranial abnormality.       No acute osseus abnormality of the cervical spine.              Procedure: [facility] 11/13/2014 [id number] CT BRAIN WITHOUT CONTRAST    Reason for Exam: \      FULL RESULT: EXAM: CT Head without Contrast - 11/13/2014 7:27 PM restricted diffusion or abnormal enhancing seen. A small    left occipital lobe developmental venous anomaly seen. No abnormal    enhancing intracranial mass seen. Visualized orbital contents appear unremarkable. Mild mucosal thickening of the ethmoid air cells noted. Skull base flow voids are patent. Superior sagittal sinus and straight sinus flow voids are patent. Heterogeneity of the T1-weighted marrow signal intensity seen. IMPRESSION:    1. No acute or subacute ischemic insult noted. No abnormal enhancing    intracranial mass seen. 2.scattered foci of T2/FLAIR hyperintensity noted in the periventricular    and subcortical white matter which essentially are nonspecific in imaging    appearance however differential considerations include demyelinating or    inflammatory process, migraine induced changes, chronic small vessel    disease or vascular disease. Procedure: [facility] 10/08/2014 [id number] CT BRAIN WITHOUT CONTRAST    Reason for Exam: \      FULL RESULT: EXAM: Brain CT without contrast dated 10/8/2014. HISTORY: Headache after head injury. COMPARISON: Brain CT dated 4/16/2014. TECHNIQUE: Multi-detector axial images are obtained through the head. Findings: There is no evidence for acute intracranial hemorrhage,    midline shift or mass effect. Ventricular and cistern spaces are normal    and symmetric. Ronold Nose and white matter differentiation is well preserved. No intra- or extra-axial fluid collections or mass occupying lesions    seen. Bilateral cerebellopontine angles are normal. Visualized orbital    contents are normal. Study viewed in bone windows shows no    abnormalities. Mild left ethmoid sinusitis otherwise all visualized    sinuses are normally aerated. Bilateral temporomandibular joints are    normally aligned. IMPRESSION: No acute intracranial abnormalities. Mild left ethmoid    sinusitis.        Plan: VISITING DIAGNOSIS:      ICD-10-CM    1. Partial idiopathic epilepsy with seizures of localized onset, not intractable, without status epilepticus (HCC)  G40.009 divalproex (DEPAKOTE) 250 MG DR tablet     CBC     Electrolyte Panel     Valproic acid level, total     EEG     CT HEAD WO CONTRAST   2. Anxiety, mild  F41.9 divalproex (DEPAKOTE) 250 MG DR tablet     CBC     Electrolyte Panel     Valproic acid level, total     EEG     CT HEAD WO CONTRAST   3. Disturbance, sleep  G47.9 divalproex (DEPAKOTE) 250 MG DR tablet     CBC     Electrolyte Panel     Valproic acid level, total     EEG     CT HEAD WO CONTRAST   4. Tobacco use  Z72.0 divalproex (DEPAKOTE) 250 MG DR tablet     CBC     Electrolyte Panel     Valproic acid level, total     EEG     CT HEAD WO CONTRAST   5. Gastroesophageal reflux disease without esophagitis  K21.9 divalproex (DEPAKOTE) 250 MG DR tablet     CBC     Electrolyte Panel     Valproic acid level, total     EEG     CT HEAD WO CONTRAST   6. Hiatal hernia  K44.9 divalproex (DEPAKOTE) 250 MG DR tablet     CBC     Electrolyte Panel     Valproic acid level, total     EEG     CT HEAD WO CONTRAST   7. Nonintractable headache, unspecified chronicity pattern, unspecified headache type  R51.9 divalproex (DEPAKOTE) 250 MG DR tablet     CBC     Electrolyte Panel     Valproic acid level, total     EEG     CT HEAD WO CONTRAST   8. Dizziness  R42 divalproex (DEPAKOTE) 250 MG DR tablet     CBC     Electrolyte Panel     Valproic acid level, total     EEG     CT HEAD WO CONTRAST   9. Sleep difficulties  G47.9 divalproex (DEPAKOTE) 250 MG DR tablet     CBC     Electrolyte Panel     Valproic acid level, total     EEG     CT HEAD WO CONTRAST   10. Seizure disorder (HCC)  G40.909 divalproex (DEPAKOTE) 250 MG DR tablet     CBC     Electrolyte Panel     Valproic acid level, total     EEG     CT HEAD WO CONTRAST   11.  Recurrent seizures (HCC)  G40.909 divalproex (DEPAKOTE) 250 MG DR tablet     CBC     Electrolyte Panel Valproic acid level, total     EEG     CT HEAD WO CONTRAST   12. Nonintractable paroxysmal hemicrania, unspecified chronicity pattern  G44.039 divalproex (DEPAKOTE) 250 MG DR tablet     CBC     Electrolyte Panel     Valproic acid level, total     EEG     CT HEAD WO CONTRAST   13. Convulsions, unspecified convulsion type ([facility] [address]  R56.9 divalproex (DEPAKOTE) 250 MG DR tablet     CBC     Electrolyte Panel     Valproic acid level, total     EEG     CT HEAD WO CONTRAST   14. Chronic migraine without aura without status migrainosus, not intractable  G43.709 divalproex (DEPAKOTE) 250 MG DR tablet     CBC     Electrolyte Panel     Valproic acid level, total     EEG     CT HEAD WO CONTRAST   15. Dysthymia  F34.1    16. H/O fall  Z91.81    17. Injury of head, sequela  S09.90XS    18. Low back pain with sciatica, sciatica laterality unspecified, unspecified back pain laterality, unspecified chronicity  M54.40                 CONCERNS   &   INCREASED   RISK   FOR          * TIA,  CEREBRO  VASCULAR  ISCHEMIA, STROKE       *  SEIZURE  ACTIVITY,  EPILEPSY ,        *  Chronic  Headaches &  Migraines      *   COGNITIVE  &   MEMORY PROBLEMS  AND  DEMENTIAS                     VARIOUS  RISK   FACTORS   WERE  REVIEWED   AND   DISCUSSED. *  PATIENT   HAS  MULTIPLE   MEDICAL, MENTAL HEALTH         &      NEUROLOGICAL   PROBLEMS . PATIENT'S   MANAGEMENT  IS  CHALLENGING. PLAN:       Lisa Roberson  Of  The  Diagnoses,  The  Management & Treatment  Options           AND    Care  plan  Were        Reviewed and   Discussed   With  patient. * Goals  And  Expectations  Of  The  Therapy  Discussed   And  Reviewed. *   Benefits   And   Side  Effect  Profile  Of  Medication/s   Were   Discussed             * Need   For  Further   Follow up For  The  Various  Problems  Were discussed. * Results  Of  The  Previous  Diagnostic tests were reviewed and questions answered.               patient  understand the PROLONGED  TV   WATCHING                         *      H/O     TWO   EPISODES  OF    SEIZURE   RECURRENCE   IN    MAY   2021                             NO    DEFINITE   PRECIPITATING  FACTORS     EXCEPT                         PATIENT     VISITING  AND   SPENDING  TIME   IN   [facility]     IN  [place]                  *     PATIENT   RECOMMENDED  :                           A)   SEIZURE  PRECAUTIONS      -    DISCUSSED  IN  DETAIL                           B)     FOLLOW  UP  NEURO  DIAGNOSTIC   EVALUATIONS                            VARIOUS  RISK   FACTORS   WERE  REVIEWED   AND   DISCUSSED. *        EXPECTATIONS,   GOALS   OF  SEIZURE  MANAGEMENT                           AND  SIDE  EFFECTS  MEDICATIONS    WERE                                 REVIEWED     AND   DISCUSSED    IN    DETAIL. Orders Placed This Encounter   Procedures    CT HEAD WO CONTRAST    CBC    Electrolyte Panel    Valproic acid level, total    EEG       Orders Placed This Encounter   Medications    divalproex (DEPAKOTE) 250 MG DR tablet     Sig: take 3 tablets by mouth twice a day     Dispense:  180 tablet     Refill:  5                   *PATIENT   TO  FOLLOW  UP  WITH   PRIMARY  CARE   AND   OTHER  CONSULTANTS  AS  BEFORE.           *TO  FOLLOW  WITH   MENTAL  HEALTH  PROFESSIONALS ,  INCLUDING            PSYCHOLOGICAL  COUNSELING   AND  PSYCHIATRIC  EVALUTIONS            *  Maintain   Healthy  Life Style    With   Periodic  Monitoring  Of      Any  Medical  Conditions  Including   Elevated  Blood  Pressure,  Lipid  Profile,     Blood  Sugar levels  And   Heart  Disease. *   Period   Screening  For  Cancers  Involving  Breast,  Colon,    Prostate, lungs  And  Other  Organs  As  Applicable,  In consultation   With  Your  Primary Care Providers.               * Second  Neurological  Opinion  And  Evaluations  In  [facility] AND [facility]  Setting  If  Patient  Is Interested. *  If  The  Patient remains  Neurologically  Stable   Return   To  [facility] Neurology Department       IN   2- 3      MONTHS  TIME   FOR  FURTHER  FOLLOW UP.                   *  If   There is  Any  Significant  Worsening   Of  Current  Symptoms  And      Or  If patient  Develops   Any additional  New  Neurological  Symptoms  Or  Significant  Concerns       Should  Call  911 or  Go  To  Emergency  Department  For  Further  Immediate  Evaluation. *   The  Neurological   Findings,  Possible  Diagnosis,  Differential diagnoses   And  Options      For    Further   Investigations   And  management   Are  Discussed  Comprehensively. Medications   And  Prescription   Risks  And  Side effects  Are   Also  Discussed. The  Above  Were  Reviewed  With  patient and       questions  Answered  In  Detail. More   Than   50% of face  To face Time   Was  Spent  On  Counseling   And   Coordination  Of  Care       Of   Patient's multiple   Neurological  Problems   And   Comorbid  Medical   Conditions. Electronically signed by Richard Spangler MD.,  [facility]       Board Certified in  Neurology &  In  [employer] and Neurology (ABPN)      DISCLAIMER:   Although every effort was made to ensure the accuracy of this  electronic transcription, some errors in transcription may have occurred. GENERAL PATIENT INSTRUCTIONS:     A Healthy Lifestyle: Care Instructions  Your Care Instructions  A healthy lifestyle can help you feel good, stay at a healthy weight, and have plenty of energy for both work and play. A healthy lifestyle is something you can share with your whole family. A healthy lifestyle also can lower your risk for serious health problems, such as high blood pressure, heart disease, and diabetes.   You can follow a few steps listed below to improve your health and the health of your family. Follow-up care is a key part of your treatment and safety. Be sure to make and go to all appointments, and call your doctor if you are having problems. Its also a good idea to know your test results and keep a list of the medicines you take. How can you care for yourself at home? Do not eat too much sugar, fat, or fast foods. You can still have dessert and treats now and then. The goal is moderation. Start small to improve your eating habits. Pay attention to portion sizes, drink less juice and soda pop, and eat more fruits and vegetables. Eat a healthy amount of food. A 3-ounce serving of meat, for example, is about the size of a deck of cards. Fill the rest of your plate with vegetables and whole grains. Limit the amount of soda and sports drinks you have every day. Drink more water when you are thirsty. Eat at least 5 servings of fruits and vegetables every day. It may seem like a lot, but it is not hard to reach this goal. A serving or helping is 1 piece of fruit, 1 cup of vegetables, or 2 cups of leafy, raw vegetables. Have an apple or some carrot sticks as an afternoon snack instead of a candy bar. Try to have fruits and/or vegetables at every meal.  Make exercise part of your daily routine. You may want to start with simple activities, such as walking, bicycling, or slow swimming. Try to be active 30 to 60 minutes every day. You do not need to do all 30 to 60 minutes all at once. For example, you can exercise 3 times a day for 10 or 20 minutes. Moderate exercise is safe for most people, but it is always a good idea to talk to your doctor before starting an exercise program.  Keep moving. [place] [employer] the lawn, work in the garden, or [address]. Take the stairs instead of the elevator at work. If you smoke, quit. People who smoke have an increased risk for heart attack, stroke, cancer, and other lung illnesses.  Quitting is hard, but there are ways to boost your chance of quitting tobacco for good.  Use nicotine gum, patches, or lozenges. Ask your doctor about stop-smoking programs and medicines. Keep trying. In addition to reducing your risk of diseases in the future, you will notice some benefits soon after you stop using tobacco. If you have shortness of breath or asthma symptoms, they will likely get better within a few weeks after you quit. Limit how much alcohol you drink. Moderate amounts of alcohol (up to 2 drinks a day for men, 1 drink a day for women) are okay. But drinking too much can lead to liver problems, high blood pressure, and other health problems. Family health  If you have a family, there are many things you can do together to improve your health. Eat meals together as a family as often as possible. Eat healthy foods. This includes fruits, vegetables, lean meats and dairy, and whole grains. Include your family in your fitness plan. Most people think of activities such as jogging or tennis as the way to fitness, but there are many ways you and your family can be more active. Anything that makes you breathe hard and gets your heart pumping is exercise. Here are some tips:  Walk to do errands or to take your child to school or the bus. Go for a family bike ride after dinner instead of watching TV. Where can you learn more? Go to [email or web address]://[email or web address]. org and sign in to your surespot account. Enter Q304 in the [facility] box to learn more about \"A Healthy Lifestyle: Care Instructions. \"     If you do not have an account, please click on the \"Sign Up Now\" link. Current as of: July 26, 2016  Content Version: 11.2  © 3180-5622 [employer]. Care instructions adapted under license by [facility] ([facility]). If you have questions about a medical condition or this instruction, always ask your healthcare professional. [place] [address] any warranty or liability for your use of this information.

## 2021-05-19 NOTE — PATIENT INSTRUCTIONS
*   SEIZURE  PRECAUTIONS. A)  Avoid  Working  At   [employer] B)  Avoid  Working  With  Heavy machinery. C)  Avoid   Swimming,  Climbing  A  Ladder   Unattended. D)  Avoid   Driving   If  You   Have  A  Seizure. E)  Must   Be  Seizure  Free   For  At   Least   6 months,  Before   You  Can drive. F) Some times  Your  May  Feel  Seizure coming  Before  It  Begins. You  May feel             Strange smell or funny  Feeling  In  Your  Stomach,  Which is  Called   Aura. TIPS  TO  REDUCE/ PREVENT  SEIZURES         1. Take  Your  Anti seizure  Medications   As   Recommended. 2. Get   Enough   Sleep. Sleep  Deprivation   Can  Trigger  A  Seizure. 3. If   You   Have  A fever,  Treat  It  At  Once,  And  Contact   Your  Primary  Care Providers. 4. Avoid   Alcohol. 5. Avoid  Flashing  Lights,  Loud  Noises and  TV  And  Video  Games,           As   These  May  Trigger   Your  Seizures       6. Control  Your  Stress  And   Have  Adequate  Rest.       7.   If  You  Feel  A  Seizure  Coming   On :           A) warn people  Who  Are  With  You           B)  Make  Sure  There  Are  No  Sharp or  Hard  Objects  Around you. C)  Lay down  On  Your  Side  And  Relax. *   ADEQUATE   FLUID  INTAKE   AND  ELECTROLYTE  BALANCE           * KEEP  DAIRY  OF   THE  NEUROLOGICAL  SYMPTOMS          *  TO  MAINTAIN  REGULAR  SLEEP  WAKE  CYCLES.      *   TO  HAVE  ADEQUATE  REST  AND   SLEEP    HOURS.        *    AVOID  USAGE OF   TOBACCO,  EXCESSIVE  ALCOHOL                AND   ILLEGAL   SUBSTANCES,  IF  ANY          *  Maintain   Healthy  Life Style    With   Periodic  Monitoring  Of

## 2021-05-26 ENCOUNTER — HOSPITAL ENCOUNTER (OUTPATIENT)
Dept: LAB | Age: 41
Discharge: HOME | End: 2021-05-26
Payer: MEDICARE

## 2021-05-26 ENCOUNTER — HOSPITAL ENCOUNTER (OUTPATIENT)
Dept: NEUROLOGY | Age: 41
Discharge: HOME | End: 2021-05-26
Payer: MEDICARE

## 2021-05-26 ENCOUNTER — HOSPITAL ENCOUNTER (OUTPATIENT)
Dept: CT IMAGING | Age: 41
Discharge: HOME | End: 2021-05-28
Payer: MEDICARE

## 2021-05-26 DIAGNOSIS — G44.039 NONINTRACTABLE PAROXYSMAL HEMICRANIA, UNSPECIFIED CHRONICITY PATTERN: ICD-10-CM

## 2021-05-26 DIAGNOSIS — G43.709 CHRONIC MIGRAINE WITHOUT AURA WITHOUT STATUS MIGRAINOSUS, NOT INTRACTABLE: ICD-10-CM

## 2021-05-26 DIAGNOSIS — G40.909 RECURRENT SEIZURES (HCC): ICD-10-CM

## 2021-05-26 DIAGNOSIS — G40.909 SEIZURE DISORDER (HCC): ICD-10-CM

## 2021-05-26 DIAGNOSIS — F41.9 ANXIETY, MILD: ICD-10-CM

## 2021-05-26 DIAGNOSIS — G47.9 DISTURBANCE, SLEEP: ICD-10-CM

## 2021-05-26 DIAGNOSIS — R42 DIZZINESS: ICD-10-CM

## 2021-05-26 DIAGNOSIS — R56.9 CONVULSIONS, UNSPECIFIED CONVULSION TYPE (HCC): ICD-10-CM

## 2021-05-26 DIAGNOSIS — K44.9 HIATAL HERNIA: ICD-10-CM

## 2021-05-26 DIAGNOSIS — G40.009 PARTIAL IDIOPATHIC EPILEPSY WITH SEIZURES OF LOCALIZED ONSET, NOT INTRACTABLE, WITHOUT STATUS EPILEPTICUS (HCC): ICD-10-CM

## 2021-05-26 DIAGNOSIS — G47.9 SLEEP DIFFICULTIES: ICD-10-CM

## 2021-05-26 DIAGNOSIS — Z72.0 TOBACCO USE: ICD-10-CM

## 2021-05-26 DIAGNOSIS — R51.9 NONINTRACTABLE HEADACHE, UNSPECIFIED CHRONICITY PATTERN, UNSPECIFIED HEADACHE TYPE: ICD-10-CM

## 2021-05-26 DIAGNOSIS — K21.9 GASTROESOPHAGEAL REFLUX DISEASE WITHOUT ESOPHAGITIS: ICD-10-CM

## 2021-05-26 LAB
ANION GAP SERPL CALCULATED.3IONS-SCNC: 12 MMOL/L (ref 9–17)
CHLORIDE BLD-SCNC: 100 MMOL/L (ref 98–107)
CO2: 28 MMOL/L (ref 20–31)
HCT VFR BLD CALC: 46.8 % (ref 40.7–50.3)
HEMOGLOBIN: 16.4 G/DL (ref 13–17)
MCH RBC QN AUTO: 34.7 PG (ref 25.2–33.5)
MCHC RBC AUTO-ENTMCNC: 35 G/DL (ref 25.2–33.5)
MCV RBC AUTO: 98.9 FL (ref 82.6–102.9)
NRBC AUTOMATED: 0 PER 100 WBC
PDW BLD-RTO: 12.3 % (ref 11.8–14.4)
PLATELET # BLD: 213 K/UL (ref 138–453)
PMV BLD AUTO: 9.7 FL (ref 8.1–13.5)
POTASSIUM SERPL-SCNC: 4.6 MMOL/L (ref 3.7–5.3)
RBC # BLD: 4.73 M/UL (ref 4.21–5.77)
SODIUM BLD-SCNC: 140 MMOL/L (ref 135–144)
VALPROIC ACID LEVEL: 85 UG/ML (ref 50–125)
VALPROIC DATE LAST DOSE: NORMAL
VALPROIC DOSE AMOUNT: NORMAL
VALPROIC TIME LAST DOSE: NORMAL
WBC # BLD: 8.3 K/UL (ref 3.5–11.3)

## 2021-05-26 PROCEDURE — 36415 COLL VENOUS BLD VENIPUNCTURE: CPT

## 2021-05-26 PROCEDURE — 95813 EEG EXTND MNTR 61-119 MIN: CPT

## 2021-05-26 PROCEDURE — 95957 EEG DIGITAL ANALYSIS: CPT

## 2021-05-26 PROCEDURE — 70450 CT HEAD/BRAIN W/O DYE: CPT

## 2021-05-26 PROCEDURE — 80051 ELECTROLYTE PANEL: CPT

## 2021-05-26 PROCEDURE — 85027 COMPLETE CBC AUTOMATED: CPT

## 2021-05-26 PROCEDURE — 80164 ASSAY DIPROPYLACETIC ACD TOT: CPT

## 2021-05-26 RX ORDER — MELOXICAM 15 MG/1
TABLET ORAL
Qty: 30 TABLET | Refills: 1 | Status: SHIPPED | OUTPATIENT
Start: 2021-05-26 | End: 2021-09-20

## 2021-05-26 NOTE — PROGRESS NOTES
EXTENDED EEG Completed with [facility]. PCP: Jason Bates MD    Ordering: Mick Nguyen Neurologist    Interpreting Physician: Delfina Freedman Neurologist    Technician: London Hurst RN, RN

## 2021-07-14 ENCOUNTER — TELEPHONE (OUTPATIENT)
Dept: NEUROLOGY | Age: 41
End: 2021-07-14

## 2021-07-14 NOTE — TELEPHONE ENCOUNTER
CHART    REVIEWED. NEEDS    MORE   DETAILS    ABOUT    PURPOSE  OF  MISSING   WORK   TO  BE  WITH  PATIENT. PATIENT'S   FAMILY   CAN  USE    FMLA    FORMS. THANK  YOU.

## 2021-07-14 NOTE — TELEPHONE ENCOUNTER
CONSULT VISIT      Patient: Colten Mckinnon Date of Service: 10/8/2020   : 1990 MRN: [id number]     SUBJECTIVE:   CHIEF COMPLAINT:   Chief Complaint   Patient presents with   • Liver Problem     Liver hemangioma; denies abdominal pain     PCP: Gamaliel Roa MD  Referring: Gamaliel Roa MD    HISTORY OF PRESENT ILLNESS:  Colten Mckinnon is a 30 year old female w/ a hx of DM, CKD, asthma, HTN who presents today for consultation regarding liver lesion.    She has no hx of liver. Denies any FHx of liver disease. She reports some L sided abd pain at times at night and improves w/ passage of gas. Denies any RUQ abd pain. She reports appetite is fine, denies any n/v, denies any abd swelling. She denies any BRBPR. She denies any episodes of confusion, denies any new rashes.     She denies any OCP, is on Mirena (Progesterone) IUD for birth control.       PAST MEDICAL HISTORY:  Past Medical History:   Diagnosis Date   • Diabetes mellitus (CMS/HCC)        MEDICATIONS:  Current Outpatient Medications   Medication Sig   • METFORMIN HCL PO    • LISINOPRIL PO    • amoxicillin (AMOXIL) 500 MG capsule Take 1 capsule by mouth 2 times daily.     No current facility-administered medications for this visit.        ALLERGIES:  ALLERGIES:   Allergen Reactions   • Seafood   (Food) Other (See Comments)   • Shellfish Allergy   (Food Or Med) Other (See Comments)       PAST SURGICAL HISTORY:  Past Surgical History:   Procedure Laterality Date   •  section, low transverse         FAMILY HISTORY:  No FHx of liver disease  No FHx of GI Cancers    SOCIAL HISTORY: [place] at home w/ her children.  Smoking - denies  Etoh - rarely  Illicits - denies  Social History     Tobacco Use   • Smoking status: Never Smoker   • Smokeless tobacco: Never Used   Substance Use Topics   • Alcohol use: Not on file   • Drug use: Not on file       ROS:     12 of 14 systems reviewed, and negative except as  Okay to send note? mentioned in the HPI.       OBJECTIVE:     Visit Vitals  /78   Pulse 93   Temp 97.6 °F (36.4 °C)   Ht 4' 9\" (1.448 m)   Wt 86 kg (189 lb 9.5 oz)   SpO2 99%   BMI 41.03 kg/m²       General: well appearing, in NAD  Eyes: EOMI  Head: normocephalic, atraumatic  Resp: lungs CTAB, no inc WOB  CV: RRR  Abd: soft, non-distended, obese, no ttp, normoactive BS  Msk: WWP b/l, no LE edema  Skin: no rashes or lesions appreciated on exam  Neuro: Alert and oriented, no gross deficits  Psych: appropriate mood/affect      DIAGNOSTIC STUDIES:   LAB RESULTS:  Lab Results   Component Value Date    WBC 8.5 09/18/2019    HCT 39.2 09/18/2019    HGB 12.1 09/18/2019     09/18/2019     Lab Results   Component Value Date    GLUCOSE 82 09/18/2019    SODIUM 138 09/18/2019    POTASSIUM 4.3 09/18/2019    POTASSIUM Slight hemolysis, result may be falsely increased. 09/18/2019    CHLORIDE 105 09/18/2019    BUN 9 09/18/2019    CREATININE 0.57 09/18/2019    CALCIUM 8.7 09/18/2019    ALBUMIN 3.8 09/18/2019    AST 21 09/18/2019    ALKPT 56 09/18/2019    GPT 25 09/18/2019    ANIONGAP 13 09/18/2019    BCRAT 16 09/18/2019    GLOB 4.2 (H) 09/18/2019    AGR 0.9 (L) 09/18/2019       No results found for: INR    No components found for: CELIAC DISEASE SCR 2Y AND OVER    No results found for: TSH    Imaging:    US ADVOCATE PROCEDURE  Narrative: Accession #    [id number]    EXAM: Limited abdomen ultrasound    Indication liver function test abnormality     There is a noncontrast CT abdomen from 09/18/2019 for COMPARISON.  Did not include the uppermost   aspect of the liver.     No prior ultrasound     FINDINGS:   Echogenic circumscribed lesion of superior left lobe liver 2 x 1.9 x 1.7 cm, no definite   internal blood flow.  Liver otherwise normal.  Bile ducts nondistended.  Gallbladder normal and   no Ritter's sign.  Hepatopedal portal venous blood flow.  Pancreas normal.  Impression: 1.  Left lobe liver mass likely benign hemangioma.  Recommend  elective MRI with and without   contrast for confirmation.  2.  Otherwise normal appearance of liver, biliary tree and pancreas.    -----  F I N A L  -----    Transcribed By: GREYSON   03/21/20 10:29 am    Dictated By:            CECY VELAZQUEZ MD    Electronically Reviewed and Approved By:           CECY VELAZQUEZ MD  03/21/20 10:31 am      No visits with results within 6 Month(s) from this visit.   Latest known visit with results is:   Walk In on 03/10/2019   Component Date Value Ref Range Status   • GRP A STREP 03/10/2019 Negative   Final   • Internal Procedural Controls Accep* 03/10/2019 Yes   Final     ASSESSMENT AND PLAN:     Colten Mckinnon is a 30 year old female w/ a hx of DM, CKD, asthma, HTN who presents today for consultation regarding liver lesion.    # Liver Lesion (likely hemangioma based on US findings)  # Normal LFTs    The patient presents for evaluation of liver lesion that was incidentally found on abdominal ultrasound.  Notably had an echogenic 2 x 1.9 x 1.7 cm echogenic lesion in the superior left lobe.  Patient is otherwise asymptomatic, denies any history of cirrhosis, no symptoms of portal hypertension, no family history of liver disease or cirrhosis or liver cancer. LFTs have been entirely normal from 2015 - 2019.     At this time liver lesion is most likely hepatic hemangioma, however, will obtain MRI triple phase with gadolinium contrast and multiphasic enhancement for closer evaluation to confirm diagnosis.  Will also check AFP and repeat CMP to ensure LFTs are still normal.     If work-up comes back consistent with suspected hepatic hemangioma, given its small size and asymptomatic clinical presentation, it will not need any further follow-up.    - MRI triple phase with gadolinium contrast multiphasic enhancement  -AFP and CMP    F/u: PRN    Thank you for allowing me to participate in the care of this patient.      Lana Amaro MD  [facility]

## 2021-07-14 NOTE — TELEPHONE ENCOUNTER
patient called stated that wife's work needs a note for her missing work to be with  on 7/14/21. Fax note to [employer] @ [phone]. Patient can be reached at [phone]. Thank you.

## 2021-07-15 NOTE — TELEPHONE ENCOUNTER
This writer contacted patient in these regards, left voicemail to contact insurance dept to complete ALEXANDRA and FMLA paperwork - to contact office with further needs.

## 2021-07-21 ENCOUNTER — TELEMEDICINE (OUTPATIENT)
Dept: NEUROLOGY | Age: 41
End: 2021-07-21
Payer: MEDICARE

## 2021-07-21 DIAGNOSIS — G40.009 PARTIAL IDIOPATHIC EPILEPSY WITH SEIZURES OF LOCALIZED ONSET, NOT INTRACTABLE, WITHOUT STATUS EPILEPTICUS (HCC): ICD-10-CM

## 2021-07-21 DIAGNOSIS — G40.909 SEIZURE DISORDER (HCC): ICD-10-CM

## 2021-07-21 DIAGNOSIS — G43.009 MIGRAINE WITHOUT AURA AND WITHOUT STATUS MIGRAINOSUS, NOT INTRACTABLE: ICD-10-CM

## 2021-07-21 DIAGNOSIS — Z86.73 H/O TRANSIENT ISCHEMIC ATTACK INVOLVING ANTERIOR CIRCULATION: ICD-10-CM

## 2021-07-21 DIAGNOSIS — R42 DIZZINESS: ICD-10-CM

## 2021-07-21 DIAGNOSIS — G47.9 SLEEP DIFFICULTIES: ICD-10-CM

## 2021-07-21 DIAGNOSIS — G40.909 RECURRENT SEIZURES (HCC): Primary | ICD-10-CM

## 2021-07-21 DIAGNOSIS — F32.0 CURRENT MILD EPISODE OF MAJOR DEPRESSIVE DISORDER, UNSPECIFIED WHETHER RECURRENT (HCC): ICD-10-CM

## 2021-07-21 DIAGNOSIS — M54.40 LOW BACK PAIN WITH SCIATICA, SCIATICA LATERALITY UNSPECIFIED, UNSPECIFIED BACK PAIN LATERALITY, UNSPECIFIED CHRONICITY: ICD-10-CM

## 2021-07-21 DIAGNOSIS — M54.12 CERVICAL RADICULOPATHY: ICD-10-CM

## 2021-07-21 DIAGNOSIS — F41.9 ANXIETY, MILD: ICD-10-CM

## 2021-07-21 DIAGNOSIS — S09.90XS INJURY OF HEAD, SEQUELA: ICD-10-CM

## 2021-07-21 DIAGNOSIS — Z72.0 TOBACCO USE: ICD-10-CM

## 2021-07-21 DIAGNOSIS — Z91.81 H/O FALL: ICD-10-CM

## 2021-07-21 PROCEDURE — G8427 DOCREV CUR MEDS BY ELIG CLIN: HCPCS | Performed by: PSYCHIATRY & NEUROLOGY

## 2021-07-21 PROCEDURE — 4004F PT TOBACCO SCREEN RCVD TLK: CPT | Performed by: PSYCHIATRY & NEUROLOGY

## 2021-07-21 PROCEDURE — 99211 OFF/OP EST MAY X REQ PHY/QHP: CPT | Performed by: PSYCHIATRY & NEUROLOGY

## 2021-07-21 PROCEDURE — G8417 CALC BMI ABV UP PARAM F/U: HCPCS | Performed by: PSYCHIATRY & NEUROLOGY

## 2021-07-21 PROCEDURE — 99214 OFFICE O/P EST MOD 30 MIN: CPT | Performed by: PSYCHIATRY & NEUROLOGY

## 2021-07-21 RX ORDER — LAMOTRIGINE 100 MG/1
TABLET ORAL
Qty: 30 TABLET | Refills: 1 | Status: SHIPPED | OUTPATIENT
Start: 2021-07-21 | End: 2021-09-22 | Stop reason: SDUPTHER

## 2021-07-21 ASSESSMENT — ENCOUNTER SYMPTOMS
EYE ITCHING: 0
ABDOMINAL DISTENTION: 0
COLOR CHANGE: 0
EYE PAIN: 0
VOMITING: 0
CHANGE IN BOWEL HABIT: 0
SCALP TENDERNESS: 0
SINUS PRESSURE: 0
WHEEZING: 0
COUGH: 0
TROUBLE SWALLOWING: 0
BLOOD IN STOOL: 0
CHEST TIGHTNESS: 0
FACIAL SWEATING: 0
PHOTOPHOBIA: 1
SORE THROAT: 0
EYE REDNESS: 0
NAUSEA: 1
EYE WATERING: 0
SHORTNESS OF BREATH: 0
FACIAL SWELLING: 0
ABDOMINAL PAIN: 0
APNEA: 0
DIARRHEA: 0
SWOLLEN GLANDS: 0
EYE DISCHARGE: 0
VISUAL CHANGE: 0
CONSTIPATION: 0
BLURRED VISION: 0
BACK PAIN: 0
CHOKING: 0
RHINORRHEA: 0
VOICE CHANGE: 0

## 2021-07-21 NOTE — PROGRESS NOTES
Subjective:          Patient ID: Kenyetta Medina is a 38 y. o. RIGHT   HANDED male. Seizures  This is a chronic problem. Episode onset: SINCE    2014. The problem occurs intermittently. The problem has been waxing and waning. Associated symptoms include headaches and nausea. Pertinent negatives include no abdominal pain, anorexia, arthralgias, change in bowel habit, chest pain, chills, congestion, coughing, diaphoresis, fatigue, fever, joint swelling, myalgias, neck pain, numbness, rash, sore throat, swollen glands, urinary symptoms, vertigo, visual change, vomiting or weakness. Exacerbated by: LACK OF  SLEEP,  PROLONGED   TV ,   STRESS,   LOUD  NOISES   AND  BRIGHT  LIGHTS   Treatments tried: ANTI  EPILEPTIC  MEDICATION. The treatment provided moderate relief. Headache   This is a chronic problem. Episode onset: MORE  THAN   10  YEARS. The problem occurs intermittently. The problem has been waxing and waning. The pain is located in the vertex region. The pain does not radiate. The pain quality is similar to prior headaches. The quality of the pain is described as aching and throbbing. The pain is at a severity of 3/10. The pain is mild. Associated symptoms include insomnia, nausea, phonophobia, photophobia, seizures and tingling. Pertinent negatives include no abdominal pain, abnormal behavior, anorexia, back pain, blurred vision, coughing, dizziness, drainage, ear pain, eye pain, eye redness, eye watering, facial sweating, fever, hearing loss, loss of balance, muscle aches, neck pain, numbness, rhinorrhea, scalp tenderness, sinus pressure, sore throat, swollen glands, tinnitus, visual change, vomiting, weakness or weight loss. The symptoms are aggravated by unknown. He has tried darkened room, NSAIDs, Excedrin and triptans for the symptoms. The treatment provided moderate relief. His past medical history is significant for migraine headaches and migraines in the family.  There is no history of cancer, cluster headaches, hypertension, immunosuppression, obesity, pseudotumor cerebri, recent head traumas, sinus disease or TMJ. Migraine   This is a chronic problem. Episode onset: MORE  THAN   10  YEARS. The problem occurs intermittently. The problem has been waxing and waning. The pain is located in the right unilateral and frontal region. The pain does not radiate. The pain quality is similar to prior headaches. The quality of the pain is described as aching and throbbing. The pain is at a severity of 3/10. The pain is mild. Associated symptoms include insomnia, nausea, phonophobia, photophobia, seizures and tingling. Pertinent negatives include no abdominal pain, abnormal behavior, anorexia, back pain, blurred vision, coughing, dizziness, drainage, ear pain, eye pain, eye redness, eye watering, facial sweating, fever, hearing loss, loss of balance, muscle aches, neck pain, numbness, rhinorrhea, scalp tenderness, sinus pressure, sore throat, swollen glands, tinnitus, visual change, vomiting, weakness or weight loss. The symptoms are aggravated by unknown. He has tried triptans and NSAIDs (TOPAMAX) for the symptoms. The treatment provided moderate relief. His past medical history is significant for migraine headaches and migraines in the family. There is no history of cancer, cluster headaches, hypertension, immunosuppression, obesity, pseudotumor cerebri, recent head traumas, sinus disease or TMJ. History obtained from  The patient          and other  available medical records were  Also  reviewed.                 1)   H/O   CHRONIC  SEVERE MIGRAINES                         FOR  MORE  THAN   10  YEARS                           -    STABLE            2)      MIGRAINES  AND TENSION  HEADACHE                        ARE BETTER  CONTROLLED                      -   ON  IMITREX,   PHENERGAN   AS  NEEDED               3)        H/O  SEIZURE  DISORDER /  EPILEPSY    SINCE      2014                        - STABLE            4)     SEIZURE  CONTROL  WAS    BETTER                 -    WAS   ON  DEPAKOTE,  VIMPAT  AND  TOPAMAX                                 IN   THE    PAST            5)   INTOLERANCE  TO  KEPPRA                    DUE  TO  IRRITABILITY  AND MOOD PROBLEMS            6)     H/O   CHRONIC  ANXIETY  AND  DEPRESSION                    -     STABLE    ON  LEXAPRO                 HAD    FOLLOW    WITH    HIS  [facility]  PROFESSIONALS               7)  H/O  PREVIOUS  MULTIPLE MILD  HEAD  INJURIES                             DUE  TO   FALLS            8)   H/O     CHRONIC    SLEEP  DIFFICULTIES                      AND  DISTURBED  SLEEP  WAKE  CYCLES                              -  STABLE                9)    PREVIOUS  H/O  DIZZY  EPISODES                          -  NO  RECURRENCE            10)   EMU  AT  [address]   IN  2014                  -  POSSIBLE  FRONTAL    FOCUS            11)   EMU   AT  [address] 7  TO  NOV. 11, 2016    SHOWED :              6  EPISODES  OF  PSYCHOGENIC  NON EPILEPTIC SPELLS. EEG  DURING  THE SPELLS  AND  INTERICTAL  PERIODS                         WAS REPORTED  TO BE  NORMAL. 12)     PREVIOUS   H/O    BRIEF  SEIZURES  AND  MEMORY  LOSS                           -  PARTIAL  COMPLEX       SEIZURES                          DUE  TO  SLEEP  DEPRIVATION                        AND  PROLONGED  TV   WATCHING                                         13)   EPISODES   OF  RIGHT  SIDED  NUMBNESS   AND  SPEECH  PROBLEMS. H/O  HOSPITALIZATION   FROM  SEPT. 2017                  AT  [address]     TIA  /   STROKE                       WITH  NO  SIGNIFICANT  ABNORMALITIES  .           14)         PREVIOUS   H/O  CARDIAC  ARRYTHMIA                         TO    FOLLOW  WITH    CARDOLOGY              15)     PREVIOUS   H/O    HAD  COGNITIVE  BEHAVIOR THERAPY  AT [facility]                       PATIENT  TO  FOLLOW  UP   WITH  MENTAL  HEALTH  PROFESSIONALS                        FOR   EVALUATIONS  OF   HIS  ANXIETY,  DEPRESSION,                                 NON  EPILEPTIC  PSYCHOGENIC  SPELLS,                            16)          PATIENT     STOPPED     VIMPAT       AND  TOPAMAX    IN    October 2017                 17)    H/O   [facility]   NEUROLOGY    FOLLOW  UP    EVALUATION     IN      2018                          PATIENT     NOT  PLANNING  TO  RETURN  BACK   TO                                    [facility]                18)     PATIENT  DID NOT  HAVE  NEUROLOGY   FOLLOW  UP    AT  [facility]                           FROM   October 2017     TO  DEC. 2018               19)     H/O        BRIEF   SEIZURE    2-3  PER MONTH                            STARING  EPISODES. IN      2019               20)          PATIENT   ON      DEPAKOTE  FOR  SEIZURES                                       AND  MIGRAINE  PROPHYLAXIS                                                       PATIENT   WAS     ON     KLONOPIN       FOR                            SEIZURE  CONTROL   AND  SLEEP  DIFFICULTIES   IN   THE PAST                         21)     H/O    BRIEF  SEIZURES       ON     8/13/2019                            -  PARTIAL  COMPLEX       SEIZURES                          DUE  TO  SLEEP  DEPRIVATION     AND  PROLONGED  TV   WATCHING                     25)      H/O    BRIEF    SEIZURES        WITH    FALLING                                  AND  MILD     HEAD   &   NECK INJURY         TWICE                                  IN  SEPT.     AND October 2019                        23)   H/O      STARING  EPISODE  WITH    HEAD TURNING   TO  THE                           RIGHT  SIDE    LASTING  FOR   ONE  MINUTE          WITH                                LETHARGY    NOTICED   VISIT  On   11/11/ 2019   .                                    25)     CT SURGICAL   HISTORY,    FAMILY   HISTORY,  SOCIAL  HISTORY,    PROBLEM  LIST   FOR  PATIENT  CARE   COORDINATION        RECORDS   REVIEWED:    historical medical records, lab reports and office notes         Past Medical History:   Diagnosis Date    Anxiety, mild     Colon polyps     Depression     Dizziness     Eczema     [facility] [facility]     GERD (gastroesophageal reflux disease)     H/O fall     Head ache     Head injury     Hiatal hernia     Inguinal hernia     Right.  Low back pain     Lumbar sprain     Migraine     Plantar fasciitis, bilateral     Seizure disorder [facility] [address] [facility]     Sigmoid diverticulosis     Sleep difficulties     Status migrainosus     TIA (transient ischemic attack) 09/2017    Tobacco use     Chronic. Past Surgical History:   Procedure Laterality Date    COLONOSCOPY  04/29/2011    Internal hemorrhoids, possible anal fissure, few scattered diverticula.  COLONOSCOPY  06/04/2010    Mild sigmoid diverticulosis.  COLONOSCOPY  08/28/2009    Sigmoid diverticulosis, internal hemorrhoids.  COLONOSCOPY  05/27/2008    Internal hemorrhoids.  COLONOSCOPY  11/19/2014    polyp in rectum    COLONOSCOPY  6/1/16    sigmoid diverticulosis, colon polyp, internal hemorrhoids    COLONOSCOPY  04/19/2019    [id number]    GASTRIC FUNDOPLICATION  82/00/6865    HERNIA REPAIR Left 11/30/2017    Left Inguinal Hernia Repair w/ Mesh performed by Blake [facility]onel [facility] 05/30/2013    INGUINAL HERNIA REPAIR Left 01/24/2013    INGUINAL HERNIA REPAIR Right 09/24/2015    KNEE ARTHROSCOPY Left     PILONIDAL CYST EXCISION N/A 7/23/2020    PILONIDAL CYSTECTOMY performed by Alannah Lorenzo DO at [address]  03/05/2010    Recurrent hiatal hernia.     UPPER GASTROINTESTINAL ENDOSCOPY  6/1/16    S/P brenda fundoplication, good repair, retained gastric fluid    VASECTOMY Current Outpatient Medications   Medication Sig Dispense Refill    lamoTRIgine (LAMICTAL) 100 MG tablet HALF  TABLET     DAILY   FOR     ONE  WEEK,     THEN     ONE  TABLET    DAILY       AT  BED  TIME 30 tablet 1    meloxicam (MOBIC) 15 MG tablet take 1 tablet by mouth once daily 30 tablet 1    divalproex (DEPAKOTE) 250 MG DR tablet take 3 tablets by mouth twice a day 180 tablet 5    SUMAtriptan (IMITREX) 50 MG tablet take 1 tablet by mouth once daily if needed for MIGRAINE 30 tablet 2    promethazine (PHENERGAN) 25 MG tablet take 1 tablet by mouth every 8 hours if needed for nausea 60 tablet 2    acetaminophen (TYLENOL) 500 MG tablet Take 500 mg by mouth every 4 hours as needed for Pain       No current facility-administered medications for this visit.            No Known Allergies            Family History   Problem Relation Age of Onset    COPD Mother     High Blood Pressure Mother    Kwasi Rudder Cancer Father         Hodgekin's Lymphoma    High Blood Pressure Father     Eczema Sister     Alzheimer's Disease Maternal Grandmother     COPD Maternal Grandmother     Cancer Maternal Grandmother     Cancer Paternal Grandmother     Cancer Paternal Grandfather     Cancer Sister         colon cancer    High Blood Pressure Sister     Arthritis Sister     Asthma Daughter     Eczema Daughter     Glaucoma Neg Hx     Diabetes Neg Hx     Cataracts Neg Hx              Social History     Socioeconomic History    Marital status:      Spouse name: Not on file    Number of children: Not on file    Years of education: Not on file    Highest education level: Not on file   Occupational History    Not on file   Tobacco Use    Smoking status: Current Every Day Smoker     Packs/day: 1.00     Years: 20.00     Pack years: 20.00     Types: Cigarettes     Last attempt to quit: 2016     Years since quittin.2    Smokeless tobacco: Never Used    Tobacco comment: 2-3 weekly   Vaping Use    Vaping Use: Never used   Substance and Sexual Activity    Alcohol use: No     Alcohol/week: 0.0 standard drinks     Comment: rarely    Drug use: No    Sexual activity: Not on file   Other Topics Concern    Not on file   Social History Narrative    Not on file     Social Determinants of Health     Financial Resource Strain:     Difficulty of Paying Living Expenses:    Food Insecurity:     Worried About Running Out of Food in the Last Year:     [address] in the Last Year:    Transportation Needs:     Lack of Transportation (Medical):  Lack of Transportation (Non-Medical):    Physical Activity:     Days of Exercise per Week:     Minutes of Exercise per Session:    Stress:     Feeling of Stress :    Social Connections:     Frequency of Communication with Friends and Family:     Frequency of Social Gatherings with Friends and Family:     Attends Buddhist Services:     Active Member of Clubs or Organizations:     Attends Club or Organization Meetings:     Marital Status:    Intimate Partner Violence:     Fear of Current or Ex-Partner:     Emotionally Abused:     Physically Abused:     Sexually Abused: There were no vitals filed for this visit.       Wt Readings from Last 3 Encounters:   05/19/21 191 lb (86.6 kg)   03/31/21 190 lb 3.2 oz (86.3 kg)   02/11/21 194 lb (88 kg)         BP Readings from Last 3 Encounters:   05/19/21 115/72   03/31/21 132/76   02/11/21 130/74       Hematology and Coagulation  Lab Results   Component Value Date    WBC 8.3 05/26/2021    RBC 4.73 05/26/2021    HGB 16.4 05/26/2021    HCT 46.8 05/26/2021    MCV 98.9 05/26/2021    MCH 34.7 05/26/2021    MCHC 35.0 05/26/2021    RDW 12.3 05/26/2021     05/26/2021    MPV 9.7 05/26/2021         Chemistries  Lab Results   Component Value Date     05/26/2021    K 4.6 05/26/2021     05/26/2021    CO2 28 05/26/2021    BUN 15 12/08/2019    CREATININE 1.26 12/08/2019    CALCIUM 9.8 12/08/2019    PROT 7.8 12/08/2019 joint swelling, myalgias, neck pain and neck stiffness. Skin: Negative for color change, pallor, rash and wound. Allergic/Immunologic: Negative for environmental allergies, food allergies and immunocompromised state. Neurological: Positive for tingling, seizures, light-headedness and headaches. Negative for dizziness, vertigo, tremors, syncope, facial asymmetry, speech difficulty, weakness, numbness and loss of balance. Hematological: Negative for adenopathy. Does not bruise/bleed easily. Psychiatric/Behavioral: Positive for decreased concentration and sleep disturbance. Negative for agitation, behavioral problems, confusion, dysphoric mood, hallucinations, self-injury and suicidal ideas. The patient is nervous/anxious and has insomnia. The patient is not hyperactive. Objective:        LIMITED    DUE  TO  VIDEO  VISIT          NEUROLOGICAL EXAMINATION :    A) MENTAL STATUS:                   Alert and  oriented  To time, place  And  Person. No Aphasia. No  Dysarthria. Able   To  Follow  commands without   Any  Difficulty. No right  To left confusion. Normal  Speech  And language function.                    Insight and  Judgment ,Fund  Of  Knowledge  DECREASED                Recent  And  Remote memory  DECREASED                Attention &Concentration are  DECREASED                                                    B) CRANIAL NERVES :      C) MOTOR  EXAM:                      D) SENSORY :                   E) REFLEXES:       F) COORDINATION  AND  GAIT :             -    LIMITED    DUE  TO  VIDEO  VISIT        Assessment:     Patient Active Problem List   Diagnosis    Low back pain with sciatica    Migraine    Anxiety, mild    Disturbance, sleep    Tobacco use    GERD (gastroesophageal reflux disease)    Hiatal hernia    Head ache    Status migrainosus    Dizziness    Depression    H/O fall    Head injury    Seizure disorder ([address]    Astigmatism    Rectal bleed    Cervical radiculopathy    Ventricular premature beats    Nausea and vomiting    Colon polyps    TIA (transient ischemic attack)    VBI (vertebrobasilar insufficiency)    Pilonidal cyst    Wound dehiscence    Sleep difficulties    Partial idiopathic epilepsy with seizures of localized onset, not intractable, without status epilepticus ([address]    Convulsions ([address]    Recurrent seizures ([address]           CT OF THE HEAD WITHOUT CONTRAST; CT OF THE CERVICAL SPINE WITHOUT CONTRAST   11/15/2019 10:21 am       TECHNIQUE:   CT of the head was performed without the administration of intravenous   contrast. Dose modulation, iterative reconstruction, and/or weight based   adjustment of the mA/kV was utilized to reduce the radiation dose to as low   as reasonably achievable.; CT of the cervical spine was performed without the   administration of intravenous contrast. Multiplanar reformatted images are   provided for review. Dose modulation, iterative reconstruction, and/or weight   based adjustment of the mA/kV was utilized to reduce the radiation dose to as   low as reasonably achievable.       COMPARISON:   01/25/2019       HISTORY:   ORDERING SYSTEM PROVIDED HISTORY: Status migrainosus   TECHNOLOGIST PROVIDED HISTORY:   migraine, seizures   Reason for Exam: Hx chronic migraines.  C/o increase headaches since recent   falls   Acuity: Acute   Type of Exam: Initial       FINDINGS:   BRAIN/VENTRICLES: There is no acute intracranial hemorrhage, mass effect or   midline shift.  No abnormal extra-axial fluid collection.  The gray-white   differentiation is maintained without evidence of an acute infarct.  There is   no evidence of hydrocephalus.       ORBITS: The visualized portion of the orbits demonstrate no acute abnormality.       SINUSES: The visualized paranasal sinuses and mastoid air cells demonstrate   no acute abnormality.       SOFT TISSUES/SKULL:  No acute abnormality of the visualized skull or soft   tissues.       CERVICAL SPINE: Cervical spine maintains its normal lordotic curvature. There are subtle degenerative changes C6-C7.  No acute fracture or   malalignment.  Vertebral body heights and intervertebral disc spaces are   preserved.  Overlying soft tissues are unremarkable.  Visualized lung apices   are clear.           Impression   No acute intracranial abnormality.       No acute osseus abnormality of the cervical spine. Procedure: [facility] 11/13/2014 [id number] CT BRAIN WITHOUT CONTRAST    Reason for Exam: \      FULL RESULT: EXAM: CT Head without Contrast - 11/13/2014 7:27 PM      HISTORY: Syncope/Near-Syncope. Seizures. COMPARISON: 10/8/14       TECHNIQUE: Contiguous axial CT images were obtained from the skull base    to the vertex and reviewed in soft tissue, subdural, bone, and brain    windows. FINDINGS: The ventricular system is normal in size and configuration. There are no intra-axial or extra-axial fluid collections or mass    lesions. No acute intracranial hemorrhage or midline shift. Visualized    paranasal sinuses and mastoid air cells are clear. No acute calvarial    fracture is identified. The brainstem and the relationship of the    craniocervical junction structures are normal. There is incomplete fusion    of the posterior elements of C1, stable from prior study and compatible    with a normal variant. Visualized orbits and globes are intact. IMPRESSION:    No evidence for acute intracranial pathology. No bleed, mass effect, or    midline shift.               Procedure: [facility] 06/02/2014 [id number] MRI BRAIN COMBINED    Reason for Exam: \      FULL RESULT: MRI brain with and without intravenous contrast, 6/2/2014      History: Migraines      Technique: Multiplanar multisequence MR imaging of the brain was    performed before and after intravenous administration of 16 mL Magnevist.      Findings: No evidence for shift of midline structures, mass effect or    compression of the ventricles noted. No acute intra-or extra-axial    hemorrhage is seen. No abnormal intracranial fluid collections are    identified. The basal cisterns are patent. Posterior fossa structures demonstrate no    discrete mass. Few scattered foci of T2/FLAIR hyperintensity noted in the    periventricular and subcortical white matter which essentially are    nonspecific in imaging appearance however differential considerations    include demyelinating or inflammatory process, migraine induced changes,    chronic small vessel disease or vascular disease. No associated restricted diffusion or abnormal enhancing seen. A small    left occipital lobe developmental venous anomaly seen. No abnormal    enhancing intracranial mass seen. Visualized orbital contents appear unremarkable. Mild mucosal thickening of the ethmoid air cells noted. Skull base flow voids are patent. Superior sagittal sinus and straight sinus flow voids are patent. Heterogeneity of the T1-weighted marrow signal intensity seen. IMPRESSION:    1. No acute or subacute ischemic insult noted. No abnormal enhancing    intracranial mass seen. 2.scattered foci of T2/FLAIR hyperintensity noted in the periventricular    and subcortical white matter which essentially are nonspecific in imaging    appearance however differential considerations include demyelinating or    inflammatory process, migraine induced changes, chronic small vessel    disease or vascular disease. Procedure: [facility] 10/08/2014 [id number] CT BRAIN WITHOUT CONTRAST    Reason for Exam: \      FULL RESULT: EXAM: Brain CT without contrast dated 10/8/2014. HISTORY: Headache after head injury. COMPARISON: Brain CT dated 4/16/2014. TECHNIQUE: Multi-detector axial images are obtained through the head. Findings:  There is no evidence for acute intracranial hemorrhage, midline shift or mass effect. Ventricular and cistern spaces are normal    and symmetric. [place] and white matter differentiation is well preserved. No intra- or extra-axial fluid collections or mass occupying lesions    seen. Bilateral cerebellopontine angles are normal. Visualized orbital    contents are normal. Study viewed in bone windows shows no    abnormalities. Mild left ethmoid sinusitis otherwise all visualized    sinuses are normally aerated. Bilateral temporomandibular joints are    normally aligned. IMPRESSION: No acute intracranial abnormalities. Mild left ethmoid    sinusitis. Plan:           VISITING DIAGNOSIS:      ICD-10-CM    1. Recurrent seizures ([facility] [address]  G40.909    2. 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 Cervical radiculopathy  M54.12    4. Seizure disorder ([facility] [address]  G40.909    5. Sleep difficulties  G47.9    6. Anxiety, mild  F41.9    7. H/O fall  Z91.81    8. Migraine without aura and without status migrainosus, not intractable  G43.009    9. Tobacco use  Z72.0    10. Current mild episode of major depressive disorder, unspecified whether recurrent ([address]  F32.0    11. Low back pain with sciatica, sciatica laterality unspecified, unspecified back pain laterality, unspecified chronicity  M54.40    12. Injury of head, sequela  S09.90XS    13. H/O transient ischemic attack involving anterior circulation  Z86.73    14. Dizziness  R42                 CONCERNS   &   INCREASED   RISK   FOR          * TIA,  CEREBRO  VASCULAR  ISCHEMIA, STROKE       *  SEIZURE  ACTIVITY,  EPILEPSY ,        *  Chronic  Headaches &  Migraines      *   COGNITIVE  &   MEMORY PROBLEMS  AND  DEMENTIAS                     VARIOUS  RISK   FACTORS   WERE  REVIEWED   AND   DISCUSSED. *  PATIENT   HAS  MULTIPLE   MEDICAL, MENTAL HEALTH         &      NEUROLOGICAL   PROBLEMS .          PATIENT'S   MANAGEMENT  IS CHALLENGING. PLAN:       Terressa Flavors  Of  The  Diagnoses,  The  Management & Treatment  Options           AND    Care  plan  Were        Reviewed and   Discussed   With  patient. * Goals  And  Expectations  Of  The  Therapy  Discussed   And  Reviewed. *   Benefits   And   Side  Effect  Profile  Of  Medication/s   Were   Discussed             * Need   For  Further   Follow up For  The  Various  Problems  Were discussed. * Results  Of  The  Previous  Diagnostic tests were reviewed and questions answered. patient  understand the same. Medical  Decision  Making  Was  Made  Based on the   Complexity  Of  Above  Mentioned  Diagnoses,        Data reviewed   & diagnostic  Tests  Reviewed,  Risk  Of  Significant   Co morbidities and complicating   Factors. Medical  Decision  Was   High  Complexity  Due   To  The  Patient's  Multiple  Symptoms,  Advancing   Disease,      Complex  Treatment  Regimen,  Multiple medications and   Risk  Of   Side  Effects,      Difficulty  In  Medication  Management  And  Diagnostic  Challenges   In  View  Of  The  Associated       Co  Morbid  Conditions   And  Problems. * FALL   PRECAUTIONS. THESE  REVIEWED   AND  DISCUSSED      *   ABSOLUTELY   NO  DRIVING    -       REVIEWED     WITH  PATIENT      *   BE  CAREFUL  WITH  ACTIVITIES             *   ADEQUATE   FLUID  INTAKE   AND  ELECTROLYTE  BALANCE             * KEEP  DAIRY  OF   THE  NEUROLOGICAL  SYMPTOMS        RECORDING THE    DURATION  AND  FREQUENCY. *  AVOID    CONDITIONS  AND  FACTORS   THAT  MAKE   NEUROLOGICAL  SYMPTOMS  WORSE. *   SEIZURE  PRECAUTIONS.  -   DISCUSSED                  A)  Avoid  Working  At   [employer] B)  Avoid  Working  With  Heavy machinery  . C)  Avoid   Swimming,  Climbing  A  Ladder   Unattended. D)  Avoid   Driving   If  You   Have  A  Seizure.           E)  Must   Be  Seizure  Free   For At   Least   6 months,  Before   You  Can drive. F) Some times  Your  May  Feel  Seizure coming  Before  It  Begins. You  May feel               Strange smell or funny  Feeling  In  Your  Stomach,  Which is  Called   Aura. TIPS  TO  REDUCE/ PREVENT  SEIZURES         1. Take  Your  Anti seizure  Medications   As   Recommended. 2. Get   Enough   Sleep. Sleep  Deprivation   Can  Trigger  A  Seizure. 3. If   You   Have  A fever,  Treat  It  At  Once,  And  Contact   Your  Primary  Care Providers. 4. Avoid   Alcohol. 5. Avoid  Flashing  Lights,  Loud  Noises and  TV  And  Video  Games,             As   These  May  Trigger   Your  Seizures       6. Control  Your  Stress  And   Have  Adequate  Rest.       7.   If  You  Feel  A  Seizure  Coming   On :             A) warn people  Who  Are  With  You             B)  Make  Sure  There  Are  No  Sharp or  Hard  Objects  Around you. C)  Lay down  On  Your  Side  And  Relax. *  TO  MAINTAIN  REGULAR  SLEEP  WAKE  CYCLES. *   TO  HAVE  ADEQUATE  REST  AND   SLEEP    HOURS.          *    AVOID  ANY USAGE OF                   TOBACCO,  EXCESSIVE  ALCOHOL  AND   ILLEGAL   SUBSTANCES          *  CONTINUE MEDICATIONS PRESCRIBED BY NEUROLOGIST AS    RECOMMENDED     *   Compliance   With  Medications   And  Instructions          * CURRENTLY  TOLERATING  THE  PRESCRIBED   MEDICATIONS. WITHOUT  ANY  SIGNIFICANT  SIDE  EFFECTS   &  GETTING BENEFIT.           *  May   Use  Pill  Box,    If  Needed      *  MEDICATIONS TO AVOID:    WELLBUTRIN,  ULTRAM          *     Antiplatelet  therapy    As   Recommended  Was   Discussed          *    Prophylactic  Use   Of Vitamin   B   Complex,  Folic  Acid,    Vitamin  B12        Multivitamin   Tablet  Daily    Supplementations   Over  The  Counter  Discussed           *  PATIENT  IS  ALSO   COUNSELED   TO  KEEP    ACTIVITIES         A)   SIMPLE      B)  ORGANIZED      C)  WRITE   DOWN                     *  EVALUATIONS  AND  FOLLOW UP:                                          * CARDIOLOGY               * EPILEPSY  MONITORING   UNIT                                 *    PREVIOUS     H/O    PARTIAL  COMPLEX       SEIZURES                          DUE  TO  SLEEP  DEPRIVATION     AND  PROLONGED  TV   WATCHING                            *     PATIENT   RECOMMENDED  :                           A)   SEIZURE  PRECAUTIONS                         VARIOUS  RISK   FACTORS   WERE  REVIEWED   AND   DISCUSSED. Orders Placed This Encounter   Medications    lamoTRIgine (LAMICTAL) 100 MG tablet     Sig: HALF  TABLET     DAILY   FOR     ONE  WEEK,     THEN     ONE  TABLET    DAILY       AT  BED  TIME     Dispense:  30 tablet     Refill:  1                          *        EXPECTATIONS,   GOALS   OF  SEIZURE  MANAGEMENT                           AND  SIDE  EFFECTS  MEDICATIONS    WERE                                 REVIEWED     AND   DISCUSSED    IN    DETAIL. *PATIENT   TO  FOLLOW  UP  WITH   PRIMARY  CARE   AND   OTHER  CONSULTANTS  AS  BEFORE.           *TO  FOLLOW  WITH   MENTAL  HEALTH  PROFESSIONALS ,  INCLUDING            PSYCHOLOGICAL  COUNSELING   AND  PSYCHIATRIC  EVALUTIONS            *  Maintain   Healthy  Life Style    With   Periodic  Monitoring  Of      Any  Medical  Conditions  Including   Elevated  Blood  Pressure,  Lipid  Profile,     Blood  Sugar levels  And   Heart  Disease. *   Period   Screening  For  Cancers  Involving  Breast,  Colon,    Prostate, lungs  And  Other  Organs  As  Applicable,  In consultation   With  Your  Primary Care Providers.               * Second Neurological  Opinion  And  Evaluations  In  [address] [facility]  Setting  If  Patient  Is  Interested. *  If  The  Patient remains  Neurologically  Stable   Return   To  [facility] Neurology Department       IN   2- 3      MONTHS  TIME   FOR  FURTHER  FOLLOW UP.                   *  If   There is  Any  Significant  Worsening   Of  Current  Symptoms  And      Or  If patient  Develops   Any additional  New  Neurological  Symptoms  Or  Significant  Concerns       Should  Call  911 or  Go  To  Emergency  Department  For  Further  Immediate  Evaluation. *   The  Neurological   Findings,  Possible  Diagnosis,  Differential diagnoses   And  Options      For    Further   Investigations   And  management   Are  Discussed  Comprehensively. Medications   And  Prescription   Risks  And  Side effects  Are   Also  Discussed. The  Above  Were  Reviewed  With  patient and       questions  Answered  In  Detail. More   Than   50% of face  To face Time   Was  Spent  On  Counseling   And   Coordination  Of  Care       Of   Patient's multiple   Neurological  Problems   And   Comorbid  Medical   Conditions. VIRTUAL   VIDEO  VISIT          PATIENT  BEING   EVALUATED   BY  NEUROLOGIST   VIA   a Virtual Visit (video visit) encounter          to address concerns as mentioned  ABOVE        ALSO    nursing   caregiver was present     before,  during  and  after   the    visit        Due to this being a TeleHealth encounter (During RXDVG-85 public health emergency), evaluation of the following organ systems was limited:     Vitals/Constitutional/EENT/Resp/CV/GI//MS/Neuro/Skin/Heme-Lymph-Imm.         Pursuant to the emergency declaration under the [facility] waiver authority and the [employer] Act, this Virtual Visit was conducted with patient's (and/or legal guardian's) consent, to reduce the patient's risk of exposure to COVID-19 and provide necessary medical care. The patient (and/or legal guardian) has also been advised to contact this office for worsening conditions or problems, and seek emergency medical treatment and/or call 911 if deemed necessary. Patient identification was verified at the start of the visit: Yes    Total time spent for this encounter:  ( Time Spent:   40  minutes )    Services were provided through a video synchronous discussion   and  limited    examination  virtually to substitute for in-person clinic visit. Patient    located at    [address] [place] [address]. [facility]. MD Belem Juarez MD on 4/94/3358 at 12:42 PM    An electronic signature was used to authenticate this note. Electronically signed by Nubia Ching MD.,  [facility]       Board Certified in  Neurology &  In  [employer] of [employer]iat[employer] and Neurology (ABPN)      DISCLAIMER:   Although every effort was made to ensure the accuracy of this  electronic transcription, some errors in transcription may have occurred. GENERAL PATIENT INSTRUCTIONS:     A Healthy Lifestyle: Care Instructions  Your Care Instructions  A healthy lifestyle can help you feel good, stay at a healthy weight, and have plenty of energy for both work and play. A healthy lifestyle is something you can share with your whole family. A healthy lifestyle also can lower your risk for serious health problems, such as high blood pressure, heart disease, and diabetes. You can follow a few steps listed below to improve your health and the health of your family. Follow-up care is a key part of your treatment and safety. Be sure to make and go to all appointments, and call your doctor if you are having problems.  Its also a good idea to know your test results and keep a list of the medicines you take. How can you care for yourself at home? Do not eat too much sugar, fat, or fast foods. You can still have dessert and treats now and then. The goal is moderation. Start small to improve your eating habits. Pay attention to portion sizes, drink less juice and soda pop, and eat more fruits and vegetables. Eat a healthy amount of food. A 3-ounce serving of meat, for example, is about the size of a deck of cards. Fill the rest of your plate with vegetables and whole grains. Limit the amount of soda and sports drinks you have every day. Drink more water when you are thirsty. Eat at least 5 servings of fruits and vegetables every day. It may seem like a lot, but it is not hard to reach this goal. A serving or helping is 1 piece of fruit, 1 cup of vegetables, or 2 cups of leafy, raw vegetables. Have an apple or some carrot sticks as an afternoon snack instead of a candy bar. Try to have fruits and/or vegetables at every meal.  Make exercise part of your daily routine. You may want to start with simple activities, such as walking, bicycling, or slow swimming. Try to be active 30 to 60 minutes every day. You do not need to do all 30 to 60 minutes all at once. For example, you can exercise 3 times a day for 10 or 20 minutes. Moderate exercise is safe for most people, but it is always a good idea to talk to your doctor before starting an exercise program.  Keep moving. [place] the lawn, work in the garden, or [employer]. Take the stairs instead of the elevator at work. If you smoke, quit. People who smoke have an increased risk for heart attack, stroke, cancer, and other lung illnesses. Quitting is hard, but there are ways to boost your chance of quitting tobacco for good. Use nicotine gum, patches, or lozenges. Ask your doctor about stop-smoking programs and medicines. Keep trying.   In addition to reducing your risk of diseases in the future, you will notice some benefits soon after you stop using tobacco. If you have shortness of breath or asthma symptoms, they will likely get better within a few weeks after you quit. Limit how much alcohol you drink. Moderate amounts of alcohol (up to 2 drinks a day for men, 1 drink a day for women) are okay. But drinking too much can lead to liver problems, high blood pressure, and other health problems. Family health  If you have a family, there are many things you can do together to improve your health. Eat meals together as a family as often as possible. Eat healthy foods. This includes fruits, vegetables, lean meats and dairy, and whole grains. Include your family in your fitness plan. Most people think of activities such as jogging or tennis as the way to fitness, but there are many ways you and your family can be more active. Anything that makes you breathe hard and gets your heart pumping is exercise. Here are some tips:  Walk to do errands or to take your child to school or the bus. Go for a family bike ride after dinner instead of watching TV. Where can you learn more? Go to https://[employer]. org and sign in to your [employer] account. Enter I320 in the zeeWAVES box to learn more about \"A Healthy Lifestyle: Care Instructions. \"     If you do not have an account, please click on the \"Sign Up Now\" link. Current as of: July 26, 2016  Content Version: 11.2  © 3119-6956 [employer]. Care instructions adapted under license by [facility] ([place] [facility]). If you have questions about a medical condition or this instruction, always ask your healthcare professional. [place] [address] any warranty or liability for your use of this information.

## 2021-08-11 ENCOUNTER — HOSPITAL ENCOUNTER (OUTPATIENT)
Dept: NON INVASIVE DIAGNOSTICS | Age: 41
Discharge: HOME | End: 2021-08-11
Payer: MEDICARE

## 2021-08-11 ENCOUNTER — HOSPITAL ENCOUNTER (OUTPATIENT)
Dept: NUCLEAR MEDICINE | Age: 41
Discharge: HOME | End: 2021-08-13
Payer: MEDICARE

## 2021-08-11 DIAGNOSIS — R07.9 CHEST PAIN, UNSPECIFIED TYPE: ICD-10-CM

## 2021-08-11 LAB
LV EF: 60 %
LVEF MODALITY: NORMAL

## 2021-08-11 PROCEDURE — 3430000000 HC RX DIAGNOSTIC RADIOPHARMACEUTICAL: Performed by: INTERNAL MEDICINE

## 2021-08-11 PROCEDURE — 93306 TTE W/DOPPLER COMPLETE: CPT

## 2021-08-11 PROCEDURE — 93017 CV STRESS TEST TRACING ONLY: CPT

## 2021-08-11 PROCEDURE — A9500 TC99M SESTAMIBI: HCPCS | Performed by: INTERNAL MEDICINE

## 2021-08-11 PROCEDURE — 6360000002 HC RX W HCPCS: Performed by: INTERNAL MEDICINE

## 2021-08-11 PROCEDURE — 78452 HT MUSCLE IMAGE SPECT MULT: CPT

## 2021-08-11 RX ADMIN — TETRAKIS(2-METHOXYISOBUTYLISOCYANIDE)COPPER(I) TETRAFLUOROBORATE 10 MILLICURIE: 1 INJECTION, POWDER, LYOPHILIZED, FOR SOLUTION INTRAVENOUS at 12:09

## 2021-08-11 RX ADMIN — REGADENOSON 0.4 MG: 0.08 INJECTION, SOLUTION INTRAVENOUS at 11:42

## 2021-08-11 RX ADMIN — TETRAKIS(2-METHOXYISOBUTYLISOCYANIDE)COPPER(I) TETRAFLUOROBORATE 30 MILLICURIE: 1 INJECTION, POWDER, LYOPHILIZED, FOR SOLUTION INTRAVENOUS at 12:10

## 2021-08-11 NOTE — PROGRESS NOTES
6 min     7 min 106 139/95   8 min     9 min 105 141/93   10 min       Symptoms:  Chest Pain:  No      Nausea:  No     Headache:  No    Shortness of Breath:  Yes     Other:  Yes, lightheaded      Electronically signed by Anjelica Jones RCP on 8/11/21 at 11:29 AM EDT    ----------------------------------------------------------------------------------------------------------  Resting EKG:  NSR T inversion in lead III aV F    Arrhythmias:  NONE     EKG Changes:  NONE     Maximum Changes:  NONE     Leads with maximum changes:       EKG returned to baseline  minutes in recovery.      Interpretation:        Stress EKG inconclusive for ischemia      Nuclear scan will be followed      Supervising Physician:  Shireen Potts

## 2021-08-12 NOTE — PROCEDURES
[address]                 [address] [facility] [id number]                              CARDIAC STRESS TEST    PATIENT NAME: Sammy Duran                  :        1980  MED REC NO:   [id number]                             ROOM:  ACCOUNT NO:   [de-identified]                           ADMIT DATE: 2021  PROVIDER:     Anabel Mendez    DATE OF STUDY:  2021    STRESS TEST    Ordering Provider: Mia Palacio MD    Primary Care Provider: Lynette Thayer MD    Patient's Age: 38     Height: 5 feet, 6 inches    Weight: 191 pounds    INDICATION:  Chest pain. Lexiscan 0.4 mg injected over 10 seconds. IV Cardiolite injected 20 seconds post Lexiscan injection. Heart Rate: 74 Resting blood pressure:  139/98              HR   BP  1 min          144  178/109  2 min  3 min          122  162/97  4 min  5 min          112  155/99  6 min  7 min          106  139/95  8 min  9 min          105  141/93  10 min    Symptoms:  Chest Pain: No.  Nausea: No.  Headache:  No.  Shortness of breath: Yes. Other: Yes, lightheaded. Resting EKG:  Normal sinus rhythm, T inversion in lead III, aVF. Arrhythmias: None. EKG changes:  None. INTERPRETATION:  1. Stress EKG inconclusive for ischemia. 2. Nuclear scan to follow. Nuclear Myocardial Perfusion Imaging (SPECT)    TESTING METHOD  STRESS:   Lexiscan  AGENT:    Cardiolite  REST:          Injection Date:  21  Time:  1020  Amount:  10.81 mCi  STRESS:   Injection Date:  21  Time:  1140  Amount:  32.4 mCi  IMAGE TIME:    Rest:  1100  Stress:  1215    EF:  71%  TID:  N/A  LHR:  N/A    FINDINGS:  Ischemia (Reversible Defect):           No.  Infarction (Irreversible Defect):       No.  Ejection fraction Calculated:           Yes. Segmental wall motion:                  Yes. Low risk study. IMPRESSION:  1. No ischemia or infarction. 2. Left ventricular ejection fraction 71%.         DAVIDSON CASTRO    D: 08/12/2021 14:52:59       T: 08/12/2021 14:54:44     ROSE/KANNAN_DAVID  Job#: [id number]     Doc#: Unknown    CC:  Tg Suresh

## 2021-09-17 NOTE — TELEPHONE ENCOUNTER
Larissa Sim called requesting a refill of the below medication which has been pended for you:     Requested Prescriptions     Pending Prescriptions Disp Refills    meloxicam (MOBIC) 15 MG tablet [Pharmacy Med Name: MELOXICAM 15 MG TABLET] 30 tablet 1     Sig: take 1 tablet by mouth once daily       Last Appointment Date: 2/11/2021  Next Appointment Date: Visit date not found    No Known Allergies

## 2021-09-20 RX ORDER — MELOXICAM 15 MG/1
TABLET ORAL
Qty: 30 TABLET | Refills: 1 | Status: SHIPPED | OUTPATIENT
Start: 2021-09-20 | End: 2022-01-20

## 2021-09-22 ENCOUNTER — TELEMEDICINE (OUTPATIENT)
Dept: NEUROLOGY | Age: 41
End: 2021-09-22
Payer: MEDICARE

## 2021-09-22 DIAGNOSIS — R51.9 NONINTRACTABLE HEADACHE, UNSPECIFIED CHRONICITY PATTERN, UNSPECIFIED HEADACHE TYPE: ICD-10-CM

## 2021-09-22 DIAGNOSIS — F41.9 ANXIETY, MILD: ICD-10-CM

## 2021-09-22 DIAGNOSIS — G47.9 SLEEP DIFFICULTIES: ICD-10-CM

## 2021-09-22 DIAGNOSIS — Z72.0 TOBACCO USE: ICD-10-CM

## 2021-09-22 DIAGNOSIS — G47.9 DISTURBANCE, SLEEP: ICD-10-CM

## 2021-09-22 DIAGNOSIS — G40.009 PARTIAL IDIOPATHIC EPILEPSY WITH SEIZURES OF LOCALIZED ONSET, NOT INTRACTABLE, WITHOUT STATUS EPILEPTICUS (HCC): Primary | ICD-10-CM

## 2021-09-22 DIAGNOSIS — K21.9 GASTROESOPHAGEAL REFLUX DISEASE WITHOUT ESOPHAGITIS: ICD-10-CM

## 2021-09-22 DIAGNOSIS — G40.909 RECURRENT SEIZURES (HCC): ICD-10-CM

## 2021-09-22 DIAGNOSIS — R42 DIZZINESS: ICD-10-CM

## 2021-09-22 DIAGNOSIS — G43.709 CHRONIC MIGRAINE WITHOUT AURA WITHOUT STATUS MIGRAINOSUS, NOT INTRACTABLE: ICD-10-CM

## 2021-09-22 DIAGNOSIS — G44.039 NONINTRACTABLE PAROXYSMAL HEMICRANIA, UNSPECIFIED CHRONICITY PATTERN: ICD-10-CM

## 2021-09-22 DIAGNOSIS — G40.909 SEIZURE DISORDER (HCC): ICD-10-CM

## 2021-09-22 DIAGNOSIS — K44.9 HIATAL HERNIA: ICD-10-CM

## 2021-09-22 DIAGNOSIS — R56.9 CONVULSIONS, UNSPECIFIED CONVULSION TYPE (HCC): ICD-10-CM

## 2021-09-22 PROCEDURE — 99211 OFF/OP EST MAY X REQ PHY/QHP: CPT | Performed by: PSYCHIATRY & NEUROLOGY

## 2021-09-22 PROCEDURE — G8417 CALC BMI ABV UP PARAM F/U: HCPCS | Performed by: PSYCHIATRY & NEUROLOGY

## 2021-09-22 PROCEDURE — G8427 DOCREV CUR MEDS BY ELIG CLIN: HCPCS | Performed by: PSYCHIATRY & NEUROLOGY

## 2021-09-22 PROCEDURE — 99214 OFFICE O/P EST MOD 30 MIN: CPT | Performed by: PSYCHIATRY & NEUROLOGY

## 2021-09-22 PROCEDURE — 4004F PT TOBACCO SCREEN RCVD TLK: CPT | Performed by: PSYCHIATRY & NEUROLOGY

## 2021-09-22 RX ORDER — LAMOTRIGINE 100 MG/1
TABLET ORAL
Qty: 30 TABLET | Refills: 1 | Status: SHIPPED | OUTPATIENT
Start: 2021-09-22 | End: 2021-10-19

## 2021-09-22 RX ORDER — DIVALPROEX SODIUM 250 MG/1
TABLET, DELAYED RELEASE ORAL
Qty: 180 TABLET | Refills: 5 | Status: SHIPPED | OUTPATIENT
Start: 2021-09-22 | End: 2021-12-15 | Stop reason: SDUPTHER

## 2021-09-22 ASSESSMENT — ENCOUNTER SYMPTOMS
PHOTOPHOBIA: 1
EYE PAIN: 0
BLURRED VISION: 0
VOMITING: 0
DIARRHEA: 0
EYE ITCHING: 0
FACIAL SWEATING: 0
APNEA: 0
ABDOMINAL PAIN: 0
TROUBLE SWALLOWING: 0
VOICE CHANGE: 0
BACK PAIN: 0
SORE THROAT: 0
RHINORRHEA: 0
SHORTNESS OF BREATH: 0
EYE WATERING: 0
CHANGE IN BOWEL HABIT: 0
FACIAL SWELLING: 0
COLOR CHANGE: 0
SWOLLEN GLANDS: 0
SINUS PRESSURE: 0
VISUAL CHANGE: 0
NAUSEA: 1
ABDOMINAL DISTENTION: 0
SCALP TENDERNESS: 0
CHEST TIGHTNESS: 0
COUGH: 0
CHOKING: 0
WHEEZING: 0
BLOOD IN STOOL: 0
EYE DISCHARGE: 0
EYE REDNESS: 0
CONSTIPATION: 0

## 2021-09-22 NOTE — PROGRESS NOTES
Subjective:          Patient ID: Kailyn Long is a 38 y. o. RIGHT   HANDED male. Seizures  This is a chronic problem. Episode onset: SINCE    2014. The problem occurs intermittently. The problem has been waxing and waning. Associated symptoms include headaches and nausea. Pertinent negatives include no abdominal pain, anorexia, arthralgias, change in bowel habit, chest pain, chills, congestion, coughing, diaphoresis, fatigue, fever, joint swelling, myalgias, neck pain, numbness, rash, sore throat, swollen glands, urinary symptoms, vertigo, visual change, vomiting or weakness. Exacerbated by: LACK OF  SLEEP,  PROLONGED   TV ,   STRESS,   LOUD  NOISES   AND  BRIGHT  LIGHTS   Treatments tried: ANTI  EPILEPTIC  MEDICATION. The treatment provided moderate relief. Headache   This is a chronic problem. Episode onset: MORE  THAN   10  YEARS. The problem occurs intermittently. The problem has been waxing and waning. The pain is located in the vertex region. The pain does not radiate. The pain quality is similar to prior headaches. The quality of the pain is described as aching and throbbing. The pain is at a severity of 3/10. The pain is mild. Associated symptoms include insomnia, nausea, phonophobia, photophobia, seizures and tingling. Pertinent negatives include no abdominal pain, abnormal behavior, anorexia, back pain, blurred vision, coughing, dizziness, drainage, ear pain, eye pain, eye redness, eye watering, facial sweating, fever, hearing loss, loss of balance, muscle aches, neck pain, numbness, rhinorrhea, scalp tenderness, sinus pressure, sore throat, swollen glands, tinnitus, visual change, vomiting, weakness or weight loss. The symptoms are aggravated by unknown. He has tried darkened room, NSAIDs, Excedrin and triptans for the symptoms. The treatment provided moderate relief. His past medical history is significant for migraine headaches and migraines in the family.  There is no history of cancer, -     STABLE            4)     SEIZURE  CONTROL  WAS    BETTER                 -    WAS   ON  DEPAKOTE,  VIMPAT  AND  TOPAMAX                                 IN   THE    PAST            5)   INTOLERANCE  TO  KEPPRA                    DUE  TO  IRRITABILITY  AND MOOD PROBLEMS            6)     H/O   CHRONIC  ANXIETY  AND  DEPRESSION                    -     STABLE    ON  LEXAPRO                 HAD    FOLLOW    WITH    HIS  MENTAL  HEALTH  PROFESSIONALS               7)  H/O  PREVIOUS  MULTIPLE MILD  HEAD  INJURIES                             DUE  TO   FALLS            8)   H/O     CHRONIC    SLEEP  DIFFICULTIES                      AND  DISTURBED  SLEEP  WAKE  CYCLES                              -  STABLE                9)    PREVIOUS  H/O  DIZZY  EPISODES                          -  NO  RECURRENCE            10)   EMU  AT  [facility]   IN  2014                  -  POSSIBLE  FRONTAL    FOCUS            11)   EMU   AT  [address] [place] [address]. 7  TO  NOV. 11, 2016    SHOWED :              6  EPISODES  OF  PSYCHOGENIC  NON EPILEPTIC SPELLS. EEG  DURING  THE SPELLS  AND  INTERICTAL  PERIODS                         WAS REPORTED  TO BE  NORMAL. 12)     PREVIOUS   H/O    BRIEF  SEIZURES  AND  MEMORY  LOSS                           -  PARTIAL  COMPLEX       SEIZURES                          DUE  TO  SLEEP  DEPRIVATION                        AND  PROLONGED  TV   WATCHING                                         13)   EPISODES   OF  RIGHT  SIDED  NUMBNESS   AND  SPEECH  PROBLEMS. H/O  HOSPITALIZATION   FROM  SEPT. 2017                  AT  [facility] [address]     TIA  /   STROKE                       WITH  NO  SIGNIFICANT  ABNORMALITIES  .           14)         PREVIOUS   H/O  CARDIAC  ARRYTHMIA                         TO    FOLLOW  WITH    CARDOLOGY              15)     PREVIOUS   H/O    HAD COGNITIVE  BEHAVIOR THERAPY  AT  [facility]                       PATIENT  TO  FOLLOW  UP   WITH  MENTAL  HEALTH  PROFESSIONALS                        FOR   EVALUATIONS  OF   HIS  ANXIETY,  DEPRESSION,                                 NON  EPILEPTIC  PSYCHOGENIC  SPELLS,                            16)          PATIENT     STOPPED     VIMPAT       AND  TOPAMAX    IN    October 2017                 17)    H/O   [facility]   NEUROLOGY    FOLLOW  UP    EVALUATION     IN      2018                          PATIENT     NOT  PLANNING  TO  RETURN  BACK   TO                                    [facility]                18)     PATIENT  DID NOT  HAVE  NEUROLOGY   FOLLOW  UP    AT  [facility]                           FROM   October 2017     TO  DEC. 2018               19)     H/O        BRIEF   SEIZURE    2-3  PER MONTH                            STARING  EPISODES. IN      2019               20)          PATIENT   ON      DEPAKOTE  FOR  SEIZURES                                       AND  MIGRAINE  PROPHYLAXIS                                                       PATIENT   WAS     ON     KLONOPIN       FOR                            SEIZURE  CONTROL   AND  SLEEP  DIFFICULTIES   IN   THE PAST                         21)     H/O    BRIEF  SEIZURES       ON     8/13/2019                            -  PARTIAL  COMPLEX       SEIZURES                          DUE  TO  SLEEP  DEPRIVATION     AND  PROLONGED  TV   WATCHING                     25)      H/O    BRIEF    SEIZURES        WITH    FALLING                                  AND  MILD     HEAD   &   NECK INJURY         TWICE                                  IN  SEPT. AND October 2019                        23)   H/O      STARING  EPISODE  WITH    HEAD TURNING   TO  THE                           RIGHT  SIDE    LASTING  FOR   ONE  MINUTE          WITH                                LETHARGY    NOTICED   VISIT  On   11/11/ 2019   . 22)     CT   HEAD    AND  CT  CERVICAL  SPINE  IN NOV. 2019                                 SHOWED  NO  SIGNIFICANT  ABNORMALITIES                                 VALPROIC  ACID  LEVEL  IN NOV. 2019       NORMAL  LIMITS                       26)      EXPECTATIONS,   GOALS   OF  SEIZURE  MANAGEMENT                           AND  SIDE  EFFECTS  MEDICATIONS    WERE                                 REVIEWED     AND   DISCUSSED    IN    DETAIL. 27)           SEIZURES     AND      MIGRAINES  WELL  CONTROLLED                         FROM     DEC.   2019        TO     April 2021                              28)        H/O     TWO   EPISODES  OF    SEIZURE   RECURRENCE   IN    MAY   2021                            ASSOCIATED      WITH     SPENDING  TIME   IN   [facility]     IN  [place]              34)      H/O  BRIEF  RECURRENT  SEIZURES     IN   July 2021                               30)           LOW  DOSE  LAMICTAL       WAS   ADDED   TO   DEPAKOTE                              IN    July 2021                     CURRENTLY  PATIENT  INDICATED    HIS    SEIZURES    ARE                            BETTER  CONTROLLED                       PATIENT  DENIES    ANY  NEW  NEUROLOGICAL   CONCERNS. 31)           VARIOUS  RISK   FACTORS   WERE  REVIEWED   AND   DISCUSSED. PATIENT   HAS  MULTIPLE   MEDICAL, MENTAL HEALTH                                    &      NEUROLOGICAL   PROBLEMS . PATIENT'S   MANAGEMENT  IS  CHALLENGING. The  Duration,  Quality,  Severity,  Location,  Timing,  Context,  Modifying  Factors   Of   The   Chief   Complaint       And  Present  Illness   Was   Reviewed   In   Chronological   [facility].              Patient   Indicates   The  Presence   And  The  Absence  Of  The  Following  Associated  And       Additional  Neurological    Symptoms: Balance  And coordination problems  absent           Gait problems     absent            Headaches      present              Migraines           present           Memory problems        Present             Confusion        absent            Paresthesia numbness          absent           Seizures  And  Starring  Episodes           PRESENT           Syncope,  Near  syncopal episodes         absent           Speech problems           absent             Swallowing  Problems      absent            Dizziness,  Light headedness           present                        Vertigo        absent             Generalized   Weakness    absent              focal  Weakness     absent             Tremors         absent              Sleep  Problems     present             History  Of   Recent   Head  Injury     absent             History  Of   Recent  TIA     absent             History  Of   Recent    Stroke     absent             Neck  Pain and  Neck muscle  Spasms  Absent               Radiating  down   And   Weakness           absent            Lower back   Pain  And     Spasms  Present              Radiating    Down   And   Weakness          absent                H/O   FALLS        absent               History  Of   Visual  Symptoms    Absent                  Associated   Diplopia       absent                                                                  Also   Additional   Symptoms   Present    As  Documented    In   The detailed      Review  Of  Systems   And    Please   Refer   To    Them for   Additional  Information.            INFORMATION   REVIEWED:      VITAL  SIGNS,  MEDICATIONS   LIST,   ALLERGIES AND  DRUG  INTOLERANCES,    MEDICAL   HISTORY,     SURGICAL   HISTORY,    FAMILY   HISTORY,  SOCIAL  HISTORY,    PROBLEM  LIST   FOR  PATIENT  CARE   COORDINATION        RECORDS   REVIEWED:    historical medical records, lab reports and office notes         Past Medical History:   Diagnosis Date    tablet take 3 tablets by mouth twice a day 180 tablet 5    meloxicam (MOBIC) 15 MG tablet take 1 tablet by mouth once daily 30 tablet 1    SUMAtriptan (IMITREX) 50 MG tablet take 1 tablet by mouth once daily if needed for MIGRAINE 30 tablet 2    promethazine (PHENERGAN) 25 MG tablet take 1 tablet by mouth every 8 hours if needed for nausea 60 tablet 2    acetaminophen (TYLENOL) 500 MG tablet Take 500 mg by mouth every 4 hours as needed for Pain       No current facility-administered medications for this visit.            No Known Allergies            Family History   Problem Relation Age of Onset    COPD Mother     High Blood Pressure Mother    [employer] Cancer Father         Hodgekin's Lymphoma    High Blood Pressure Father     Eczema Sister     Alzheimer's Disease Maternal Grandmother     COPD Maternal Grandmother     Cancer Maternal Grandmother     Cancer Paternal Grandmother     Cancer Paternal Grandfather     Cancer Sister         colon cancer    High Blood Pressure Sister     Arthritis Sister     Asthma Daughter     Eczema Daughter     Glaucoma Neg Hx     Diabetes Neg Hx     Cataracts Neg Hx              Social History     Socioeconomic History    Marital status:      Spouse name: Not on file    Number of children: Not on file    Years of education: Not on file    Highest education level: Not on file   Occupational History    Not on file   Tobacco Use    Smoking status: Current Every Day Smoker     Packs/day: 1.00     Years: 20.00     Pack years: 20.00     Types: Cigarettes     Last attempt to quit: 2016     Years since quittin.3    Smokeless tobacco: Never Used    Tobacco comment: 2-3 weekly   Vaping Use    Vaping Use: Never used   Substance and Sexual Activity    Alcohol use: No     Alcohol/week: 0.0 standard drinks     Comment: rarely    Drug use: No    Sexual activity: Not on file   Other Topics Concern    Not on file   Social History Narrative    Not on file Social Determinants of Health     Financial Resource Strain:     Difficulty of Paying Living Expenses:    Food Insecurity:     Worried About Running Out of Food in the Last Year:     [address] in the Last Year:    Transportation Needs:     Lack of Transportation (Medical):  Lack of Transportation (Non-Medical):    Physical Activity:     Days of Exercise per Week:     Minutes of Exercise per Session:    Stress:     Feeling of Stress :    Social Connections:     Frequency of Communication with Friends and Family:     Frequency of Social Gatherings with Friends and Family:     Attends Shinto Services:     Active Member of Clubs or Organizations:     Attends Club or Organization Meetings:     Marital Status:    Intimate Partner Violence:     Fear of Current or Ex-Partner:     Emotionally Abused:     Physically Abused:     Sexually Abused: There were no vitals filed for this visit.       Wt Readings from Last 3 Encounters:   05/19/21 191 lb (86.6 kg)   03/31/21 190 lb 3.2 oz (86.3 kg)   02/11/21 194 lb (88 kg)         BP Readings from Last 3 Encounters:   05/19/21 115/72   03/31/21 132/76   02/11/21 130/74       Hematology and Coagulation  Lab Results   Component Value Date    WBC 8.3 05/26/2021    RBC 4.73 05/26/2021    HGB 16.4 05/26/2021    HCT 46.8 05/26/2021    MCV 98.9 05/26/2021    MCH 34.7 05/26/2021    MCHC 35.0 05/26/2021    RDW 12.3 05/26/2021     05/26/2021    MPV 9.7 05/26/2021         Chemistries  Lab Results   Component Value Date     05/26/2021    K 4.6 05/26/2021     05/26/2021    CO2 28 05/26/2021    BUN 15 12/08/2019    CREATININE 1.26 12/08/2019    CALCIUM 9.8 12/08/2019    PROT 7.8 12/08/2019    LABALBU 4.8 12/08/2019    BILITOT 0.34 12/08/2019    ALKPHOS 75 12/08/2019    AST 16 07/20/2020    ALT 17 07/20/2020     Lab Results   Component Value Date    ALKPHOS 75 12/08/2019    ALT 17 07/20/2020    AST 16 07/20/2020    PROT 7.8 12/08/2019 tingling, seizures, light-headedness and headaches. Negative for dizziness, vertigo, tremors, syncope, facial asymmetry, speech difficulty, weakness, numbness and loss of balance. Hematological: Negative for adenopathy. Does not bruise/bleed easily. Psychiatric/Behavioral: Positive for decreased concentration and sleep disturbance. Negative for agitation, behavioral problems, confusion, dysphoric mood, hallucinations, self-injury and suicidal ideas. The patient is nervous/anxious and has insomnia. The patient is not hyperactive. Objective:        LIMITED    DUE  TO  VIDEO  VISIT          NEUROLOGICAL EXAMINATION :    A) MENTAL STATUS:                   Alert and  oriented  To time, place  And  Person. No Aphasia. No  Dysarthria. Able   To  Follow  commands without   Any  Difficulty. No right  To left confusion. Normal  Speech  And language function.                    Insight and  Judgment ,Fund  Of  Knowledge  DECREASED                Recent  And  Remote memory  DECREASED                Attention &Concentration are  DECREASED                                                    B) CRANIAL NERVES :      C) MOTOR  EXAM:                      D) SENSORY :                   E) REFLEXES:       F) COORDINATION  AND  GAIT :             -    LIMITED    DUE  TO  VIDEO  VISIT        Assessment:     Patient Active Problem List   Diagnosis    Low back pain with sciatica    Migraine    Anxiety, mild    Disturbance, sleep    Tobacco use    GERD (gastroesophageal reflux disease)    Hiatal hernia    Head ache    Status migrainosus    Dizziness    Depression    H/O fall    Head injury    Seizure disorder (HCC)    Astigmatism    Rectal bleed    Cervical radiculopathy    Ventricular premature beats    Nausea and vomiting    Colon polyps    TIA (transient ischemic attack)    VBI (vertebrobasilar insufficiency)    Pilonidal cyst    Wound dehiscence    Sleep difficulties    Partial idiopathic epilepsy with seizures of localized onset, not intractable, without status epilepticus ([address]    Convulsions ([address]    Recurrent seizures ([address]           CT OF THE HEAD WITHOUT CONTRAST; CT OF THE CERVICAL SPINE WITHOUT CONTRAST   11/15/2019 10:21 am       TECHNIQUE:   CT of the head was performed without the administration of intravenous   contrast. Dose modulation, iterative reconstruction, and/or weight based   adjustment of the mA/kV was utilized to reduce the radiation dose to as low   as reasonably achievable.; CT of the cervical spine was performed without the   administration of intravenous contrast. Multiplanar reformatted images are   provided for review. Dose modulation, iterative reconstruction, and/or weight   based adjustment of the mA/kV was utilized to reduce the radiation dose to as   low as reasonably achievable.       COMPARISON:   01/25/2019       HISTORY:   ORDERING SYSTEM PROVIDED HISTORY: Status migrainosus   TECHNOLOGIST PROVIDED HISTORY:   migraine, seizures   Reason for Exam: Hx chronic migraines. C/o increase headaches since recent   falls   Acuity: Acute   Type of Exam: Initial       FINDINGS:   BRAIN/VENTRICLES: There is no acute intracranial hemorrhage, mass effect or   midline shift.  No abnormal extra-axial fluid collection.  The gray-white   differentiation is maintained without evidence of an acute infarct.  There is   no evidence of hydrocephalus.       ORBITS: The visualized portion of the orbits demonstrate no acute abnormality.       SINUSES: The visualized paranasal sinuses and mastoid air cells demonstrate   no acute abnormality.       SOFT TISSUES/SKULL:  No acute abnormality of the visualized skull or soft   tissues.       CERVICAL SPINE: Cervical spine maintains its normal lordotic curvature.    There are subtle degenerative changes C6-C7.  No acute fracture or   malalignment.  Vertebral body heights and intervertebral disc spaces are   preserved.  Overlying soft tissues are unremarkable.  Visualized lung apices   are clear.           Impression   No acute intracranial abnormality.       No acute osseus abnormality of the cervical spine. Procedure: [facility] 11/13/2014 [id number] CT BRAIN WITHOUT CONTRAST    Reason for Exam: \      FULL RESULT: EXAM: CT Head without Contrast - 11/13/2014 7:27 PM      HISTORY: Syncope/Near-Syncope. Seizures. COMPARISON: 10/8/14       TECHNIQUE: Contiguous axial CT images were obtained from the skull base    to the vertex and reviewed in soft tissue, subdural, bone, and brain    windows. FINDINGS: The ventricular system is normal in size and configuration. There are no intra-axial or extra-axial fluid collections or mass    lesions. No acute intracranial hemorrhage or midline shift. Visualized    paranasal sinuses and mastoid air cells are clear. No acute calvarial    fracture is identified. The brainstem and the relationship of the    craniocervical junction structures are normal. There is incomplete fusion    of the posterior elements of C1, stable from prior study and compatible    with a normal variant. Visualized orbits and globes are intact. IMPRESSION:    No evidence for acute intracranial pathology. No bleed, mass effect, or    midline shift. Procedure: [facility] 06/02/2014 [id number] MRI BRAIN COMBINED    Reason for Exam: \      FULL RESULT: MRI brain with and without intravenous contrast, 6/2/2014      History: Migraines      Technique: Multiplanar multisequence MR imaging of the brain was    performed before and after intravenous administration of 16 mL Magnevist.      Findings: No evidence for shift of midline structures, mass effect or    compression of the ventricles noted. No acute intra-or extra-axial    hemorrhage is seen. No abnormal intracranial fluid collections are    identified. The basal cisterns are patent.  Posterior fossa structures demonstrate no    discrete mass. Few scattered foci of T2/FLAIR hyperintensity noted in the    periventricular and subcortical white matter which essentially are    nonspecific in imaging appearance however differential considerations    include demyelinating or inflammatory process, migraine induced changes,    chronic small vessel disease or vascular disease. No associated restricted diffusion or abnormal enhancing seen. A small    left occipital lobe developmental venous anomaly seen. No abnormal    enhancing intracranial mass seen. Visualized orbital contents appear unremarkable. Mild mucosal thickening of the ethmoid air cells noted. Skull base flow voids are patent. Superior sagittal sinus and straight sinus flow voids are patent. Heterogeneity of the T1-weighted marrow signal intensity seen. IMPRESSION:    1. No acute or subacute ischemic insult noted. No abnormal enhancing    intracranial mass seen. 2.scattered foci of T2/FLAIR hyperintensity noted in the periventricular    and subcortical white matter which essentially are nonspecific in imaging    appearance however differential considerations include demyelinating or    inflammatory process, migraine induced changes, chronic small vessel    disease or vascular disease. Procedure: [facility] 10/08/2014 [id number] CT BRAIN WITHOUT CONTRAST    Reason for Exam: \      FULL RESULT: EXAM: Brain CT without contrast dated 10/8/2014. HISTORY: Headache after head injury. COMPARISON: Brain CT dated 4/16/2014. TECHNIQUE: Multi-detector axial images are obtained through the head. Findings: There is no evidence for acute intracranial hemorrhage,    midline shift or mass effect. Ventricular and cistern spaces are normal    and symmetric. Doristine Hermanns and white matter differentiation is well preserved. No intra- or extra-axial fluid collections or mass occupying lesions    seen.  Bilateral cerebellopontine angles are normal. Visualized orbital    contents are normal. Study viewed in bone windows shows no    abnormalities. Mild left ethmoid sinusitis otherwise all visualized    sinuses are normally aerated. Bilateral temporomandibular joints are    normally aligned. IMPRESSION: No acute intracranial abnormalities. Mild left ethmoid    sinusitis. Plan:           VISITING DIAGNOSIS:      ICD-10-CM    1. Partial idiopathic epilepsy with seizures of localized onset, not intractable, without status epilepticus (HCC)  G40.009 divalproex (DEPAKOTE) 250 MG DR tablet   2. Anxiety, mild  F41.9 divalproex (DEPAKOTE) 250 MG DR tablet   3. Disturbance, sleep  G47.9 divalproex (DEPAKOTE) 250 MG DR tablet   4. Tobacco use  Z72.0 divalproex (DEPAKOTE) 250 MG DR tablet   5. Gastroesophageal reflux disease without esophagitis  K21.9 divalproex (DEPAKOTE) 250 MG DR tablet   6. Hiatal hernia  K44.9 divalproex (DEPAKOTE) 250 MG DR tablet   7. Nonintractable headache, unspecified chronicity pattern, unspecified headache type  R51.9 divalproex (DEPAKOTE) 250 MG DR tablet   8. Dizziness  R42 divalproex (DEPAKOTE) 250 MG DR tablet   9. Sleep difficulties  G47.9 divalproex (DEPAKOTE) 250 MG DR tablet   10. Seizure disorder (HCC)  G40.909 divalproex (DEPAKOTE) 250 MG DR tablet   11. Recurrent seizures (HCC)  G40.909 divalproex (DEPAKOTE) 250 MG DR tablet   12. Nonintractable paroxysmal hemicrania, unspecified chronicity pattern  G44.039 divalproex (DEPAKOTE) 250 MG DR tablet   13. Convulsions, unspecified convulsion type ([facility]  R56.9 divalproex (DEPAKOTE) 250 MG DR tablet   14.  Chronic migraine without aura without status migrainosus, not intractable  G43.709 divalproex (DEPAKOTE) 250 MG DR tablet                CONCERNS   &   INCREASED   RISK   FOR          * TIA,  CEREBRO  VASCULAR  ISCHEMIA, STROKE       *  SEIZURE  ACTIVITY,  EPILEPSY ,        *  Chronic  Headaches &  Migraines      *   COGNITIVE  & MEMORY PROBLEMS  AND  DEMENTIAS                     VARIOUS  RISK   FACTORS   WERE  REVIEWED   AND   DISCUSSED. *  PATIENT   HAS  MULTIPLE   MEDICAL, MENTAL HEALTH         &      NEUROLOGICAL   PROBLEMS . PATIENT'S   MANAGEMENT  IS  CHALLENGING. PLAN:       Talya Pimentel  Of  The  Diagnoses,  The  Management & Treatment  Options           AND    Care  plan  Were        Reviewed and   Discussed   With  patient. * Goals  And  Expectations  Of  The  Therapy  Discussed   And  Reviewed. *   Benefits   And   Side  Effect  Profile  Of  Medication/s   Were   Discussed             * Need   For  Further   Follow up For  The  Various  Problems  Were discussed. * Results  Of  The  Previous  Diagnostic tests were reviewed and questions answered. patient  understand the same. Medical  Decision  Making  Was  Made  Based on the   Complexity  Of  Above  Mentioned  Diagnoses,        Data reviewed   & diagnostic  Tests  Reviewed,  Risk  Of  Significant   Co morbidities and complicating   Factors. Medical  Decision  Was   High  Complexity  Due   To  The  Patient's  Multiple  Symptoms,  Advancing   Disease,      Complex  Treatment  Regimen,  Multiple medications and   Risk  Of   Side  Effects,      Difficulty  In  Medication  Management  And  Diagnostic  Challenges   In  View  Of  The  Associated       Co  Morbid  Conditions   And  Problems. * FALL   PRECAUTIONS. THESE  REVIEWED   AND  DISCUSSED      *   ABSOLUTELY   NO  DRIVING    -       REVIEWED     WITH  PATIENT      *   BE  CAREFUL  WITH  ACTIVITIES             *   ADEQUATE   FLUID  INTAKE   AND  ELECTROLYTE  BALANCE             * KEEP  DAIRY  OF   THE  NEUROLOGICAL  SYMPTOMS        RECORDING THE    DURATION  AND  FREQUENCY. *  AVOID    CONDITIONS  AND  FACTORS   THAT  MAKE   NEUROLOGICAL  SYMPTOMS  WORSE.         *   SEIZURE  PRECAUTIONS.  -   DISCUSSED                  A) Avoid  Working  At   [employer] B)  Avoid  Working  With  Heavy machinery  . C)  Avoid   Swimming,  Climbing  A  Ladder   Unattended. D)  Avoid   Driving   If  You   Have  A  Seizure. E)  Must   Be  Seizure  Free   For  At   Least   6 months,  Before   You  Can drive. F) Some times  Your  May  Feel  Seizure coming  Before  It  Begins. You  May feel               Strange smell or funny  Feeling  In  Your  Stomach,  Which is  Called   Aura. TIPS  TO  REDUCE/ PREVENT  SEIZURES         1. Take  Your  Anti seizure  Medications   As   Recommended. 2. Get   Enough   Sleep. Sleep  Deprivation   Can  Trigger  A  Seizure. 3. If   You   Have  A fever,  Treat  It  At  Once,  And  Contact   Your  Primary  Care Providers. 4. Avoid   Alcohol. 5. Avoid  Flashing  Lights,  Loud  Noises and  TV  And  Video  Games,             As   These  May  Trigger   Your  Seizures       6. Control  Your  Stress  And   Have  Adequate  Rest.       7.   If  You  Feel  A  Seizure  Coming   On :             A) warn people  Who  Are  With  You             B)  Make  Sure  There  Are  No  Sharp or  Hard  Objects  Around you. C)  Lay down  On  Your  Side  And  Relax. *  TO  MAINTAIN  REGULAR  SLEEP  WAKE  CYCLES. *   TO  HAVE  ADEQUATE  REST  AND   SLEEP    HOURS.          *    AVOID  ANY USAGE OF                   TOBACCO,  EXCESSIVE  ALCOHOL  AND   ILLEGAL   SUBSTANCES          *  CONTINUE MEDICATIONS PRESCRIBED BY NEUROLOGIST AS    RECOMMENDED     *   Compliance   With  Medications   And  Instructions          * CURRENTLY  TOLERATING  THE  PRESCRIBED   MEDICATIONS.        WITHOUT  ANY  SIGNIFICANT  SIDE EFFECTS   &  GETTING BENEFIT. *  May   Use  Pill  Box,    If  Needed      *  MEDICATIONS TO AVOID:    Pradeep Maffucci          *     Antiplatelet  therapy    As   Recommended  Was   Discussed          *    Prophylactic  Use   Of     Vitamin   B   Complex,  Folic  Acid,    Vitamin  B12        Multivitamin   Tablet  Daily    Supplementations   Over  The  Counter  Discussed           *  PATIENT  IS  ALSO   COUNSELED   TO  KEEP    ACTIVITIES         A)   SIMPLE      B)  ORGANIZED      C)  WRITE   DOWN                     *  EVALUATIONS  AND  FOLLOW UP:                                          * CARDIOLOGY               * EPILEPSY  MONITORING   UNIT                                 *    PREVIOUS     H/O    PARTIAL  COMPLEX       SEIZURES                          DUE  TO  SLEEP  DEPRIVATION     AND  PROLONGED  TV   WATCHING                            *     PATIENT   RECOMMENDED  :                       TO  CONTINUE     SEIZURE  PRECAUTIONS                         VARIOUS  RISK   FACTORS   WERE  REVIEWED   AND   DISCUSSED. Orders Placed This Encounter   Medications    lamoTRIgine (LAMICTAL) 100 MG tablet     Sig: HALF  TABLET     DAILY   FOR     ONE  WEEK,     THEN     ONE  TABLET    DAILY       AT  BED  TIME     Dispense:  30 tablet     Refill:  1    divalproex (DEPAKOTE) 250 MG DR tablet     Sig: take 3 tablets by mouth twice a day     Dispense:  180 tablet     Refill:  5                          *        EXPECTATIONS,   GOALS   OF  SEIZURE  MANAGEMENT                           AND  SIDE  EFFECTS  MEDICATIONS    WERE                                 REVIEWED     AND   DISCUSSED    IN    DETAIL.                                 *PATIENT   TO  FOLLOW  UP  WITH   PRIMARY  CARE   AND   OTHER  CONSULTANTS  AS  BEFORE.           *TO  FOLLOW  WITH   MENTAL  HEALTH  PROFESSIONALS ,  INCLUDING            PSYCHOLOGICAL  COUNSELING   AND  PSYCHIATRIC  EVALUTIONS            *  Maintain   Healthy  Life Style With   Periodic  Monitoring  Of      Any  Medical  Conditions  Including   Elevated  Blood  Pressure,  Lipid  Profile,     Blood  Sugar levels  And   Heart  Disease. *   Period   Screening  For  Cancers  Involving  Breast,  Colon,    Prostate, lungs  And  Other  Organs  As  Applicable,  In consultation   With  Your  Primary Care Providers. * Second  Neurological  Opinion  And  Evaluations  In  [facility]  Setting  If  Patient  Is  Interested. *  If  The  Patient remains  Neurologically  Stable   Return   To  [facility] Neurology Department       IN   2- 3      MONTHS  TIME   FOR  FURTHER  FOLLOW UP.                   *  If   There is  Any  Significant  Worsening   Of  Current  Symptoms  And      Or  If patient  Develops   Any additional  New  Neurological  Symptoms  Or  Significant  Concerns       Should  Call  911 or  Go  To  Emergency  Department  For  Further  Immediate  Evaluation. *   The  Neurological   Findings,  Possible  Diagnosis,  Differential diagnoses   And  Options      For    Further   Investigations   And  management   Are  Discussed  Comprehensively. Medications   And  Prescription   Risks  And  Side effects  Are   Also  Discussed. The  Above  Were  Reviewed  With  patient and       questions  Answered  In  Detail. More   Than   50% of face  To face Time   Was  Spent  On  Counseling   And   Coordination  Of  Care       Of   Patient's multiple   Neurological  Problems   And   Comorbid  Medical   Conditions.            VIRTUAL   VIDEO  VISIT          PATIENT  BEING   EVALUATED   BY  NEUROLOGIST   VIA   a Virtual Visit (video visit) encounter          to address concerns as mentioned  ABOVE        ALSO    nursing   caregiver was present     before,  during  and  after   the    visit        Due to this being a TeleHealth encounter (During [id number] public health emergency), evaluation of the following organ systems was limited:     Vitals/Constitutional/EENT/Resp/CV/GI//MS/Neuro/Skin/Heme-Lymph-Imm. Pursuant to the emergency declaration under the [facility][note 3][facility], [address] [place] [address] and the [employer] Act, this Virtual Visit was conducted with patient's (and/or legal guardian's) consent, to reduce the patient's risk of exposure to COVID-19 and provide necessary medical care. The patient (and/or legal guardian) has also been advised to contact this office for worsening conditions or problems, and seek emergency medical treatment and/or call 911 if deemed necessary. Patient identification was verified at the start of the visit: Yes    Total time spent for this encounter:  ( Time Spent:   40  minutes )    Services were provided through a video synchronous discussion   and  limited    examination  virtually to substitute for in-person clinic visit. Patient    located at    [address] [place] [address]. Electronically signed by Fred Virgen MD.,  [facility]       Board Certified in  Neurology &  In  [facility] of [employer] and Neurology (ABPN)      DISCLAIMER:   Although every effort was made to ensure the accuracy of this  electronic transcription, some errors in transcription may have occurred. GENERAL PATIENT INSTRUCTIONS:     A Healthy Lifestyle: Care Instructions  Your Care Instructions  A healthy lifestyle can help you feel good, stay at a healthy weight, and have plenty of energy for both work and play. A healthy lifestyle is something you can share with your whole family. A healthy lifestyle also can lower your risk for serious health problems, such as high blood pressure, heart disease, and diabetes.   You can follow a few steps listed below to improve your health and the health of your good.  Use nicotine gum, patches, or lozenges. Ask your doctor about stop-smoking programs and medicines. Keep trying. In addition to reducing your risk of diseases in the future, you will notice some benefits soon after you stop using tobacco. If you have shortness of breath or asthma symptoms, they will likely get better within a few weeks after you quit. Limit how much alcohol you drink. Moderate amounts of alcohol (up to 2 drinks a day for men, 1 drink a day for women) are okay. But drinking too much can lead to liver problems, high blood pressure, and other health problems. Family health  If you have a family, there are many things you can do together to improve your health. Eat meals together as a family as often as possible. Eat healthy foods. This includes fruits, vegetables, lean meats and dairy, and whole grains. Include your family in your fitness plan. Most people think of activities such as jogging or tennis as the way to fitness, but there are many ways you and your family can be more active. Anything that makes you breathe hard and gets your heart pumping is exercise. Here are some tips:  Walk to do errands or to take your child to school or the bus. Go for a family bike ride after dinner instead of watching TV. Where can you learn more? Go to https://[employer]. org and sign in to your [employer] account. Enter C562 in the Shopperception box to learn more about \"A Healthy Lifestyle: Care Instructions. \"     If you do not have an account, please click on the \"Sign Up Now\" link. Current as of: July 26, 2016  Content Version: 11.2  © 9270-4361 [employer]. Care instructions adapted under license by [facility] ([facility]). If you have questions about a medical condition or this instruction, always ask your healthcare professional. [address] any warranty or liability for your use of this information.

## 2021-09-22 NOTE — PATIENT INSTRUCTIONS
Any  Medical  Conditions  Including   Elevated  Blood  Pressure,  Lipid  Profile,     Blood  Sugar levels  And   Heart  Disease. *   Period   Screening  For  Cancers  Involving  Breast,  Colon,   lungs  And  Other  Organs  As  Applicable,  In consultation   With  Your  Primary Care Providers. *  If   There is  Any  Significant  Worsening   Of  Current  Symptoms  And  Or  If    Any additional  New  Neurological  Symptoms                 Or  Significant  Concerns   Should  Call  911 or  Go  To  Emergency  Department  For  Further  Immediate  Evaluation.

## 2021-10-08 ENCOUNTER — E-VISIT (OUTPATIENT)
Dept: INTERNAL MEDICINE CLINIC | Age: 41
End: 2021-10-08
Payer: MEDICARE

## 2021-10-08 DIAGNOSIS — L70.8 OTHER ACNE: Primary | ICD-10-CM

## 2021-10-08 PROCEDURE — 99421 OL DIG E/M SVC 5-10 MIN: CPT | Performed by: INTERNAL MEDICINE

## 2021-10-19 ENCOUNTER — OFFICE VISIT (OUTPATIENT)
Dept: FAMILY MEDICINE CLINIC | Age: 41
End: 2021-10-19
Payer: MEDICARE

## 2021-10-19 ENCOUNTER — OFFICE VISIT (OUTPATIENT)
Dept: CARDIOLOGY | Age: 41
End: 2021-10-19
Payer: MEDICARE

## 2021-10-19 VITALS
SYSTOLIC BLOOD PRESSURE: 130 MMHG | BODY MASS INDEX: 31.02 KG/M2 | HEIGHT: 66 IN | HEART RATE: 72 BPM | WEIGHT: 193 LBS | DIASTOLIC BLOOD PRESSURE: 70 MMHG

## 2021-10-19 VITALS
SYSTOLIC BLOOD PRESSURE: 130 MMHG | HEIGHT: 66 IN | BODY MASS INDEX: 31.02 KG/M2 | DIASTOLIC BLOOD PRESSURE: 70 MMHG | WEIGHT: 193 LBS | HEART RATE: 72 BPM

## 2021-10-19 DIAGNOSIS — Z71.6 TOBACCO ABUSE COUNSELING: Primary | ICD-10-CM

## 2021-10-19 DIAGNOSIS — E66.09 CLASS 1 OBESITY DUE TO EXCESS CALORIES WITH SERIOUS COMORBIDITY IN ADULT, UNSPECIFIED BMI: ICD-10-CM

## 2021-10-19 DIAGNOSIS — L73.8: Primary | ICD-10-CM

## 2021-10-19 DIAGNOSIS — I63.89 CEREBROVASCULAR ACCIDENT (CVA) DUE TO OTHER MECHANISM (HCC): ICD-10-CM

## 2021-10-19 DIAGNOSIS — G40.909 SEIZURE DISORDER (HCC): ICD-10-CM

## 2021-10-19 DIAGNOSIS — I20.9 TYPICAL ANGINA (HCC): ICD-10-CM

## 2021-10-19 DIAGNOSIS — G40.909 SEIZURE DISORDER (HCC): Primary | ICD-10-CM

## 2021-10-19 DIAGNOSIS — Z98.890 HISTORY OF LOOP RECORDER: ICD-10-CM

## 2021-10-19 DIAGNOSIS — F41.9 ANXIETY: ICD-10-CM

## 2021-10-19 PROCEDURE — G8417 CALC BMI ABV UP PARAM F/U: HCPCS | Performed by: INTERNAL MEDICINE

## 2021-10-19 PROCEDURE — 99213 OFFICE O/P EST LOW 20 MIN: CPT | Performed by: INTERNAL MEDICINE

## 2021-10-19 PROCEDURE — G8417 CALC BMI ABV UP PARAM F/U: HCPCS | Performed by: FAMILY MEDICINE

## 2021-10-19 PROCEDURE — 4004F PT TOBACCO SCREEN RCVD TLK: CPT | Performed by: FAMILY MEDICINE

## 2021-10-19 PROCEDURE — 99213 OFFICE O/P EST LOW 20 MIN: CPT | Performed by: FAMILY MEDICINE

## 2021-10-19 PROCEDURE — G8483 FLU IMM NO ADMIN DOC REA: HCPCS | Performed by: INTERNAL MEDICINE

## 2021-10-19 PROCEDURE — G8427 DOCREV CUR MEDS BY ELIG CLIN: HCPCS | Performed by: INTERNAL MEDICINE

## 2021-10-19 PROCEDURE — G8427 DOCREV CUR MEDS BY ELIG CLIN: HCPCS | Performed by: FAMILY MEDICINE

## 2021-10-19 PROCEDURE — 99204 OFFICE O/P NEW MOD 45 MIN: CPT | Performed by: INTERNAL MEDICINE

## 2021-10-19 PROCEDURE — G8483 FLU IMM NO ADMIN DOC REA: HCPCS | Performed by: FAMILY MEDICINE

## 2021-10-19 PROCEDURE — 99212 OFFICE O/P EST SF 10 MIN: CPT | Performed by: FAMILY MEDICINE

## 2021-10-19 RX ORDER — LAMOTRIGINE 100 MG/1
TABLET ORAL
Qty: 30 TABLET | Refills: 2 | Status: SHIPPED | OUTPATIENT
Start: 2021-10-19 | End: 2021-12-15 | Stop reason: SDUPTHER

## 2021-10-19 RX ORDER — LISINOPRIL 20 MG/1
TABLET ORAL
COMMUNITY
Start: 2021-09-24

## 2021-10-19 ASSESSMENT — ENCOUNTER SYMPTOMS
EYES NEGATIVE: 1
ALLERGIC/IMMUNOLOGIC NEGATIVE: 1
RESPIRATORY NEGATIVE: 1
GASTROINTESTINAL NEGATIVE: 1

## 2021-10-19 ASSESSMENT — PATIENT HEALTH QUESTIONNAIRE - PHQ9
SUM OF ALL RESPONSES TO PHQ QUESTIONS 1-9: 0
SUM OF ALL RESPONSES TO PHQ9 QUESTIONS 1 & 2: 0
2. FEELING DOWN, DEPRESSED OR HOPELESS: 0
SUM OF ALL RESPONSES TO PHQ QUESTIONS 1-9: 0
1. LITTLE INTEREST OR PLEASURE IN DOING THINGS: 0
SUM OF ALL RESPONSES TO PHQ QUESTIONS 1-9: 0

## 2021-10-19 NOTE — PATIENT INSTRUCTIONS
Per Dr Louisa Smiley from [facility] will call you to schedule procedure, her number is [phone], if you don't hear from her in 48 hours, call her.

## 2021-10-19 NOTE — PROGRESS NOTES
Pt wants loop recorder removed. Dr Norris Grant wants it done in [facility]. Dr Norris Grant contacted Eduard Mcqueen at [facility]. She will contact pt. Her number was given to pt to call if he doesn't hear from her in 48 hours.

## 2021-10-19 NOTE — PROGRESS NOTES
Cardiology Consultation  [facility]      10/19/21      CC: Former Rica Lazo Patient    HPI:  Sofia Alvarado  is doing well from a cardiac standpoint. Good functional capacity with no significant change in functional capacity. No chest pain, no dyspnea, no PND, no syncope or pre-syncope, no orthopnea. No symptoms of CHF or angina/chest pain. Past Medical History:   Diagnosis Date    Anxiety, mild     Colon polyps     Depression     Dizziness     Eczema     Epilepsy ([address]     GERD (gastroesophageal reflux disease)     H/O fall     Head ache     Head injury     Hiatal hernia     Inguinal hernia     Right.  Low back pain     Lumbar sprain     Migraine     Plantar fasciitis, bilateral     Seizure disorder ([address]     Sigmoid diverticulosis     Sleep difficulties     Status migrainosus     TIA (transient ischemic attack) 09/2017    Tobacco use     Chronic. Past Surgical History:   Procedure Laterality Date    COLONOSCOPY  04/29/2011    Internal hemorrhoids, possible anal fissure, few scattered diverticula.  COLONOSCOPY  06/04/2010    Mild sigmoid diverticulosis.  COLONOSCOPY  08/28/2009    Sigmoid diverticulosis, internal hemorrhoids.  COLONOSCOPY  05/27/2008    Internal hemorrhoids.     COLONOSCOPY  11/19/2014    polyp in rectum    COLONOSCOPY  6/1/16    sigmoid diverticulosis, colon polyp, internal hemorrhoids    COLONOSCOPY  04/19/2019    [id number]    GASTRIC FUNDOPLICATION  99/76/2302    HERNIA REPAIR Left 11/30/2017    Left Inguinal Hernia Repair w/ Mesh performed by Alyce Razo MD at [facility] Right 05/30/2013    INGUINAL HERNIA REPAIR Left 01/24/2013    INGUINAL HERNIA REPAIR Right 09/24/2015    KNEE ARTHROSCOPY Left     PILONIDAL CYST EXCISION N/A 7/23/2020    PILONIDAL CYSTECTOMY performed by Jennie Lira DO at [place] [address]  03/05/2010    Recurrent hiatal hernia.  UPPER GASTROINTESTINAL ENDOSCOPY  16    S/P brenda fundoplication, good repair, retained gastric fluid    VASECTOMY         Family History   Problem Relation Age of Onset    COPD Mother     High Blood Pressure Mother    Markus Choi Cancer Father         Hodgekin's Lymphoma    High Blood Pressure Father     Eczema Sister     Alzheimer's Disease Maternal Grandmother     COPD Maternal Grandmother     Cancer Maternal Grandmother     Cancer Paternal Grandmother     Cancer Paternal Grandfather     Cancer Sister         colon cancer    High Blood Pressure Sister     Arthritis Sister     Asthma Daughter     Eczema Daughter     Glaucoma Neg Hx     Diabetes Neg Hx     Cataracts Neg Hx        Social History     Socioeconomic History    Marital status:      Spouse name: None    Number of children: None    Years of education: None    Highest education level: None   Occupational History    None   Tobacco Use    Smoking status: Current Every Day Smoker     Packs/day: 1.00     Years: 20.00     Pack years: 20.00     Types: Cigarettes     Last attempt to quit: 2016     Years since quittin.4    Smokeless tobacco: Never Used    Tobacco comment: 2-3 weekly   Vaping Use    Vaping Use: Never used   Substance and Sexual Activity    Alcohol use: No     Alcohol/week: 0.0 standard drinks     Comment: rarely    Drug use: No    Sexual activity: None   Other Topics Concern    None   Social History Narrative    None     Social Determinants of Health     Financial Resource Strain:     Difficulty of Paying Living Expenses:    Food Insecurity:     Worried About Running Out of Food in the Last Year:     Ran Out of Food in the Last Year:    Transportation Needs:     Lack of Transportation (Medical):      Lack of Transportation (Non-Medical):    Physical Activity:     Days of Exercise per Week:     Minutes of Exercise per Session:    Stress:     Feeling of Stress :    Social Connections:     Frequency of Communication with Friends and Family:     Frequency of Social Gatherings with Friends and Family:     Attends Worship Services:     Active Member of Clubs or Organizations:     Attends Club or Organization Meetings:     Marital Status:    Intimate Partner Violence:     Fear of Current or Ex-Partner:     Emotionally Abused:     Physically Abused:     Sexually Abused:         No Known Allergies    Current Outpatient Medications   Medication Sig Dispense Refill    lamoTRIgine (LAMICTAL) 100 MG tablet 1 TABLET DAILY AT BEDTIME 30 tablet 2    lisinopril (PRINIVIL;ZESTRIL) 20 MG tablet take 1 tablet by mouth daily      divalproex (DEPAKOTE) 250 MG DR tablet take 3 tablets by mouth twice a day 180 tablet 5    meloxicam (MOBIC) 15 MG tablet take 1 tablet by mouth once daily 30 tablet 1    SUMAtriptan (IMITREX) 50 MG tablet take 1 tablet by mouth once daily if needed for MIGRAINE 30 tablet 2    promethazine (PHENERGAN) 25 MG tablet take 1 tablet by mouth every 8 hours if needed for nausea 60 tablet 2    acetaminophen (TYLENOL) 500 MG tablet Take 500 mg by mouth every 4 hours as needed for Pain       No current facility-administered medications for this visit.         Patient Active Problem List   Diagnosis    Low back pain with sciatica    Migraine    Anxiety, mild    Disturbance, sleep    Tobacco use    GERD (gastroesophageal reflux disease)    Hiatal hernia    Head ache    Status migrainosus    Dizziness    Depression    H/O fall    Head injury    Seizure disorder (HCC)    Astigmatism    Rectal bleed    Cervical radiculopathy    Ventricular premature beats    Nausea and vomiting    Colon polyps    TIA (transient ischemic attack)    VBI (vertebrobasilar insufficiency)    Pilonidal cyst    Wound dehiscence    Sleep difficulties    Partial idiopathic epilepsy with seizures of localized onset, not intractable, without status epilepticus ([address]    Convulsions ([address]    Recurrent seizures ([facility] [address]           REVIEW OF SYSTEMS:    · Constitutional: there has been no unanticipated weight loss. There's been No change in energy level, No change in activity level. · Eyes: No visual changes or diplopia. No scleral icterus. · ENT: No Headaches, hearing loss or vertigo. No mouth sores or sore throat. · Cardiovascular: per hpi  · Respiratory: per hpi  · Gastrointestinal: No abdominal pain, appetite loss, blood in stools. No change in bowel or bladder habits. · Genitourinary: No dysuria, trouble voiding, or hematuria. · Musculoskeletal:  No gait disturbance, No weakness or joint complaints. · Integumentary: No rash or pruritis. · Neurological: No headache, diplopia, change in muscle strength, numbness or tingling. No change in gait, balance, coordination, mood, affect, memory, mentation, behavior. · Psychiatric: No new anxiety or depression. · Endocrine: No temperature intolerance. No excessive thirst, fluid intake, or urination. No tremor. · Hematologic/Lymphatic: No abnormal bruising or bleeding, blood clots or swollen lymph nodes. · Allergic/Immunologic: No nasal congestion or hives. Physical Exam:   Vitals: /70   Pulse 72   Ht 5' 6\" (1.676 m)   Wt 193 lb (87.5 kg)   BMI 31.15 kg/m²   General appearance: alert and cooperative with exam  HEENT: Head: Normocephalic, no lesions, without obvious abnormality.   Eyes: PERRL, EOMI  Ears: Not obvious deformations or lack of hearing  Neck: no carotid bruit, no JVD  Lungs: clear to auscultation bilaterally  Heart: regular rate and rhythm, S1, S2 normal, no murmur, click, rub or gallop  Abdomen: soft, non-tender; bowel sounds normal; no masses,  no organomegaly  Extremities: extremities normal, atraumatic, no cyanosis or edema  Neurologic: Mental status: Alert, oriented, thought content appropriate  Skin: WNL for age and condition, no obvious rashes or leasions      EKG: Normal Sinus Rhythm with no ischemic changes. Reviewed today's ECG along serial ECGs    LAST ECHO: 8/11/21  Normal LV size and function. Ejection fraction is 60%. No diastolic dysfunction. Normal LA and right cavities size. Normal function of all valves. No evidence of pericardial effusion. LAST STRESS: 8/11/21  Negative perfusion scan  EF 71%    LAST CATH:    Hx from T[facility] chart from [facility]  1. CVA's  2. Loop recorder in-situ, implanted in 2017  3. Migraine headache  4. Seizure disorder  5. Anxiety  6. Atypical chest pain  7. Tobacco abuse  Social History    Past Medical and Surgical History, Problem List, Allergies, Medications, Labs, Imaging, all reviewed extensively in EMR and with the patient. Assessment and Plan:    1. Plan for loop explant at [facility]. 2. Atypical chest pain- resolved. No issues  3. h/o CVA with depleted loop recorder in-situ- loop recorder explant maybe at [facility] (which is where it was implanted)  4. Seizure disorder  5. Chronic anxiety  6. Tobacco abuse  7. HTN- Chronic. Kit Pelt well controlled at this time. Cont to optimize meds. 8. Obesity - Chronic. Encouraged diet, exercise, and discussed weight loss extensively. 9. Tobacco abuse. Chronic. Discussed extensively and gave options to help with quitting. Thank you for allowing me to participate in the care of this patient, please do not hesitate to call if you have any questions. Kathey Bamberger, [id number] [facility]  [facility]  [phone]

## 2021-10-19 NOTE — PROGRESS NOTES
Subjective:      Patient ID: Maddy Carroll is a 42 y.o. male. HPI  Acute visit for concerns for skin lesions behind the ears. Some odor . Sebaceous gland hyperplasia? Suspect the smell is sebaceous material.  Not really a rash. Past Medical History:   Diagnosis Date    Anxiety, mild     Colon polyps     Depression     Dizziness     Eczema     Epilepsy ([address]     GERD (gastroesophageal reflux disease)     H/O fall     Head ache     Head injury     Hiatal hernia     Inguinal hernia     Right.  Low back pain     Lumbar sprain     Migraine     Plantar fasciitis, bilateral     Seizure disorder ([address]     Sigmoid diverticulosis     Sleep difficulties     Status migrainosus     TIA (transient ischemic attack) 09/2017    Tobacco use     Chronic. Past Surgical History:   Procedure Laterality Date    COLONOSCOPY  04/29/2011    Internal hemorrhoids, possible anal fissure, few scattered diverticula.  COLONOSCOPY  06/04/2010    Mild sigmoid diverticulosis.  COLONOSCOPY  08/28/2009    Sigmoid diverticulosis, internal hemorrhoids.  COLONOSCOPY  05/27/2008    Internal hemorrhoids.  COLONOSCOPY  11/19/2014    polyp in rectum    COLONOSCOPY  6/1/16    sigmoid diverticulosis, colon polyp, internal hemorrhoids    COLONOSCOPY  04/19/2019    [id number]    GASTRIC FUNDOPLICATION  27/44/6327    HERNIA REPAIR Left 11/30/2017    Left Inguinal Hernia Repair w/ Mesh performed by Kaycee Younger MD at [facility] Right 05/30/2013    INGUINAL HERNIA REPAIR Left 01/24/2013    INGUINAL HERNIA REPAIR Right 09/24/2015    KNEE ARTHROSCOPY Left     PILONIDAL CYST EXCISION N/A 7/23/2020    PILONIDAL CYSTECTOMY performed by Yolanda Barrietnos DO at [facility]  03/05/2010    Recurrent hiatal hernia.     UPPER GASTROINTESTINAL ENDOSCOPY  6/1/16    S/P brenda fundoplication, good repair, retained gastric fluid    VASECTOMY Current Outpatient Medications   Medication Sig Dispense Refill    lisinopril (PRINIVIL;ZESTRIL) 20 MG tablet take 1 tablet by mouth daily      divalproex (DEPAKOTE) 250 MG DR tablet take 3 tablets by mouth twice a day 180 tablet 5    meloxicam (MOBIC) 15 MG tablet take 1 tablet by mouth once daily 30 tablet 1    SUMAtriptan (IMITREX) 50 MG tablet take 1 tablet by mouth once daily if needed for MIGRAINE 30 tablet 2    promethazine (PHENERGAN) 25 MG tablet take 1 tablet by mouth every 8 hours if needed for nausea 60 tablet 2    lamoTRIgine (LAMICTAL) 100 MG tablet 1 TABLET DAILY AT BEDTIME 30 tablet 2    acetaminophen (TYLENOL) 500 MG tablet Take 500 mg by mouth every 4 hours as needed for Pain       No current facility-administered medications for this visit. No Known Allergies      Review of Systems   Constitutional: Negative. HENT: Negative. Eyes: Negative. Respiratory: Negative. Cardiovascular: Negative. Gastrointestinal: Negative. Endocrine: Negative. Genitourinary: Negative. Musculoskeletal: Negative. Skin: Positive for wound (??). Allergic/Immunologic: Negative. Neurological: Negative. Hematological: Negative. Psychiatric/Behavioral: Negative. Objective:   Physical Exam  Constitutional:       General: He is not in acute distress. Appearance: He is well-developed. HENT:      Head: Normocephalic and atraumatic. Right Ear: External ear normal.      Left Ear: External ear normal.      Mouth/Throat:      Pharynx: No oropharyngeal exudate. Eyes:      General: No scleral icterus. Conjunctiva/sclera: Conjunctivae normal.   Neck:      Thyroid: No thyromegaly. Cardiovascular:      Rate and Rhythm: Normal rate and regular rhythm. Heart sounds: Normal heart sounds. No murmur heard. Pulmonary:      Effort: Pulmonary effort is normal. No respiratory distress. Breath sounds: Normal breath sounds. No wheezing.    Abdominal: General: Bowel sounds are normal. There is no distension. Palpations: Abdomen is soft. Tenderness: There is no abdominal tenderness. There is no rebound. Musculoskeletal:         General: No tenderness. Normal range of motion. Cervical back: Neck supple. Skin:     General: Skin is warm and dry. Findings: No erythema or rash. Neurological:      Mental Status: He is alert and oriented to person, place, and time. Psychiatric:         Behavior: Behavior normal.         Thought Content: Thought content normal.         Judgment: Judgment normal.       /70 (Site: Right Upper Arm, Position: Sitting, Cuff Size: Large Adult)   Pulse 72   Ht 5' 6\" (1.676 m)   Wt 193 lb (87.5 kg)   BMI 31.15 kg/m²     Assessment:      Sebaceous gland      Plan:      Discussed benign nature. Not worth cutting them out behind the ears. Rec. Cleansing benzoyl peroxide product and loufa. Recheck prn concerns. No rash issues.           Cass Lancaster MD

## 2021-11-05 ENCOUNTER — TELEPHONE (OUTPATIENT)
Dept: CARDIOLOGY | Age: 41
End: 2021-11-05

## 2021-11-05 VITALS — TEMPERATURE: 98.7 F | OXYGEN SATURATION: 98 % | DIASTOLIC BLOOD PRESSURE: 82 MMHG | SYSTOLIC BLOOD PRESSURE: 116 MMHG

## 2021-11-05 NOTE — TELEPHONE ENCOUNTER
One stitch removed from loop explant site, mid chest, left toward sternal edge. Some pinkness and tenderness noted. No warmth or drainage, but steri strips were quite soiled and dangling--almost completely off. Pt said he tried to pull them off. I covered with triple antibiotic ointment and bandaid. I told him to watch closely and keep clean and return for further eval if redness worsen or any other signs of infection, including warmth or drainage. Pt has been tested for covid at [place], awaiting results. I used [facility] and PPE for suture removal per Dr. Kailyn Soto verbal [facility].

## 2021-12-15 ENCOUNTER — TELEMEDICINE (OUTPATIENT)
Dept: NEUROLOGY | Age: 41
End: 2021-12-15
Payer: MEDICARE

## 2021-12-15 DIAGNOSIS — R42 DIZZINESS: ICD-10-CM

## 2021-12-15 DIAGNOSIS — G44.039 NONINTRACTABLE PAROXYSMAL HEMICRANIA, UNSPECIFIED CHRONICITY PATTERN: ICD-10-CM

## 2021-12-15 DIAGNOSIS — G47.9 DISTURBANCE, SLEEP: ICD-10-CM

## 2021-12-15 DIAGNOSIS — M54.12 CERVICAL RADICULOPATHY: ICD-10-CM

## 2021-12-15 DIAGNOSIS — K21.9 GASTROESOPHAGEAL REFLUX DISEASE WITHOUT ESOPHAGITIS: ICD-10-CM

## 2021-12-15 DIAGNOSIS — G45.9 TIA (TRANSIENT ISCHEMIC ATTACK): ICD-10-CM

## 2021-12-15 DIAGNOSIS — Z72.0 TOBACCO USE: ICD-10-CM

## 2021-12-15 DIAGNOSIS — R51.9 NONINTRACTABLE HEADACHE, UNSPECIFIED CHRONICITY PATTERN, UNSPECIFIED HEADACHE TYPE: ICD-10-CM

## 2021-12-15 DIAGNOSIS — G43.709 CHRONIC MIGRAINE WITHOUT AURA WITHOUT STATUS MIGRAINOSUS, NOT INTRACTABLE: ICD-10-CM

## 2021-12-15 DIAGNOSIS — G47.9 SLEEP DIFFICULTIES: ICD-10-CM

## 2021-12-15 DIAGNOSIS — F41.9 ANXIETY, MILD: ICD-10-CM

## 2021-12-15 DIAGNOSIS — G40.009 PARTIAL IDIOPATHIC EPILEPSY WITH SEIZURES OF LOCALIZED ONSET, NOT INTRACTABLE, WITHOUT STATUS EPILEPTICUS (HCC): Primary | ICD-10-CM

## 2021-12-15 DIAGNOSIS — G40.909 SEIZURE DISORDER (HCC): ICD-10-CM

## 2021-12-15 PROCEDURE — 99211 OFF/OP EST MAY X REQ PHY/QHP: CPT

## 2021-12-15 RX ORDER — PROMETHAZINE HYDROCHLORIDE 25 MG/1
TABLET ORAL
Qty: 60 TABLET | Refills: 2 | Status: SHIPPED | OUTPATIENT
Start: 2021-12-15

## 2021-12-15 RX ORDER — SUMATRIPTAN 50 MG/1
TABLET, FILM COATED ORAL
Qty: 30 TABLET | Refills: 2 | Status: SHIPPED | OUTPATIENT
Start: 2021-12-15

## 2021-12-15 RX ORDER — DIVALPROEX SODIUM 250 MG/1
TABLET, DELAYED RELEASE ORAL
Qty: 180 TABLET | Refills: 5 | Status: SHIPPED | OUTPATIENT
Start: 2021-12-15 | End: 2022-07-20 | Stop reason: SDUPTHER

## 2021-12-15 RX ORDER — LAMOTRIGINE 100 MG/1
TABLET ORAL
Qty: 30 TABLET | Refills: 2 | Status: SHIPPED | OUTPATIENT
Start: 2021-12-15 | End: 2022-03-16 | Stop reason: SDUPTHER

## 2021-12-15 ASSESSMENT — ENCOUNTER SYMPTOMS
FACIAL SWEATING: 0
CHEST TIGHTNESS: 0
ABDOMINAL DISTENTION: 0
PHOTOPHOBIA: 1
BLOOD IN STOOL: 0
ABDOMINAL PAIN: 0
RHINORRHEA: 0
SCALP TENDERNESS: 0
BLURRED VISION: 0
DIARRHEA: 0
COUGH: 0
EYE PAIN: 0
FACIAL SWELLING: 0
BACK PAIN: 0
APNEA: 0
CONSTIPATION: 0
EYE ITCHING: 0
WHEEZING: 0
CHANGE IN BOWEL HABIT: 0
EYE WATERING: 0
SINUS PRESSURE: 0
VISUAL CHANGE: 0
EYE DISCHARGE: 0
CHOKING: 0
TROUBLE SWALLOWING: 0
SORE THROAT: 0
SWOLLEN GLANDS: 0
EYE REDNESS: 0
SHORTNESS OF BREATH: 0
VOICE CHANGE: 0
NAUSEA: 1
COLOR CHANGE: 0
VOMITING: 0

## 2021-12-15 NOTE — PROGRESS NOTES
Subjective:          Patient ID: Newton Arnold is a 42 y. o. RIGHT   HANDED male. Seizures  This is a chronic problem. Episode onset: SINCE    2014. The problem occurs intermittently. The problem has been waxing and waning. Associated symptoms include headaches and nausea. Pertinent negatives include no abdominal pain, anorexia, arthralgias, change in bowel habit, chest pain, chills, congestion, coughing, diaphoresis, fatigue, fever, joint swelling, myalgias, neck pain, numbness, rash, sore throat, swollen glands, urinary symptoms, vertigo, visual change, vomiting or weakness. Exacerbated by: LACK OF  SLEEP,  PROLONGED   TV ,   STRESS,   LOUD  NOISES   AND  BRIGHT  LIGHTS   Treatments tried: ANTI  EPILEPTIC  MEDICATION. The treatment provided moderate relief. Headache   This is a chronic problem. Episode onset: MORE  THAN   10  YEARS. The problem occurs intermittently. The problem has been waxing and waning. The pain is located in the vertex region. The pain does not radiate. The pain quality is similar to prior headaches. The quality of the pain is described as aching and throbbing. The pain is at a severity of 3/10. The pain is mild. Associated symptoms include insomnia, nausea, phonophobia, photophobia, seizures and tingling. Pertinent negatives include no abdominal pain, abnormal behavior, anorexia, back pain, blurred vision, coughing, dizziness, drainage, ear pain, eye pain, eye redness, eye watering, facial sweating, fever, hearing loss, loss of balance, muscle aches, neck pain, numbness, rhinorrhea, scalp tenderness, sinus pressure, sore throat, swollen glands, tinnitus, visual change, vomiting, weakness or weight loss. The symptoms are aggravated by unknown. He has tried darkened room, NSAIDs, Excedrin and triptans for the symptoms. The treatment provided moderate relief. His past medical history is significant for migraine headaches and migraines in the family.  There is no history of cancer, cluster headaches, hypertension, immunosuppression, obesity, pseudotumor cerebri, recent head traumas, sinus disease or TMJ. Migraine   This is a chronic problem. Episode onset: MORE  THAN   10  YEARS. The problem occurs intermittently. The problem has been waxing and waning. The pain is located in the right unilateral and frontal region. The pain does not radiate. The pain quality is similar to prior headaches. The quality of the pain is described as aching and throbbing. The pain is at a severity of 3/10. The pain is mild. Associated symptoms include insomnia, nausea, phonophobia, photophobia, seizures and tingling. Pertinent negatives include no abdominal pain, abnormal behavior, anorexia, back pain, blurred vision, coughing, dizziness, drainage, ear pain, eye pain, eye redness, eye watering, facial sweating, fever, hearing loss, loss of balance, muscle aches, neck pain, numbness, rhinorrhea, scalp tenderness, sinus pressure, sore throat, swollen glands, tinnitus, visual change, vomiting, weakness or weight loss. The symptoms are aggravated by unknown. He has tried triptans and NSAIDs (TOPAMAX) for the symptoms. The treatment provided moderate relief. His past medical history is significant for migraine headaches and migraines in the family. There is no history of cancer, cluster headaches, hypertension, immunosuppression, obesity, pseudotumor cerebri, recent head traumas, sinus disease or TMJ. History obtained from  The patient          and other  available medical records were  Also  reviewed.                 1)   H/O   CHRONIC  SEVERE MIGRAINES                         FOR  MORE  THAN   10  YEARS                           -     WELL  CONTROLLED                      -   ON  IMITREX,   PHENERGAN   AS  NEEDED               2)     H/O   CHRONIC   TENSION  HEADACHE                       --   BETTER  CONTROLLED                            3)        H/O  SEIZURE  DISORDER /  EPILEPSY    SINCE      2014 -     STABLE            4)     SEIZURE  CONTROL  WAS    BETTER                 -    WAS   ON  DEPAKOTE,  VIMPAT  AND  TOPAMAX                                 IN   THE    PAST            5)   INTOLERANCE  TO  KEPPRA                    DUE  TO  IRRITABILITY  AND MOOD PROBLEMS            6)     H/O   CHRONIC  ANXIETY  AND  DEPRESSION                    -     STABLE    ON  LEXAPRO                 HAD    FOLLOW    WITH    HIS  MENTAL  HEALTH  PROFESSIONALS               7)  H/O  PREVIOUS  MULTIPLE MILD  HEAD  INJURIES                             DUE  TO   FALLS            8)   H/O     CHRONIC    SLEEP  DIFFICULTIES                      AND  DISTURBED  SLEEP  WAKE  CYCLES                              -  STABLE                9)    PREVIOUS  H/O  DIZZY  EPISODES                          -  NO  RECURRENCE            10)   EMU  AT  [facility]   IN  2014                  -  POSSIBLE  FRONTAL    FOCUS            11)   EMU   AT  [address] [place] [address]. 7  TO  NOV. 11, 2016    SHOWED :              6  EPISODES  OF  PSYCHOGENIC  NON EPILEPTIC SPELLS. EEG  DURING  THE SPELLS  AND  INTERICTAL  PERIODS                         WAS REPORTED  TO BE  NORMAL. 12)     PREVIOUS   H/O    BRIEF  SEIZURES  AND  MEMORY  LOSS                           -  PARTIAL  COMPLEX       SEIZURES                          DUE  TO  SLEEP  DEPRIVATION                        AND  PROLONGED  TV   WATCHING                                         13)   EPISODES   OF  RIGHT  SIDED  NUMBNESS   AND  SPEECH  PROBLEMS. H/O  HOSPITALIZATION   FROM  SEPT. 2017                  AT  [facility] [address]     TIA  /   STROKE                       WITH  NO  SIGNIFICANT  ABNORMALITIES  .           14)         PREVIOUS   H/O  CARDIAC  ARRYTHMIA                         TO    FOLLOW  WITH    CARDOLOGY              15)     PREVIOUS   H/O    HAD COGNITIVE  BEHAVIOR THERAPY  AT  [facility]                       PATIENT  TO  FOLLOW  UP   WITH  MENTAL  HEALTH  PROFESSIONALS                        FOR   EVALUATIONS  OF   HIS  ANXIETY,  DEPRESSION,                                 NON  EPILEPTIC  PSYCHOGENIC  SPELLS,                            16)          PATIENT     STOPPED     VIMPAT       AND  TOPAMAX    IN    October 2017                 17)    H/O   [facility]   NEUROLOGY    FOLLOW  UP    EVALUATION     IN      2018                          PATIENT     NOT  PLANNING  TO  RETURN  BACK   TO                                    [facility]                18)     PATIENT  DID NOT  HAVE  NEUROLOGY   FOLLOW  UP    AT  [facility]                           FROM   October 2017     TO  DEC. 2018               19)     H/O        BRIEF   SEIZURE    2-3  PER MONTH                            STARING  EPISODES. IN      2019               20)          PATIENT   ON      DEPAKOTE  FOR  SEIZURES                                       AND  MIGRAINE  PROPHYLAXIS                                                       PATIENT   WAS     ON     KLONOPIN       FOR                            SEIZURE  CONTROL   AND  SLEEP  DIFFICULTIES   IN   THE PAST                         21)     H/O    BRIEF  SEIZURES       ON     8/13/2019                            -  PARTIAL  COMPLEX       SEIZURES                          DUE  TO  SLEEP  DEPRIVATION     AND  PROLONGED  TV   WATCHING                     25)      H/O    BRIEF    SEIZURES        WITH    FALLING                                  AND  MILD     HEAD   &   NECK INJURY         TWICE                                  IN  SEPT. AND October 2019                        23)   H/O      STARING  EPISODE  WITH    HEAD TURNING   TO  THE                           RIGHT  SIDE    LASTING  FOR   ONE  MINUTE          WITH                                LETHARGY    NOTICED   VISIT  On   11/11/ 2019   . 22)     CT   HEAD    AND  CT  CERVICAL  SPINE  IN NOV. 2019                                 SHOWED  NO  SIGNIFICANT  ABNORMALITIES                                 VALPROIC  ACID  LEVEL  IN NOV. 2019       NORMAL  LIMITS                       26)      EXPECTATIONS,   GOALS   OF  SEIZURE  MANAGEMENT                           AND  SIDE  EFFECTS  MEDICATIONS    WERE                                 REVIEWED     AND   DISCUSSED    IN    DETAIL. 27)           SEIZURES     AND      MIGRAINES  WELL  CONTROLLED                         FROM     DEC.   2019        TO     April 2021                              28)        H/O     TWO   EPISODES  OF    SEIZURE   RECURRENCE   IN    MAY   2021                            ASSOCIATED      WITH     SPENDING  TIME   IN   [employer]     IN  [place]              34)      H/O  BRIEF  RECURRENT  SEIZURES     IN   July 2021                               30)           LOW  DOSE  LAMICTAL       WAS   ADDED   TO   DEPAKOTE                              IN    July 2021                             CURRENTLY  PATIENT  INDICATED    HIS    SEIZURES    ARE                            BETTER  CONTROLLED                       PATIENT  DENIES    ANY  NEW  NEUROLOGICAL   CONCERNS. PATIENT   RECOMMENDED  :                        A)    TO  CONTINUE     SEIZURE  PRECAUTIONS                       B)     TO  CONTINUE        LAMICTAL   AND  DEPAKOTE  AS  BEFORE                        C)   PERIODIC    ANTI  EPILEPTIC  MEDICATION  LEVELS                                  EVERY    3   MONTHS                            31)           VARIOUS  RISK   FACTORS   WERE  REVIEWED   AND   DISCUSSED. PATIENT   HAS  MULTIPLE   MEDICAL, MENTAL HEALTH                                    &      NEUROLOGICAL   PROBLEMS . PATIENT'S   MANAGEMENT  IS  CHALLENGING.                             The Duration,  Quality,  Severity,  Location,  Timing,  Context,  Modifying  Factors   Of   The   Chief   Complaint       And  Present  Illness   Was   Reviewed   In   Chronological   [place]. Patient   Indicates   The  Presence   And  The  Absence  Of  The  Following  Associated  And       Additional  Neurological    Symptoms:                                Balance  And coordination problems  absent           Gait problems     absent            Headaches      present              Migraines           present           Memory problems        Present             Confusion        absent            Paresthesia numbness          absent           Seizures  And  Starring  Episodes           PRESENT           Syncope,  Near  syncopal episodes         absent           Speech problems           absent             Swallowing  Problems      absent            Dizziness,  Light headedness           present                        Vertigo        absent             Generalized   Weakness    absent              focal  Weakness     absent             Tremors         absent              Sleep  Problems     present             History  Of   Recent   Head  Injury     absent             History  Of   Recent  TIA     absent             History  Of   Recent    Stroke     absent             Neck  Pain and  Neck muscle  Spasms  Absent               Radiating  down   And   Weakness           absent            Lower back   Pain  And     Spasms  Present              Radiating    Down   And   Weakness          absent                H/O   FALLS        absent               History  Of   Visual  Symptoms    Absent                  Associated   Diplopia       absent                                                                  Also   Additional   Symptoms   Present    As  Documented    In   The detailed      Review  Of  Systems   And    Please   Refer   To    Them for   Additional  Information.            INFORMATION   REVIEWED:      VITAL SIGNS,  MEDICATIONS   LIST,   ALLERGIES AND  DRUG  INTOLERANCES,    MEDICAL   HISTORY,     SURGICAL   HISTORY,    FAMILY   HISTORY,  SOCIAL  HISTORY,    PROBLEM  LIST   FOR  PATIENT  CARE   COORDINATION        RECORDS   REVIEWED:    historical medical records, lab reports and office notes         Past Medical History:   Diagnosis Date    Anxiety, mild     Colon polyps     Depression     Dizziness     Eczema     [facility] [address]     GERD (gastroesophageal reflux disease)     H/O fall     Head ache     Head injury     Hiatal hernia     Inguinal hernia     Right.  Low back pain     Lumbar sprain     Migraine     Plantar fasciitis, bilateral     Seizure disorder ([facility] [address]     Sigmoid diverticulosis     Sleep difficulties     Status migrainosus     TIA (transient ischemic attack) 09/2017    Tobacco use     Chronic. Past Surgical History:   Procedure Laterality Date    COLONOSCOPY  04/29/2011    Internal hemorrhoids, possible anal fissure, few scattered diverticula.  COLONOSCOPY  06/04/2010    Mild sigmoid diverticulosis.  COLONOSCOPY  08/28/2009    Sigmoid diverticulosis, internal hemorrhoids.  COLONOSCOPY  05/27/2008    Internal hemorrhoids.  COLONOSCOPY  11/19/2014    polyp in rectum    COLONOSCOPY  6/1/16    sigmoid diverticulosis, colon polyp, internal hemorrhoids    COLONOSCOPY  04/19/2019    [id number]    GASTRIC FUNDOPLICATION  92/58/8159    HERNIA REPAIR Left 11/30/2017    Left Inguinal Hernia Repair w/ Mesh performed by Monika Hebert MD at [address] [place] [address] Right 05/30/2013    INGUINAL HERNIA REPAIR Left 01/24/2013    INGUINAL HERNIA REPAIR Right 09/24/2015    KNEE ARTHROSCOPY Left     PILONIDAL CYST EXCISION N/A 7/23/2020    PILONIDAL CYSTECTOMY performed by Dora Zaragoza DO at [facility]  03/05/2010    Recurrent hiatal hernia.     UPPER GASTROINTESTINAL ENDOSCOPY  6/1/16    S/P brenda fundoplication, good repair, retained gastric fluid    VASECTOMY                   Current Outpatient Medications   Medication Sig Dispense Refill    divalproex (DEPAKOTE) 250 MG DR tablet take 3 tablets by mouth twice a day 180 tablet 5    lamoTRIgine (LAMICTAL) 100 MG tablet 1 TABLET DAILY AT BEDTIME 30 tablet 2    promethazine (PHENERGAN) 25 MG tablet take 1 tablet by mouth every 8 hours if needed for nausea 60 tablet 2    SUMAtriptan (IMITREX) 50 MG tablet take 1 tablet by mouth once daily if needed for MIGRAINE 30 tablet 2    lisinopril (PRINIVIL;ZESTRIL) 20 MG tablet take 1 tablet by mouth daily      meloxicam (MOBIC) 15 MG tablet take 1 tablet by mouth once daily 30 tablet 1    acetaminophen (TYLENOL) 500 MG tablet Take 500 mg by mouth every 4 hours as needed for Pain       No current facility-administered medications for this visit.            No Known Allergies            Family History   Problem Relation Age of Onset    COPD Mother     High Blood Pressure Mother    [employer] Cancer Father         Hodgekin's Lymphoma    High Blood Pressure Father     Eczema Sister     Alzheimer's Disease Maternal Grandmother     COPD Maternal Grandmother     Cancer Maternal Grandmother     Cancer Paternal Grandmother     Cancer Paternal Grandfather     Cancer Sister         colon cancer    High Blood Pressure Sister     Arthritis Sister     Asthma Daughter     Eczema Daughter     Glaucoma Neg Hx     Diabetes Neg Hx     Cataracts Neg Hx              Social History     Socioeconomic History    Marital status:      Spouse name: Not on file    Number of children: Not on file    Years of education: Not on file    Highest education level: Not on file   Occupational History    Not on file   Tobacco Use    Smoking status: Current Every Day Smoker     Packs/day: 1.00     Years: 20.00     Pack years: 20.00     Types: Cigarettes     Last attempt to quit: 2016     Years since quittin.6    Smokeless tobacco: Never Used    Tobacco comment: 2-3 weekly   Vaping Use    Vaping Use: Never used   Substance and Sexual Activity    Alcohol use: No     Alcohol/week: 0.0 standard drinks     Comment: rarely    Drug use: No    Sexual activity: Not on file   Other Topics Concern    Not on file   Social History Narrative    Not on file     Social Determinants of Health     Financial Resource Strain:     Difficulty of Paying Living Expenses: Not on file   Food Insecurity:     Worried About Running Out of Food in the Last Year: Not on file    Garcia of Food in the Last Year: Not on file   Transportation Needs:     Lack of Transportation (Medical): Not on file    Lack of Transportation (Non-Medical): Not on file   Physical Activity:     Days of Exercise per Week: Not on file    Minutes of Exercise per Session: Not on file   Stress:     Feeling of Stress : Not on file   Social Connections:     Frequency of Communication with Friends and Family: Not on file    Frequency of Social Gatherings with Friends and Family: Not on file    Attends Yazidi Services: Not on file    Active Member of [address] [place] [address] or Organizations: Not on file    Attends Club or Organization Meetings: Not on file    Marital Status: Not on file   Intimate Partner Violence:     Fear of Current or Ex-Partner: Not on file    Emotionally Abused: Not on file    Physically Abused: Not on file    Sexually Abused: Not on file   Housing Stability:     Unable to Pay for Housing in the Last Year: Not on file    Number of [place] in the Last Year: Not on file    Unstable Housing in the Last Year: Not on file         There were no vitals filed for this visit.       Wt Readings from Last 3 Encounters:   10/19/21 193 lb (87.5 kg)   10/19/21 193 lb (87.5 kg)   05/19/21 191 lb (86.6 kg)         BP Readings from Last 3 Encounters:   11/05/21 116/82   10/19/21 130/70   10/19/21 130/70       Hematology and Coagulation  Lab Results   Component Value Date    WBC 8.3 05/26/2021    RBC 4.73 05/26/2021    HGB 16.4 05/26/2021    HCT 46.8 05/26/2021    MCV 98.9 05/26/2021    MCH 34.7 05/26/2021    MCHC 35.0 05/26/2021    RDW 12.3 05/26/2021     05/26/2021    MPV 9.7 05/26/2021         Chemistries  Lab Results   Component Value Date     05/26/2021    K 4.6 05/26/2021     05/26/2021    CO2 28 05/26/2021    BUN 15 12/08/2019    CREATININE 1.26 12/08/2019    CALCIUM 9.8 12/08/2019    PROT 7.8 12/08/2019    LABALBU 4.8 12/08/2019    BILITOT 0.34 12/08/2019    ALKPHOS 75 12/08/2019    AST 16 07/20/2020    ALT 17 07/20/2020     Lab Results   Component Value Date    ALKPHOS 75 12/08/2019    ALT 17 07/20/2020    AST 16 07/20/2020    PROT 7.8 12/08/2019    BILITOT 0.34 12/08/2019    BILIDIR 0.11 12/08/2019    LABALBU 4.8 12/08/2019     Lab Results   Component Value Date    BUN 15 12/08/2019    CREATININE 1.26 12/08/2019     Lab Results   Component Value Date    CALCIUM 9.8 12/08/2019    MG 1.8 09/18/2013     Lab Results   Component Value Date    AST 16 07/20/2020    ALT 17 07/20/2020         Lab Results   Component Value Date    CKTOTAL 67 09/19/2013     Lab Results   Component Value Date    [id number] 590 01/25/2019         Drug Levels  Lab Results   Component Value Date    PHENYTOIN <0.8 09/28/2017    PHENYTOIN <0.8 04/04/2016    VALPROATE 85 05/26/2021    VALPROATE 34 07/20/2020           Review of Systems   Constitutional: Negative for appetite change, chills, diaphoresis, fatigue, fever, unexpected weight change and weight loss. HENT: Negative for congestion, dental problem, drooling, ear discharge, ear pain, facial swelling, hearing loss, mouth sores, nosebleeds, postnasal drip, rhinorrhea, sinus pressure, sore throat, tinnitus, trouble swallowing and voice change. Eyes: Positive for photophobia. Negative for blurred vision, pain, discharge, redness, itching and visual disturbance.    Respiratory: Negative for apnea, cough, choking, chest Status migrainosus    Dizziness    Depression    H/O fall    Head injury    Seizure disorder (HCC)    Astigmatism    Rectal bleed    Cervical radiculopathy    Ventricular premature beats    Nausea and vomiting    Colon polyps    TIA (transient ischemic attack)    VBI (vertebrobasilar insufficiency)    Pilonidal cyst    Wound dehiscence    Sleep difficulties    Partial idiopathic epilepsy with seizures of localized onset, not intractable, without status epilepticus ([address]    Convulsions ([address]    Recurrent seizures ([address]           CT OF THE HEAD WITHOUT CONTRAST; CT OF THE CERVICAL SPINE WITHOUT CONTRAST   11/15/2019 10:21 am       TECHNIQUE:   CT of the head was performed without the administration of intravenous   contrast. Dose modulation, iterative reconstruction, and/or weight based   adjustment of the mA/kV was utilized to reduce the radiation dose to as low   as reasonably achievable.; CT of the cervical spine was performed without the   administration of intravenous contrast. Multiplanar reformatted images are   provided for review. Dose modulation, iterative reconstruction, and/or weight   based adjustment of the mA/kV was utilized to reduce the radiation dose to as   low as reasonably achievable.       COMPARISON:   01/25/2019       HISTORY:   ORDERING SYSTEM PROVIDED HISTORY: Status migrainosus   TECHNOLOGIST PROVIDED HISTORY:   migraine, seizures   Reason for Exam: Hx chronic migraines.  C/o increase headaches since recent   falls   Acuity: Acute   Type of Exam: Initial       FINDINGS:   BRAIN/VENTRICLES: There is no acute intracranial hemorrhage, mass effect or   midline shift.  No abnormal extra-axial fluid collection.  The gray-white   differentiation is maintained without evidence of an acute infarct.  There is   no evidence of hydrocephalus.       ORBITS: The visualized portion of the orbits demonstrate no acute abnormality.       SINUSES: The visualized paranasal sinuses and mastoid air cells demonstrate   no acute abnormality.       SOFT TISSUES/SKULL:  No acute abnormality of the visualized skull or soft   tissues.       CERVICAL SPINE: Cervical spine maintains its normal lordotic curvature. There are subtle degenerative changes C6-C7.  No acute fracture or   malalignment.  Vertebral body heights and intervertebral disc spaces are   preserved.  Overlying soft tissues are unremarkable.  Visualized lung apices   are clear.           Impression   No acute intracranial abnormality.       No acute osseus abnormality of the cervical spine. Procedure: [facility] 11/13/2014 [id number] CT BRAIN WITHOUT CONTRAST    Reason for Exam: \      FULL RESULT: EXAM: CT Head without Contrast - 11/13/2014 7:27 PM      HISTORY: Syncope/Near-Syncope. Seizures. COMPARISON: 10/8/14       TECHNIQUE: Contiguous axial CT images were obtained from the skull base    to the vertex and reviewed in soft tissue, subdural, bone, and brain    windows. FINDINGS: The ventricular system is normal in size and configuration. There are no intra-axial or extra-axial fluid collections or mass    lesions. No acute intracranial hemorrhage or midline shift. Visualized    paranasal sinuses and mastoid air cells are clear. No acute calvarial    fracture is identified. The brainstem and the relationship of the    craniocervical junction structures are normal. There is incomplete fusion    of the posterior elements of C1, stable from prior study and compatible    with a normal variant. Visualized orbits and globes are intact. IMPRESSION:    No evidence for acute intracranial pathology. No bleed, mass effect, or    midline shift.               Procedure: [facility] 06/02/2014 [id number] MRI BRAIN COMBINED    Reason for Exam: \      FULL RESULT: MRI brain with and without intravenous contrast, 6/2/2014      History: Migraines      Technique: Multiplanar multisequence MR imaging of the brain was    performed before and after intravenous administration of 16 mL Magnevist.      Findings: No evidence for shift of midline structures, mass effect or    compression of the ventricles noted. No acute intra-or extra-axial    hemorrhage is seen. No abnormal intracranial fluid collections are    identified. The basal cisterns are patent. Posterior fossa structures demonstrate no    discrete mass. Few scattered foci of T2/FLAIR hyperintensity noted in the    periventricular and subcortical white matter which essentially are    nonspecific in imaging appearance however differential considerations    include demyelinating or inflammatory process, migraine induced changes,    chronic small vessel disease or vascular disease. No associated restricted diffusion or abnormal enhancing seen. A small    left occipital lobe developmental venous anomaly seen. No abnormal    enhancing intracranial mass seen. Visualized orbital contents appear unremarkable. Mild mucosal thickening of the ethmoid air cells noted. Skull base flow voids are patent. Superior sagittal sinus and straight sinus flow voids are patent. Heterogeneity of the T1-weighted marrow signal intensity seen. IMPRESSION:    1. No acute or subacute ischemic insult noted. No abnormal enhancing    intracranial mass seen. 2.scattered foci of T2/FLAIR hyperintensity noted in the periventricular    and subcortical white matter which essentially are nonspecific in imaging    appearance however differential considerations include demyelinating or    inflammatory process, migraine induced changes, chronic small vessel    disease or vascular disease. Procedure: [facility] 10/08/2014 [id number] CT BRAIN WITHOUT CONTRAST    Reason for Exam: \      FULL RESULT: EXAM: Brain CT without contrast dated 10/8/2014. HISTORY: Headache after head injury. COMPARISON: Brain CT dated 4/16/2014.       TECHNIQUE: Multi-detector axial images are obtained through the head. Findings: There is no evidence for acute intracranial hemorrhage,    midline shift or mass effect. Ventricular and cistern spaces are normal    and symmetric. Radha Dally and white matter differentiation is well preserved. No intra- or extra-axial fluid collections or mass occupying lesions    seen. Bilateral cerebellopontine angles are normal. Visualized orbital    contents are normal. Study viewed in bone windows shows no    abnormalities. Mild left ethmoid sinusitis otherwise all visualized    sinuses are normally aerated. Bilateral temporomandibular joints are    normally aligned. IMPRESSION: No acute intracranial abnormalities. Mild left ethmoid    sinusitis. Plan:           VISITING DIAGNOSIS:      ICD-10-CM    1. Partial idiopathic epilepsy with seizures of localized onset, not intractable, without status epilepticus (Formerly [facility])  G40.009 divalproex (DEPAKOTE) 250 MG DR tablet     promethazine (PHENERGAN) 25 MG tablet     SUMAtriptan (IMITREX) 50 MG tablet   2. Anxiety, mild  F41.9 divalproex (DEPAKOTE) 250 MG DR tablet   3. Disturbance, sleep  G47.9 divalproex (DEPAKOTE) 250 MG DR tablet   4. Tobacco use  Z72.0 divalproex (DEPAKOTE) 250 MG DR tablet   5. Gastroesophageal reflux disease without esophagitis  K21.9 divalproex (DEPAKOTE) 250 MG DR tablet   6. Nonintractable headache, unspecified chronicity pattern, unspecified headache type  R51.9 divalproex (DEPAKOTE) 250 MG DR tablet   7. Dizziness  R42 divalproex (DEPAKOTE) 250 MG DR tablet   8. Sleep difficulties  G47.9 divalproex (DEPAKOTE) 250 MG DR tablet   9. Seizure disorder (Formerly [facility])  G40.909 divalproex (DEPAKOTE) 250 MG DR tablet     lamoTRIgine (LAMICTAL) 100 MG tablet     promethazine (PHENERGAN) 25 MG tablet     SUMAtriptan (IMITREX) 50 MG tablet   10. Nonintractable paroxysmal hemicrania, unspecified chronicity pattern  G44.039 divalproex (DEPAKOTE) 250 MG DR tablet   11.  Chronic migraine without aura without status migrainosus, not intractable  G43.709 divalproex (DEPAKOTE) 250 MG DR tablet   12. Cervical radiculopathy  M54.12    13. TIA (transient ischemic attack)  G45.9                 CONCERNS   &   INCREASED   RISK   FOR          * TIA,  CEREBRO  VASCULAR  ISCHEMIA, STROKE       *  SEIZURE  ACTIVITY,  EPILEPSY ,        *  Chronic  Headaches &  Migraines      *   COGNITIVE  &   MEMORY PROBLEMS  AND  DEMENTIAS                     VARIOUS  RISK   FACTORS   WERE  REVIEWED   AND   DISCUSSED. *  PATIENT   HAS  MULTIPLE   MEDICAL, MENTAL HEALTH         &      NEUROLOGICAL   PROBLEMS . PATIENT'S   MANAGEMENT  IS  CHALLENGING. PLAN:       Lamona Hunting  Of  The  Diagnoses,  The  Management & Treatment  Options           AND    Care  plan  Were        Reviewed and   Discussed   With  patient. * Goals  And  Expectations  Of  The  Therapy  Discussed   And  Reviewed. *   Benefits   And   Side  Effect  Profile  Of  Medication/s   Were   Discussed             * Need   For  Further   Follow up For  The  Various  Problems  Were discussed. * Results  Of  The  Previous  Diagnostic tests were reviewed and questions answered. patient  understand the same. Medical  Decision  Making  Was  Made  Based on the   Complexity  Of  Above  Mentioned  Diagnoses,        Data reviewed   & diagnostic  Tests  Reviewed,  Risk  Of  Significant   Co morbidities and complicating   Factors. Medical  Decision  Was   High  Complexity  Due   To  The  Patient's  Multiple  Symptoms,  Advancing   Disease,      Complex  Treatment  Regimen,  Multiple medications and   Risk  Of   Side  Effects,      Difficulty  In  Medication  Management  And  Diagnostic  Challenges   In  View  Of  The  Associated       Co  Morbid  Conditions   And  Problems. * FALL   PRECAUTIONS.       THESE  REVIEWED   AND  DISCUSSED      *   ABSOLUTELY   NO  DRIVING    -       REVIEWED     WITH PATIENT      *   BE  CAREFUL  WITH  ACTIVITIES             *   ADEQUATE   FLUID  INTAKE   AND  ELECTROLYTE  BALANCE             * KEEP  DAIRY  OF   THE  NEUROLOGICAL  SYMPTOMS        RECORDING THE    DURATION  AND  FREQUENCY. *  AVOID    CONDITIONS  AND  FACTORS   THAT  MAKE   NEUROLOGICAL  SYMPTOMS  WORSE. *   SEIZURE  PRECAUTIONS.  -   DISCUSSED                  A)  Avoid  Working  At   [employer] B)  Avoid  Working  With  Heavy machinery  . C)  Avoid   Swimming,  Climbing  A  Ladder   Unattended. D)  Avoid   Driving   If  You   Have  A  Seizure. E)  Must   Be  Seizure  Free   For  At   Least   6 months,  Before   You  Can drive. F) Some times  Your  May  Feel  Seizure coming  Before  It  Begins. You  May feel               Strange smell or funny  Feeling  In  Your  Stomach,  Which is  Called   Aura. TIPS  TO  REDUCE/ PREVENT  SEIZURES         1. Take  Your  Anti seizure  Medications   As   Recommended. 2. Get   Enough   Sleep. Sleep  Deprivation   Can  Trigger  A  Seizure. 3. If   You   Have  A fever,  Treat  It  At  Once,  And  Contact   Your  Primary  Care Providers. 4. Avoid   Alcohol. 5. Avoid  Flashing  Lights,  Loud  Noises and  TV  And  Video  Games,             As   These  May  Trigger   Your  Seizures       6. Control  Your  Stress  And   Have  Adequate  Rest.       7.   If  You  Feel  A  Seizure  Coming   On :             A) warn people  Who  Are  With  You             B)  Make  Sure  There  Are  No  Sharp or  Hard  Objects  Around you. C)  Lay down  On  Your  Side  And  Relax. *  TO  MAINTAIN  REGULAR  SLEEP  WAKE  CYCLES.      *   TO  HAVE ADEQUATE  REST  AND   SLEEP    HOURS.          *    AVOID  ANY USAGE OF                   TOBACCO,  EXCESSIVE  ALCOHOL  AND   ILLEGAL   SUBSTANCES          *  CONTINUE MEDICATIONS PRESCRIBED BY NEUROLOGIST AS    RECOMMENDED     *   Compliance   With  Medications   And  Instructions          * CURRENTLY  TOLERATING  THE  PRESCRIBED   MEDICATIONS. WITHOUT  ANY  SIGNIFICANT  SIDE  EFFECTS   &  GETTING BENEFIT. *  May   Use  Pill  Box,    If  Needed      *  MEDICATIONS TO AVOID:    WELLBUTRIN,  ULTRAM          *     Antiplatelet  therapy    As   Recommended  Was   Discussed          *    Prophylactic  Use   Of     Vitamin   B   Complex,  Folic  Acid,    Vitamin  B12        Multivitamin   Tablet  Daily    Supplementations   Over  The  Counter  Discussed           *  PATIENT  IS  ALSO   COUNSELED   TO  KEEP    ACTIVITIES         A)   SIMPLE      B)  ORGANIZED      C)  WRITE   DOWN                     *  EVALUATIONS  AND  FOLLOW UP:                                          * CARDIOLOGY               * EPILEPSY  MONITORING   UNIT                                 *    PREVIOUS     H/O    PARTIAL  COMPLEX       SEIZURES                          DUE  TO  SLEEP  DEPRIVATION     AND  PROLONGED  TV   WATCHING                                *     PATIENT   RECOMMENDED  :                        A)    TO  CONTINUE     SEIZURE  PRECAUTIONS                       B)     TO  CONTINUE        LAMICTAL   AND  DEPAKOTE  AS  BEFORE                        C)   PERIODIC    ANTI  EPILEPTIC  MEDICATION  LEVELS                                  EVERY    3   MONTHS                         VARIOUS  RISK   FACTORS   WERE  REVIEWED   AND   DISCUSSED.           Orders Placed This Encounter   Medications    divalproex (DEPAKOTE) 250 MG DR tablet     Sig: take 3 tablets by mouth twice a day     Dispense:  180 tablet     Refill:  5    lamoTRIgine (LAMICTAL) 100 MG tablet     Si TABLET DAILY AT BEDTIME     Dispense:  30 tablet Refill:  2    promethazine (PHENERGAN) 25 MG tablet     Sig: take 1 tablet by mouth every 8 hours if needed for nausea     Dispense:  60 tablet     Refill:  2    SUMAtriptan (IMITREX) 50 MG tablet     Sig: take 1 tablet by mouth once daily if needed for MIGRAINE     Dispense:  30 tablet     Refill:  2                         *PATIENT   TO  FOLLOW  UP  WITH   PRIMARY  CARE   AND   OTHER  CONSULTANTS  AS  BEFORE.           *TO  FOLLOW  WITH   MENTAL  HEALTH  PROFESSIONALS ,  INCLUDING            PSYCHOLOGICAL  COUNSELING   AND  PSYCHIATRIC  EVALUTIONS            *  Maintain   Healthy  Life Style    With   Periodic  Monitoring  Of      Any  Medical  Conditions  Including   Elevated  Blood  Pressure,  Lipid  Profile,     Blood  Sugar levels  And   Heart  Disease. *   Period   Screening  For  Cancers  Involving  Breast,  Colon,    Prostate, lungs  And  Other  Organs  As  Applicable,  In consultation   With  Your  Primary Care Providers. * Second  Neurological  Opinion  And  Evaluations  In  [facility]  Setting  If  Patient  Is  Interested. *  If  The  Patient remains  Neurologically  Stable   Return   To  [facility] Neurology Department       IN   2- 3      MONTHS  TIME   FOR  FURTHER  FOLLOW UP.                   *  If   There is  Any  Significant  Worsening   Of  Current  Symptoms  And      Or  If patient  Develops   Any additional  New  Neurological  Symptoms  Or  Significant  Concerns       Should  Call  911 or  Go  To  Emergency  Department  For  Further  Immediate  Evaluation. *   The  Neurological   Findings,  Possible  Diagnosis,  Differential diagnoses   And  Options      For    Further   Investigations   And  management   Are  Discussed  Comprehensively. Medications   And  Prescription   Risks  And  Side effects  Are   Also  Discussed.              The  Above  Were  Reviewed  With  patient and       questions Answered  In  Detail. More   Than   50% of face  To face Time   Was  Spent  On  Counseling   And   Coordination  Of  Care       Of   Patient's multiple   Neurological  Problems   And   Comorbid  Medical   Conditions. VIRTUAL   VIDEO  VISIT          PATIENT  BEING   EVALUATED   BY  NEUROLOGIST   VIA   a Virtual Visit (video visit) encounter          to address concerns as mentioned  ABOVE    ON     12/15/2021          ALSO    nursing   caregiver was present     before,  during  and  after   the    visit        Due to this being a TeleHealth encounter (During [id number] public health emergency), evaluation of the following organ systems was limited:     Vitals/Constitutional/EENT/Resp/CV/GI//MS/Neuro/Skin/Heme-Lymph-Imm. Pursuant to the emergency declaration under the [address] [place] [address] authority and the [employer] Act, this Virtual Visit was conducted with patient's (and/or legal guardian's) consent, to reduce the patient's risk of exposure to COVID-19 and provide necessary medical care. The patient (and/or legal guardian) has also been advised to contact this office for worsening conditions or problems, and seek emergency medical treatment and/or call 911 if deemed necessary. Patient identification was verified at the start of the visit: Yes    Total time spent for this encounter:  ( Time Spent:   40  minutes )    Services were provided through a video synchronous discussion   and  limited    examination  virtually to substitute for in-person clinic visit. Patient    located at    [address] [place] [address].             Electronically signed by Deangelo Gordon MD.,  [facility]       Board Certified in  Neurology &  In  [employer] ([employer])      DISCLAIMER:   Although every effort was made to ensure the accuracy of this  electronic transcription, some errors in transcription may have occurred. GENERAL PATIENT INSTRUCTIONS:     A Healthy Lifestyle: Care Instructions  Your Care Instructions  A healthy lifestyle can help you feel good, stay at a healthy weight, and have plenty of energy for both work and play. A healthy lifestyle is something you can share with your whole family. A healthy lifestyle also can lower your risk for serious health problems, such as high blood pressure, heart disease, and diabetes. You can follow a few steps listed below to improve your health and the health of your family. Follow-up care is a key part of your treatment and safety. Be sure to make and go to all appointments, and call your doctor if you are having problems. Its also a good idea to know your test results and keep a list of the medicines you take. How can you care for yourself at home? Do not eat too much sugar, fat, or fast foods. You can still have dessert and treats now and then. The goal is moderation. Start small to improve your eating habits. Pay attention to portion sizes, drink less juice and soda pop, and eat more fruits and vegetables. Eat a healthy amount of food. A 3-ounce serving of meat, for example, is about the size of a deck of cards. Fill the rest of your plate with vegetables and whole grains. Limit the amount of soda and sports drinks you have every day. Drink more water when you are thirsty. Eat at least 5 servings of fruits and vegetables every day. It may seem like a lot, but it is not hard to reach this goal. A serving or helping is 1 piece of fruit, 1 cup of vegetables, or 2 cups of leafy, raw vegetables. Have an apple or some carrot sticks as an afternoon snack instead of a candy bar. Try to have fruits and/or vegetables at every meal.  Make exercise part of your daily routine.  You may want to start with simple activities, such as walking, bicycling, or slow swimming. Try to be active 30 to 60 minutes every day. You do not need to do all 30 to 60 minutes all at once. For example, you can exercise 3 times a day for 10 or 20 minutes. Moderate exercise is safe for most people, but it is always a good idea to talk to your doctor before starting an exercise program.  Keep moving. Laneta Satchel the lawn, work in the garden, or [employer]. Take the stairs instead of the elevator at work. If you smoke, quit. People who smoke have an increased risk for heart attack, stroke, cancer, and other lung illnesses. Quitting is hard, but there are ways to boost your chance of quitting tobacco for good. Use nicotine gum, patches, or lozenges. Ask your doctor about stop-smoking programs and medicines. Keep trying. In addition to reducing your risk of diseases in the future, you will notice some benefits soon after you stop using tobacco. If you have shortness of breath or asthma symptoms, they will likely get better within a few weeks after you quit. Limit how much alcohol you drink. Moderate amounts of alcohol (up to 2 drinks a day for men, 1 drink a day for women) are okay. But drinking too much can lead to liver problems, high blood pressure, and other health problems. Family health  If you have a family, there are many things you can do together to improve your health. Eat meals together as a family as often as possible. Eat healthy foods. This includes fruits, vegetables, lean meats and dairy, and whole grains. Include your family in your fitness plan. Most people think of activities such as jogging or tennis as the way to fitness, but there are many ways you and your family can be more active. Anything that makes you breathe hard and gets your heart pumping is exercise. Here are some tips:  Walk to do errands or to take your child to school or the bus. Go for a family bike ride after dinner instead of watching TV. Where can you learn more?   Go to

## 2022-01-20 RX ORDER — MELOXICAM 15 MG/1
TABLET ORAL
Qty: 30 TABLET | Refills: 1 | Status: SHIPPED | OUTPATIENT
Start: 2022-01-20 | End: 2022-03-31

## 2022-03-16 ENCOUNTER — HOSPITAL ENCOUNTER (OUTPATIENT)
Dept: LAB | Age: 42
Discharge: HOME | End: 2022-03-16
Payer: MEDICARE

## 2022-03-16 ENCOUNTER — OFFICE VISIT (OUTPATIENT)
Dept: NEUROLOGY | Age: 42
End: 2022-03-16
Payer: MEDICARE

## 2022-03-16 VITALS
DIASTOLIC BLOOD PRESSURE: 70 MMHG | HEART RATE: 110 BPM | WEIGHT: 187 LBS | SYSTOLIC BLOOD PRESSURE: 110 MMHG | HEIGHT: 66 IN | BODY MASS INDEX: 30.05 KG/M2

## 2022-03-16 DIAGNOSIS — M54.12 CERVICAL RADICULOPATHY: ICD-10-CM

## 2022-03-16 DIAGNOSIS — F34.1 DYSTHYMIA: ICD-10-CM

## 2022-03-16 DIAGNOSIS — S09.90XS INJURY OF HEAD, SEQUELA: ICD-10-CM

## 2022-03-16 DIAGNOSIS — G43.009 MIGRAINE WITHOUT AURA AND WITHOUT STATUS MIGRAINOSUS, NOT INTRACTABLE: ICD-10-CM

## 2022-03-16 DIAGNOSIS — G47.9 DISTURBANCE, SLEEP: ICD-10-CM

## 2022-03-16 DIAGNOSIS — G43.709 CHRONIC MIGRAINE WITHOUT AURA WITHOUT STATUS MIGRAINOSUS, NOT INTRACTABLE: ICD-10-CM

## 2022-03-16 DIAGNOSIS — G40.909 SEIZURE DISORDER (HCC): ICD-10-CM

## 2022-03-16 DIAGNOSIS — G40.009 PARTIAL IDIOPATHIC EPILEPSY WITH SEIZURES OF LOCALIZED ONSET, NOT INTRACTABLE, WITHOUT STATUS EPILEPTICUS (HCC): ICD-10-CM

## 2022-03-16 DIAGNOSIS — F41.9 ANXIETY, MILD: ICD-10-CM

## 2022-03-16 DIAGNOSIS — R42 DIZZINESS: ICD-10-CM

## 2022-03-16 DIAGNOSIS — Z91.81 H/O FALL: ICD-10-CM

## 2022-03-16 DIAGNOSIS — G40.009 PARTIAL IDIOPATHIC EPILEPSY WITH SEIZURES OF LOCALIZED ONSET, NOT INTRACTABLE, WITHOUT STATUS EPILEPTICUS (HCC): Primary | ICD-10-CM

## 2022-03-16 DIAGNOSIS — G47.9 SLEEP DIFFICULTIES: ICD-10-CM

## 2022-03-16 DIAGNOSIS — Z86.73 H/O TRANSIENT ISCHEMIC ATTACK INVOLVING ANTERIOR CIRCULATION: ICD-10-CM

## 2022-03-16 LAB
ALT SERPL-CCNC: 32 U/L (ref 5–41)
AST SERPL-CCNC: 22 U/L
LAMOTRIGINE LEVEL: 2.2 UG/ML (ref 3–15)
VALPROIC ACID LEVEL: 57 UG/ML (ref 50–125)

## 2022-03-16 PROCEDURE — 99214 OFFICE O/P EST MOD 30 MIN: CPT | Performed by: PSYCHIATRY & NEUROLOGY

## 2022-03-16 PROCEDURE — 84460 ALANINE AMINO (ALT) (SGPT): CPT

## 2022-03-16 PROCEDURE — 4004F PT TOBACCO SCREEN RCVD TLK: CPT | Performed by: PSYCHIATRY & NEUROLOGY

## 2022-03-16 PROCEDURE — 80175 DRUG SCREEN QUAN LAMOTRIGINE: CPT

## 2022-03-16 PROCEDURE — G8417 CALC BMI ABV UP PARAM F/U: HCPCS | Performed by: PSYCHIATRY & NEUROLOGY

## 2022-03-16 PROCEDURE — 36415 COLL VENOUS BLD VENIPUNCTURE: CPT

## 2022-03-16 PROCEDURE — G8483 FLU IMM NO ADMIN DOC REA: HCPCS | Performed by: PSYCHIATRY & NEUROLOGY

## 2022-03-16 PROCEDURE — 99213 OFFICE O/P EST LOW 20 MIN: CPT | Performed by: PSYCHIATRY & NEUROLOGY

## 2022-03-16 PROCEDURE — G8427 DOCREV CUR MEDS BY ELIG CLIN: HCPCS | Performed by: PSYCHIATRY & NEUROLOGY

## 2022-03-16 PROCEDURE — 80164 ASSAY DIPROPYLACETIC ACD TOT: CPT

## 2022-03-16 PROCEDURE — 84450 TRANSFERASE (AST) (SGOT): CPT

## 2022-03-16 RX ORDER — LAMOTRIGINE 100 MG/1
TABLET ORAL
Qty: 30 TABLET | Refills: 2 | Status: SHIPPED | OUTPATIENT
Start: 2022-03-16 | End: 2022-07-20 | Stop reason: SDUPTHER

## 2022-03-16 ASSESSMENT — ENCOUNTER SYMPTOMS
CHOKING: 0
EYE REDNESS: 0
CONSTIPATION: 0
PHOTOPHOBIA: 1
CHANGE IN BOWEL HABIT: 0
EYE ITCHING: 0
FACIAL SWELLING: 0
DIARRHEA: 0
VOICE CHANGE: 0
WHEEZING: 0
SINUS PRESSURE: 0
ABDOMINAL DISTENTION: 0
COLOR CHANGE: 0
VOMITING: 0
SORE THROAT: 0
SHORTNESS OF BREATH: 0
BLURRED VISION: 0
VISUAL CHANGE: 0
CHEST TIGHTNESS: 0
BACK PAIN: 0
SWOLLEN GLANDS: 0
COUGH: 0
EYE DISCHARGE: 0
TROUBLE SWALLOWING: 0
SCALP TENDERNESS: 0
FACIAL SWEATING: 0
EYE PAIN: 0
ABDOMINAL PAIN: 0
APNEA: 0
RHINORRHEA: 0
EYE WATERING: 0
BLOOD IN STOOL: 0
NAUSEA: 1

## 2022-03-16 NOTE — PROGRESS NOTES
Subjective:        Patient ID: Adela Kaufman is a 42 y. o. RIGHT   HANDED male. Seizures  This is a chronic problem. Episode onset: SINCE    2014. The problem occurs intermittently. The problem has been waxing and waning. Associated symptoms include headaches and nausea. Pertinent negatives include no abdominal pain, anorexia, arthralgias, change in bowel habit, chest pain, chills, congestion, coughing, diaphoresis, fatigue, fever, joint swelling, myalgias, neck pain, numbness, rash, sore throat, swollen glands, urinary symptoms, vertigo, visual change, vomiting or weakness. Exacerbated by: LACK OF  SLEEP,  PROLONGED   TV ,   STRESS,   LOUD  NOISES   AND  BRIGHT  LIGHTS   Treatments tried: ANTI  EPILEPTIC  MEDICATION. The treatment provided moderate relief. Headache   This is a chronic problem. Episode onset: MORE  THAN   10  YEARS. The problem occurs intermittently. The problem has been waxing and waning. The pain is located in the vertex region. The pain does not radiate. The pain quality is similar to prior headaches. The quality of the pain is described as aching and throbbing. The pain is at a severity of 3/10. The pain is mild. Associated symptoms include insomnia, nausea, phonophobia, photophobia, seizures and tingling. Pertinent negatives include no abdominal pain, abnormal behavior, anorexia, back pain, blurred vision, coughing, dizziness, drainage, ear pain, eye pain, eye redness, eye watering, facial sweating, fever, hearing loss, loss of balance, muscle aches, neck pain, numbness, rhinorrhea, scalp tenderness, sinus pressure, sore throat, swollen glands, tinnitus, visual change, vomiting, weakness or weight loss. The symptoms are aggravated by unknown. He has tried darkened room, NSAIDs, Excedrin and triptans for the symptoms. The treatment provided moderate relief. His past medical history is significant for migraine headaches and migraines in the family.  There is no history of cancer, cluster headaches, hypertension, immunosuppression, obesity, pseudotumor cerebri, recent head traumas, sinus disease or TMJ. Migraine   This is a chronic problem. Episode onset: MORE  THAN   10  YEARS. The problem occurs intermittently. The problem has been waxing and waning. The pain is located in the right unilateral and frontal region. The pain does not radiate. The pain quality is similar to prior headaches. The quality of the pain is described as aching and throbbing. The pain is at a severity of 3/10. The pain is mild. Associated symptoms include insomnia, nausea, phonophobia, photophobia, seizures and tingling. Pertinent negatives include no abdominal pain, abnormal behavior, anorexia, back pain, blurred vision, coughing, dizziness, drainage, ear pain, eye pain, eye redness, eye watering, facial sweating, fever, hearing loss, loss of balance, muscle aches, neck pain, numbness, rhinorrhea, scalp tenderness, sinus pressure, sore throat, swollen glands, tinnitus, visual change, vomiting, weakness or weight loss. The symptoms are aggravated by unknown. He has tried triptans and NSAIDs (TOPAMAX) for the symptoms. The treatment provided moderate relief. His past medical history is significant for migraine headaches and migraines in the family. There is no history of cancer, cluster headaches, hypertension, immunosuppression, obesity, pseudotumor cerebri, recent head traumas, sinus disease or TMJ. History obtained from  The patient        AND    HIS    WIFE       and other  available medical records were  Also  reviewed.                 1)    KNOWN    H/O   CHRONIC  SEVERE MIGRAINES                         FOR  MORE  THAN   10  YEARS                           -     WELL  CONTROLLED                      -   ON  IMITREX,   PHENERGAN   AS  NEEDED               2)     H/O   CHRONIC   TENSION  HEADACHE                       --   BETTER  CONTROLLED                            3)        H/O  SEIZURE  DISORDER /  EPILEPSY    SINCE      2014                            BETTER      SEIZURE  CONTROL                                 -    ON  DEPAKOTE     -     STABLE            4)   INTOLERANCE  TO  KEPPRA                    DUE  TO  IRRITABILITY  AND MOOD PROBLEMS            5)     TRIED     VIMPAT  AND  TOPAMAX   IN   THE    PAST            6)     H/O   CHRONIC  ANXIETY  AND  DEPRESSION                    -     STABLE    ON  LEXAPRO                 HAD    FOLLOW    WITH    HIS  MENTAL  HEALTH  PROFESSIONALS               7)      H/O  PREVIOUS  MULTIPLE MILD  HEAD  INJURIES                             DUE  TO   FALLS            8)   H/O     CHRONIC    SLEEP  DIFFICULTIES                      AND  DISTURBED  SLEEP  WAKE  CYCLES                              -  STABLE                9)    PREVIOUS  H/O  DIZZY  EPISODES                          -  NO  RECURRENCE            10)   EMU  AT  [address]   IN  2014                  -  POSSIBLE  FRONTAL    FOCUS            11)   EMU   AT  [facility]. 7  TO  NOV. 11, 2016    SHOWED :              6  EPISODES  OF  PSYCHOGENIC  NON EPILEPTIC SPELLS. EEG  DURING  THE SPELLS  AND  INTERICTAL  PERIODS                         WAS REPORTED  TO BE  NORMAL. 12)     PREVIOUS   H/O    BRIEF  SEIZURES  AND  MEMORY  LOSS                           -  PARTIAL  COMPLEX       SEIZURES                             DUE  TO  SLEEP  DEPRIVATION                               AND  PROLONGED  TV   WATCHING                                         13)      EPISODES   OF  RIGHT  SIDED  NUMBNESS   AND  SPEECH  PROBLEMS. H/O  HOSPITALIZATION   FROM  SEPT. 2017                     AT  [facility] [address]     TIA  /   STROKE                        NO  SIGNIFICANT  ABNORMALITIES     REPORTED .           14)         PREVIOUS   H/O  CARDIAC  ARRYTHMIA                         TO    FOLLOW  WITH CARDOLOGY              15)     PREVIOUS   H/O    HAD  COGNITIVE  BEHAVIOR THERAPY  AT  [facility]                       PATIENT  TO  FOLLOW  UP   WITH  MENTAL  HEALTH  PROFESSIONALS                        FOR   EVALUATIONS  OF   HIS  ANXIETY,  DEPRESSION,                                 NON  EPILEPTIC  PSYCHOGENIC  SPELLS,                            16)          PATIENT     STOPPED     VIMPAT       AND  TOPAMAX    IN    October 2017                 17)    H/O   [facility]   NEUROLOGY    FOLLOW  UP    EVALUATION     IN      2018                          PATIENT     NOT  PLANNING  TO  RETURN  BACK   TO                                    [facility]                18)     PATIENT  DID NOT  HAVE  NEUROLOGY   FOLLOW  UP    AT  [facility]                           FROM   October 2017     TO  DEC. 2018               19)     H/O        BRIEF   SEIZURE    2-3  PER MONTH                            STARING  EPISODES. IN      2019               20)          PATIENT   ON      DEPAKOTE  FOR  SEIZURES                                       AND  MIGRAINE  PROPHYLAXIS                                                       PATIENT   WAS     ON     KLONOPIN       FOR                            SEIZURE  CONTROL   AND  SLEEP  DIFFICULTIES   IN   THE PAST                         21)     H/O    BRIEF  SEIZURES       ON     8/13/2019                            -  PARTIAL  COMPLEX       SEIZURES                          DUE  TO  SLEEP  DEPRIVATION     AND  PROLONGED  TV   WATCHING                     25)      H/O    BRIEF    SEIZURES        WITH    FALLING                                  AND  MILD     HEAD   &   NECK INJURY         TWICE                                  IN  SEPT.     AND October 2019                        23)   H/O      STARING  EPISODE  WITH    HEAD TURNING   TO  THE                           RIGHT  SIDE    LASTING  FOR   ONE  MINUTE          WITH                                LETHARGY NOTICED   VISIT  On   11/11/ 2019   .                                    25)     CT   HEAD    AND  CT  CERVICAL  SPINE  IN NOV. 2019                                 SHOWED  NO  SIGNIFICANT  ABNORMALITIES                                 VALPROIC  ACID  LEVEL  IN NOV. 2019       NORMAL  LIMITS                       26)      EXPECTATIONS,   GOALS   OF  SEIZURE  MANAGEMENT                           AND  SIDE  EFFECTS  MEDICATIONS    WERE                                 REVIEWED     AND   DISCUSSED    IN    DETAIL. 27)           SEIZURES     AND      MIGRAINES  WELL  CONTROLLED                         FROM     DEC.   2019        TO     April 2021                              28)        H/O     TWO   EPISODES  OF    SEIZURE   RECURRENCE   IN    MAY   2021                            ASSOCIATED      WITH     SPENDING  TIME   IN   [facility]     IN  [place]              34)      H/O  BRIEF  RECURRENT  SEIZURES     IN   July 2021                               30)           LOW  DOSE  LAMICTAL       WAS   ADDED   TO   DEPAKOTE                              IN    July 2021                             CURRENTLY  PATIENT  INDICATED    HIS    SEIZURES    ARE                            BETTER  CONTROLLED                       PATIENT  DENIES    ANY  NEW  NEUROLOGICAL   CONCERNS. PATIENT   RECOMMENDED  :                        A)    TO  CONTINUE     SEIZURE  PRECAUTIONS                       B)     TO  CONTINUE        LAMICTAL   AND  DEPAKOTE  AS  BEFORE                        C)   PERIODIC    ANTI  EPILEPTIC  MEDICATION  LEVELS                                  EVERY    3   MONTHS                            31)           VARIOUS  RISK   FACTORS   WERE  REVIEWED   AND   DISCUSSED. PATIENT   HAS  MULTIPLE   MEDICAL, MENTAL HEALTH                                    &      NEUROLOGICAL   PROBLEMS .                                        PATIENT'S MANAGEMENT  IS  CHALLENGING. The  Duration,  Quality,  Severity,  Location,  Timing,  Context,  Modifying  Factors   Of   The   Chief   Complaint       And  Present  Illness   Was   Reviewed   In   Chronological   [place].              Patient   Indicates   The  Presence   And  The  Absence  Of  The  Following  Associated  And       Additional  Neurological    Symptoms:                                Balance  And coordination problems  absent           Gait problems     absent            Headaches      present              Migraines           present           Memory problems        Present             Confusion        absent            Paresthesia numbness          absent           Seizures  And  Starring  Episodes           PRESENT           Syncope,  Near  syncopal episodes         absent           Speech problems           absent             Swallowing  Problems      absent            Dizziness,  Light headedness           present                        Vertigo        absent             Generalized   Weakness    absent              focal  Weakness     absent             Tremors         absent              Sleep  Problems     present             History  Of   Recent   Head  Injury     absent             History  Of   Recent  TIA     absent             History  Of   Recent    Stroke     absent             Neck  Pain and  Neck muscle  Spasms  Absent               Radiating  down   And   Weakness           absent            Lower back   Pain  And     Spasms  Present              Radiating    Down   And   Weakness          absent                H/O   FALLS        absent               History  Of   Visual  Symptoms    Absent                  Associated   Diplopia       absent                                                                  Also   Additional   Symptoms   Present    As  Documented    In   The detailed      Review  Of  Systems   And    Please   Refer   To    Them for   Additional Information. INFORMATION   REVIEWED:      VITAL  SIGNS,  MEDICATIONS   LIST,   ALLERGIES AND  DRUG  INTOLERANCES,    MEDICAL   HISTORY,     SURGICAL   HISTORY,    FAMILY   HISTORY,  SOCIAL  HISTORY,    PROBLEM  LIST   FOR  PATIENT  CARE   COORDINATION        RECORDS   REVIEWED:    historical medical records, lab reports and office notes         Past Medical History:   Diagnosis Date    Anxiety, mild     Colon polyps     Depression     Dizziness     Eczema     Epile[address] [facility] [address]     GERD (gastroesophageal reflux disease)     H/O fall     Head ache     Head injury     Hiatal hernia     Inguinal hernia     Right.  Low back pain     Lumbar sprain     Migraine     Plantar fasciitis, bilateral     Seizure disorder ([address]     Sigmoid diverticulosis     Sleep difficulties     Status migrainosus     TIA (transient ischemic attack) 09/2017    Tobacco use     Chronic. Past Surgical History:   Procedure Laterality Date    COLONOSCOPY  04/29/2011    Internal hemorrhoids, possible anal fissure, few scattered diverticula.  COLONOSCOPY  06/04/2010    Mild sigmoid diverticulosis.  COLONOSCOPY  08/28/2009    Sigmoid diverticulosis, internal hemorrhoids.  COLONOSCOPY  05/27/2008    Internal hemorrhoids.     COLONOSCOPY  11/19/2014    polyp in rectum    COLONOSCOPY  6/1/16    sigmoid diverticulosis, colon polyp, internal hemorrhoids    COLONOSCOPY  04/19/2019    [id number]    GASTRIC FUNDOPLICATION  34/54/3650    HERNIA REPAIR Left 11/30/2017    Left Inguinal Hernia Repair w/ Mesh performed by Joseph Jose MD at [address] [place] [address] Right 05/30/2013    INGUINAL HERNIA REPAIR Left 01/24/2013    INGUINAL HERNIA REPAIR Right 09/24/2015    KNEE ARTHROSCOPY Left     PILONIDAL CYST EXCISION N/A 7/23/2020    PILONIDAL CYSTECTOMY performed by Shefali Burgess DO at [facility]  03/05/2010    Recurrent hiatal 05/26/2021    RBC 4.73 05/26/2021    HGB 16.4 05/26/2021    HCT 46.8 05/26/2021    MCV 98.9 05/26/2021    MCH 34.7 05/26/2021    MCHC 35.0 05/26/2021    RDW 12.3 05/26/2021     05/26/2021    MPV 9.7 05/26/2021         Chemistries  Lab Results   Component Value Date     05/26/2021    K 4.6 05/26/2021     05/26/2021    CO2 28 05/26/2021    BUN 15 12/08/2019    CREATININE 1.26 12/08/2019    CALCIUM 9.8 12/08/2019    PROT 7.8 12/08/2019    LABALBU 4.8 12/08/2019    BILITOT 0.34 12/08/2019    ALKPHOS 75 12/08/2019    AST 16 07/20/2020    ALT 17 07/20/2020     Lab Results   Component Value Date    ALKPHOS 75 12/08/2019    ALT 17 07/20/2020    AST 16 07/20/2020    PROT 7.8 12/08/2019    BILITOT 0.34 12/08/2019    BILIDIR 0.11 12/08/2019    LABALBU 4.8 12/08/2019     Lab Results   Component Value Date    BUN 15 12/08/2019    CREATININE 1.26 12/08/2019     Lab Results   Component Value Date    CALCIUM 9.8 12/08/2019    MG 1.8 09/18/2013     Lab Results   Component Value Date    AST 16 07/20/2020    ALT 17 07/20/2020         Lab Results   Component Value Date    CKTOTAL 67 09/19/2013     Lab Results   Component Value Date    [id number] 590 01/25/2019         Drug Levels  Lab Results   Component Value Date    PHENYTOIN <0.8 09/28/2017    PHENYTOIN <0.8 04/04/2016    VALPROATE 85 05/26/2021    VALPROATE 34 07/20/2020           Review of Systems   Constitutional: Negative for appetite change, chills, diaphoresis, fatigue, fever, unexpected weight change and weight loss. HENT: Negative for congestion, dental problem, drooling, ear discharge, ear pain, facial swelling, hearing loss, mouth sores, nosebleeds, postnasal drip, rhinorrhea, sinus pressure, sore throat, tinnitus, trouble swallowing and voice change. Eyes: Positive for photophobia. Negative for blurred vision, pain, discharge, redness, itching and visual disturbance.    Respiratory: Negative for apnea, cough, choking, chest tightness, shortness of breath and wheezing. Cardiovascular: Negative for chest pain, palpitations and leg swelling. Gastrointestinal: Positive for nausea. Negative for abdominal distention, abdominal pain, anorexia, blood in stool, change in bowel habit, constipation, diarrhea and vomiting. Endocrine: Negative for cold intolerance, heat intolerance, polydipsia, polyphagia and polyuria. Musculoskeletal: Positive for gait problem. Negative for arthralgias, back pain, joint swelling, myalgias, neck pain and neck stiffness. Skin: Negative for color change, pallor, rash and wound. Allergic/Immunologic: Negative for environmental allergies, food allergies and immunocompromised state. Neurological: Positive for tingling, seizures, light-headedness and headaches. Negative for dizziness, vertigo, tremors, syncope, facial asymmetry, speech difficulty, weakness, numbness and loss of balance. Hematological: Negative for adenopathy. Does not bruise/bleed easily. Psychiatric/Behavioral: Positive for decreased concentration and sleep disturbance. Negative for agitation, behavioral problems, confusion, dysphoric mood, hallucinations, self-injury and suicidal ideas. The patient is nervous/anxious and has insomnia. The patient is not hyperactive. Objective:       Physical Exam  Constitutional:       Appearance: He is well-developed. HENT:      Head: Normocephalic and atraumatic. No raccoon eyes or Vaughn's sign. Right Ear: External ear normal.      Left Ear: External ear normal.      Nose: Nose normal.   Eyes:      Conjunctiva/sclera: Conjunctivae normal.      Pupils: Pupils are equal, round, and reactive to light. Neck:      Thyroid: No thyroid mass or thyromegaly. Vascular: No carotid bruit. Trachea: No tracheal deviation. Meningeal: Brudzinski's sign and Kernig's sign absent. Cardiovascular:      Rate and Rhythm: Normal rate and regular rhythm.    Pulmonary:      Effort: Pulmonary effort is normal. Musculoskeletal:         General: No tenderness. Cervical back: Normal range of motion and neck supple. No rigidity. No muscular tenderness. Normal range of motion. Skin:     General: Skin is warm. Coloration: Skin is not pale. Findings: No erythema or rash. Nails: There is no clubbing. Psychiatric:         Attention and Perception: He is attentive. Mood and Affect: Mood is anxious and depressed. Affect is blunt. Affect is not labile or inappropriate. Speech: He is communicative. Speech is not rapid and pressured, delayed, slurred or tangential.         Behavior: Behavior is slowed. Behavior is not agitated, aggressive, withdrawn, hyperactive or combative. Behavior is cooperative. Thought Content: Thought content is not paranoid or delusional. Thought content does not include homicidal or suicidal ideation. Thought content does not include homicidal or suicidal plan. Cognition and Memory: Memory is impaired. He does not exhibit impaired recent memory or impaired remote memory. Judgment: Judgment is not impulsive or inappropriate. NEUROLOGICAL EXAMINATION :    A) MENTAL STATUS:                   Alert and  oriented  To time, place  And  Person. No Aphasia. No  Dysarthria. Able   To  Follow  commands without   Any  Difficulty. No right  To left confusion. Normal  Speech  And language function. Insight and  Judgment ,Fund  Of  Knowledge  DECREASED                Recent  And  Remote memory  DECREASED                Attention &Concentration are  DECREASED                                                    B) CRANIAL NERVES :             2 CN : Visual  Acuity  And  Visual fields  within normal limits                        Fundi  Could  Not  Be  Could  Not  Be  Evaluated.            3,4,6 CN : Both  Pupils are   Reactive and  Equal. Extraocular   Movements  Are  Intact. No  Nystagmus. No  MOO. No  Afferent  Pupillary  Defect noted. 5 CN :  Normal  Facial sensations and Corneal  Reflexes           7 CN :  Normal  Facial  Symmetry  And  Strength. No facial  Weakness. 8 CN :  Hearing  Appears within normal limits          9, 10 CN: Normal spontaneous, reflex palate movements         11 CN:   Normal  Shoulder shrug and  Strength         12 CN :   Normal  Tongue movements and  Tongue  In midline                        No tongue   Fasciculations or atrophy         C) MOTOR  EXAM:                 Strength  In upper  And  Lower extremities   within normal limits               No  Drift. No  Atrophy               Rapid alternating  And  repetitions  Movements  within normal limits                 Muscle  Tone  In upper  And  Lower  Extremities  within normal limits                No rigidity. No  Spasticity. Bradykinesia   absent               No  Asterixis. Sustention  Tremor , Resting  Tremor   absent                    No other  Abnormal  Movements noted             D) SENSORY :               light touch, pinprick, position  And  Vibration DECREASED   IN   HANDS      E) REFLEXES:                   Deep  Tendon  Reflexes within normal limits                    No pathological  Reflexes  Bilaterally.                                   F) COORDINATION  AND  GAIT :                                Station and  Gait  within normal limits                                        Romberg's test negative                          Ataxia negative            Assessment:     Patient Active Problem List   Diagnosis    Low back pain with sciatica    Migraine    Anxiety, mild    Disturbance, sleep    Tobacco use    GERD (gastroesophageal reflux disease)    Hiatal hernia    Head ache    Status migrainosus    Dizziness    Depression    H/O fall    Head injury    Seizure disorder ([address]    Astigmatism    Rectal bleed    Cervical radiculopathy    Ventricular premature beats    Nausea and vomiting    Colon polyps    TIA (transient ischemic attack)    VBI (vertebrobasilar insufficiency)    Pilonidal cyst    Wound dehiscence    Sleep difficulties    Partial idiopathic epilepsy with seizures of localized onset, not intractable, without status epilepticus ([address]    Convulsions ([address]    Recurrent seizures ([address]           CT OF THE HEAD WITHOUT CONTRAST; CT OF THE CERVICAL SPINE WITHOUT CONTRAST   11/15/2019 10:21 am       TECHNIQUE:   CT of the head was performed without the administration of intravenous   contrast. Dose modulation, iterative reconstruction, and/or weight based   adjustment of the mA/kV was utilized to reduce the radiation dose to as low   as reasonably achievable.; CT of the cervical spine was performed without the   administration of intravenous contrast. Multiplanar reformatted images are   provided for review. Dose modulation, iterative reconstruction, and/or weight   based adjustment of the mA/kV was utilized to reduce the radiation dose to as   low as reasonably achievable.       COMPARISON:   01/25/2019       HISTORY:   ORDERING SYSTEM PROVIDED HISTORY: Status migrainosus   TECHNOLOGIST PROVIDED HISTORY:   migraine, seizures   Reason for Exam: Hx chronic migraines.  C/o increase headaches since recent   falls   Acuity: Acute   Type of Exam: Initial       FINDINGS:   BRAIN/VENTRICLES: There is no acute intracranial hemorrhage, mass effect or   midline shift.  No abnormal extra-axial fluid collection.  The gray-white   differentiation is maintained without evidence of an acute infarct.  There is   no evidence of hydrocephalus.       ORBITS: The visualized portion of the orbits demonstrate no acute abnormality.       SINUSES: The visualized paranasal sinuses and mastoid air cells demonstrate   no acute abnormality.       SOFT TISSUES/SKULL:  No acute abnormality of the visualized skull or soft   tissues.       CERVICAL SPINE: Cervical spine maintains its normal lordotic curvature. There are subtle degenerative changes C6-C7.  No acute fracture or   malalignment.  Vertebral body heights and intervertebral disc spaces are   preserved.  Overlying soft tissues are unremarkable.  Visualized lung apices   are clear.           Impression   No acute intracranial abnormality.       No acute osseus abnormality of the cervical spine. Procedure: [facility] 11/13/2014 [id number] CT BRAIN WITHOUT CONTRAST    Reason for Exam: \      FULL RESULT: EXAM: CT Head without Contrast - 11/13/2014 7:27 PM      HISTORY: Syncope/Near-Syncope. Seizures. COMPARISON: 10/8/14       TECHNIQUE: Contiguous axial CT images were obtained from the skull base    to the vertex and reviewed in soft tissue, subdural, bone, and brain    windows. FINDINGS: The ventricular system is normal in size and configuration. There are no intra-axial or extra-axial fluid collections or mass    lesions. No acute intracranial hemorrhage or midline shift. Visualized    paranasal sinuses and mastoid air cells are clear. No acute calvarial    fracture is identified. The brainstem and the relationship of the    craniocervical junction structures are normal. There is incomplete fusion    of the posterior elements of C1, stable from prior study and compatible    with a normal variant. Visualized orbits and globes are intact. IMPRESSION:    No evidence for acute intracranial pathology. No bleed, mass effect, or    midline shift.               Procedure: [facility] 06/02/2014 [id number] MRI BRAIN COMBINED    Reason for Exam: \      FULL RESULT: MRI brain with and without intravenous contrast, 6/2/2014      History: Migraines      Technique: Multiplanar multisequence MR imaging of the brain was    performed before and after intravenous administration of 16 mL Magnevist.      Findings: No evidence for shift of midline structures, mass effect or    compression of the ventricles noted. No acute intra-or extra-axial    hemorrhage is seen. No abnormal intracranial fluid collections are    identified. The basal cisterns are patent. Posterior fossa structures demonstrate no    discrete mass. Few scattered foci of T2/FLAIR hyperintensity noted in the    periventricular and subcortical white matter which essentially are    nonspecific in imaging appearance however differential considerations    include demyelinating or inflammatory process, migraine induced changes,    chronic small vessel disease or vascular disease. No associated restricted diffusion or abnormal enhancing seen. A small    left occipital lobe developmental venous anomaly seen. No abnormal    enhancing intracranial mass seen. Visualized orbital contents appear unremarkable. Mild mucosal thickening of the ethmoid air cells noted. Skull base flow voids are patent. Superior sagittal sinus and straight sinus flow voids are patent. Heterogeneity of the T1-weighted marrow signal intensity seen. IMPRESSION:    1. No acute or subacute ischemic insult noted. No abnormal enhancing    intracranial mass seen. 2.scattered foci of T2/FLAIR hyperintensity noted in the periventricular    and subcortical white matter which essentially are nonspecific in imaging    appearance however differential considerations include demyelinating or    inflammatory process, migraine induced changes, chronic small vessel    disease or vascular disease. Procedure: [facility] 10/08/2014 [id number] CT BRAIN WITHOUT CONTRAST    Reason for Exam: \      FULL RESULT: EXAM: Brain CT without contrast dated 10/8/2014. HISTORY: Headache after head injury. COMPARISON: Brain CT dated 4/16/2014. TECHNIQUE: Multi-detector axial images are obtained through the head. Findings:  There is no evidence for acute intracranial hemorrhage, midline shift or mass effect. Ventricular and cistern spaces are normal    and symmetric. Isaiah Force and white matter differentiation is well preserved. No intra- or extra-axial fluid collections or mass occupying lesions    seen. Bilateral cerebellopontine angles are normal. Visualized orbital    contents are normal. Study viewed in bone windows shows no    abnormalities. Mild left ethmoid sinusitis otherwise all visualized    sinuses are normally aerated. Bilateral temporomandibular joints are    normally aligned. IMPRESSION: No acute intracranial abnormalities. Mild left ethmoid    sinusitis. Plan:           VISITING DIAGNOSIS:      ICD-10-CM    1. Partial idiopathic epilepsy with seizures of localized onset, not intractable, without status epilepticus ([facility])  G40.009 Valproic Acid Level, Total     AST     ALT     Lamotrigine Level   2. Migraine without aura and without status migrainosus, not intractable  G43.009    3. Cervical radiculopathy  M54.12    4. Seizure disorder ([facility])  G40.909 lamoTRIgine (LAMICTAL) 100 MG tablet   5. H/O fall  Z91.81    6. Dizziness  R42    7. Disturbance, sleep  G47.9    8. Anxiety, mild  F41.9    9. Injury of head, sequela  S09.90XS    10. H/O transient ischemic attack involving anterior circulation  Z86.73    11. Dysthymia  F34.1    12. Chronic migraine without aura without status migrainosus, not intractable  G43.709    13. Sleep difficulties  G47.9                 CONCERNS   &   INCREASED   RISK   FOR          * TIA,  CEREBRO  VASCULAR  ISCHEMIA, STROKE       *  SEIZURE  ACTIVITY,  EPILEPSY ,        *  Chronic  Headaches &  Migraines      *   COGNITIVE  &   MEMORY PROBLEMS  AND  DEMENTIAS                     VARIOUS  RISK   FACTORS   WERE  REVIEWED   AND   DISCUSSED. *  PATIENT   HAS  MULTIPLE   MEDICAL, MENTAL HEALTH         &      NEUROLOGICAL   PROBLEMS . PATIENT'S   MANAGEMENT  IS  CHALLENGING.             PLAN:       Dwight Courtney  Of  The Diagnoses,  The  Management & Treatment  Options           AND    Care  plan  Were        Reviewed and   Discussed   With  patient. * Goals  And  Expectations  Of  The  Therapy  Discussed   And  Reviewed. *   Benefits   And   Side  Effect  Profile  Of  Medication/s   Were   Discussed             * Need   For  Further   Follow up For  The  Various  Problems  Were discussed. * Results  Of  The  Previous  Diagnostic tests were reviewed and questions answered. patient  understand the same. Medical  Decision  Making  Was  Made  Based on the   Complexity  Of  Above  Mentioned  Diagnoses,        Data reviewed   & diagnostic  Tests  Reviewed,  Risk  Of  Significant   Co morbidities  & complicating   Factors. Medical  Decision  Was   High  Complexity  Due   To  The  Patient's  Multiple  Symptoms,  ,      Complex  Treatment  Regimen,  Multiple medications and   Risk  Of   Side  Effects,      Difficulty  In  Medication  Management  And  Diagnostic  Challenges   In  View  Of  The  Associated       Co  Morbid  Conditions   And  Problems. * FALL   PRECAUTIONS. THESE  REVIEWED   AND  DISCUSSED      *   ABSOLUTELY   NO  DRIVING    -       REVIEWED     WITH  PATIENT      *   BE  CAREFUL  WITH  ACTIVITIES             *   ADEQUATE   FLUID  INTAKE   AND  ELECTROLYTE  BALANCE             * KEEP  DAIRY  OF   THE  NEUROLOGICAL  SYMPTOMS        RECORDING THE    DURATION  AND  FREQUENCY. *  AVOID    CONDITIONS  AND  FACTORS   THAT  MAKE   NEUROLOGICAL  SYMPTOMS  WORSE. *   SEIZURE  PRECAUTIONS.  -   DISCUSSED                  A)  Avoid  Working  At   [employer] B)  Avoid  Working  With  Heavy machinery  . C)  Avoid   Swimming,  Climbing  A  Ladder   Unattended. D)  Avoid   Driving   If  You   Have  A  Seizure. E)  Must   Be  Seizure  Free   For  At   Least   6 months,  Before   You  Can drive. F) Some times  Your  May  Feel  Seizure coming  Before  It  Begins. You  May feel               Strange smell or funny  Feeling  In  Your  Stomach,  Which is  Called   Aura. TIPS  TO  REDUCE/ PREVENT  SEIZURES         1. Take  Your  Anti seizure  Medications   As   Recommended. 2. Get   Enough   Sleep. Sleep  Deprivation   Can  Trigger  A  Seizure. 3. If   You   Have  A fever,  Treat  It  At  Once,  And  Contact   Your  Primary  Care Providers. 4. Avoid   Alcohol. 5. Avoid  Flashing  Lights,  Loud  Noises and  TV  And  Video  Games,             As   These  May  Trigger   Your  Seizures       6. Control  Your  Stress  And   Have  Adequate  Rest.       7.   If  You  Feel  A  Seizure  Coming   On :             A) warn people  Who  Are  With  You             B)  Make  Sure  There  Are  No  Sharp or  Hard  Objects  Around you. C)  Lay down  On  Your  Side  And  Relax. *  TO  MAINTAIN  REGULAR  SLEEP  WAKE  CYCLES. *   TO  HAVE  ADEQUATE  REST  AND   SLEEP    HOURS.          *    AVOID  ANY USAGE OF                   TOBACCO,  EXCESSIVE  ALCOHOL  AND   ILLEGAL   SUBSTANCES          *  CONTINUE MEDICATIONS PRESCRIBED BY NEUROLOGIST AS    RECOMMENDED     *   Compliance   With  Medications   And  Instructions          * CURRENTLY  TOLERATING  THE  PRESCRIBED   MEDICATIONS. WITHOUT  ANY  SIGNIFICANT  SIDE  EFFECTS   &  GETTING BENEFIT.           *  May   Use  Pill  Box,    If  Needed      *  MEDICATIONS TO AVOID:    WELLBUTRIN,  ULTRAM          *     Antiplatelet  therapy    As   Recommended  Was   Discussed          *    Prophylactic  Use   Of     Vitamin   B   Complex,  Folic  Acid,    Vitamin  B12        Multivitamin Tablet  Daily    Supplementations   Over  The  Counter  Discussed           *  PATIENT  IS  ALSO   COUNSELED   TO  KEEP    ACTIVITIES         A)   SIMPLE      B)  ORGANIZED      C)  WRITE   DOWN                     *  EVALUATIONS  AND  FOLLOW UP:                                          * CARDIOLOGY               * EPILEPSY  MONITORING   UNIT                                 *    PREVIOUS     H/O    PARTIAL  COMPLEX       SEIZURES                          DUE  TO  SLEEP  DEPRIVATION     AND  PROLONGED  TV   WATCHING                                *     PATIENT   RECOMMENDED  :                        A)    TO  CONTINUE     SEIZURE  PRECAUTIONS                       B)     TO  CONTINUE        LAMICTAL   AND  DEPAKOTE  AS  BEFORE                        C)   PERIODIC    ANTI  EPILEPTIC  MEDICATION  LEVELS                                  EVERY    3   MONTHS                         VARIOUS  RISK   FACTORS   WERE  REVIEWED   AND   DISCUSSED. Orders Placed This Encounter   Procedures    Valproic Acid Level, Total    AST    ALT    Lamotrigine Level       Orders Placed This Encounter   Medications    lamoTRIgine (LAMICTAL) 100 MG tablet     Si TABLET DAILY AT BEDTIME     Dispense:  30 tablet     Refill:  2                   *PATIENT   TO  FOLLOW  UP  WITH   PRIMARY  CARE   AND   OTHER  CONSULTANTS  AS  BEFORE.           *TO  FOLLOW  WITH   MENTAL  HEALTH  PROFESSIONALS ,  INCLUDING            PSYCHOLOGICAL  COUNSELING   AND  PSYCHIATRIC  EVALUTIONS            *  Maintain   Healthy  Life Style    With   Periodic  Monitoring  Of      Any  Medical  Conditions  Including   Elevated  Blood  Pressure,  Lipid  Profile,     Blood  Sugar levels  And   Heart  Disease. *   Period   Screening  For  Cancers  Involving  Breast,  Colon,    Prostate, lungs  And  Other  Organs  As  Applicable,  In consultation   With  Your  Primary Care Providers.               * Second  Neurological  Opinion  And Evaluations  In  [facility]  Setting  If  Patient  Is  Interested. *  If  The  Patient remains  Neurologically  Stable   Return   To  [facility] Neurology Department       IN    3 - 4       MONTHS  TIME   FOR  FURTHER  FOLLOW UP.                   *  If   There is  Any  Significant  Worsening   Of  Current  Symptoms  And      Or  If patient  Develops   Any additional  New  Neurological  Symptoms  Or  Significant  Concerns       Should  Call  911 or  Go  To  Emergency  Department  For  Further  Immediate  Evaluation. *   The  Neurological   Findings,  Possible  Diagnosis,  Differential diagnoses   And  Options      For    Further   Investigations   And  management   Are  Discussed  Comprehensively. Medications   And  Prescription   Risks  And  Side effects  Are   Also  Discussed. The  Above  Were  Reviewed  With  patient and       questions  Answered  In  Detail. More   Than   50% of face  To face Time   Was  Spent  On  Counseling   And   Coordination  Of  Care       Of   Patient's multiple   Neurological  Problems   And   Comorbid  Medical   Conditions. Electronically signed by Jacqueline Bonds MD.,  [facility]       Board Certified in  Neurology &  In  [employer] and Neu[employer] (ABPN)      DISCLAIMER:   Although every effort was made to ensure the accuracy of this  electronic transcription, some errors in transcription may have occurred. GENERAL PATIENT INSTRUCTIONS:     A Healthy Lifestyle: Care Instructions  Your Care Instructions  A healthy lifestyle can help you feel good, stay at a healthy weight, and have plenty of energy for both work and play. A healthy lifestyle is something you can share with your whole family. A healthy lifestyle also can lower your risk for serious health problems, such as high blood pressure, heart disease, and diabetes.   You can follow a few steps listed below to improve your health and the health of your family. Follow-up care is a key part of your treatment and safety. Be sure to make and go to all appointments, and call your doctor if you are having problems. Its also a good idea to know your test results and keep a list of the medicines you take. How can you care for yourself at home? Do not eat too much sugar, fat, or fast foods. You can still have dessert and treats now and then. The goal is moderation. Start small to improve your eating habits. Pay attention to portion sizes, drink less juice and soda pop, and eat more fruits and vegetables. Eat a healthy amount of food. A 3-ounce serving of meat, for example, is about the size of a deck of cards. Fill the rest of your plate with vegetables and whole grains. Limit the amount of soda and sports drinks you have every day. Drink more water when you are thirsty. Eat at least 5 servings of fruits and vegetables every day. It may seem like a lot, but it is not hard to reach this goal. A serving or helping is 1 piece of fruit, 1 cup of vegetables, or 2 cups of leafy, raw vegetables. Have an apple or some carrot sticks as an afternoon snack instead of a candy bar. Try to have fruits and/or vegetables at every meal.  Make exercise part of your daily routine. You may want to start with simple activities, such as walking, bicycling, or slow swimming. Try to be active 30 to 60 minutes every day. You do not need to do all 30 to 60 minutes all at once. For example, you can exercise 3 times a day for 10 or 20 minutes. Moderate exercise is safe for most people, but it is always a good idea to talk to your doctor before starting an exercise program.  Keep moving. Jarek Kinzers the lawn, work in the garden, or [employer]. Take the stairs instead of the elevator at work. If you smoke, quit. People who smoke have an increased risk for heart attack, stroke, cancer, and other lung illnesses.  Quitting is hard, but there are ways to boost your chance of quitting tobacco for good. Use nicotine gum, patches, or lozenges. Ask your doctor about stop-smoking programs and medicines. Keep trying. In addition to reducing your risk of diseases in the future, you will notice some benefits soon after you stop using tobacco. If you have shortness of breath or asthma symptoms, they will likely get better within a few weeks after you quit. Limit how much alcohol you drink. Moderate amounts of alcohol (up to 2 drinks a day for men, 1 drink a day for women) are okay. But drinking too much can lead to liver problems, high blood pressure, and other health problems. Family health  If you have a family, there are many things you can do together to improve your health. Eat meals together as a family as often as possible. Eat healthy foods. This includes fruits, vegetables, lean meats and dairy, and whole grains. Include your family in your fitness plan. Most people think of activities such as jogging or tennis as the way to fitness, but there are many ways you and your family can be more active. Anything that makes you breathe hard and gets your heart pumping is exercise. Here are some tips:  Walk to do errands or to take your child to school or the bus. Go for a family bike ride after dinner instead of watching TV. Where can you learn more? Go to https://[employer].[email or web address]. org and sign in to your [employer] account. Enter F768 in the [facility] box to learn more about \"A Healthy Lifestyle: Care Instructions. \"     If you do not have an account, please click on the \"Sign Up Now\" link. Current as of: July 26, 2016  Content Version: 11.2  © 6125-5433 [employer]. Care instructions adapted under license by [facility] ([facility]).  If you have questions about a medical condition or this instruction, always ask your healthcare professional. [address]  any warranty or liability for your use of this information.

## 2022-03-29 ENCOUNTER — OFFICE VISIT (OUTPATIENT)
Dept: CARDIOLOGY | Age: 42
End: 2022-03-29
Payer: MEDICARE

## 2022-03-29 VITALS
BODY MASS INDEX: 30.25 KG/M2 | HEIGHT: 66 IN | HEART RATE: 68 BPM | WEIGHT: 188.2 LBS | DIASTOLIC BLOOD PRESSURE: 81 MMHG | SYSTOLIC BLOOD PRESSURE: 120 MMHG

## 2022-03-29 DIAGNOSIS — I20.9 TYPICAL ANGINA (HCC): Primary | ICD-10-CM

## 2022-03-29 PROCEDURE — G8483 FLU IMM NO ADMIN DOC REA: HCPCS | Performed by: INTERNAL MEDICINE

## 2022-03-29 PROCEDURE — 99214 OFFICE O/P EST MOD 30 MIN: CPT | Performed by: INTERNAL MEDICINE

## 2022-03-29 PROCEDURE — 93005 ELECTROCARDIOGRAM TRACING: CPT | Performed by: INTERNAL MEDICINE

## 2022-03-29 PROCEDURE — G8427 DOCREV CUR MEDS BY ELIG CLIN: HCPCS | Performed by: INTERNAL MEDICINE

## 2022-03-29 PROCEDURE — 93010 ELECTROCARDIOGRAM REPORT: CPT | Performed by: INTERNAL MEDICINE

## 2022-03-29 PROCEDURE — 99213 OFFICE O/P EST LOW 20 MIN: CPT | Performed by: INTERNAL MEDICINE

## 2022-03-29 PROCEDURE — G8417 CALC BMI ABV UP PARAM F/U: HCPCS | Performed by: INTERNAL MEDICINE

## 2022-03-29 PROCEDURE — 4004F PT TOBACCO SCREEN RCVD TLK: CPT | Performed by: INTERNAL MEDICINE

## 2022-03-29 NOTE — PROGRESS NOTES
Cardiology Consultation  [facility]      03/29/22      CC: Former [facility] Patient    HPI:  Seth Bach  is here for routine follow-up. He underwent explantation of loop recorder by Dr. Cori Mclain. Doing well from cardiac standpoint. Denies any episodes of anginal chest pain, no dyspnea on exertion, no orthopnea or lower extremity edema. Has been following with neurologist.  Blood pressure is well controlled. Past Medical History:   Diagnosis Date    Anxiety, mild     Colon polyps     Depression     Dizziness     Eczema     Epilepsy ([address]     GERD (gastroesophageal reflux disease)     H/O fall     Head ache     Head injury     Hiatal hernia     Inguinal hernia     Right.  Low back pain     Lumbar sprain     Migraine     Plantar fasciitis, bilateral     Seizure disorder ([address]     Sigmoid diverticulosis     Sleep difficulties     Status migrainosus     TIA (transient ischemic attack) 09/2017    Tobacco use     Chronic. Past Surgical History:   Procedure Laterality Date    COLONOSCOPY  04/29/2011    Internal hemorrhoids, possible anal fissure, few scattered diverticula.  COLONOSCOPY  06/04/2010    Mild sigmoid diverticulosis.  COLONOSCOPY  08/28/2009    Sigmoid diverticulosis, internal hemorrhoids.  COLONOSCOPY  05/27/2008    Internal hemorrhoids.     COLONOSCOPY  11/19/2014    polyp in rectum    COLONOSCOPY  6/1/16    sigmoid diverticulosis, colon polyp, internal hemorrhoids    COLONOSCOPY  04/19/2019    [id number]    GASTRIC FUNDOPLICATION  54/43/6861    HERNIA REPAIR Left 11/30/2017    Left Inguinal Hernia Repair w/ Mesh performed by [facility]idro [facility] Right 05/30/2013    INGUINAL HERNIA REPAIR Left 01/24/2013    INGUINAL HERNIA REPAIR Right 09/24/2015    KNEE ARTHROSCOPY Left     PILONIDAL CYST EXCISION N/A 7/23/2020    PILONIDAL CYSTECTOMY performed by Alice Blair DO at [facility]    UPPER GASTROINTESTINAL ENDOSCOPY  2010    Recurrent hiatal hernia.  UPPER GASTROINTESTINAL ENDOSCOPY  16    S/P brenda fundoplication, good repair, retained gastric fluid    VASECTOMY         Family History   Problem Relation Age of Onset    COPD Mother     High Blood Pressure Mother    Martha Marino Cancer Father         Hodgekin's Lymphoma    High Blood Pressure Father     Eczema Sister     Alzheimer's Disease Maternal Grandmother     COPD Maternal Grandmother     Cancer Maternal Grandmother     Cancer Paternal Grandmother     Cancer Paternal Grandfather     Cancer Sister         colon cancer    High Blood Pressure Sister     Arthritis Sister     Asthma Daughter     Eczema Daughter     Glaucoma Neg Hx     Diabetes Neg Hx     Cataracts Neg Hx        Social History     Socioeconomic History    Marital status:      Spouse name: None    Number of children: None    Years of education: None    Highest education level: None   Occupational History    None   Tobacco Use    Smoking status: Current Every Day Smoker     Packs/day: 1.00     Years: 20.00     Pack years: 20.00     Types: Cigarettes     Last attempt to quit: 2016     Years since quittin.9    Smokeless tobacco: Never Used    Tobacco comment: 2-3 weekly   Vaping Use    Vaping Use: Never used   Substance and Sexual Activity    Alcohol use: No     Alcohol/week: 0.0 standard drinks     Comment: rarely    Drug use: No    Sexual activity: None   Other Topics Concern    None   Social History Narrative    None     Social Determinants of Health     Financial Resource Strain:     Difficulty of Paying Living Expenses: Not on file   Food Insecurity:     Worried About Running Out of Food in the Last Year: Not on file    Garcia of Food in the Last Year: Not on file   Transportation Needs:     Lack of Transportation (Medical): Not on file    Lack of Transportation (Non-Medical):  Not on file   Physical Activity:     Days of Exercise per Week: Not on file    Minutes of Exercise per Session: Not on file   Stress:     Feeling of Stress : Not on file   Social Connections:     Frequency of Communication with Friends and Family: Not on file    Frequency of Social Gatherings with Friends and Family: Not on file    Attends Restorationism Services: Not on file    Active Member of [address] [place] [address] or Organizations: Not on file    Attends Club or Organization Meetings: Not on file    Marital Status: Not on file   Intimate Partner Violence:     Fear of Current or Ex-Partner: Not on file    Emotionally Abused: Not on file    Physically Abused: Not on file    Sexually Abused: Not on file   Housing Stability:     Unable to Pay for Housing in the Last Year: Not on file    Number of [place] in the Last Year: Not on file    Unstable Housing in the Last Year: Not on file        No Known Allergies    Current Outpatient Medications   Medication Sig Dispense Refill    lamoTRIgine (LAMICTAL) 100 MG tablet 1 TABLET DAILY AT BEDTIME 30 tablet 2    meloxicam (MOBIC) 15 MG tablet take 1 tablet by mouth once daily 30 tablet 1    divalproex (DEPAKOTE) 250 MG DR tablet take 3 tablets by mouth twice a day 180 tablet 5    promethazine (PHENERGAN) 25 MG tablet take 1 tablet by mouth every 8 hours if needed for nausea 60 tablet 2    SUMAtriptan (IMITREX) 50 MG tablet take 1 tablet by mouth once daily if needed for MIGRAINE 30 tablet 2    lisinopril (PRINIVIL;ZESTRIL) 20 MG tablet take 1 tablet by mouth daily       No current facility-administered medications for this visit.         Patient Active Problem List   Diagnosis    Low back pain with sciatica    Migraine    Anxiety, mild    Disturbance, sleep    Tobacco use    GERD (gastroesophageal reflux disease)    Hiatal hernia    Head ache    Status migrainosus    Dizziness    Depression    H/O fall    Head injury    Seizure disorder (HCC)    Astigmatism    Rectal bleed    Cervical radiculopathy    Ventricular premature beats    Nausea and vomiting    Colon polyps    TIA (transient ischemic attack)    VBI (vertebrobasilar insufficiency)    Pilonidal cyst    Wound dehiscence    Sleep difficulties    Partial idiopathic epilepsy with seizures of localized onset, not intractable, without status epilepticus ([address]    Convulsions ([address]    Recurrent seizures ([address]           REVIEW OF SYSTEMS:    · Constitutional: there has been no unanticipated weight loss. There's been No change in energy level, No change in activity level. · Eyes: No visual changes or diplopia. No scleral icterus. · ENT: No Headaches, hearing loss or vertigo. No mouth sores or sore throat. · Cardiovascular: per hpi  · Respiratory: per hpi  · Gastrointestinal: No abdominal pain, appetite loss, blood in stools. No change in bowel or bladder habits. · Genitourinary: No dysuria, trouble voiding, or hematuria. · Musculoskeletal:  No gait disturbance, No weakness or joint complaints. · Integumentary: No rash or pruritis. · Neurological: Positive for seizure disorder and CVA, stage  · Psychiatric: No new anxiety or depression. · Endocrine: No temperature intolerance. No excessive thirst, fluid intake, or urination. No tremor. · Hematologic/Lymphatic: No abnormal bruising or bleeding, blood clots or swollen lymph nodes. · Allergic/Immunologic: No nasal congestion or hives. Physical Exam:   Vitals: /81   Pulse 68   Ht 5' 6\" (1.676 m)   Wt 188 lb 3.2 oz (85.4 kg)   BMI 30.38 kg/m²   General appearance: alert and cooperative with exam  HEENT: Head: Normocephalic, no lesions, without obvious abnormality.   Eyes: PERRL, EOMI  Ears: Not obvious deformations or lack of hearing  Neck: no carotid bruit, no JVD  Lungs: clear to auscultation bilaterally  Heart: regular rate and rhythm, S1, S2 normal, no murmur, click, rub or gallop  Abdomen: soft, non-tender; bowel sounds normal; no masses,  no organomegaly  Extremities: extremities normal, atraumatic, no cyanosis or edema  Neurologic: Mental status: Alert, oriented, thought content appropriate  Skin: WNL for age and condition, no obvious rashes or leasions      EKG 3/29/2022: Normal sinus rhythm, normal ECG    LAST ECHO: 8/11/21  Normal LV size and function. Ejection fraction is 60%. No diastolic dysfunction. Normal LA and right cavities size. Normal function of all valves. No evidence of pericardial effusion. LAST STRESS: 8/11/21  Negative perfusion scan  EF 71%      Past Medical and Surgical History, Problem List, Allergies, Medications, Labs, Imaging, all reviewed extensively in EMR and with the patient. Assessment and Plan:    1. Hypertension, well controlled, continue lisinopril. Low-salt diet and exercise emphasized. 2. Normal echocardiogram and myocardial perfusion scan (08/2021)  3. Hx of CVA with depleted loop recorder s/p explantation. No focal neurological deficits. Has not been on aspirin or statin. I advised him to discuss this with his neurologist.  4. Seizure disorder  5. Chronic anxiety  6. Tobacco abuse, extensively counseled regarding importance of smoking cessation, various pharmacological and nonpharmacological strategies discussed. He will think about it. Routine follow-up in cardiology office on annual basis    Thank you for allowing me to participate in the care of this patient, please do not hesitate to call if you have any questions. Sariah Dang MD [address] [facility]  [facility] [employer]  [phone]

## 2022-03-31 RX ORDER — MELOXICAM 15 MG/1
TABLET ORAL
Qty: 30 TABLET | Refills: 1 | Status: SHIPPED | OUTPATIENT
Start: 2022-03-31 | End: 2022-07-01

## 2022-05-17 ENCOUNTER — OFFICE VISIT (OUTPATIENT)
Dept: FAMILY MEDICINE CLINIC | Age: 42
End: 2022-05-17
Payer: MEDICARE

## 2022-05-17 VITALS
HEIGHT: 66 IN | BODY MASS INDEX: 30.05 KG/M2 | SYSTOLIC BLOOD PRESSURE: 118 MMHG | WEIGHT: 187 LBS | HEART RATE: 68 BPM | DIASTOLIC BLOOD PRESSURE: 64 MMHG

## 2022-05-17 DIAGNOSIS — M77.8 SUBACROMIAL TENDONITIS OF LEFT SHOULDER: ICD-10-CM

## 2022-05-17 DIAGNOSIS — R42 DIZZINESS: Primary | ICD-10-CM

## 2022-05-17 PROCEDURE — G8417 CALC BMI ABV UP PARAM F/U: HCPCS | Performed by: FAMILY MEDICINE

## 2022-05-17 PROCEDURE — G8427 DOCREV CUR MEDS BY ELIG CLIN: HCPCS | Performed by: FAMILY MEDICINE

## 2022-05-17 PROCEDURE — 99213 OFFICE O/P EST LOW 20 MIN: CPT | Performed by: FAMILY MEDICINE

## 2022-05-17 PROCEDURE — 99214 OFFICE O/P EST MOD 30 MIN: CPT | Performed by: FAMILY MEDICINE

## 2022-05-17 PROCEDURE — 4004F PT TOBACCO SCREEN RCVD TLK: CPT | Performed by: FAMILY MEDICINE

## 2022-05-17 ASSESSMENT — PATIENT HEALTH QUESTIONNAIRE - PHQ9
9. THOUGHTS THAT YOU WOULD BE BETTER OFF DEAD, OR OF HURTING YOURSELF: 0
5. POOR APPETITE OR OVEREATING: 0
SUM OF ALL RESPONSES TO PHQ QUESTIONS 1-9: 0
4. FEELING TIRED OR HAVING LITTLE ENERGY: 0
2. FEELING DOWN, DEPRESSED OR HOPELESS: 0
SUM OF ALL RESPONSES TO PHQ9 QUESTIONS 1 & 2: 0
SUM OF ALL RESPONSES TO PHQ QUESTIONS 1-9: 0
7. TROUBLE CONCENTRATING ON THINGS, SUCH AS READING THE NEWSPAPER OR WATCHING TELEVISION: 0
3. TROUBLE FALLING OR STAYING ASLEEP: 0
6. FEELING BAD ABOUT YOURSELF - OR THAT YOU ARE A FAILURE OR HAVE LET YOURSELF OR YOUR FAMILY DOWN: 0
8. MOVING OR SPEAKING SO SLOWLY THAT OTHER PEOPLE COULD HAVE NOTICED. OR THE OPPOSITE, BEING SO FIGETY OR RESTLESS THAT YOU HAVE BEEN MOVING AROUND A LOT MORE THAN USUAL: 0
10. IF YOU CHECKED OFF ANY PROBLEMS, HOW DIFFICULT HAVE THESE PROBLEMS MADE IT FOR YOU TO DO YOUR WORK, TAKE CARE OF THINGS AT HOME, OR GET ALONG WITH OTHER PEOPLE: 0
1. LITTLE INTEREST OR PLEASURE IN DOING THINGS: 0
SUM OF ALL RESPONSES TO PHQ QUESTIONS 1-9: 0
SUM OF ALL RESPONSES TO PHQ QUESTIONS 1-9: 0

## 2022-05-17 ASSESSMENT — ENCOUNTER SYMPTOMS
EYES NEGATIVE: 1
ALLERGIC/IMMUNOLOGIC NEGATIVE: 1
NAUSEA: 1
RESPIRATORY NEGATIVE: 1

## 2022-05-17 NOTE — PATIENT INSTRUCTIONS
other hand, hold your injured arm (palm outward) behind your back by the wrist. Pull your arm up gently to stretch your shoulder. 3. Advanced stretch: Put a towel over your other shoulder. Put the hand of your injured arm behind your back. Now hold the back end of the towel. With the other hand, hold the front end of the towel in front of your body. Pull gently on the front end of the towel. This will bring your hand farther up your back to stretch your shoulder. Overhead stretch    1. Standing about an arm's length away, grasp onto a solid surface. You could use a countertop, a doorknob, or the back of a sturdy chair. 2. With your knees slightly bent, bend forward with your arms straight. Lower your upper body, and let your shoulders stretch. 3. As your shoulders are able to stretch farther, you may need to take a step or two backward. 4. Hold for at least 15 to 30 seconds. Then stand up and relax. If you had stepped back during your stretch, step forward so you can keep your hands on the solid surface. 5. Repeat 2 to 4 times. Shoulder flexion (lying down)    To make a wand for this exercise, use a piece of PVC pipe or a broom handlewith the broom removed. Make the wand about a foot wider than your shoulders. 1. Lie on your back, holding a wand with both hands. Your palms should face down as you hold the wand. 2. Keeping your elbows straight, slowly raise your arms over your head. Raise them until you feel a stretch in your shoulders, upper back, and chest.  3. Hold for 15 to 30 seconds. 4. Repeat 2 to 4 times. Shoulder rotation (lying down)    To make a wand for this exercise, use a piece of PVC pipe or a broom handlewith the broom removed. Make the wand about a foot wider than your shoulders. 1. Lie on your back. Hold a wand with both hands with your elbows bent and palms up. 2. Keep your elbows close to your body, and move the wand across your body toward the sore arm.   3. Hold for 8 to 12 seconds. 4. Repeat 2 to 4 times. Wall climbing (to the side)    Avoid any movement that is straight to your side, and be careful not to archyour back. Your arm should stay about 30 degrees to the front of your side. 1. Stand with your side to a wall so that your fingers can just touch it at an angle about 30 degrees toward the front of your body. 2. Walk the fingers of your injured arm up the wall as high as pain permits. Try not to shrug your shoulder up toward your ear as you move your arm up. 3. Hold that position for a count of at least 15 to 20.  4. Walk your fingers back down to the starting position. 5. Repeat at least 2 to 4 times. Try to reach higher each time. Wall climbing (to the front)    During this stretching exercise, be careful not to arch your back. 1. Face a wall, and stand so your fingers can just touch it. 2. Keeping your shoulder down, walk the fingers of your injured arm up the wall as high as pain permits. (Don't shrug your shoulder up toward your ear.)  3. Hold your arm in that position for at least 15 to 30 seconds. 4. Slowly walk your fingers back down to where you started. 5. Repeat at least 2 to 4 times. Try to reach higher each time. Shoulder blade squeeze    1. Stand with your arms at your sides, and squeeze your shoulder blades together. Do not raise your shoulders up as you squeeze. 2. Hold 6 seconds. 3. Repeat 8 to 12 times. Scapular exercise: Arm reach    1. Lie flat on your back. This exercise is a very slight motion that starts with your arms raised (elbows straight, arms straight). 2. From this position, reach higher toward the anna or ceiling. Keep your elbows straight. All motion should be from your shoulder blade only. 3. Relax your arms back to where you started. 4. Repeat 8 to 12 times. Arm raise to the side    During this strengthening exercise, your arm should stay about 30 degrees tothe front of your side.   1. Slowly raise your injured arm to the side, with your thumb facing up. Raise your arm 60 degrees at the most (shoulder level is 90 degrees). 2. Hold the position for 3 to 5 seconds. Then lower your arm back to your side. If you need to, bring your \"good\" arm across your body and place it under the elbow as you lower your injured arm. Use your good arm to keep your injured arm from dropping down too fast.  3. Repeat 8 to 12 times. 4. When you first start out, don't hold any extra weight in your hand. As you get stronger, you may use a 1-pound to 2-pound dumbbell or a small can of food. Shoulder flexor and extensor exercise    These are isometric exercises. That means you contract your muscles withoutactually moving. 1. Push forward (flex): Stand facing a wall or doorjamb, about 6 inches or less back. Hold your injured arm against your body. Make a closed fist with your thumb on top. Then gently push your hand forward into the wall with about 25% to 50% of your strength. Don't let your body move backward as you push. Hold for about 6 seconds. Relax for a few seconds. Repeat 8 to 12 times. 2. Push backward (extend): Stand with your back flat against a wall. Your upper arm should be against the wall, with your elbow bent 90 degrees (your hand straight ahead). Push your elbow gently back against the wall with about 25% to 50% of your strength. Don't let your body move forward as you push. Hold for about 6 seconds. Relax for a few seconds. Repeat 8 to 12 times. Scapular exercise: Wall push-ups    This exercise is best done with your fingers somewhat turned out, rather thanstraight up and down. 1. Stand facing a wall, about 12 inches to 18 inches away. 2. Place your hands on the wall at shoulder height. 3. Slowly bend your elbows and bring your face to the wall. Keep your back and hips straight. 4. Push back to where you started. 5. Repeat 8 to 12 times. 6. When you can do this exercise against a wall comfortably, you can try it against a counter.  You can then slowly progress to the end of a couch, then to a sturdy chair, and finally to the floor. Scapular exercise: Retraction    For this exercise, you will need elastic exercise material, such as surgicaltubing or Thera-Band. 1. Put the band around a solid object at about waist level. (A bedpost will work well.) Each hand should hold an end of the band. 2. With your elbows at your sides and bent to 90 degrees, pull the band back. Your shoulder blades should move toward each other. Then move your arms back where you started. 3. Repeat 8 to 12 times. 4. If you have good range of motion in your shoulders, try this exercise with your arms lifted out to the sides. Keep your elbows at a 90-degree angle. Raise the elastic band up to about shoulder level. Pull the band back to move your shoulder blades toward each other. Then move your arms back where you started. Internal rotator strengthening exercise    1. Start by tying a piece of elastic exercise material to a doorknob. You can use surgical tubing or Thera-Band. 2. Stand or sit with your shoulder relaxed and your elbow bent 90 degrees. Your upper arm should rest comfortably against your side. Squeeze a rolled towel between your elbow and your body for comfort. This will help keep your arm at your side. 3. Hold one end of the elastic band in the hand of the painful arm. 4. Slowly rotate your forearm toward your body until it touches your belly. Slowly move it back to where you started. 5. Keep your elbow and upper arm firmly tucked against the towel roll or at your side. 6. Repeat 8 to 12 times. External rotator strengthening exercise    1. Start by tying a piece of elastic exercise material to a doorknob. You can use surgical tubing or Thera-Band. (You may also hold one end of the band in each hand.)  2. Stand or sit with your shoulder relaxed and your elbow bent 90 degrees. Your upper arm should rest comfortably against your side.  Squeeze a rolled towel between your elbow and your body for comfort. This will help keep your arm at your side. 3. Hold one end of the elastic band with the hand of the painful arm. 4. Start with your forearm across your belly. Slowly rotate the forearm out away from your body. Keep your elbow and upper arm tucked against the towel roll or the side of your body until you begin to feel tightness in your shoulder. Slowly move your arm back to where you started. 5. Repeat 8 to 12 times. Follow-up care is a key part of your treatment and safety. Be sure to make and go to all appointments, and call your doctor if you are having problems. It's also a good idea to know your test results and keep alist of the medicines you take. Where can you learn more? Go to https://[employer]. org and sign in to your [employer] account. Enter Avery Cotrés in the [employer] box to learn more about \"Rotator Cuff: Exercises. \"     If you do not have an account, please click on the \"Sign Up Now\" link. Current as of: July 1, 2021               Content Version: 13.2  © 4420-7683 [employer]. Care instructions adapted under license by [facility] ([facility]). If you have questions about a medical condition or this instruction, always ask your healthcare professional. [address] any warranty or liability for your use of this information.

## 2022-05-17 NOTE — PROGRESS NOTES
Subjective:      Patient ID: Renu Perez is a 42 y.o. male. HPI  Acute  Visit for episodic spells of shaking, sweaty, and near fainting. Chronic issue, off and on for years by report. Never with syncope. Has had low bs readings after a seizure in the past by report. Not diabetic. Once a month, fairly rare. Nothing he can relate as a trigger. Sitting watching tv at times. Eating helps. Left shoulder pain in the last 6 weeks. Had a seizure about that time. No significant fall. Might have hit the shoulder. Having issues lifting things. Past Medical History:   Diagnosis Date    Anxiety, mild     Colon polyps     Depression     Dizziness     Eczema     Epilepsy ([address]     GERD (gastroesophageal reflux disease)     H/O fall     Head ache     Head injury     Hiatal hernia     Inguinal hernia     Right.  Low back pain     Lumbar sprain     Migraine     Plantar fasciitis, bilateral     Seizure disorder ([address]     Sigmoid diverticulosis     Sleep difficulties     Status migrainosus     TIA (transient ischemic attack) 09/2017    Tobacco use     Chronic. Past Surgical History:   Procedure Laterality Date    COLONOSCOPY  04/29/2011    Internal hemorrhoids, possible anal fissure, few scattered diverticula.  COLONOSCOPY  06/04/2010    Mild sigmoid diverticulosis.  COLONOSCOPY  08/28/2009    Sigmoid diverticulosis, internal hemorrhoids.  COLONOSCOPY  05/27/2008    Internal hemorrhoids.     COLONOSCOPY  11/19/2014    polyp in rectum    COLONOSCOPY  6/1/16    sigmoid diverticulosis, colon polyp, internal hemorrhoids    COLONOSCOPY  04/19/2019    [id number]    GASTRIC FUNDOPLICATION  53/26/9363    HERNIA REPAIR Left 11/30/2017    Left Inguinal Hernia Repair w/ Mesh performed by Wandy Sams MD at [address] Right 05/30/2013    INGUINAL HERNIA REPAIR Left 01/24/2013    INGUINAL HERNIA REPAIR Right 09/24/2015    KNEE ARTHROSCOPY Left     PILONIDAL CYST EXCISION N/A 7/23/2020    PILONIDAL CYSTECTOMY performed by Ivette Jewell DO at [address] [facility] [place]  03/05/2010    Recurrent hiatal hernia.  UPPER GASTROINTESTINAL ENDOSCOPY  6/1/16    S/P brenda fundoplication, good repair, retained gastric fluid    VASECTOMY       Current Outpatient Medications   Medication Sig Dispense Refill    meloxicam (MOBIC) 15 MG tablet take 1 tablet by mouth once daily 30 tablet 1    lamoTRIgine (LAMICTAL) 100 MG tablet 1 TABLET DAILY AT BEDTIME 30 tablet 2    divalproex (DEPAKOTE) 250 MG DR tablet take 3 tablets by mouth twice a day 180 tablet 5    lisinopril (PRINIVIL;ZESTRIL) 20 MG tablet take 1 tablet by mouth daily      promethazine (PHENERGAN) 25 MG tablet take 1 tablet by mouth every 8 hours if needed for nausea 60 tablet 2    SUMAtriptan (IMITREX) 50 MG tablet take 1 tablet by mouth once daily if needed for MIGRAINE 30 tablet 2     No current facility-administered medications for this visit. No Known Allergies      Review of Systems   Constitutional: Positive for diaphoresis. HENT: Negative. Eyes: Negative. Respiratory: Negative. Cardiovascular: Negative. Gastrointestinal: Positive for nausea. Endocrine: Negative. Genitourinary: Negative. Musculoskeletal: Positive for arthralgias (left shoulder). Skin: Negative. Allergic/Immunologic: Negative. Neurological: Positive for dizziness. Negative for syncope. Hematological: Negative. Psychiatric/Behavioral: Negative. Objective:   Physical Exam  Constitutional:       Appearance: He is well-developed. HENT:      Head: Normocephalic and atraumatic. Eyes:      Pupils: Pupils are equal, round, and reactive to light. Cardiovascular:      Rate and Rhythm: Normal rate and regular rhythm. Heart sounds: Normal heart sounds. No murmur heard.       Pulmonary:      Effort: Pulmonary effort is normal. No respiratory distress. Breath sounds: Normal breath sounds. No wheezing or rales. Abdominal:      General: There is no distension. Palpations: Abdomen is soft. There is no mass. Tenderness: There is no abdominal tenderness. Musculoskeletal:      Left shoulder: Tenderness (anterior/subacromial) present. No swelling or effusion. Decreased range of motion. Cervical back: Normal range of motion and neck supple. Skin:     General: Skin is warm. Findings: No erythema or rash. Neurological:      Mental Status: He is alert. Psychiatric:         Behavior: Behavior normal.         Thought Content: Thought content normal.         Judgment: Judgment normal.       /64 (Site: Right Upper Arm, Position: Sitting, Cuff Size: Large Adult)   Pulse 68   Ht 5' 5.5\" (1.664 m)   Wt 187 lb (84.8 kg)   BMI 30.65 kg/m²     Assessment:      Encounter Diagnosis   Name Primary?  Dizziness Yes     Episodic spells of diaphoresis, shakiness, weakness, and sense of impending syncope. Fairly rare, once a month. He usually eats something and feels better. Never with syncope. Not diabetic or on any medications to lower sugar. Suspect this may be more of a hypotensive spell . No trigger recalled. Can happen and rest.  Unaware of any arrhythmia. On lisinopril , bp lower at present. Discussed importance of remaining well hydrated during the summer. Rec. Checking bp and sugar at time of the next spell. Safety precautions. He does not drive due to seizure d/o. Plan:      Cont. Current medication dosing. Consider lowering lisinopril recurrent spells of orthostasis. Subacromial tendonitis of left shoulder/  Rec. Ibuprofen and rotator cuff strengthening at home. Recheck if persistent.         Nanci Thomas MD

## 2022-06-19 ENCOUNTER — OFFICE VISIT (OUTPATIENT)
Dept: PRIMARY CARE CLINIC | Age: 42
End: 2022-06-19
Payer: MEDICARE

## 2022-06-19 VITALS
BODY MASS INDEX: 29.73 KG/M2 | HEART RATE: 122 BPM | SYSTOLIC BLOOD PRESSURE: 130 MMHG | WEIGHT: 185 LBS | OXYGEN SATURATION: 98 % | HEIGHT: 66 IN | TEMPERATURE: 98.6 F | DIASTOLIC BLOOD PRESSURE: 88 MMHG

## 2022-06-19 DIAGNOSIS — R05.9 COUGH: Primary | ICD-10-CM

## 2022-06-19 PROCEDURE — G8427 DOCREV CUR MEDS BY ELIG CLIN: HCPCS | Performed by: FAMILY MEDICINE

## 2022-06-19 PROCEDURE — 99213 OFFICE O/P EST LOW 20 MIN: CPT | Performed by: FAMILY MEDICINE

## 2022-06-19 PROCEDURE — 4004F PT TOBACCO SCREEN RCVD TLK: CPT | Performed by: FAMILY MEDICINE

## 2022-06-19 PROCEDURE — G8417 CALC BMI ABV UP PARAM F/U: HCPCS | Performed by: FAMILY MEDICINE

## 2022-06-19 PROCEDURE — 99212 OFFICE O/P EST SF 10 MIN: CPT | Performed by: FAMILY MEDICINE

## 2022-06-19 RX ORDER — GUAIFENESIN AND CODEINE PHOSPHATE 100; 10 MG/5ML; MG/5ML
5 SOLUTION ORAL EVERY 4 HOURS PRN
Qty: 120 ML | Refills: 0 | Status: SHIPPED | OUTPATIENT
Start: 2022-06-19 | End: 2022-07-09 | Stop reason: SDUPTHER

## 2022-06-19 RX ORDER — PREDNISONE 50 MG/1
50 TABLET ORAL DAILY
Qty: 5 TABLET | Refills: 0 | Status: SHIPPED | OUTPATIENT
Start: 2022-06-19 | End: 2022-06-28 | Stop reason: SDUPTHER

## 2022-06-19 RX ORDER — ALBUTEROL SULFATE 90 UG/1
2 AEROSOL, METERED RESPIRATORY (INHALATION) 4 TIMES DAILY PRN
Qty: 18 G | Refills: 0 | Status: SHIPPED | OUTPATIENT
Start: 2022-06-19 | End: 2022-08-29

## 2022-06-19 RX ORDER — AMOXICILLIN AND CLAVULANATE POTASSIUM 875; 125 MG/1; MG/1
1 TABLET, FILM COATED ORAL 2 TIMES DAILY
Qty: 20 TABLET | Refills: 0 | Status: SHIPPED | OUTPATIENT
Start: 2022-06-19 | End: 2022-06-29

## 2022-06-19 ASSESSMENT — ENCOUNTER SYMPTOMS
SORE THROAT: 1
CHEST TIGHTNESS: 1
ALLERGIC/IMMUNOLOGIC NEGATIVE: 1
COUGH: 1
SHORTNESS OF BREATH: 1
EYES NEGATIVE: 1
GASTROINTESTINAL NEGATIVE: 1

## 2022-06-19 NOTE — PROGRESS NOTES
Subjective:      Patient ID: Omkar Sadler is a 42 y.o. male. HPI   Acute urgent care visit for prolonged illness with sore throat and cough over the last month. Treated prior at another facility withy zpak. Negative flu, strep, covid testing. Persistent cough and body aches. Active smoker. 1/2ppd at present. Past Medical History:   Diagnosis Date    Anxiety, mild     Colon polyps     Depression     Dizziness     Eczema     [facility] [facility]     GERD (gastroesophageal reflux disease)     H/O fall     Head ache     Head injury     Hiatal hernia     Inguinal hernia     Right.  Low back pain     Lumbar sprain     Migraine     Plantar fasciitis, bilateral     Seizure disorder ([facility]     Sigmoid diverticulosis     Sleep difficulties     Status migrainosus     TIA (transient ischemic attack) 09/2017    Tobacco use     Chronic. Past Surgical History:   Procedure Laterality Date    COLONOSCOPY  04/29/2011    Internal hemorrhoids, possible anal fissure, few scattered diverticula.  COLONOSCOPY  06/04/2010    Mild sigmoid diverticulosis.  COLONOSCOPY  08/28/2009    Sigmoid diverticulosis, internal hemorrhoids.  COLONOSCOPY  05/27/2008    Internal hemorrhoids.  COLONOSCOPY  11/19/2014    polyp in rectum    COLONOSCOPY  6/1/16    sigmoid diverticulosis, colon polyp, internal hemorrhoids    COLONOSCOPY  04/19/2019    [id number]    GASTRIC FUNDOPLICATION  37/66/4731    HERNIA REPAIR Left 11/30/2017    Left Inguinal Hernia Repair w/ Mesh performed by Keturah Baptiste MD at [facility] Right 05/30/2013    INGUINAL HERNIA REPAIR Left 01/24/2013    INGUINAL HERNIA REPAIR Right 09/24/2015    KNEE ARTHROSCOPY Left     PILONIDAL CYST EXCISION N/A 7/23/2020    PILONIDAL CYSTECTOMY performed by Kenrick Fung DO at [address] [place] [address]  03/05/2010    Recurrent hiatal hernia.     UPPER GASTROINTESTINAL ENDOSCOPY 6/1/16    S/P brenda fundoplication, good repair, retained gastric fluid    VASECTOMY       Current Outpatient Medications   Medication Sig Dispense Refill    meloxicam (MOBIC) 15 MG tablet take 1 tablet by mouth once daily 30 tablet 1    lamoTRIgine (LAMICTAL) 100 MG tablet 1 TABLET DAILY AT BEDTIME 30 tablet 2    divalproex (DEPAKOTE) 250 MG DR tablet take 3 tablets by mouth twice a day 180 tablet 5    promethazine (PHENERGAN) 25 MG tablet take 1 tablet by mouth every 8 hours if needed for nausea 60 tablet 2    SUMAtriptan (IMITREX) 50 MG tablet take 1 tablet by mouth once daily if needed for MIGRAINE 30 tablet 2    lisinopril (PRINIVIL;ZESTRIL) 20 MG tablet take 1 tablet by mouth daily       No current facility-administered medications for this visit. No Known Allergies    Review of Systems   Constitutional: Negative. Negative for fever. HENT: Positive for sore throat. Negative for congestion and sneezing. Eyes: Negative. Respiratory: Positive for cough, chest tightness and shortness of breath. Cardiovascular: Negative. Gastrointestinal: Negative. Endocrine: Negative. Genitourinary: Negative. Musculoskeletal: Negative. Skin: Negative. Allergic/Immunologic: Negative. Neurological: Negative. Hematological: Negative. Psychiatric/Behavioral: Negative. Objective:   Physical Exam  Constitutional:       Appearance: He is well-developed. HENT:      Head: Normocephalic and atraumatic. Nose: Nose normal.   Eyes:      Pupils: Pupils are equal, round, and reactive to light. Cardiovascular:      Rate and Rhythm: Normal rate and regular rhythm. Heart sounds: Normal heart sounds. No murmur heard. Pulmonary:      Effort: Pulmonary effort is normal. No respiratory distress. Breath sounds: Normal breath sounds. No wheezing, rhonchi or rales. Abdominal:      General: There is no distension. Palpations: Abdomen is soft. There is no mass. Tenderness: There is no abdominal tenderness. Musculoskeletal:         General: No tenderness. Normal range of motion. Cervical back: Normal range of motion and neck supple. Skin:     General: Skin is warm. Findings: No erythema or rash. Neurological:      Mental Status: He is alert. Psychiatric:         Behavior: Behavior normal.         Thought Content: Thought content normal.         Judgment: Judgment normal.       /88 (Site: Right Upper Arm, Position: Sitting, Cuff Size: Medium Adult)   Pulse (!) 122   Temp 98.6 °F (37 °C) (Infrared)   Ht 5' 6\" (1.676 m)   Wt 185 lb (83.9 kg)   SpO2 98%   BMI 29.86 kg/m²     Assessment:      Prolonged cough after uri  Ongoing tracheitis /bronchiolitis. Reported wheezing at times. Ongoing tobacco smoking. Plan:      Encouraged tobacco smoking cessation.        augmentin bid x 10 days  Prednisone 50mg/day x 5 days  cheratussin a/c    albuterol hfa inhaler prn      Venkat Roa MD

## 2022-06-28 ENCOUNTER — TELEPHONE (OUTPATIENT)
Dept: FAMILY MEDICINE CLINIC | Age: 42
End: 2022-06-28

## 2022-06-28 RX ORDER — PREDNISONE 50 MG/1
50 TABLET ORAL DAILY
Qty: 5 TABLET | Refills: 0 | Status: SHIPPED | OUTPATIENT
Start: 2022-06-28 | End: 2022-07-03

## 2022-06-28 NOTE — TELEPHONE ENCOUNTER
Will give another burst of prednisone x 5 days. Cont. Inhaler use. He absolutely needs to stop smoking. Rec. He follow up if symptoms are recurrent or still problematic.

## 2022-06-28 NOTE — TELEPHONE ENCOUNTER
Pt calling stating he was seen at [facility] on 6-19 for a cough, states it was getting better but he's out of medication and now it's just as bad as before, do you want to call something else in, pt uses pended pharmacy, please advise.

## 2022-07-01 RX ORDER — MELOXICAM 15 MG/1
TABLET ORAL
Qty: 30 TABLET | Refills: 1 | Status: SHIPPED | OUTPATIENT
Start: 2022-07-01 | End: 2022-08-29 | Stop reason: SDUPTHER

## 2022-07-01 NOTE — TELEPHONE ENCOUNTER
Fernando Beckham called requesting a refill of the below medication which has been pended for you:     Requested Prescriptions     Pending Prescriptions Disp Refills    meloxicam (MOBIC) 15 MG tablet [Pharmacy Med Name: MELOXICAM 15 MG TABLET] 30 tablet 1     Sig: take 1 tablet by mouth once daily       Last Appointment Date: 6/19/2022  Next Appointment Date: Visit date not found    No Known Allergies

## 2022-07-09 ENCOUNTER — HOSPITAL ENCOUNTER (OUTPATIENT)
Dept: GENERAL RADIOLOGY | Age: 42
Discharge: HOME | End: 2022-07-11
Payer: MEDICARE

## 2022-07-09 ENCOUNTER — HOSPITAL ENCOUNTER (OUTPATIENT)
Age: 42
Discharge: HOME | End: 2022-07-11
Payer: MEDICARE

## 2022-07-09 ENCOUNTER — OFFICE VISIT (OUTPATIENT)
Dept: PRIMARY CARE CLINIC | Age: 42
End: 2022-07-09
Payer: MEDICARE

## 2022-07-09 ENCOUNTER — TELEPHONE (OUTPATIENT)
Dept: PRIMARY CARE CLINIC | Age: 42
End: 2022-07-09

## 2022-07-09 VITALS
BODY MASS INDEX: 29.41 KG/M2 | HEART RATE: 112 BPM | HEIGHT: 66 IN | DIASTOLIC BLOOD PRESSURE: 80 MMHG | RESPIRATION RATE: 20 BRPM | TEMPERATURE: 98.7 F | SYSTOLIC BLOOD PRESSURE: 118 MMHG | WEIGHT: 183 LBS | OXYGEN SATURATION: 98 %

## 2022-07-09 DIAGNOSIS — J04.10 TRACHEITIS: ICD-10-CM

## 2022-07-09 DIAGNOSIS — J98.01 BRONCHOSPASM: ICD-10-CM

## 2022-07-09 DIAGNOSIS — R05.9 COUGH: ICD-10-CM

## 2022-07-09 DIAGNOSIS — J04.10 TRACHEITIS: Primary | ICD-10-CM

## 2022-07-09 PROCEDURE — G8427 DOCREV CUR MEDS BY ELIG CLIN: HCPCS | Performed by: FAMILY MEDICINE

## 2022-07-09 PROCEDURE — 71046 X-RAY EXAM CHEST 2 VIEWS: CPT

## 2022-07-09 PROCEDURE — 4004F PT TOBACCO SCREEN RCVD TLK: CPT | Performed by: FAMILY MEDICINE

## 2022-07-09 PROCEDURE — 99214 OFFICE O/P EST MOD 30 MIN: CPT | Performed by: FAMILY MEDICINE

## 2022-07-09 PROCEDURE — 99212 OFFICE O/P EST SF 10 MIN: CPT | Performed by: FAMILY MEDICINE

## 2022-07-09 PROCEDURE — G8417 CALC BMI ABV UP PARAM F/U: HCPCS | Performed by: FAMILY MEDICINE

## 2022-07-09 RX ORDER — GUAIFENESIN AND CODEINE PHOSPHATE 100; 10 MG/5ML; MG/5ML
5 SOLUTION ORAL EVERY 4 HOURS PRN
Qty: 120 ML | Refills: 0 | Status: SHIPPED | OUTPATIENT
Start: 2022-07-09 | End: 2022-07-12

## 2022-07-09 RX ORDER — PREDNISONE 20 MG/1
40 TABLET ORAL DAILY
Qty: 10 TABLET | Refills: 0 | Status: SHIPPED | OUTPATIENT
Start: 2022-07-09 | End: 2022-07-14

## 2022-07-09 SDOH — ECONOMIC STABILITY: FOOD INSECURITY: WITHIN THE PAST 12 MONTHS, THE FOOD YOU BOUGHT JUST DIDN'T LAST AND YOU DIDN'T HAVE MONEY TO GET MORE.: NEVER TRUE

## 2022-07-09 SDOH — ECONOMIC STABILITY: FOOD INSECURITY: WITHIN THE PAST 12 MONTHS, YOU WORRIED THAT YOUR FOOD WOULD RUN OUT BEFORE YOU GOT MONEY TO BUY MORE.: NEVER TRUE

## 2022-07-09 ASSESSMENT — ENCOUNTER SYMPTOMS
EYES NEGATIVE: 1
WHEEZING: 1
SHORTNESS OF BREATH: 1
RHINORRHEA: 0
ALLERGIC/IMMUNOLOGIC NEGATIVE: 1
GASTROINTESTINAL NEGATIVE: 1
SORE THROAT: 1
COUGH: 1

## 2022-07-09 ASSESSMENT — SOCIAL DETERMINANTS OF HEALTH (SDOH): HOW HARD IS IT FOR YOU TO PAY FOR THE VERY BASICS LIKE FOOD, HOUSING, MEDICAL CARE, AND HEATING?: NOT HARD AT ALL

## 2022-07-09 NOTE — PROGRESS NOTES
Subjective:      Patient ID: Alberto Gregory is a 42 y.o. male. HPI   Acute urgent care visit for ongoing sore throat. Recent uri with sore throat cough and wheezing. Treated with atb and steroid burst.  Was improving on all fronts, then yesterday the sore throat and cough came back. Still actively smoking. Using inhaler, which helps some. Negative covid test at the start. Past Medical History:   Diagnosis Date    Anxiety, mild     Colon polyps     Depression     Dizziness     Eczema     Epilepsy ([address]     GERD (gastroesophageal reflux disease)     H/O fall     Head ache     Head injury     Hiatal hernia     Inguinal hernia     Right.  Low back pain     Lumbar sprain     Migraine     Plantar fasciitis, bilateral     Seizure disorder ([address]     Sigmoid diverticulosis     Sleep difficulties     Status migrainosus     TIA (transient ischemic attack) 09/2017    Tobacco use     Chronic. Past Surgical History:   Procedure Laterality Date    COLONOSCOPY  04/29/2011    Internal hemorrhoids, possible anal fissure, few scattered diverticula.  COLONOSCOPY  06/04/2010    Mild sigmoid diverticulosis.  COLONOSCOPY  08/28/2009    Sigmoid diverticulosis, internal hemorrhoids.  COLONOSCOPY  05/27/2008    Internal hemorrhoids.     COLONOSCOPY  11/19/2014    polyp in rectum    COLONOSCOPY  6/1/16    sigmoid diverticulosis, colon polyp, internal hemorrhoids    COLONOSCOPY  04/19/2019    [id number]    GASTRIC FUNDOPLICATION  29/73/0233    HERNIA REPAIR Left 11/30/2017    Left Inguinal Hernia Repair w/ Mesh performed by Lacy Herrera MD at [address] [place] [address] Right 05/30/2013    INGUINAL HERNIA REPAIR Left 01/24/2013    INGUINAL HERNIA REPAIR Right 09/24/2015    KNEE ARTHROSCOPY Left     PILONIDAL CYST EXCISION N/A 7/23/2020    PILONIDAL CYSTECTOMY performed by Diego Bowers DO at [facility] ENDOSCOPY  03/05/2010 Recurrent hiatal hernia.  UPPER GASTROINTESTINAL ENDOSCOPY  6/1/16    S/P brenda fundoplication, good repair, retained gastric fluid    VASECTOMY       Current Outpatient Medications   Medication Sig Dispense Refill    meloxicam (MOBIC) 15 MG tablet take 1 tablet by mouth once daily 30 tablet 1    albuterol sulfate HFA (VENTOLIN HFA) 108 (90 Base) MCG/ACT inhaler Inhale 2 puffs into the lungs 4 times daily as needed for Wheezing 18 g 0    lamoTRIgine (LAMICTAL) 100 MG tablet 1 TABLET DAILY AT BEDTIME 30 tablet 2    divalproex (DEPAKOTE) 250 MG DR tablet take 3 tablets by mouth twice a day 180 tablet 5    promethazine (PHENERGAN) 25 MG tablet take 1 tablet by mouth every 8 hours if needed for nausea 60 tablet 2    SUMAtriptan (IMITREX) 50 MG tablet take 1 tablet by mouth once daily if needed for MIGRAINE 30 tablet 2    lisinopril (PRINIVIL;ZESTRIL) 20 MG tablet take 1 tablet by mouth daily       No current facility-administered medications for this visit. No Known Allergies    Review of Systems   Constitutional: Negative. Negative for fever. HENT: Positive for sore throat. Negative for congestion and rhinorrhea. Eyes: Negative. Respiratory: Positive for cough, shortness of breath and wheezing. Cardiovascular: Negative. Gastrointestinal: Negative. Endocrine: Negative. Genitourinary: Negative. Musculoskeletal: Negative. Skin: Negative. Allergic/Immunologic: Negative. Neurological: Negative. Hematological: Negative. Psychiatric/Behavioral: Negative. Objective:   Physical Exam  Constitutional:       Appearance: He is well-developed. HENT:      Head: Normocephalic and atraumatic. Eyes:      Pupils: Pupils are equal, round, and reactive to light. Cardiovascular:      Rate and Rhythm: Normal rate and regular rhythm. Heart sounds: Normal heart sounds. No murmur heard. Pulmonary:      Effort: Pulmonary effort is normal. No respiratory distress. Breath sounds: Wheezing (with coughing) present. No rales. Abdominal:      General: There is no distension. Palpations: Abdomen is soft. There is no mass. Tenderness: There is no abdominal tenderness. Musculoskeletal:         General: No tenderness. Normal range of motion. Cervical back: Normal range of motion and neck supple. Skin:     General: Skin is warm. Findings: No erythema or rash. Neurological:      Mental Status: He is alert. Psychiatric:         Behavior: Behavior normal.         Thought Content: Thought content normal.         Judgment: Judgment normal.       /80 (Site: Left Upper Arm, Position: Sitting, Cuff Size: Medium Adult)   Pulse (!) 112   Temp 98.7 °F (37.1 °C) (Tympanic)   Resp 20   Ht 5' 6\" (1.676 m)   Wt 183 lb (83 kg)   SpO2 98%   BMI 29.54 kg/m²     Assessment:      Encounter Diagnoses   Name Primary?  Tracheitis Yes    Bronchospasm      Ongoing cough and wheezing following uri. Improved with atb and steroid burst, now with recurrent tracheitis , cough, and wheezing, complicated by smoking. Rec. D/c smoking , substitute nicotine if needed. Plan:      Prednisone x 5 days. Cont. Albuterol and cough suppression. Adding flovent inhaler  Recheck if persistent or progressive.       cxr          Onofre Trevino MD

## 2022-07-09 NOTE — TELEPHONE ENCOUNTER
I couldn't find one covered with brief search. Would need the pharmacist to help compare insurance coverage. Looking for an inhaled steroid. Would have pharmacist see if they can find one that is affordable rather then me writing several other prescriptions with the same result.

## 2022-07-09 NOTE — TELEPHONE ENCOUNTER
Called [facility] in [place] spoke to Manfred, When she looked it up it didn't give any preferred inhaler. She doubled checked, everything keeps coming back over $200 with coverage.

## 2022-07-09 NOTE — TELEPHONE ENCOUNTER
Pt called requesting if you can send in something different, he went to go  Advair inhaler and its going to cost him $230 for that medication.      Please Advise    Pharm- [facility] in [place]

## 2022-07-13 ENCOUNTER — TELEPHONE (OUTPATIENT)
Dept: FAMILY MEDICINE CLINIC | Age: 42
End: 2022-07-13

## 2022-07-13 NOTE — TELEPHONE ENCOUNTER
Let patient know for instructions for medication dosing and her verbalized a understanding, and if any more questions or concerns to let us know.

## 2022-07-13 NOTE — TELEPHONE ENCOUNTER
Pt calling stating he was given a sample of Breztri but not given any instructions, pt states he used it 3-4x yesterday and his heart was racing, please advise of correct dosing at above number.

## 2022-07-20 ENCOUNTER — TELEMEDICINE (OUTPATIENT)
Dept: NEUROLOGY | Age: 42
End: 2022-07-20
Payer: MEDICARE

## 2022-07-20 DIAGNOSIS — R42 DIZZINESS: ICD-10-CM

## 2022-07-20 DIAGNOSIS — Z72.0 TOBACCO USE: ICD-10-CM

## 2022-07-20 DIAGNOSIS — R51.9 NONINTRACTABLE HEADACHE, UNSPECIFIED CHRONICITY PATTERN, UNSPECIFIED HEADACHE TYPE: ICD-10-CM

## 2022-07-20 DIAGNOSIS — G40.909 SEIZURE DISORDER (HCC): Primary | ICD-10-CM

## 2022-07-20 DIAGNOSIS — F41.9 ANXIETY, MILD: ICD-10-CM

## 2022-07-20 DIAGNOSIS — Z91.81 H/O FALL: ICD-10-CM

## 2022-07-20 DIAGNOSIS — S09.90XS INJURY OF HEAD, SEQUELA: ICD-10-CM

## 2022-07-20 DIAGNOSIS — G47.9 SLEEP DIFFICULTIES: ICD-10-CM

## 2022-07-20 DIAGNOSIS — G40.009 PARTIAL IDIOPATHIC EPILEPSY WITH SEIZURES OF LOCALIZED ONSET, NOT INTRACTABLE, WITHOUT STATUS EPILEPTICUS (HCC): ICD-10-CM

## 2022-07-20 DIAGNOSIS — G47.9 DISTURBANCE, SLEEP: ICD-10-CM

## 2022-07-20 DIAGNOSIS — G44.039 NONINTRACTABLE PAROXYSMAL HEMICRANIA, UNSPECIFIED CHRONICITY PATTERN: ICD-10-CM

## 2022-07-20 DIAGNOSIS — G45.0 VBI (VERTEBROBASILAR INSUFFICIENCY): ICD-10-CM

## 2022-07-20 DIAGNOSIS — G43.709 CHRONIC MIGRAINE WITHOUT AURA WITHOUT STATUS MIGRAINOSUS, NOT INTRACTABLE: ICD-10-CM

## 2022-07-20 DIAGNOSIS — K21.9 GASTROESOPHAGEAL REFLUX DISEASE WITHOUT ESOPHAGITIS: ICD-10-CM

## 2022-07-20 PROCEDURE — G8427 DOCREV CUR MEDS BY ELIG CLIN: HCPCS | Performed by: PSYCHIATRY & NEUROLOGY

## 2022-07-20 PROCEDURE — G8417 CALC BMI ABV UP PARAM F/U: HCPCS | Performed by: PSYCHIATRY & NEUROLOGY

## 2022-07-20 PROCEDURE — 99214 OFFICE O/P EST MOD 30 MIN: CPT | Performed by: PSYCHIATRY & NEUROLOGY

## 2022-07-20 PROCEDURE — 99211 OFF/OP EST MAY X REQ PHY/QHP: CPT | Performed by: PSYCHIATRY & NEUROLOGY

## 2022-07-20 PROCEDURE — 4004F PT TOBACCO SCREEN RCVD TLK: CPT | Performed by: PSYCHIATRY & NEUROLOGY

## 2022-07-20 RX ORDER — DIVALPROEX SODIUM 250 MG/1
TABLET, DELAYED RELEASE ORAL
Qty: 180 TABLET | Refills: 5 | Status: SHIPPED | OUTPATIENT
Start: 2022-07-20

## 2022-07-20 RX ORDER — LAMOTRIGINE 100 MG/1
TABLET ORAL
Qty: 30 TABLET | Refills: 5 | Status: SHIPPED | OUTPATIENT
Start: 2022-07-20

## 2022-07-20 ASSESSMENT — ENCOUNTER SYMPTOMS
TROUBLE SWALLOWING: 0
CHOKING: 0
COLOR CHANGE: 0
DIARRHEA: 0
EYE DISCHARGE: 0
COUGH: 0
EYE WATERING: 0
EYE PAIN: 0
VOICE CHANGE: 0
CHANGE IN BOWEL HABIT: 0
FACIAL SWELLING: 0
SCALP TENDERNESS: 0
SWOLLEN GLANDS: 0
BLURRED VISION: 0
VISUAL CHANGE: 0
EYE ITCHING: 0
BACK PAIN: 0
APNEA: 0
BLOOD IN STOOL: 0
ABDOMINAL DISTENTION: 0
CONSTIPATION: 0
VOMITING: 0
EYE REDNESS: 0
FACIAL SWEATING: 0
ABDOMINAL PAIN: 0
PHOTOPHOBIA: 1
SHORTNESS OF BREATH: 0
SINUS PRESSURE: 0
CHEST TIGHTNESS: 0
RHINORRHEA: 0
SORE THROAT: 0
NAUSEA: 1
WHEEZING: 0

## 2022-07-20 NOTE — PROGRESS NOTES
cluster headaches, hypertension, immunosuppression, obesity, pseudotumor cerebri, recent head traumas, sinus disease or TMJ. Migraine   This is a chronic problem. Episode onset: MORE  THAN   10  YEARS. The problem occurs intermittently. The problem has been waxing and waning. The pain is located in the Right unilateral and frontal region. The pain does not radiate. The pain quality is similar to prior headaches. The quality of the pain is described as aching and throbbing. The pain is at a severity of 3/10. The pain is mild. Associated symptoms include insomnia, nausea, phonophobia, photophobia, seizures and tingling. Pertinent negatives include no abdominal pain, abnormal behavior, anorexia, back pain, blurred vision, coughing, dizziness, drainage, ear pain, eye pain, eye redness, eye watering, facial sweating, fever, hearing loss, loss of balance, muscle aches, neck pain, numbness, rhinorrhea, scalp tenderness, sinus pressure, sore throat, swollen glands, tinnitus, visual change, vomiting, weakness or weight loss. The symptoms are aggravated by unknown. He has tried triptans and NSAIDs (TOPAMAX) for the symptoms. The treatment provided moderate relief. His past medical history is significant for migraine headaches and migraines in the family. There is no history of cancer, cluster headaches, hypertension, immunosuppression, obesity, pseudotumor cerebri, recent head traumas, sinus disease or TMJ. History obtained from  The patient             and other  available medical records were  Also  reviewed.                 1)    KNOWN    H/O   CHRONIC  SEVERE MIGRAINES                         FOR     10  YEARS                           -     WELL  CONTROLLED                      -   ON  IMITREX,   PHENERGAN   AS  NEEDED               2)     H/O   CHRONIC   TENSION  HEADACHE                       --   BETTER  CONTROLLED                            3)        H/O  SEIZURE  DISORDER /  EPILEPSY    SINCE      2014 BETTER      SEIZURE  CONTROL                                 -    ON  DEPAKOTE     -     STABLE            4)   INTOLERANCE  TO  KEPPRA                    DUE  TO  IRRITABILITY  AND MOOD PROBLEMS            5)     TRIED     VIMPAT  AND  TOPAMAX   IN   THE    PAST            6)     H/O   CHRONIC  ANXIETY  AND  DEPRESSION                    -     STABLE    ON  LEXAPRO                 HAD    FOLLOW    WITH    HIS  [facility]               7)      H/O  PREVIOUS  MULTIPLE MILD  HEAD  INJURIES                             DUE  TO   FALLS            8)   H/O     CHRONIC    SLEEP  DIFFICULTIES                      AND  DISTURBED  SLEEP  WAKE  CYCLES                              -  STABLE                9)    PREVIOUS  H/O  DIZZY  EPISODES                          -  NO  RECURRENCE            10)   EMU  AT  [address]   IN  2014                  -  POSSIBLE  FRONTAL    FOCUS            11)   EMU   AT  [address] 7  TO  NOV. 11, 2016    SHOWED :              6  EPISODES  OF  PSYCHOGENIC  NON EPILEPTIC SPELLS. EEG  DURING  THE SPELLS  AND  INTERICTAL  PERIODS                         WAS REPORTED  TO BE  NORMAL. 12)     PREVIOUS   H/O    BRIEF  SEIZURES  AND  MEMORY  LOSS                           -  PARTIAL  COMPLEX       SEIZURES                             DUE  TO  SLEEP  DEPRIVATION                               AND  PROLONGED  TV   WATCHING                                         13)      EPISODES   OF  RIGHT  SIDED  NUMBNESS   AND  SPEECH  PROBLEMS. H/O  HOSPITALIZATION   FROM  SEPT. 2017                     AT  [address]     TIA  /   STROKE                        NO  SIGNIFICANT  ABNORMALITIES     REPORTED .           14)         PREVIOUS   H/O  CARDIAC  ARRYTHMIA                         TO    FOLLOW  WITH    CARDOLOGY              15) PREVIOUS   H/O    HAD  COGNITIVE  BEHAVIOR THERAPY  AT  [facility]                       PATIENT  TO  FOLLOW  UP   WITH  MENT[facility]  [facility]  PROFESSION[facility]                        FOR   EVALUATIONS  OF   HIS  ANXIETY,  DEPRESSION,                                 NON  EPILEPTIC  PSYCHOGENIC  SPELLS,                            16)          PATIENT     STOPPED     VIMPAT       AND  TOPAMAX    IN    October 2017                 17)    H/O   [facility]   NEUROLOGY    FOLLOW  UP    EVALUATION     IN      2018                          PATIENT     NOT  PLANNING  TO  RETURN  BACK   TO                                    [facility]                18)     PATIENT  DID NOT  HAVE  NEUROLOGY   FOLLOW  UP    AT  [facility]                           FROM   October 2017     TO  DEC. 2018               19)       PREVIOUS    H/O    BRIEF   SEIZURE    2-3  PER MONTH                            STARING  EPISODES. IN      2019               20)          PATIENT   ON      DEPAKOTE  FOR  SEIZURES                                       AND  MIGRAINE  PROPHYLAXIS                                                       PREVIOUS    H/O   BEING      ON     KLONOPIN       FOR                            SEIZURE  CONTROL   AND  SLEEP  DIFFICULTIES                         21)     H/O    BRIEF  SEIZURES       ON     8/13/2019                            -  PARTIAL  COMPLEX       SEIZURES                          DUE  TO  SLEEP  DEPRIVATION     AND  PROLONGED  TV   WATCHING                     25)      H/O    BRIEF    SEIZURES        WITH    FALLING                                  AND  MILD     HEAD   &   NECK INJURY         TWICE                                  IN  SEPT.     AND October 2019                        23)   H/O      STARING  EPISODE  WITH    HEAD TURNING   TO  THE                           RIGHT  SIDE    LASTING  FOR   ONE  MINUTE          WITH                                LETHARGY    NOTICED   VISIT  On 11/11/ 2019   .                                    25)     CT   HEAD    AND  CT  CERVICAL  SPINE  IN NOV. 2019                                 SHOWED  NO  SIGNIFICANT  ABNORMALITIES                                 VALPROIC  ACID  LEVEL  IN NOV. 2019       NORMAL  LIMITS                       26)      EXPECTATIONS,   GOALS   OF  SEIZURE  MANAGEMENT                           AND  SIDE  EFFECTS  MEDICATIONS    WERE                                 REVIEWED     AND   DISCUSSED    IN    DETAIL. 27)           SEIZURES     AND      MIGRAINES  WELL  CONTROLLED                         FROM     DEC.   2019        TO     April 2021                              28)        H/O     TWO   EPISODES  OF    SEIZURE   RECURRENCE   IN    MAY   2021                            ASSOCIATED      WITH     SPENDING  TIME   IN   [facility]     IN  [place]              34)         H/O  BRIEF  RECURRENT  SEIZURES     IN   July 2021                         30)           LOW  DOSE  LAMICTAL       WAS   ADDED   TO   DEPAKOTE                              IN    July 2021                           PATIENT'S    SEIZURES    ARE    WELL   CONTROLLED     SINCE    AUG.  2021                            PATIENT  DENIES    ANY  NEW  NEUROLOGICAL   CONCERNS. 31)             PATIENT   RECOMMENDED  :                        A)    TO  CONTINUE     SEIZURE  PRECAUTIONS                       B)     TO  CONTINUE        LAMICTAL   AND  DEPAKOTE  AS  BEFORE                        C)   PERIODIC    ANTI  EPILEPTIC  MEDICATION  LEVELS                                  EVERY    3 - 4   MONTHS                            32)           VARIOUS  RISK   FACTORS   WERE  REVIEWED   AND   DISCUSSED. PATIENT   HAS  MULTIPLE   MEDICAL, MENTAL HEALTH                                    &      NEUROLOGICAL   PROBLEMS . PATIENT'S   MANAGEMENT  IS  CHALLENGING. The  Duration,  Quality,  Severity,  Location,  Timing,  Context,  Modifying  Factors   Of   The   Chief   Complaint       And  Present  Illness   Was   Reviewed   In   Chronological   [place]. Patient   Indicates   The  Presence   And  The  Absence  Of  The  Following  Associated  And       Additional  Neurological    Symptoms:                                Balance  And coordination problems  absent           Gait problems     absent            Headaches      present              Migraines           present           Memory problems        Present             Confusion        absent            Paresthesia numbness          absent           Seizures  And  Starring  Episodes           PRESENT           Syncope,  Near  syncopal episodes         absent           Speech problems           absent             Swallowing  Problems      absent            Dizziness,  Light headedness           present                        Vertigo        absent             Generalized   Weakness    absent              focal  Weakness     absent             Tremors         absent              Sleep  Problems     present             History  Of   Recent   Head  Injury     absent             History  Of   Recent  TIA     absent             History  Of   Recent    Stroke     absent             Neck  Pain and  Neck muscle  Spasms  Absent               Radiating  down   And   Weakness           absent            Lower back   Pain  And     Spasms  Present              Radiating    Down   And   Weakness          absent                H/O   FALLS        absent               History  Of   Visual  Symptoms    Absent                  Associated   Diplopia       absent                                                                  Also   Additional   Symptoms   Present    As  Documented    In   The detailed      Review  Of  Systems   And    Please   Refer   To    Them for   Additional  Information.            INFORMATION REVIEWED:      VITAL  SIGNS,  MEDICATIONS   LIST,   ALLERGIES AND  DRUG  INTOLERANCES,    MEDICAL   HISTORY,     SURGICAL   HISTORY,    FAMILY   HISTORY,  SOCIAL  HISTORY,    PROBLEM  LIST   FOR  PATIENT  CARE   COORDINATION        RECORDS   REVIEWED:    historical medical records, lab reports and office notes         Past Medical History:   Diagnosis Date    Anxiety, mild     Colon polyps     Depression     Dizziness     Eczema     Epilepsy ([address]     GERD (gastroesophageal reflux disease)     H/O fall     Head ache     Head injury     Hiatal hernia     Inguinal hernia     Right. Low back pain     Lumbar sprain     Migraine     Plantar fasciitis, bilateral     Seizure disorder (HCC)     Sigmoid diverticulosis     Sleep difficulties     Status migrainosus     TIA (transient ischemic attack) 09/2017    Tobacco use     Chronic. Past Surgical History:   Procedure Laterality Date    COLONOSCOPY  04/29/2011    Internal hemorrhoids, possible anal fissure, few scattered diverticula. COLONOSCOPY  06/04/2010    Mild sigmoid diverticulosis. COLONOSCOPY  08/28/2009    Sigmoid diverticulosis, internal hemorrhoids. COLONOSCOPY  05/27/2008    Internal hemorrhoids. COLONOSCOPY  11/19/2014    polyp in rectum    COLONOSCOPY  6/1/16    sigmoid diverticulosis, colon polyp, internal hemorrhoids    COLONOSCOPY  04/19/2019    [id number]    GASTRIC FUNDOPLICATION  58/41/1621    HERNIA REPAIR Left 11/30/2017    Left Inguinal Hernia Repair w/ Mesh performed by Tomasa Mayo MD at [address] [place] [address] Right 05/30/2013    INGUINAL HERNIA REPAIR Left 01/24/2013    INGUINAL HERNIA REPAIR Right 09/24/2015    KNEE ARTHROSCOPY Left     PILONIDAL CYST EXCISION N/A 7/23/2020    PILONIDAL CYSTECTOMY performed by Godwin Coyle DO at [facility]  03/05/2010    Recurrent hiatal hernia.     UPPER GASTROINTESTINAL ENDOSCOPY  6/1/16    S/P brenda fundoplication, good repair, retained gastric fluid    VASECTOMY                   Current Outpatient Medications   Medication Sig Dispense Refill    lamoTRIgine (LAMICTAL) 100 MG tablet 1 TABLET DAILY AT BEDTIME 30 tablet 5    divalproex (DEPAKOTE) 250 MG DR tablet take 3 tablets by mouth twice a day 180 tablet 5    fluticasone-salmeterol (ADVAIR HFA) 230-21 MCG/ACT inhaler Inhale 2 puffs into the lungs 2 times daily RINSE MOUTH AFTER USE 1 each 1    meloxicam (MOBIC) 15 MG tablet take 1 tablet by mouth once daily 30 tablet 1    albuterol sulfate HFA (VENTOLIN HFA) 108 (90 Base) MCG/ACT inhaler Inhale 2 puffs into the lungs 4 times daily as needed for Wheezing 18 g 0    promethazine (PHENERGAN) 25 MG tablet take 1 tablet by mouth every 8 hours if needed for nausea 60 tablet 2    SUMAtriptan (IMITREX) 50 MG tablet take 1 tablet by mouth once daily if needed for MIGRAINE 30 tablet 2    lisinopril (PRINIVIL;ZESTRIL) 20 MG tablet take 1 tablet by mouth daily       No current facility-administered medications for this visit.            No Known Allergies            Family History   Problem Relation Age of Onset    COPD Mother     High Blood Pressure Mother     Cancer Father         Hodgekin's Lymphoma    High Blood Pressure Father     Eczema Sister     Alzheimer's Disease Maternal Grandmother     COPD Maternal Grandmother     Cancer Maternal Grandmother     Cancer Paternal Grandmother     Cancer Paternal Grandfather     Cancer Sister         colon cancer    High Blood Pressure Sister     Arthritis Sister     Asthma Daughter     Eczema Daughter     Glaucoma Neg Hx     Diabetes Neg Hx     Cataracts Neg Hx              Social History     Socioeconomic History    Marital status:      Spouse name: Not on file    Number of children: Not on file    Years of education: Not on file    Highest education level: Not on file   Occupational History    Not on file   Tobacco Use    Smoking status: Every Day     Packs/day: 1.00 Years: 20.00     Pack years: 20.00     Types: Cigarettes     Last attempt to quit: 2016     Years since quittin.2    Smokeless tobacco: Never    Tobacco comments:     2-3 weekly   Vaping Use    Vaping Use: Never used   Substance and Sexual Activity    Alcohol use: No     Alcohol/week: 0.0 standard drinks     Comment: rarely    Drug use: No    Sexual activity: Not on file   Other Topics Concern    Not on file   Social History Narrative    Not on file     Social Determinants of Health     Financial Resource Strain: Low Risk     Difficulty of Paying Living Expenses: Not hard at all   Food Insecurity: No Food Insecurity    Worried About Running Out of Food in the Last Year: Never true    Ran Out of Food in the Last Year: Never true   Transportation Needs: Not on file   Physical Activity: Not on file   Stress: Not on file   Social Connections: Not on file   Intimate Partner Violence: Not on file   Housing Stability: Not on file         There were no vitals filed for this visit.         Wt Readings from Last 3 Encounters:   22 183 lb (83 kg)   22 185 lb (83.9 kg)   22 187 lb (84.8 kg)         BP Readings from Last 3 Encounters:   22 118/80   22 130/88   22 118/64       Hematology and Coagulation  Lab Results   Component Value Date/Time    WBC 8.3 2021 03:16 PM    RBC 4.73 2021 03:16 PM    HGB 16.4 2021 03:16 PM    HCT 46.8 2021 03:16 PM    MCV 98.9 2021 03:16 PM    MCH 34.7 2021 03:16 PM    MCHC 35.0 2021 03:16 PM    RDW 12.3 2021 03:16 PM     2021 03:16 PM    MPV 9.7 2021 03:16 PM         Chemistries  Lab Results   Component Value Date/Time     2021 03:16 PM    K 4.6 2021 03:16 PM     2021 03:16 PM    CO2 28 2021 03:16 PM    BUN 15 2019 05:32 PM    CREATININE 1.26 2019 05:32 PM    CALCIUM 9.8 2019 05:32 PM    PROT 7.8 2019 05:32 PM    LABALBU 4.8 12/08/2019 05:32 PM    BILITOT 0.34 12/08/2019 05:32 PM    ALKPHOS 75 12/08/2019 05:32 PM    AST 22 03/16/2022 03:13 PM    ALT 32 03/16/2022 03:13 PM     Lab Results   Component Value Date/Time    ALKPHOS 75 12/08/2019 05:32 PM    ALT 32 03/16/2022 03:13 PM    AST 22 03/16/2022 03:13 PM    PROT 7.8 12/08/2019 05:32 PM    BILITOT 0.34 12/08/2019 05:32 PM    BILIDIR 0.11 12/08/2019 05:32 PM    LABALBU 4.8 12/08/2019 05:32 PM     Lab Results   Component Value Date/Time    BUN 15 12/08/2019 05:32 PM    CREATININE 1.26 12/08/2019 05:32 PM     Lab Results   Component Value Date/Time    CALCIUM 9.8 12/08/2019 05:32 PM    MG 1.8 09/18/2013 09:18 PM     Lab Results   Component Value Date/Time    AST 22 03/16/2022 03:13 PM    ALT 32 03/16/2022 03:13 PM         Lab Results   Component Value Date/Time    CKTOTAL 67 09/19/2013 09:50 AM     Lab Results   Component Value Date/Time    DJHIVOHZ22 590 01/25/2019 03:08 PM         Drug Levels  Lab Results   Component Value Date/Time    PHENYTOIN <0.8 09/28/2017 04:24 PM    PHENYTOIN <0.8 04/04/2016 02:23 PM    VALPROATE 57 03/16/2022 03:13 PM    VALPROATE 85 05/26/2021 03:16 PM           Review of Systems   Constitutional:  Negative for appetite change, chills, diaphoresis, fatigue, fever, unexpected weight change and weight loss. HENT:  Negative for congestion, dental problem, drooling, ear discharge, ear pain, facial swelling, hearing loss, mouth sores, nosebleeds, postnasal drip, rhinorrhea, sinus pressure, sore throat, tinnitus, trouble swallowing and voice change. Eyes:  Positive for photophobia. Negative for blurred vision, pain, discharge, redness, itching and visual disturbance. Respiratory:  Negative for apnea, cough, choking, chest tightness, shortness of breath and wheezing. Cardiovascular:  Negative for chest pain, palpitations and leg swelling. Gastrointestinal:  Positive for nausea.  Negative for abdominal distention, abdominal pain, anorexia, blood in stool, change in bowel habit, constipation, diarrhea and vomiting. Endocrine: Negative for cold intolerance, heat intolerance, polydipsia, polyphagia and polyuria. Musculoskeletal:  Positive for gait problem. Negative for arthralgias, back pain, joint swelling, myalgias, neck pain and neck stiffness. Skin:  Negative for color change, pallor, rash and wound. Allergic/Immunologic: Negative for environmental allergies, food allergies and immunocompromised state. Neurological:  Positive for tingling, seizures, light-headedness and headaches. Negative for dizziness, vertigo, tremors, syncope, facial asymmetry, speech difficulty, weakness, numbness and loss of balance. Hematological:  Negative for adenopathy. Does not bruise/bleed easily. Psychiatric/Behavioral:  Positive for decreased concentration and sleep disturbance. Negative for agitation, behavioral problems, confusion, dysphoric mood, hallucinations, self-injury and suicidal ideas. The patient is nervous/anxious and has insomnia. The patient is not hyperactive. Objective:       LIMITED     DUE    TO  VIDEO  VISIT           PREVIOUS    EXAM   SHOWED :         Physical Exam  Constitutional:       Appearance: He is well-developed. HENT:      Head: Normocephalic and atraumatic. No raccoon eyes or Vaughn's sign. Right Ear: External ear normal.      Left Ear: External ear normal.      Nose: Nose normal.   Eyes:      Conjunctiva/sclera: Conjunctivae normal.      Pupils: Pupils are equal, round, and reactive to light. Neck:      Thyroid: No thyroid mass or thyromegaly. Vascular: No carotid bruit. Trachea: No tracheal deviation. Meningeal: Brudzinski's sign and Kernig's sign absent. Cardiovascular:      Rate and Rhythm: Normal rate and regular rhythm. Pulmonary:      Effort: Pulmonary effort is normal.   Musculoskeletal:         General: No tenderness. Cervical back: Normal range of motion and neck supple. No rigidity.  No muscular tenderness. Normal range of motion. Skin:     General: Skin is warm. Coloration: Skin is not pale. Findings: No erythema or rash. Nails: There is no clubbing. Psychiatric:         Attention and Perception: He is attentive. Mood and Affect: Mood is anxious and depressed. Affect is blunt. Affect is not labile or inappropriate. Speech: He is communicative. Speech is not rapid and pressured, delayed, slurred or tangential.         Behavior: Behavior is slowed. Behavior is not agitated, aggressive, withdrawn, hyperactive or combative. Behavior is cooperative. Thought Content: Thought content is not paranoid or delusional. Thought content does not include homicidal or suicidal ideation. Thought content does not include homicidal or suicidal plan. Cognition and Memory: Memory is impaired. He does not exhibit impaired recent memory or impaired remote memory. Judgment: Judgment is not impulsive or inappropriate. NEUROLOGICAL EXAMINATION :        LIMITED     DUE    TO  VIDEO  VISIT           PREVIOUS    EXAM   SHOWED :       A) MENTAL STATUS:                   Alert and  oriented  To time, place  And  Person. No Aphasia. No  Dysarthria. Able   To  Follow  commands without   Any  Difficulty. No right  To left confusion. Normal  Speech  And language function. Insight and  Judgment ,Fund  Of  Knowledge  DECREASED                Recent  And  Remote memory  DECREASED                Attention &Concentration are  DECREASED                                                    B) CRANIAL NERVES :             2 CN : Visual  Acuity  And  Visual fields  within normal limits                        Fundi  Could  Not  Be  Could  Not  Be  Evaluated. 3,4,6 CN : Both  Pupils are   Reactive and  Equal.                            Extraocular   Movements  Are  Intact. No  Nystagmus. No  MOO. No  Afferent  Pupillary  Defect noted. 5 CN :  Normal  Facial sensations and Corneal  Reflexes           7 CN :  Normal  Facial  Symmetry  And  Strength. No facial  Weakness. 8 CN :  Hearing  Appears within normal limits          9, 10 CN: Normal spontaneous, reflex palate movements         11 CN:   Normal  Shoulder shrug and  Strength         12 CN :   Normal  Tongue movements and  Tongue  In midline                        No tongue   Fasciculations or atrophy         C) MOTOR  EXAM:                 Strength  In upper  And  Lower extremities   within normal limits               No  Drift. No  Atrophy               Rapid alternating  And  repetitions  Movements  within normal limits                 Muscle  Tone  In upper  And  Lower  Extremities  within normal limits                No rigidity. No  Spasticity. Bradykinesia   absent               No  Asterixis. Sustention  Tremor , Resting  Tremor   absent                    No other  Abnormal  Movements noted             D) SENSORY :               light touch, pinprick, position  And  Vibration DECREASED   IN   HANDS      E) REFLEXES:                   Deep  Tendon  Reflexes within normal limits                    No pathological  Reflexes  Bilaterally.                                   F) COORDINATION  AND  GAIT :                                Station and  Gait  within normal limits                                        Romberg's test negative                          Ataxia negative            Assessment:     Patient Active Problem List   Diagnosis    Low back pain with sciatica    Migraine    Anxiety, mild    Disturbance, sleep    Tobacco use    GERD (gastroesophageal reflux disease)    Hiatal hernia    Head ache    Status migrainosus    Dizziness    Depression    H/O fall    Head injury    Seizure disorder (HCC)    Astigmatism    Rectal bleed    Cervical radiculopathy    Ventricular premature beats    Nausea and vomiting    Colon polyps    TIA (transient ischemic attack)    VBI (vertebrobasilar insufficiency)    Pilonidal cyst    Wound dehiscence    Sleep difficulties    Partial idiopathic epilepsy with seizures of localized onset, not intractable, without status epilepticus ([address]    Convulsions ([address]    Recurrent seizures ([address]           CT OF THE HEAD WITHOUT CONTRAST; CT OF THE CERVICAL SPINE WITHOUT CONTRAST   11/15/2019 10:21 am       TECHNIQUE:   CT of the head was performed without the administration of intravenous   contrast. Dose modulation, iterative reconstruction, and/or weight based   adjustment of the mA/kV was utilized to reduce the radiation dose to as low   as reasonably achievable.; CT of the cervical spine was performed without the   administration of intravenous contrast. Multiplanar reformatted images are   provided for review. Dose modulation, iterative reconstruction, and/or weight   based adjustment of the mA/kV was utilized to reduce the radiation dose to as   low as reasonably achievable. COMPARISON:   01/25/2019       HISTORY:   ORDERING SYSTEM PROVIDED HISTORY: Status migrainosus   TECHNOLOGIST PROVIDED HISTORY:   migraine, seizures   Reason for Exam: Hx chronic migraines. C/o increase headaches since recent   falls   Acuity: Acute   Type of Exam: Initial       FINDINGS:   BRAIN/VENTRICLES: There is no acute intracranial hemorrhage, mass effect or   midline shift. No abnormal extra-axial fluid collection. The gray-white   differentiation is maintained without evidence of an acute infarct. There is   no evidence of hydrocephalus. ORBITS: The visualized portion of the orbits demonstrate no acute abnormality. SINUSES: The visualized paranasal sinuses and mastoid air cells demonstrate   no acute abnormality. SOFT TISSUES/SKULL:  No acute abnormality of the visualized skull or soft   tissues.        CERVICAL SPINE: Cervical spine maintains its normal lordotic curvature. There are subtle degenerative changes C6-C7. No acute fracture or   malalignment. Vertebral body heights and intervertebral disc spaces are   preserved. Overlying soft tissues are unremarkable. Visualized lung apices   are clear. Impression   No acute intracranial abnormality. No acute osseus abnormality of the cervical spine. Procedure: [facility] 11/13/2014 [id number] CT BRAIN WITHOUT CONTRAST    Reason for Exam: \      FULL RESULT: EXAM: CT Head without Contrast - 11/13/2014 7:27 PM      HISTORY: Syncope/Near-Syncope. Seizures. COMPARISON: 10/8/14       TECHNIQUE: Contiguous axial CT images were obtained from the skull base    to the vertex and reviewed in soft tissue, subdural, bone, and brain    windows. FINDINGS: The ventricular system is normal in size and configuration. There are no intra-axial or extra-axial fluid collections or mass    lesions. No acute intracranial hemorrhage or midline shift. Visualized    paranasal sinuses and mastoid air cells are clear. No acute calvarial    fracture is identified. The brainstem and the relationship of the    craniocervical junction structures are normal. There is incomplete fusion    of the posterior elements of C1, stable from prior study and compatible    with a normal variant. Visualized orbits and globes are intact. IMPRESSION:    No evidence for acute intracranial pathology. No bleed, mass effect, or    midline shift.               Procedure: [facility] 06/02/2014 [id number] MRI BRAIN COMBINED    Reason for Exam: \      FULL RESULT: MRI brain with and without intravenous contrast, 6/2/2014      History: Migraines      Technique: Multiplanar multisequence MR imaging of the brain was    performed before and after intravenous administration of 16 mL Magnevist.      Findings: No evidence for shift of midline structures, mass effect or    compression of the ventricles noted. No acute intra-or extra-axial    hemorrhage is seen. No abnormal intracranial fluid collections are    identified. The basal cisterns are patent. Posterior fossa structures demonstrate no    discrete mass. Few scattered foci of T2/FLAIR hyperintensity noted in the    periventricular and subcortical white matter which essentially are    nonspecific in imaging appearance however differential considerations    include demyelinating or inflammatory process, migraine induced changes,    chronic small vessel disease or vascular disease. No associated restricted diffusion or abnormal enhancing seen. A small    left occipital lobe developmental venous anomaly seen. No abnormal    enhancing intracranial mass seen. Visualized orbital contents appear unremarkable. Mild mucosal thickening of the ethmoid air cells noted. Skull base flow voids are patent. Superior sagittal sinus and straight sinus flow voids are patent. Heterogeneity of the T1-weighted marrow signal intensity seen. IMPRESSION:    1. No acute or subacute ischemic insult noted. No abnormal enhancing    intracranial mass seen. 2.scattered foci of T2/FLAIR hyperintensity noted in the periventricular    and subcortical white matter which essentially are nonspecific in imaging    appearance however differential considerations include demyelinating or    inflammatory process, migraine induced changes, chronic small vessel    disease or vascular disease. Procedure: [facility] 10/08/2014 [id number] CT BRAIN WITHOUT CONTRAST    Reason for Exam: \      FULL RESULT: EXAM: Brain CT without contrast dated 10/8/2014. HISTORY: Headache after head injury. COMPARISON: Brain CT dated 4/16/2014. TECHNIQUE: Multi-detector axial images are obtained through the head. Findings: There is no evidence for acute intracranial hemorrhage,    midline shift or mass effect.  Ventricular and cistern spaces are normal and symmetric.  Anya and white matter differentiation is well preserved. No intra- or extra-axial fluid collections or mass occupying lesions    seen. Bilateral cerebellopontine angles are normal. Visualized orbital    contents are normal. Study viewed in bone windows shows no    abnormalities. Mild left ethmoid sinusitis otherwise all visualized    sinuses are normally aerated. Bilateral temporomandibular joints are    normally aligned. IMPRESSION: No acute intracranial abnormalities. Mild left ethmoid    sinusitis. Plan:           VISITING DIAGNOSIS:      ICD-10-CM    1. Seizure disorder (HCC)  G40.909 lamoTRIgine (LAMICTAL) 100 MG tablet     divalproex (DEPAKOTE) 250 MG DR tablet     CBC     Electrolyte Panel     Valproic Acid Level, Total     Lamotrigine Level      2. Anxiety, mild  F41.9 divalproex (DEPAKOTE) 250 MG DR tablet      3. Disturbance, sleep  G47.9 divalproex (DEPAKOTE) 250 MG DR tablet      4. Tobacco use  Z72.0 divalproex (DEPAKOTE) 250 MG DR tablet      5. Gastroesophageal reflux disease without esophagitis  K21.9 divalproex (DEPAKOTE) 250 MG DR tablet      6. Nonintractable headache, unspecified chronicity pattern, unspecified headache type  R51.9 divalproex (DEPAKOTE) 250 MG DR tablet      7. Dizziness  R42 divalproex (DEPAKOTE) 250 MG DR tablet      8. Sleep difficulties  G47.9 divalproex (DEPAKOTE) 250 MG DR tablet      9. Partial idiopathic epilepsy with seizures of localized onset, not intractable, without status epilepticus (HCC)  G40.009 divalproex (DEPAKOTE) 250 MG DR tablet      10. Nonintractable paroxysmal hemicrania, unspecified chronicity pattern  G44.039 divalproex (DEPAKOTE) 250 MG DR tablet      11. Chronic migraine without aura without status migrainosus, not intractable  G43.709 divalproex (DEPAKOTE) 250 MG DR tablet      12. VBI (vertebrobasilar insufficiency)  G45.0       13.  Injury of head, sequela  S09.90XS       14. H/O fall  Z91.81 CONCERNS   &   INCREASED   RISK   FOR          * TIA,  CEREBRO  VASCULAR  ISCHEMIA, STROKE       *  SEIZURE  ACTIVITY,  EPILEPSY ,        *  Chronic  Headaches &  Migraines      *   COGNITIVE  &   MEMORY PROBLEMS  AND  DEMENTIAS                     VARIOUS  RISK   FACTORS   WERE  REVIEWED   AND   DISCUSSED. *  PATIENT   HAS  MULTIPLE   MEDICAL, MENTAL HEALTH         &      NEUROLOGICAL   PROBLEMS . PATIENT'S   MANAGEMENT  IS  CHALLENGING. PLAN:       [place]  Of  The  Diagnoses,  The  Management & Treatment  Options           AND    Care  plan  Were        Reviewed and   Discussed   With  patient. * Goals  And  Expectations  Of  The  Therapy  Discussed   And  Reviewed. *   Benefits   And   Side  Effect  Profile  Of  Medication/s   Were   Discussed             * Need   For  Further   Follow up For  The  Various  Problems  Were discussed. * Results  Of  The  Previous  Diagnostic tests were reviewed and questions answered. patient  understand the same. Medical  Decision  Making  Was  Made  Based on the   Complexity  Of  Above  Mentioned  Diagnoses,        Data reviewed   & diagnostic  Tests  Reviewed,  Risk  Of  Significant   Co morbidities  & complicating   Factors. Medical  Decision  Was   High  Complexity  Due   To  The  Patient's  Multiple  Symptoms,  ,      Complex  Treatment  Regimen,  Multiple medications and   Risk  Of   Side  Effects,      Difficulty  In  Medication  Management  And  Diagnostic  Challenges   In  View  Of  The  Associated       Co  Morbid  Conditions   And  Problems. * FALL   PRECAUTIONS.       THESE  REVIEWED   AND  DISCUSSED      *   ABSOLUTELY   NO  DRIVING    -       REVIEWED     WITH  PATIENT      *   BE  CAREFUL  WITH  ACTIVITIES             *   ADEQUATE   FLUID  INTAKE   AND  ELECTROLYTE  BALANCE             * KEEP  DAIRY  OF   THE  NEUROLOGICAL  SYMPTOMS        RECORDING THE DURATION  AND  FREQUENCY. *  AVOID    CONDITIONS  AND  FACTORS   THAT  MAKE   NEUROLOGICAL  SYMPTOMS  WORSE. *   SEIZURE  PRECAUTIONS.  -   DISCUSSED                  A)  Avoid  Working  At   [employer] B)  Avoid  Working  With  Heavy machinery  . C)  Avoid   Swimming,  Climbing  A  Ladder   Unattended. D)  Avoid   Driving   If  You   Have  A  Seizure. E)  Must   Be  Seizure  Free   For  At   Least   6 months,  Before   You  Can drive. F) Some times  Your  May  Feel  Seizure coming  Before  It  Begins. You  May feel               Strange smell or funny  Feeling  In  Your  Stomach,  Which is  Called   Aura. TIPS  TO  REDUCE/ PREVENT  SEIZURES         1. Take  Your  Anti seizure  Medications   As   Recommended. 2. Get   Enough   Sleep. Sleep  Deprivation   Can  Trigger  A  Seizure. 3. If   You   Have  A fever,  Treat  It  At  Once,  And  Contact   Your  Primary  Care Providers. 4. Avoid   Alcohol. 5. Avoid  Flashing  Lights,  Loud  Noises and  TV  And  Video  Games,             As   These  May  Trigger   Your  Seizures       6. Control  Your  Stress  And   Have  Adequate  Rest.       7.   If  You  Feel  A  Seizure  Coming   On :             A) warn people  Who  Are  With  You             B)  Make  Sure  There  Are  No  Sharp or  Hard  Objects  Around you. C)  Lay down  On  Your  Side  And  Relax. *  TO  MAINTAIN  REGULAR  SLEEP  WAKE  CYCLES.      *   TO  HAVE  ADEQUATE  REST  AND   SLEEP    HOURS.          *    AVOID  ANY USAGE OF                   TOBACCO,  EXCESSIVE  ALCOHOL  AND   ILLEGAL   SUBSTANCES          *  CONTINUE MEDICATIONS PRESCRIBED BY NEUROLOGIST AS    RECOMMENDED     *   Compliance   With  Medications   And  Instructions          * CURRENTLY  TOLERATING  THE  PRESCRIBED   MEDICATIONS. WITHOUT  ANY  SIGNIFICANT  SIDE  EFFECTS   &  GETTING BENEFIT. *  May   Use  Pill  Box,    If  Needed      *  MEDICATIONS TO AVOID:    WELLBUTRIN,  ULTRAM          *     Antiplatelet  therapy    As   Recommended  Was   Discussed          *    Prophylactic  Use   Of     Vitamin   B   Complex,  Folic  Acid,    Vitamin  B12        Multivitamin   Tablet  Daily    Supplementations   Over  The  Counter  Discussed           *  PATIENT  IS  ALSO   COUNSELED   TO  KEEP    ACTIVITIES         A)   SIMPLE      B)  ORGANIZED      C)  WRITE   DOWN                     *  EVALUATIONS  AND  FOLLOW UP:                                          * CARDIOLOGY               * EPILEPSY  MONITORING   UNIT                                 *    PREVIOUS     H/O    PARTIAL  COMPLEX       SEIZURES                          DUE  TO  SLEEP  DEPRIVATION     AND  PROLONGED  TV   WATCHING                                *     PATIENT   RECOMMENDED  :                        A)    TO  CONTINUE     SEIZURE  PRECAUTIONS                       B)     TO  CONTINUE        LAMICTAL   AND  DEPAKOTE  AS  BEFORE                        C)   PERIODIC    ANTI  EPILEPTIC  MEDICATION  LEVELS                                  EVERY    3   MONTHS                         VARIOUS  RISK   FACTORS   WERE  REVIEWED   AND   DISCUSSED.                       Orders Placed This Encounter   Procedures    CBC    Electrolyte Panel    Valproic Acid Level, Total    Lamotrigine Level       Orders Placed This Encounter   Medications    lamoTRIgine (LAMICTAL) 100 MG tablet     Si TABLET DAILY AT BEDTIME     Dispense:  30 tablet     Refill:  5    divalproex (DEPAKOTE) 250 MG DR tablet     Sig: take 3 tablets by mouth twice a day     Dispense:  180 tablet     Refill:  5                   *PATIENT   TO  FOLLOW  UP  WITH PRIMARY  CARE   AND   OTHER  CONSULTANTS  AS  BEFORE.           *TO  FOLLOW  WITH   MENTAL  HEALTH  PROFESSIONALS ,  INCLUDING            PSYCHOLOGICAL  COUNSELING   AND  PSYCHIATRIC  EVALUTIONS            *  Maintain   Healthy  Life Style    With   Periodic  Monitoring  Of      Any  Medical  Conditions  Including   Elevated  Blood  Pressure,  Lipid  Profile,     Blood  Sugar levels  And   Heart  Disease. *   Period   Screening  For  Cancers  Involving  Breast,  Colon,    Prostate, lungs  And  Other  Organs  As  Applicable,  In consultation   With  Your  Primary Care Providers. * Second  Neurological  Opinion  And  Evaluations  In  [facility] AND [facility]  Setting  If  Patient  Is  Interested. *  If  The  Patient remains  Neurologically  Stable   Return   To  [facility] Neurology Department       IN    3 - 4       MONTHS  TIME   FOR  FURTHER  FOLLOW UP.                   *  If   There is  Any  Significant  Worsening   Of  Current  Symptoms  And      Or  If patient  Develops   Any additional  New  Neurological  Symptoms  Or  Significant  Concerns       Should  Call  911 or  Go  To  Emergency  Department  For  Further  Immediate  Evaluation. *   The  Neurological   Findings,  Possible  Diagnosis,  Differential diagnoses   And  Options      For    Further   Investigations   And  management   Are  Discussed  Comprehensively. Medications   And  Prescription   Risks  And  Side effects  Are   Also  Discussed. The  Above  Were  Reviewed  With  patient and       questions  Answered  In  Detail. More   Than   50% of face  To face Time   Was  Spent  On  Counseling   And   Coordination  Of  Care       Of   Patient's multiple   Neurological  Problems   And   Comorbid  Medical   Conditions.            VIRTUAL   VIDEO  VISIT          PATIENT    EVALUATED   BY  NEUROLOGIST   VIA   a Virtual  synchronous Visit (Audio - Video visit) encounter          to address concerns as mentioned  ABOVE        ALSO    nursing   staff   was present     before,  during  and  after   the    visit        Due to this being a TeleHealth encounter (During XRMSG-28 public health emergency), evaluation of the following organ systems was limited:     Vitals/Constitutional/EENT/Resp/CV/GI//MS/Neuro/Skin/Heme-Lymph-Imm. Pursuant to the emergency declaration under the [address] [place] [address] and the [employer] Act, this Virtual Visit was conducted with patient's (and/or legal guardian's) consent, to reduce the patient's risk of exposure to COVID-19 and provide necessary medical care. The patient (and/or legal guardian) has also been advised to contact this office for worsening conditions or problems, and seek emergency medical treatment and/or call 911 if deemed necessary. Patient identification was verified at the start of the visit: Yes    Total time spent for this encounter:  ( Time Spent:   25 minutes )    Services were provided through a video synchronous discussion   and  limited    examination  virtually to substitute for in-person clinic visit. Patient    located at    [address] [place] [address]. Veronique Mckeon, was evaluated through a synchronous (real-Time) audio-video encounter. The patient (or guardian if applicable) is aware that this is a billable service, which includes applicable co-pays. This Virtual Visit was conducted with patient's (and/or legal guardian's) consent. The visit was conducted pursuant to the emergency declaration under the [address] [place] [address] and the [employer] Act.         Patient identification was verified and a caregiver was present when appropriate. The patient was located in a state where the provider was licensed to provider care. Willa Romeo. MD Devang Castillo MD on 0/66/3018 at 3:13 PM    An electronic signature was used to authenticate this note. Electronically signed by Tabby Mnedez MD.,  [facility]       Board Certified in  Neurology &  In  [place] [address] of Psychiatry and Neurology ([facility])      DISCLAIMER:   Although every effort was made to ensure the accuracy of this  electronic transcription, some errors in transcription may have occurred. GENERAL PATIENT INSTRUCTIONS:     A Healthy Lifestyle: Care Instructions  Your Care Instructions  A healthy lifestyle can help you feel good, stay at a healthy weight, and have plenty of energy for both work and play. A healthy lifestyle is something you can share with your whole family. A healthy lifestyle also can lower your risk for serious health problems, such as high blood pressure, heart disease, and diabetes. You can follow a few steps listed below to improve your health and the health of your family. Follow-up care is a key part of your treatment and safety. Be sure to make and go to all appointments, and call your doctor if you are having problems. Its also a good idea to know your test results and keep a list of the medicines you take. How can you care for yourself at home? Do not eat too much sugar, fat, or fast foods. You can still have dessert and treats now and then. The goal is moderation. Start small to improve your eating habits. Pay attention to portion sizes, drink less juice and soda pop, and eat more fruits and vegetables. Eat a healthy amount of food. A 3-ounce serving of meat, for example, is about the size of a deck of cards. Fill the rest of your plate with vegetables and whole grains. Limit the amount of soda and sports drinks you have every day.  Drink more water when you are thirsty. Eat at least 5 servings of fruits and vegetables every day. It may seem like a lot, but it is not hard to reach this goal. A serving or helping is 1 piece of fruit, 1 cup of vegetables, or 2 cups of leafy, raw vegetables. Have an apple or some carrot sticks as an afternoon snack instead of a candy bar. Try to have fruits and/or vegetables at every meal.  Make exercise part of your daily routine. You may want to start with simple activities, such as walking, bicycling, or slow swimming. Try to be active 30 to 60 minutes every day. You do not need to do all 30 to 60 minutes all at once. For example, you can exercise 3 times a day for 10 or 20 minutes. Moderate exercise is safe for most people, but it is always a good idea to talk to your doctor before starting an exercise program.  Keep moving. [place] the lawn, work in the garden, or [place]. Take the stairs instead of the elevator at work. If you smoke, quit. People who smoke have an increased risk for heart attack, stroke, cancer, and other lung illnesses. Quitting is hard, but there are ways to boost your chance of quitting tobacco for good. Use nicotine gum, patches, or lozenges. Ask your doctor about stop-smoking programs and medicines. Keep trying. In addition to reducing your risk of diseases in the future, you will notice some benefits soon after you stop using tobacco. If you have shortness of breath or asthma symptoms, they will likely get better within a few weeks after you quit. Limit how much alcohol you drink. Moderate amounts of alcohol (up to 2 drinks a day for men, 1 drink a day for women) are okay. But drinking too much can lead to liver problems, high blood pressure, and other health problems. Family health  If you have a family, there are many things you can do together to improve your health. Eat meals together as a family as often as possible. Eat healthy foods.  This includes fruits, vegetables, lean meats and dairy, and whole grains. Include your family in your fitness plan. Most people think of activities such as jogging or tennis as the way to fitness, but there are many ways you and your family can be more active. Anything that makes you breathe hard and gets your heart pumping is exercise. Here are some tips:  Walk to do errands or to take your child to school or the bus. Go for a family bike ride after dinner instead of watching TV. Where can you learn more? Go to https://[employer]. org and sign in to your [employer] account. Enter O722 in the "[employer]" box to learn more about \"A Healthy Lifestyle: Care Instructions. \"     If you do not have an account, please click on the \"Sign Up Now\" link. Current as of: July 26, 2016  Content Version: 11.2  © 7610-2160 [employer]. Care instructions adapted under license by [facility] ([facility]). If you have questions about a medical condition or this instruction, always ask your healthcare professional. [address] any warranty or liability for your use of this information.

## 2022-08-29 ENCOUNTER — OFFICE VISIT (OUTPATIENT)
Dept: PRIMARY CARE CLINIC | Age: 42
End: 2022-08-29
Payer: MEDICARE

## 2022-08-29 VITALS
BODY MASS INDEX: 30.22 KG/M2 | HEART RATE: 100 BPM | SYSTOLIC BLOOD PRESSURE: 130 MMHG | WEIGHT: 188 LBS | TEMPERATURE: 97.9 F | OXYGEN SATURATION: 96 % | DIASTOLIC BLOOD PRESSURE: 72 MMHG | HEIGHT: 66 IN

## 2022-08-29 DIAGNOSIS — J98.01 BRONCHOSPASM: ICD-10-CM

## 2022-08-29 DIAGNOSIS — R05.9 COUGH: Primary | ICD-10-CM

## 2022-08-29 PROCEDURE — G8427 DOCREV CUR MEDS BY ELIG CLIN: HCPCS | Performed by: FAMILY MEDICINE

## 2022-08-29 PROCEDURE — G8417 CALC BMI ABV UP PARAM F/U: HCPCS | Performed by: FAMILY MEDICINE

## 2022-08-29 PROCEDURE — 4004F PT TOBACCO SCREEN RCVD TLK: CPT | Performed by: FAMILY MEDICINE

## 2022-08-29 PROCEDURE — 99213 OFFICE O/P EST LOW 20 MIN: CPT | Performed by: FAMILY MEDICINE

## 2022-08-29 PROCEDURE — 99212 OFFICE O/P EST SF 10 MIN: CPT | Performed by: FAMILY MEDICINE

## 2022-08-29 RX ORDER — MELOXICAM 15 MG/1
TABLET ORAL
Qty: 30 TABLET | Refills: 5 | Status: SHIPPED | OUTPATIENT
Start: 2022-08-29

## 2022-08-29 RX ORDER — BUDESONIDE, GLYCOPYRROLATE, AND FORMOTEROL FUMARATE 160; 9; 4.8 UG/1; UG/1; UG/1
2 AEROSOL, METERED RESPIRATORY (INHALATION) 2 TIMES DAILY
Qty: 1 EACH | Refills: 3 | Status: SHIPPED | OUTPATIENT
Start: 2022-08-29

## 2022-08-29 ASSESSMENT — ENCOUNTER SYMPTOMS
WHEEZING: 1
COUGH: 1
RHINORRHEA: 0
GASTROINTESTINAL NEGATIVE: 1
EYES NEGATIVE: 1
SHORTNESS OF BREATH: 1
SINUS PRESSURE: 0

## 2022-08-29 NOTE — PROGRESS NOTES
Subjective:      Patient ID: Lola Barraza is a 42 y.o. male. HPI  acute urgent care for recurrent cough. Suspected asthma/reactive airway disease exacerbation. He had sample brestri inhalers and was doing ok. Off the inhaler a little over 2 weeks. Trying to get more out of the inhaler even though it is at 0 inhalations. Coughing a lot at present and giving him a headache. Mostly airway concerns and cough. Not having any nasal symptoms . Past Medical History:   Diagnosis Date    Anxiety, mild     Colon polyps     Depression     Dizziness     Eczema     Epilepsy ([address]     GERD (gastroesophageal reflux disease)     H/O fall     Head ache     Head injury     Hiatal hernia     Inguinal hernia     Right. Low back pain     Lumbar sprain     Migraine     Plantar fasciitis, bilateral     Seizure disorder (HCC)     Sigmoid diverticulosis     Sleep difficulties     Status migrainosus     TIA (transient ischemic attack) 09/2017    Tobacco use     Chronic. Past Surgical History:   Procedure Laterality Date    COLONOSCOPY  04/29/2011    Internal hemorrhoids, possible anal fissure, few scattered diverticula. COLONOSCOPY  06/04/2010    Mild sigmoid diverticulosis. COLONOSCOPY  08/28/2009    Sigmoid diverticulosis, internal hemorrhoids. COLONOSCOPY  05/27/2008    Internal hemorrhoids.     COLONOSCOPY  11/19/2014    polyp in rectum    COLONOSCOPY  6/1/16    sigmoid diverticulosis, colon polyp, internal hemorrhoids    COLONOSCOPY  04/19/2019    [id number]    GASTRIC FUNDOPLICATION  65/18/8603    HERNIA REPAIR Left 11/30/2017    Left Inguinal Hernia Repair w/ Mesh performed by José Luis Pfeiffer MD at [address] [place] [address] Right 05/30/2013    INGUINAL HERNIA REPAIR Left 01/24/2013    INGUINAL HERNIA REPAIR Right 09/24/2015    KNEE ARTHROSCOPY Left     PILONIDAL CYST EXCISION N/A 7/23/2020    PILONIDAL CYSTECTOMY performed by Luca Thorpe DO at [facility] [place] GASTROINTESTINAL ENDOSCOPY  03/05/2010    Recurrent hiatal hernia. UPPER GASTROINTESTINAL ENDOSCOPY  6/1/16    S/P brenda fundoplication, good repair, retained gastric fluid    VASECTOMY           Review of Systems   Constitutional:  Negative for chills and fever. HENT: Negative. Negative for congestion, dental problem, rhinorrhea and sinus pressure. Eyes: Negative. Respiratory:  Positive for cough, shortness of breath and wheezing. Cardiovascular: Negative. Gastrointestinal: Negative. Genitourinary: Negative. Musculoskeletal: Negative. Skin: Negative. Neurological:  Positive for seizures (recent seizure reported). Psychiatric/Behavioral: Negative. Objective:   Physical Exam  Constitutional:       General: He is not in acute distress. Appearance: He is well-developed. HENT:      Head: Normocephalic and atraumatic. Right Ear: External ear normal.      Left Ear: External ear normal.      Mouth/Throat:      Pharynx: No oropharyngeal exudate. Eyes:      General: No scleral icterus. Conjunctiva/sclera: Conjunctivae normal.   Neck:      Thyroid: No thyromegaly. Cardiovascular:      Rate and Rhythm: Normal rate and regular rhythm. Heart sounds: Normal heart sounds. No murmur heard. Pulmonary:      Effort: Pulmonary effort is normal. No respiratory distress. Breath sounds: Wheezing (end exp.) present. Abdominal:      General: Bowel sounds are normal. There is no distension. Palpations: Abdomen is soft. Tenderness: There is no abdominal tenderness. There is no rebound. Musculoskeletal:         General: No tenderness. Normal range of motion. Cervical back: Neck supple. Skin:     General: Skin is warm and dry. Findings: No erythema or rash. Neurological:      Mental Status: He is alert and oriented to person, place, and time. Psychiatric:         Behavior: Behavior normal.         Thought Content:  Thought content normal. Judgment: Judgment normal.     /72 (Site: Left Upper Arm, Position: Sitting, Cuff Size: Large Adult)   Pulse 100   Temp 97.9 °F (36.6 °C) (Tympanic)   Ht 5' 6\" (1.676 m)   Wt 188 lb (85.3 kg)   SpO2 96%   BMI 30.34 kg/m²     Assessment:      Encounter Diagnoses   Name Primary? Cough Yes    Bronchospasm      Recurrent symptoms off inhaler. Plan:        Melia Santoro worked well as a sample inhaler. Appears to be partially available on his medicare plan per EMR software. Inhalers have been too expensive for him to afford. Will prescribe Breztri nhaler and give him 2 more sample .       Gilmar Valencia MD

## 2022-11-01 ENCOUNTER — HOSPITAL ENCOUNTER (OUTPATIENT)
Age: 42
Setting detail: OBSERVATION
Discharge: HOME | End: 2022-11-03
Attending: INTERNAL MEDICINE | Admitting: STUDENT IN AN ORGANIZED HEALTH CARE EDUCATION/TRAINING PROGRAM
Payer: MEDICARE

## 2022-11-01 ENCOUNTER — TELEPHONE (OUTPATIENT)
Dept: FAMILY MEDICINE CLINIC | Age: 42
End: 2022-11-01

## 2022-11-01 PROBLEM — I48.91 ATRIAL FIBRILLATION WITH RVR (HCC): Status: ACTIVE | Noted: 2022-11-01

## 2022-11-01 PROCEDURE — 2060000000 HC ICU INTERMEDIATE R&B

## 2022-11-01 RX ORDER — LAMOTRIGINE 100 MG/1
100 TABLET ORAL DAILY
Status: DISCONTINUED | OUTPATIENT
Start: 2022-11-02 | End: 2022-11-03 | Stop reason: HOSPADM

## 2022-11-01 RX ORDER — SODIUM CHLORIDE 9 MG/ML
INJECTION, SOLUTION INTRAVENOUS PRN
Status: DISCONTINUED | OUTPATIENT
Start: 2022-11-01 | End: 2022-11-03 | Stop reason: HOSPADM

## 2022-11-01 RX ORDER — HEPARIN SODIUM AND DEXTROSE 10000; 5 [USP'U]/100ML; G/100ML
5-30 INJECTION INTRAVENOUS CONTINUOUS
Status: DISCONTINUED | OUTPATIENT
Start: 2022-11-02 | End: 2022-11-03

## 2022-11-01 RX ORDER — POTASSIUM CHLORIDE 7.45 MG/ML
10 INJECTION INTRAVENOUS PRN
Status: DISCONTINUED | OUTPATIENT
Start: 2022-11-01 | End: 2022-11-03 | Stop reason: HOSPADM

## 2022-11-01 RX ORDER — ONDANSETRON 4 MG/1
4 TABLET, ORALLY DISINTEGRATING ORAL EVERY 8 HOURS PRN
Status: DISCONTINUED | OUTPATIENT
Start: 2022-11-01 | End: 2022-11-03 | Stop reason: HOSPADM

## 2022-11-01 RX ORDER — DOCUSATE SODIUM 100 MG/1
100 CAPSULE, LIQUID FILLED ORAL DAILY
Status: DISCONTINUED | OUTPATIENT
Start: 2022-11-02 | End: 2022-11-03 | Stop reason: HOSPADM

## 2022-11-01 RX ORDER — HEPARIN SODIUM 1000 [USP'U]/ML
4000 INJECTION, SOLUTION INTRAVENOUS; SUBCUTANEOUS ONCE
Status: COMPLETED | OUTPATIENT
Start: 2022-11-02 | End: 2022-11-02

## 2022-11-01 RX ORDER — ACETAMINOPHEN 650 MG/1
650 SUPPOSITORY RECTAL EVERY 6 HOURS PRN
Status: DISCONTINUED | OUTPATIENT
Start: 2022-11-01 | End: 2022-11-03 | Stop reason: HOSPADM

## 2022-11-01 RX ORDER — POTASSIUM CHLORIDE 20 MEQ/1
40 TABLET, EXTENDED RELEASE ORAL PRN
Status: DISCONTINUED | OUTPATIENT
Start: 2022-11-01 | End: 2022-11-03 | Stop reason: HOSPADM

## 2022-11-01 RX ORDER — HEPARIN SODIUM 1000 [USP'U]/ML
2000 INJECTION, SOLUTION INTRAVENOUS; SUBCUTANEOUS PRN
Status: DISCONTINUED | OUTPATIENT
Start: 2022-11-01 | End: 2022-11-03 | Stop reason: HOSPADM

## 2022-11-01 RX ORDER — HEPARIN SODIUM 1000 [USP'U]/ML
4000 INJECTION, SOLUTION INTRAVENOUS; SUBCUTANEOUS PRN
Status: DISCONTINUED | OUTPATIENT
Start: 2022-11-01 | End: 2022-11-03 | Stop reason: HOSPADM

## 2022-11-01 RX ORDER — SODIUM CHLORIDE 0.9 % (FLUSH) 0.9 %
5-40 SYRINGE (ML) INJECTION EVERY 12 HOURS SCHEDULED
Status: DISCONTINUED | OUTPATIENT
Start: 2022-11-02 | End: 2022-11-03 | Stop reason: HOSPADM

## 2022-11-01 RX ORDER — ACETAMINOPHEN 325 MG/1
650 TABLET ORAL EVERY 6 HOURS PRN
Status: DISCONTINUED | OUTPATIENT
Start: 2022-11-01 | End: 2022-11-03 | Stop reason: HOSPADM

## 2022-11-01 RX ORDER — SODIUM CHLORIDE 0.9 % (FLUSH) 0.9 %
10 SYRINGE (ML) INJECTION PRN
Status: DISCONTINUED | OUTPATIENT
Start: 2022-11-01 | End: 2022-11-03 | Stop reason: HOSPADM

## 2022-11-01 RX ORDER — ONDANSETRON 2 MG/ML
4 INJECTION INTRAMUSCULAR; INTRAVENOUS EVERY 6 HOURS PRN
Status: DISCONTINUED | OUTPATIENT
Start: 2022-11-01 | End: 2022-11-03 | Stop reason: HOSPADM

## 2022-11-01 RX ORDER — POLYETHYLENE GLYCOL 3350 17 G/17G
17 POWDER, FOR SOLUTION ORAL DAILY PRN
Status: DISCONTINUED | OUTPATIENT
Start: 2022-11-01 | End: 2022-11-03 | Stop reason: HOSPADM

## 2022-11-01 RX ORDER — LISINOPRIL 20 MG/1
20 TABLET ORAL DAILY
Status: DISCONTINUED | OUTPATIENT
Start: 2022-11-02 | End: 2022-11-03 | Stop reason: HOSPADM

## 2022-11-01 RX ORDER — MAGNESIUM SULFATE 1 G/100ML
1000 INJECTION INTRAVENOUS PRN
Status: DISCONTINUED | OUTPATIENT
Start: 2022-11-01 | End: 2022-11-03 | Stop reason: HOSPADM

## 2022-11-01 NOTE — TELEPHONE ENCOUNTER
Patient discharged from [facility] [place] 11/1/22 for pneumonia and COPD. Calling for follow up with .

## 2022-11-02 ENCOUNTER — APPOINTMENT (OUTPATIENT)
Dept: NUCLEAR MEDICINE | Age: 42
End: 2022-11-02
Attending: INTERNAL MEDICINE
Payer: MEDICARE

## 2022-11-02 PROBLEM — J44.0 ACUTE BRONCHITIS WITH COPD (HCC): Status: ACTIVE | Noted: 2022-11-02

## 2022-11-02 PROBLEM — J96.01 ACUTE RESPIRATORY FAILURE WITH HYPOXEMIA (HCC): Status: ACTIVE | Noted: 2022-11-02

## 2022-11-02 PROBLEM — J20.9 ACUTE BRONCHITIS WITH COPD (HCC): Status: ACTIVE | Noted: 2022-11-02

## 2022-11-02 LAB
ANION GAP SERPL CALCULATED.3IONS-SCNC: 11 MMOL/L (ref 9–17)
BILIRUBIN URINE: NEGATIVE
BUN BLDV-MCNC: 26 MG/DL (ref 6–20)
CALCIUM SERPL-MCNC: 8.6 MG/DL (ref 8.6–10.4)
CHLORIDE BLD-SCNC: 97 MMOL/L (ref 98–107)
CO2: 25 MMOL/L (ref 20–31)
COLOR: YELLOW
COMMENT UA: NORMAL
CREAT SERPL-MCNC: 1 MG/DL (ref 0.7–1.2)
EPITHELIAL CELLS UA: NORMAL /HPF (ref 0–5)
GFR SERPL CREATININE-BSD FRML MDRD: >60 ML/MIN/1.73M2
GLUCOSE BLD-MCNC: 126 MG/DL (ref 70–99)
GLUCOSE URINE: NEGATIVE
HCT VFR BLD CALC: 40.6 % (ref 40.7–50.3)
HEMOGLOBIN: 14.4 G/DL (ref 13–17)
INR BLD: 1.1
KETONES, URINE: NEGATIVE
LEUKOCYTE ESTERASE, URINE: NEGATIVE
MCH RBC QN AUTO: 34.7 PG (ref 25.2–33.5)
MCHC RBC AUTO-ENTMCNC: 35.5 G/DL (ref 28.4–34.8)
MCV RBC AUTO: 97.8 FL (ref 82.6–102.9)
NITRITE, URINE: NEGATIVE
NRBC AUTOMATED: 0 PER 100 WBC
PARTIAL THROMBOPLASTIN TIME: 21.5 SEC (ref 20.5–30.5)
PARTIAL THROMBOPLASTIN TIME: 40.4 SEC (ref 20.5–30.5)
PARTIAL THROMBOPLASTIN TIME: 51.1 SEC (ref 20.5–30.5)
PARTIAL THROMBOPLASTIN TIME: 55.8 SEC (ref 20.5–30.5)
PDW BLD-RTO: 12.1 % (ref 11.8–14.4)
PH UA: 6.5 (ref 5–8)
PLATELET # BLD: 229 K/UL (ref 138–453)
PMV BLD AUTO: 9.9 FL (ref 8.1–13.5)
POTASSIUM SERPL-SCNC: 4 MMOL/L (ref 3.7–5.3)
PROCALCITONIN: 0.04 NG/ML
PROTEIN UA: NEGATIVE
PROTHROMBIN TIME: 11.9 SEC (ref 9.1–12.3)
RBC # BLD: 4.15 M/UL (ref 4.21–5.77)
RBC UA: NORMAL /HPF (ref 0–4)
REASON FOR REJECTION: NORMAL
SODIUM BLD-SCNC: 133 MMOL/L (ref 135–144)
SPECIFIC GRAVITY UA: 1.02 (ref 1–1.03)
TURBIDITY: CLEAR
URINE HGB: NEGATIVE
UROBILINOGEN, URINE: NORMAL
VITAMIN D 25-HYDROXY: 8.8 NG/ML
WBC # BLD: 11.3 K/UL (ref 3.5–11.3)
WBC UA: NORMAL /HPF (ref 0–5)
ZZ NTE CLEAN UP: ORDERED TEST: NORMAL
ZZ NTE WITH NAME CLEAN UP: SPECIMEN SOURCE: NORMAL

## 2022-11-02 PROCEDURE — A9500 TC99M SESTAMIBI: HCPCS | Performed by: INTERNAL MEDICINE

## 2022-11-02 PROCEDURE — 96376 TX/PRO/DX INJ SAME DRUG ADON: CPT

## 2022-11-02 PROCEDURE — 2580000003 HC RX 258: Performed by: INTERNAL MEDICINE

## 2022-11-02 PROCEDURE — 80048 BASIC METABOLIC PNL TOTAL CA: CPT

## 2022-11-02 PROCEDURE — 81001 URINALYSIS AUTO W/SCOPE: CPT

## 2022-11-02 PROCEDURE — 94640 AIRWAY INHALATION TREATMENT: CPT

## 2022-11-02 PROCEDURE — 36415 COLL VENOUS BLD VENIPUNCTURE: CPT

## 2022-11-02 PROCEDURE — 6370000000 HC RX 637 (ALT 250 FOR IP): Performed by: NURSE PRACTITIONER

## 2022-11-02 PROCEDURE — 94761 N-INVAS EAR/PLS OXIMETRY MLT: CPT

## 2022-11-02 PROCEDURE — 6370000000 HC RX 637 (ALT 250 FOR IP): Performed by: INTERNAL MEDICINE

## 2022-11-02 PROCEDURE — 3430000000 HC RX DIAGNOSTIC RADIOPHARMACEUTICAL: Performed by: INTERNAL MEDICINE

## 2022-11-02 PROCEDURE — 6360000002 HC RX W HCPCS: Performed by: NURSE PRACTITIONER

## 2022-11-02 PROCEDURE — 78452 HT MUSCLE IMAGE SPECT MULT: CPT

## 2022-11-02 PROCEDURE — 85027 COMPLETE CBC AUTOMATED: CPT

## 2022-11-02 PROCEDURE — 84145 PROCALCITONIN (PCT): CPT

## 2022-11-02 PROCEDURE — 82306 VITAMIN D 25 HYDROXY: CPT

## 2022-11-02 PROCEDURE — 99223 1ST HOSP IP/OBS HIGH 75: CPT | Performed by: INTERNAL MEDICINE

## 2022-11-02 PROCEDURE — 2580000003 HC RX 258: Performed by: NURSE PRACTITIONER

## 2022-11-02 PROCEDURE — 2700000000 HC OXYGEN THERAPY PER DAY

## 2022-11-02 PROCEDURE — 85610 PROTHROMBIN TIME: CPT

## 2022-11-02 PROCEDURE — 6370000000 HC RX 637 (ALT 250 FOR IP): Performed by: STUDENT IN AN ORGANIZED HEALTH CARE EDUCATION/TRAINING PROGRAM

## 2022-11-02 PROCEDURE — 81003 URINALYSIS AUTO W/O SCOPE: CPT

## 2022-11-02 PROCEDURE — 2T015 HOSPITALIST 2ND TOUCH: CPT | Performed by: STUDENT IN AN ORGANIZED HEALTH CARE EDUCATION/TRAINING PROGRAM

## 2022-11-02 PROCEDURE — 85730 THROMBOPLASTIN TIME PARTIAL: CPT

## 2022-11-02 PROCEDURE — 6360000002 HC RX W HCPCS: Performed by: INTERNAL MEDICINE

## 2022-11-02 PROCEDURE — 2060000000 HC ICU INTERMEDIATE R&B

## 2022-11-02 RX ORDER — GUAIFENESIN 600 MG/1
600 TABLET, EXTENDED RELEASE ORAL 2 TIMES DAILY
Status: DISCONTINUED | OUTPATIENT
Start: 2022-11-02 | End: 2022-11-03 | Stop reason: HOSPADM

## 2022-11-02 RX ORDER — LEVALBUTEROL INHALATION SOLUTION 1.25 MG/3ML
1.25 SOLUTION RESPIRATORY (INHALATION)
Status: DISCONTINUED | OUTPATIENT
Start: 2022-11-02 | End: 2022-11-03 | Stop reason: HOSPADM

## 2022-11-02 RX ORDER — NICOTINE 21 MG/24HR
1 PATCH, TRANSDERMAL 24 HOURS TRANSDERMAL DAILY
Status: DISCONTINUED | OUTPATIENT
Start: 2022-11-02 | End: 2022-11-03 | Stop reason: HOSPADM

## 2022-11-02 RX ORDER — ASPIRIN 81 MG/1
81 TABLET ORAL DAILY
Status: DISCONTINUED | OUTPATIENT
Start: 2022-11-02 | End: 2022-11-02

## 2022-11-02 RX ORDER — MELOXICAM 7.5 MG/1
15 TABLET ORAL DAILY
Status: DISCONTINUED | OUTPATIENT
Start: 2022-11-02 | End: 2022-11-03 | Stop reason: HOSPADM

## 2022-11-02 RX ORDER — HYDROCODONE BITARTRATE AND ACETAMINOPHEN 5; 325 MG/1; MG/1
1 TABLET ORAL ONCE
Status: COMPLETED | OUTPATIENT
Start: 2022-11-02 | End: 2022-11-02

## 2022-11-02 RX ORDER — LEVOFLOXACIN 500 MG/1
500 TABLET, FILM COATED ORAL DAILY
Status: DISCONTINUED | OUTPATIENT
Start: 2022-11-02 | End: 2022-11-03 | Stop reason: HOSPADM

## 2022-11-02 RX ORDER — GUAIFENESIN DEXTROMETHORPHAN HYDROBROMIDE ORAL SOLUTION 10; 100 MG/5ML; MG/5ML
5 SOLUTION ORAL EVERY 4 HOURS PRN
Status: DISCONTINUED | OUTPATIENT
Start: 2022-11-02 | End: 2022-11-03 | Stop reason: HOSPADM

## 2022-11-02 RX ORDER — ATORVASTATIN CALCIUM 40 MG/1
40 TABLET, FILM COATED ORAL NIGHTLY
Status: DISCONTINUED | OUTPATIENT
Start: 2022-11-02 | End: 2022-11-03 | Stop reason: HOSPADM

## 2022-11-02 RX ORDER — BENZONATATE 100 MG/1
200 CAPSULE ORAL 3 TIMES DAILY PRN
Status: DISCONTINUED | OUTPATIENT
Start: 2022-11-02 | End: 2022-11-03 | Stop reason: HOSPADM

## 2022-11-02 RX ORDER — OXYCODONE HYDROCHLORIDE AND ACETAMINOPHEN 5; 325 MG/1; MG/1
1 TABLET ORAL EVERY 6 HOURS PRN
Status: DISCONTINUED | OUTPATIENT
Start: 2022-11-02 | End: 2022-11-03 | Stop reason: HOSPADM

## 2022-11-02 RX ORDER — FAMOTIDINE 20 MG/1
20 TABLET, FILM COATED ORAL 2 TIMES DAILY
Status: DISCONTINUED | OUTPATIENT
Start: 2022-11-02 | End: 2022-11-03 | Stop reason: HOSPADM

## 2022-11-02 RX ORDER — PREDNISONE 20 MG/1
40 TABLET ORAL DAILY
Status: DISCONTINUED | OUTPATIENT
Start: 2022-11-02 | End: 2022-11-03 | Stop reason: HOSPADM

## 2022-11-02 RX ADMIN — BENZONATATE 200 MG: 100 CAPSULE ORAL at 08:31

## 2022-11-02 RX ADMIN — SODIUM CHLORIDE, PRESERVATIVE FREE 10 ML: 5 INJECTION INTRAVENOUS at 14:02

## 2022-11-02 RX ADMIN — HYDROCODONE BITARTRATE AND ACETAMINOPHEN 1 TABLET: 5; 325 TABLET ORAL at 08:32

## 2022-11-02 RX ADMIN — LEVALBUTEROL HYDROCHLORIDE 1.25 MG: 1.25 SOLUTION RESPIRATORY (INHALATION) at 16:13

## 2022-11-02 RX ADMIN — LAMOTRIGINE 100 MG: 100 TABLET ORAL at 08:32

## 2022-11-02 RX ADMIN — IPRATROPIUM BROMIDE 0.5 MG: 0.5 SOLUTION RESPIRATORY (INHALATION) at 16:14

## 2022-11-02 RX ADMIN — HEPARIN SODIUM 4000 UNITS: 1000 INJECTION INTRAVENOUS; SUBCUTANEOUS at 02:22

## 2022-11-02 RX ADMIN — Medication 81 MG: at 08:31

## 2022-11-02 RX ADMIN — PREDNISONE 40 MG: 20 TABLET ORAL at 08:31

## 2022-11-02 RX ADMIN — METOPROLOL TARTRATE 12.5 MG: 25 TABLET ORAL at 20:43

## 2022-11-02 RX ADMIN — MELOXICAM 15 MG: 7.5 TABLET ORAL at 08:32

## 2022-11-02 RX ADMIN — FAMOTIDINE 20 MG: 20 TABLET, FILM COATED ORAL at 11:11

## 2022-11-02 RX ADMIN — HEPARIN SODIUM 16 UNITS/KG/HR: 10000 INJECTION, SOLUTION INTRAVENOUS at 02:22

## 2022-11-02 RX ADMIN — HEPARIN SODIUM 12 UNITS/KG/HR: 10000 INJECTION, SOLUTION INTRAVENOUS at 00:20

## 2022-11-02 RX ADMIN — FAMOTIDINE 20 MG: 20 TABLET, FILM COATED ORAL at 20:44

## 2022-11-02 RX ADMIN — HEPARIN SODIUM 18 UNITS/KG/HR: 10000 INJECTION, SOLUTION INTRAVENOUS at 18:17

## 2022-11-02 RX ADMIN — HEPARIN SODIUM 2000 UNITS: 1000 INJECTION INTRAVENOUS; SUBCUTANEOUS at 16:28

## 2022-11-02 RX ADMIN — DESMOPRESSIN ACETATE 40 MG: 0.2 TABLET ORAL at 20:44

## 2022-11-02 RX ADMIN — METOPROLOL TARTRATE 12.5 MG: 25 TABLET ORAL at 11:11

## 2022-11-02 RX ADMIN — OXYCODONE HYDROCHLORIDE AND ACETAMINOPHEN 1 TABLET: 5; 325 TABLET ORAL at 17:00

## 2022-11-02 RX ADMIN — IPRATROPIUM BROMIDE 0.5 MG: 0.5 SOLUTION RESPIRATORY (INHALATION) at 21:10

## 2022-11-02 RX ADMIN — LEVALBUTEROL HYDROCHLORIDE 1.25 MG: 1.25 SOLUTION RESPIRATORY (INHALATION) at 21:10

## 2022-11-02 RX ADMIN — TETRAKIS(2-METHOXYISOBUTYLISOCYANIDE)COPPER(I) TETRAFLUOROBORATE 24.2 MILLICURIE: 1 INJECTION, POWDER, LYOPHILIZED, FOR SOLUTION INTRAVENOUS at 14:02

## 2022-11-02 RX ADMIN — DEXTROMETHORPHAN HYDROBROMIDE, GUAIFENESIN 5 ML: 10; 100 LIQUID ORAL at 23:12

## 2022-11-02 RX ADMIN — GUAIFENESIN 600 MG: 600 TABLET, EXTENDED RELEASE ORAL at 08:32

## 2022-11-02 RX ADMIN — LISINOPRIL 20 MG: 20 TABLET ORAL at 08:32

## 2022-11-02 RX ADMIN — DEXTROMETHORPHAN HYDROBROMIDE, GUAIFENESIN 5 ML: 10; 100 LIQUID ORAL at 01:42

## 2022-11-02 RX ADMIN — ACETAMINOPHEN 650 MG: 325 TABLET ORAL at 00:47

## 2022-11-02 RX ADMIN — SODIUM CHLORIDE, PRESERVATIVE FREE 10 ML: 5 INJECTION INTRAVENOUS at 08:32

## 2022-11-02 RX ADMIN — DOCUSATE SODIUM 100 MG: 100 CAPSULE ORAL at 08:31

## 2022-11-02 RX ADMIN — GUAIFENESIN 600 MG: 600 TABLET, EXTENDED RELEASE ORAL at 20:44

## 2022-11-02 RX ADMIN — DEXTROMETHORPHAN HYDROBROMIDE, GUAIFENESIN 5 ML: 10; 100 LIQUID ORAL at 12:46

## 2022-11-02 RX ADMIN — LEVOFLOXACIN 500 MG: 500 TABLET, FILM COATED ORAL at 11:11

## 2022-11-02 RX ADMIN — HEPARIN SODIUM 4000 UNITS: 1000 INJECTION INTRAVENOUS; SUBCUTANEOUS at 00:21

## 2022-11-02 ASSESSMENT — PAIN SCALES - GENERAL
PAINLEVEL_OUTOF10: 4
PAINLEVEL_OUTOF10: 7
PAINLEVEL_OUTOF10: 7
PAINLEVEL_OUTOF10: 4
PAINLEVEL_OUTOF10: 4

## 2022-11-02 ASSESSMENT — PAIN DESCRIPTION - DESCRIPTORS
DESCRIPTORS: ACHING

## 2022-11-02 ASSESSMENT — PAIN DESCRIPTION - LOCATION
LOCATION: HEAD
LOCATION: HEAD
LOCATION: HEAD;NECK

## 2022-11-02 ASSESSMENT — PAIN DESCRIPTION - ORIENTATION
ORIENTATION: MID
ORIENTATION: MID

## 2022-11-02 ASSESSMENT — PAIN - FUNCTIONAL ASSESSMENT: PAIN_FUNCTIONAL_ASSESSMENT: ACTIVITIES ARE NOT PREVENTED

## 2022-11-02 NOTE — PROGRESS NOTES
Writer messaged NP on call, \"Pt is requesting something else for pain. He had tylenol previously and can have it again in about an hour but he believes it did not do anything for his pain last time. He states the pain to be a 4/10 in the head and neck area. Thank you. \". NP responded, \"Where is his pain? \". Writer responded, \"in his head and neck area\". Will continue to monitor.

## 2022-11-02 NOTE — CARE COORDINATION
Case Management Assessment  Initial Evaluation    Date/Time of Evaluation: 11/2/2022 11:30AM  Assessment Completed by: Court Dove RN    If patient is discharged prior to next notation, then this note serves as note for discharge by case management. Patient Name: Rosa Portillo                   YOB: 1980  Diagnosis: Atrial fibrillation with RVR ([address] [I48.91]                   Date / Time: 11/1/2022 11:56 PM    Patient Admission Status: Inpatient   Readmission Risk (Low < 19, Mod (19-27), High > 27): Readmission Risk Score: 7.7    Current PCP: Sivakumar Roach MD  PCP verified by CM? Yes    Chart Reviewed: Yes      History Provided by: Patient  Patient Orientation: Alert and Oriented    Patient Cognition: Alert    Hospitalization in the last 30 days (Readmission):  No    If yes, Readmission Assessment in CM Navigator will be completed. Advance Directives:      Code Status: Full Code   Patient's Primary Decision Maker is: Legal Next of Kin    Primary Decision Maker: Vicky Mills - [phone]    Discharge Planning:    Patient lives with: Spouse/Significant Other Type of Home: House  Primary Care Giver: Self  Patient Support Systems include: Spouse/Significant Other, Family Members   Current Financial resources: Medicare  Current community resources:    Current services prior to admission: None            Current DME:              Type of Home Care services:  None    ADLS  Prior functional level: Independent in ADLs/IADLs  Current functional level: Independent in ADLs/IADLs    PT AM-PAC:   /24  OT AM-PAC:   /24    Family can provide assistance at DC: Yes  Would you like Case Management to discuss the discharge plan with any other family members/significant others, and if so, who?  Yes (mother, wife)  Plans to Return to Present Housing: Yes  Other Identified Issues/Barriers to RETURNING to current housing: none  Potential Assistance needed at discharge: N/A            Potential DME:    Patient expects to discharge to: House  Plan for transportation at discharge: Self    Financial    Payor: Opal Decker / Plan: Adriana Deng / Product Type: *No Product type* /     Does insurance require precert for SNF: Yes    Potential assistance Purchasing Medications:    Meds-to-Beds request: No    [facility] [place], [place]    Notes:    Factors facilitating achievement of predicted outcomes: Family support, Motivated, Cooperative, and Pleasant    Barriers to discharge: none    Additional Case Management Notes: patient plans to return home independently with spouse. He denies needs and has transportation    The Plan for Transition of Care is related to the following treatment goals of Atrial fibrillation with RVR ([facility] [F41.94]    IF APPLICABLE: The Patient and/or patient representative Hemant Bains and his family were provided with a choice of provider and agrees with the discharge plan.  Freedom of choice list with basic dialogue that supports the patient's individualized plan of care/goals and shares the quality data associated with the providers was provided to:     Patient Representative Name:       The Patient and/or Patient Representative Agree with the Discharge Plan?  yes    Sherie Guerrero RN  Case Management Department  Ph: [phone] Fax: [phone]

## 2022-11-02 NOTE — PROGRESS NOTES
Writer messaged NP on call \"Pt admitted to floor, admission complete, and med rec complete. Pt wondering if we could get robitussin ordered for him as he has a pretty active cough. Pt also wondering if orders could be placed to have a nicotine patch placed in the AM. Please advise. Thank you. \". NP on call responded, \" How much does he smoke daily? \". Writer messaged, \"1/2 a pack a day. pt states he has been using nicotine patches since Friday and not smoked since then. \" Orders placed in MAR (see MAR). Will continue to monitor.

## 2022-11-02 NOTE — CONSULTS
[facility]    Consult / H&P               Today's Date: 11/2/2022  Patient Name: Ara Benjamin  Date of admission: 11/1/2022 11:56 PM  Patient's age: 36 y.o., 1980  Admission Dx: Atrial fibrillation with RVR ([facility] [I48.91]    Reason for Consult:  Cardiac evaluation    Requesting Physician: No admitting provider for patient encounter. CHIEF COMPLAINT: Palpitations. History Obtained From:  patient, electronic medical record    HISTORY OF PRESENT ILLNESS:      The patient is a 36 y.o. male who was recently transferred from [facility] due to new onset A. fib with RVR. Patient was recently admitted with acute bronchitis presumably secondary to COPD exacerbation/community-acquired pneumonia was about to be discharged yesterday on bronchodilators and steroids when he started having shortness of breath as per EMR was found to be in rapid A. fib with RVR. He was subsequently started on a Cardizem drip and transferred to [facility] for further evaluation. Past Medical History:   has a past medical history of Anxiety, mild, Colon polyps, Dizziness, Eczema, Epilepsy ([address] GERD (gastroesophageal reflux disease), H/O fall, Head ache, Head injury, Hiatal hernia, Inguinal hernia, Low back pain, Lumbar sprain, Migraine, Plantar fasciitis, bilateral, Seizure disorder ([address] Sigmoid diverticulosis, Sleep difficulties, Status migrainosus, TIA (transient ischemic attack), and Tobacco use. Past Surgical History:   has a past surgical history that includes Vasectomy; Knee arthroscopy (Left); Gastric fundoplication (14/66/0663); Upper gastrointestinal endoscopy (03/05/2010); Colonoscopy (04/29/2011); Colonoscopy (06/04/2010); Colonoscopy (08/28/2009); Colonoscopy (05/27/2008); Colonoscopy (11/19/2014); Inguinal hernia repair (Right, 05/30/2013); Inguinal hernia repair (Left, 01/24/2013); Inguinal hernia repair (Right, 09/24/2015); Colonoscopy (6/1/16);  Upper gastrointestinal endoscopy (6/1/16); hernia repair (Left, 11/30/2017); Colonoscopy (04/19/2019); and Pilonidal cyst excision (N/A, 7/23/2020). Home Medications:    Prior to Admission medications    Medication Sig Start Date End Date Taking?  Authorizing Provider   meloxicam (MOBIC) 15 MG tablet take 1 tablet by mouth once daily 8/29/22   bAiola Masters MD   Budeson-Glycopyrrol-Formoterol (BREZTRI AEROSPHERE) 160-9-4.8 MCG/ACT AERO Inhale 2 inhalations into the lungs in the morning and at bedtime  Patient not taking: Reported on 11/2/2022 8/29/22   Abiola Masters MD   lamoTRIgine (LAMICTAL) 100 MG tablet 1 TABLET DAILY AT BEDTIME 9/97/12   Ekaterina Gilbert MD   divalproex (DEPAKOTE) 250 MG DR tablet take 3 tablets by mouth twice a day 6/04/92   Ekaterina Gilbert MD   promethazine (PHENERGAN) 25 MG tablet take 1 tablet by mouth every 8 hours if needed for nausea 23/72/00   Ekaterina Gilbert MD   SUMAtriptan (IMITREX) 50 MG tablet take 1 tablet by mouth once daily if needed for MIGRAINE 58/64/71   Ekaterina Gilbert MD   lisinopril (PRINIVIL;ZESTRIL) 20 MG tablet take 1 tablet by mouth daily 9/24/21   Historical Provider, MD      Current Facility-Administered Medications: dextromethorphan-guaiFENesin (ROBITUSSIN-DM)  MG/5ML liquid 5 mL, 5 mL, Oral, Q4H PRN  nicotine (NICODERM CQ) 14 MG/24HR 1 patch, 1 patch, TransDERmal, Daily  atorvastatin (LIPITOR) tablet 40 mg, 40 mg, Oral, Nightly  aspirin EC tablet 81 mg, 81 mg, Oral, Daily  levalbuterol (XOPENEX) nebulizer solution 1.25 mg, 1.25 mg, Nebulization, Q4H WA  ipratropium (ATROVENT) 0.02 % nebulizer solution 0.5 mg, 0.5 mg, Nebulization, 4x daily  levoFLOXacin (LEVAQUIN) tablet 500 mg, 500 mg, Oral, Daily  benzonatate (TESSALON) capsule 200 mg, 200 mg, Oral, TID PRN  guaiFENesin (MUCINEX) extended release tablet 600 mg, 600 mg, Oral, BID  famotidine (PEPCID) tablet 20 mg, 20 mg, Oral, BID  meloxicam (MOBIC) tablet 15 mg, 15 mg, Oral, Daily  predniSONE (DELTASONE) tablet 40 mg, 40 mg, Oral, Daily  HYDROcodone-acetaminophen (NORCO) 5-325 MG per tablet 1 tablet, 1 tablet, Oral, Once  docusate sodium (COLACE) capsule 100 mg, 100 mg, Oral, Daily  lamoTRIgine (LAMICTAL) tablet 100 mg, 100 mg, Oral, Daily  lisinopril (PRINIVIL;ZESTRIL) tablet 20 mg, 20 mg, Oral, Daily  sodium chloride flush 0.9 % injection 5-40 mL, 5-40 mL, IntraVENous, 2 times per day  sodium chloride flush 0.9 % injection 10 mL, 10 mL, IntraVENous, PRN  0.9 % sodium chloride infusion, , IntraVENous, PRN  potassium chloride (KLOR-CON M) extended release tablet 40 mEq, 40 mEq, Oral, PRN **OR** potassium bicarb-citric acid (EFFER-K) effervescent tablet 40 mEq, 40 mEq, Oral, PRN **OR** potassium chloride 10 mEq/100 mL IVPB (Peripheral Line), 10 mEq, IntraVENous, PRN  magnesium sulfate 1000 mg in dextrose 5% 100 mL IVPB, 1,000 mg, IntraVENous, PRN  ondansetron (ZOFRAN-ODT) disintegrating tablet 4 mg, 4 mg, Oral, Q8H PRN **OR** ondansetron (ZOFRAN) injection 4 mg, 4 mg, IntraVENous, Q6H PRN  polyethylene glycol (GLYCOLAX) packet 17 g, 17 g, Oral, Daily PRN  acetaminophen (TYLENOL) tablet 650 mg, 650 mg, Oral, Q6H PRN **OR** acetaminophen (TYLENOL) suppository 650 mg, 650 mg, Rectal, Q6H PRN  heparin (porcine) injection 4,000 Units, 4,000 Units, IntraVENous, PRN  heparin (porcine) injection 2,000 Units, 2,000 Units, IntraVENous, PRN  heparin 25,000 units in dextrose 5 % 250 mL infusion (rate based), 5-30 Units/kg/hr, IntraVENous, Continuous    Allergies:  Patient has no known allergies. Social History:   reports that he has been smoking cigarettes. He has a 5.00 pack-year smoking history. He has never used smokeless tobacco. He reports that he does not drink alcohol and does not use drugs.      Family History: family history includes Alzheimer's Disease in his maternal grandmother; Arthritis in his sister; Asthma in his daughter; COPD in his maternal grandmother and mother; Cancer in his father, maternal grandmother, paternal grandfather, paternal grandmother, and sister; Eczema in his daughter and sister; High Blood Pressure in his father, mother, and sister. REVIEW OF SYSTEMS:    Constitutional: there has been no unanticipated weight loss. There's been No change in energy level, No change in activity level. Eyes: No visual changes or diplopia. No scleral icterus. ENT: No Headaches  Cardiovascular: As above. Respiratory: No previous pulmonary problems, No cough  Gastrointestinal: No abdominal pain. No change in bowel or bladder habits. Genitourinary: No dysuria, trouble voiding, or hematuria. Musculoskeletal:  No gait disturbance, No weakness or joint complaints. Integumentary: No rash or pruritis. Neurological: No headache, diplopia, change in muscle strength, numbness or tingling. No change in gait, balance, coordination, mood, affect, memory, mentation, behavior. Psychiatric: No anxiety, or depression. Endocrine: No temperature intolerance. No excessive thirst, fluid intake, or urination. No tremor. Hematologic/Lymphatic: No abnormal bruising or bleeding, blood clots or swollen lymph nodes. Allergic/Immunologic: No nasal congestion or hives. PHYSICAL EXAM:      /84   Pulse 80   Temp 98 °F (36.7 °C) (Oral)   Resp 17   Ht 5' 6\" (1.676 m)   Wt 188 lb 0.8 oz (85.3 kg)   SpO2 93%   BMI 30.35 kg/m²    Constitutional and General Appearance: alert, cooperative, no distress and appears stated age  HEENT: PERRL, no cervical lymphadenopathy. No masses palpable. Normal oral mucosa  Respiratory:  Normal excursion and expansion without use of accessory muscles  Resp Auscultation: Good respiratory effort. No for increased work of breathing. On auscultation: clear to auscultation bilaterally  Cardiovascular:   The apical impulse is not displaced  Heart tones are crisp and normal. regular S1 and S2.  Jugular venous pulsation Normal  The carotid upstroke is normal in amplitude and contour without delay or bruit  Peripheral pulses are symmetrical and full   Abdomen:   No masses or tenderness  Bowel sounds present  Extremities:   No Cyanosis or Clubbing   Lower extremity edema: No   Skin: Warm and dry  Neurological:  Alert and oriented. Moves all extremities well  No abnormalities of mood, affect, memory, mentation, or behavior are noted        Labs:     CBC:   Recent Labs     11/02/22 0023   WBC 11.3   HGB 14.4   HCT 40.6*        BMP: No results for input(s): NA, K, CO2, BUN, CREATININE, LABGLOM, GLUCOSE in the last 72 hours. BNP: No results for input(s): BNP in the last 72 hours. PT/INR: No results for input(s): PROTIME, INR in the last 72 hours. APTT:  Recent Labs     11/02/22 0023   APTT 21.5     CARDIAC ENZYMES:No results for input(s): CKTOTAL, CKMB, CKMBINDEX, TROPONINI in the last 72 hours. FASTING LIPID PANEL:  Lab Results   Component Value Date/Time    HDL 26 09/19/2013 03:45 AM    TRIG 363 09/19/2013 03:45 AM     LIVER PROFILE:No results for input(s): AST, ALT, LABALBU in the last 72 hours. DATA:    Diagnostics:    EKG: Not in chart,   ECHO: 08/11/2021  Normal LV size and function. Ejection fraction is 60%. No diastolic dysfunction. Normal LA and right cavities size. Normal function of all valves. No evidence of pericardial effusion. Nuclear stress test 08/2021. EF:  71%  TID:  N/A  LHR:  N/A     FINDINGS:  Ischemia (Reversible Defect):           No.  Infarction (Irreversible Defect):       No.  Ejection fraction Calculated:           Yes. Segmental wall motion:                  Yes. Low risk study. IMPRESSION:  1. No ischemia or infarction. 2. Left ventricular ejection fraction 71%. INTERPRETATION:  1. Stress EKG inconclusive for ischemia. 2. Nuclear scan to follow. IMPRESSION:    A. fib with RVR at outside hospital.  Presumably new onset, converted back to NSR. ChadsVasc of 3. History of hypertension at home.   Recent admission for dyspnea, likely secondary to COPD versus pneumonia. Being treated by primary team.  History of TIA in 2017. RECOMMENDATIONS:  We will start low-dose of beta-blocker and continue heparin. Will follow up with an echocardiogram.  If echo shows normal LVEF with no significant WMA, will start on flecainide and DC. DC aspirin and heparin and start eliquis on discharge. Discussed with patient and Nurse. Clive Fernandes MD       Cardiovascular Fellow  11/2/2022, 7:45 AM    Attending Physician Statement  I have discussed the care of the patient, including pertinent history and exam findings, with the resident. I have seen and examined the patient and the key elements of all parts of the encounter have been performed by me. I agree with the assessment, plan and orders as documented by the resident.   Paroxysmal A. fib has been converted to normal sinus rhythm  Last year echocardiogram normal ejection fraction awaiting for the today echo  SQ patient is complaining of chest tightness and pressure patient family is here there is a strong family history of early CAD massive MI before age 48  Plan to do Lexiscan stress test before discharge  Stephane Mendoza MD

## 2022-11-02 NOTE — H&P
[facility]  Office: [address], DO, Delicia Jean-Paulh, DO, Seth Pope, DO, Risatonya Figueroa, DO, Yogesh Walker MD, Kathy Harding MD, Anton Rivas MD, Jose Shanks MD,  Gui Jerez MD, Wanda Herring MD, Kg Oro, DO, Dawit Do MD,  Raine Ramirez MD, Sánchez Smith MD, Ibeth Blake DO, Teri Ortiz MD, Tasneem Zuniga MD, Lucio Pozo DO, Caterina Park MD, Myra Cano MD, Dung Benjamin MD, Sarai Leigh MD, Phani Miranda DO, Aron Tejeda MD, Faiza Cuellar MD, Nir Molina, [facility],  Rudy Cleveland, CNP, Indra Alexander, CNP, Deirdre Butcher, CNP,  Tuyet Marmolejo, [facility], Rosalia Becker, CNP, Sonal Lockhart, CNP, Jerod Julissa, CNP, Joseluis Cruz, CNP, Susanne Berry, CNP, Andrei Kemp PALoriC, Janiya Ayon, CNS, Zack Roa, [facility], Antione Philippe, CNP, Cinda Evans, CNP, Maria Guadalupe Gilliam, [facility]    HISTORY AND PHYSICAL EXAMINATION            Date:   11/2/2022  Patient name:  Oralia Dick  Date of admission:  11/1/2022 11:56 PM  MRN:   [id number]  Account:  [de-identified]  YOB: 1980  PCP:    Glenys Kelly MD  Room:   2020/2020-01  Code Status:    Full Code    Chief Complaint:     \" I was laying about ready to be discharged and my heart started pounding\"    History Obtained From:     patient    History of Present Illness:     Oralia Dick is a 36 y.o.  male with history of COPD, ongoing tobacco abuse, prior TIA who recently hospitalized at [facility], admitted 10/28 with acute bronchitis /COPD exacerbation with right upper lobe, left upper lobe and lingular patchy pneumonia with acute hypoxic respiratory failure. Patient clinically improved, ready for discharge when developed chest tightness. Telemetry monitoring reapplied and patient noted to have A. fib with RVR with heart rate up to 1 90-2 20.   Recommended for transfer for cardiology evaluation status post Cardizem drip .  presents with chest tightness and is admitted to the hospital for the management of Atrial fibrillation with RVR ([facility] [address] Patient describes recent worsening respiratory symptoms since May with intermittent cough with congestion, recurrent bronchitis status post multiple courses of steroids antibiotics and inhalers. Recently hospitalized on 10/28 with pneumonia with acute COPD exacerbation. Patient admitted to [facility], started on nasal cannula for acute hypoxic respiratory failure, treated for pneumonia and bronchitis with improvement. CT imaging negative for PE, did demonstrate patchy bilateral airspace disease after initial chest x-ray is unremarkable. Patient was set to be discharged when he developed acute symptomatic tachycardia with heart rate 190s to 200s, A. fib with RVR symptomatic with chest pain near syncope. Patient describes chest pain as tightness, midsternum, radiating up to his throat. + Diaphoretic.   denies pleurisy. Patient does describe prior episodes of similar palpitations over the past few months,3x. Denies prior cardiac work-up. Denies prior A. Fib. Denies MI CHF TIA stroke. Patient started on Cardizem drip with conversion back to sinus mechanism. Recommended transfer to [facility] for further evaluation. Continued on nasal cannula. Breathing improved. Still with cough with congestion with occasional phlegm production. Denies hemoptysis. Denies pulmonary evaluation, PFTs. States he may have had asthma as a child but was never formally evaluated or treated.  + Diffuse throbbing headaches intermittently  Past Medical History:     Past Medical History:   Diagnosis Date    Anxiety, mild     Colon polyps     Dizziness     Eczema     Epilepsy ([facility] [address]     GERD (gastroesophageal reflux disease)     H/O fall     Head ache     Head injury     Hiatal hernia     Inguinal hernia     Right.     Low back pain     Lumbar sprain     Migraine     Plantar fasciitis, bilateral     Seizure disorder (HCC)     Sigmoid diverticulosis     Sleep difficulties     Status migrainosus     TIA (transient ischemic attack) 09/2017    Tobacco use     Chronic. Past Surgical History:     Past Surgical History:   Procedure Laterality Date    COLONOSCOPY  04/29/2011    Internal hemorrhoids, possible anal fissure, few scattered diverticula. COLONOSCOPY  06/04/2010    Mild sigmoid diverticulosis. COLONOSCOPY  08/28/2009    Sigmoid diverticulosis, internal hemorrhoids. COLONOSCOPY  05/27/2008    Internal hemorrhoids. COLONOSCOPY  11/19/2014    polyp in rectum    COLONOSCOPY  6/1/16    sigmoid diverticulosis, colon polyp, internal hemorrhoids    COLONOSCOPY  04/19/2019    [id number]    GASTRIC FUNDOPLICATION  39/52/1176    HERNIA REPAIR Left 11/30/2017    Left Inguinal Hernia Repair w/ Mesh performed by Dorie Pineda MD at [address] [place] [address] Right 05/30/2013    INGUINAL HERNIA REPAIR Left 01/24/2013    INGUINAL HERNIA REPAIR Right 09/24/2015    KNEE ARTHROSCOPY Left     PILONIDAL CYST EXCISION N/A 7/23/2020    PILONIDAL CYSTECTOMY performed by Zari Batista DO at [address]  03/05/2010    Recurrent hiatal hernia. UPPER GASTROINTESTINAL ENDOSCOPY  6/1/16    S/P brenda fundoplication, good repair, retained gastric fluid    VASECTOMY          Medications Prior to Admission:     Prior to Admission medications    Medication Sig Start Date End Date Taking?  Authorizing Provider   meloxicam (MOBIC) 15 MG tablet take 1 tablet by mouth once daily 8/29/22   Robson Vizcaino MD   Budeson-Glycopyrrol-Formoterol (BREZTRI AEROSPHERE) 160-9-4.8 MCG/ACT AERO Inhale 2 inhalations into the lungs in the morning and at bedtime  Patient not taking: Reported on 11/2/2022 8/29/22   Robson Vizcaino MD   lamoTRIgine (LAMICTAL) 100 MG tablet 1 TABLET DAILY AT BEDTIME 5/66/19   Ritika Regalado MD   divalproex (DEPAKOTE) 250 MG DR tablet take 3 tablets by mouth twice a day 4/14/89   Paula Kuhn MD   promethazine (PHENERGAN) 25 MG tablet take 1 tablet by mouth every 8 hours if needed for nausea 84/48/38   Paula Kuhn MD   SUMAtriptan (IMITREX) 50 MG tablet take 1 tablet by mouth once daily if needed for MIGRAINE 22/59/68   Paula Kuhn MD   lisinopril (PRINIVIL;ZESTRIL) 20 MG tablet take 1 tablet by mouth daily 9/24/21   Historical Provider, MD        Allergies:     Patient has no known allergies. Social History:     Tobacco:    reports that he has been smoking cigarettes. He has a 5.00 pack-year smoking history. He has never used smokeless tobacco.  Alcohol:      reports no history of alcohol use. Drug Use:  reports no history of drug use. Continues to smoke, denies excessive binge drinking or illegal drugs, lives independently, denies travel    Family History:     Family History   Problem Relation Age of Onset    COPD Mother     High Blood Pressure Mother     Cancer Father         Hodgekin's Lymphoma    High Blood Pressure Father     Eczema Sister     Alzheimer's Disease Maternal Grandmother     COPD Maternal Grandmother     Cancer Maternal Grandmother     Cancer Paternal Grandmother     Cancer Paternal Grandfather     Cancer Sister         colon cancer    High Blood Pressure Sister     Arthritis Sister     Asthma Daughter     Eczema Daughter     Glaucoma Neg Hx     Diabetes Neg Hx     Cataracts Neg Hx    Denies family history of premature coronary disease or DVT PE    Review of Systems:     Positive and Negative as described in HPI. Review of Systems  Patient does have history of seizure disorder most recently 6 weeks ago complicated by acute fall with worsening myalgias arthralgias. Patient does describe diffuse body pain with myalgias and headaches. Does struggle with indigestion with worsening heartburn over the past few months.   Denies history of melena or bright red blood per rectum or GI bleeding. Does take NSAIDs for chronic neck pain and headaches, myalgias. Patient states his sister was diagnosed with COVID yesterday but his initial screening when he was admitted was negative. Denies any other obvious COVID exposure. Denies any occupational exposures. Denies lower extremity edema. Denies nausea vomiting diarrhea constipation. Denies abdominal pain. Denies prior history of DVT PE bleeding issues, easy bruising. Denies history of TIA stroke. Denies heat or cold intolerance. Denies recent travel. Review of systems otherwise -12 system review and otherwise unremarkable    Physical Exam:   /84   Pulse 80   Temp 98 °F (36.7 °C) (Oral)   Resp 17   Ht 5' 6\" (1.676 m)   Wt 188 lb 0.8 oz (85.3 kg)   SpO2 93%   BMI 30.35 kg/m²   Temp (24hrs), Av °F (36.7 °C), Min:97.9 °F (36.6 °C), Max:98 °F (36.7 °C)    No results for input(s): POCGLU in the last 72 hours.     Intake/Output Summary (Last 24 hours) at 2022 0719  Last data filed at 2022 0545  Gross per 24 hour   Intake --   Output 150 ml   Net -150 ml       Physical Exam  General awake alert sitting up in bed appropriate, follows commands  Eye exam pupils equally round reactive sclera nonicteric normal horizontal gaze  ENT oropharynx with moist extremities to be symmetric neck supple  Cardiovascular regular rate and rhythm normal S1-S2 no murmurs or gallops, no JVD, exam limited pulmonary noise  Lungs rhonchi bilaterally with expiratory wheezing bilaterally with mildly limited air exchange, mild resting tachypnea nonlabored no accessory muscle use  GI soft nontender nondistended obese bowel sounds present  Vascular intact dorsalis pedis and posterior tibialis without significant pedal edema  Derm adequate foot hygiene no rashes lesions or skin infections  Musculoskeletal passive range of motion of upper and lower limbs unremarkable no synovitis or joint effusions  Neuro nonfocal normal speech and cognition strength symmetric in upper and lower limbs sensation grossly intact    Investigations:      Laboratory Testing:  Recent Results (from the past 24 hour(s))   CBC    Collection Time: 11/02/22 12:23 AM   Result Value Ref Range    WBC 11.3 3.5 - 11.3 k/uL    RBC 4.15 (L) 4.21 - 5.77 m/uL    Hemoglobin 14.4 13.0 - 17.0 g/dL    Hematocrit 40.6 (L) 40.7 - 50.3 %    MCV 97.8 82.6 - 102.9 fL    MCH 34.7 (H) 25.2 - 33.5 pg    MCHC 35.5 (H) 28.4 - 34.8 g/dL    RDW 12.1 11.8 - 14.4 %    Platelets 739 997 - 081 k/uL    MPV 9.9 8.1 - 13.5 fL    NRBC Automated 0.0 0.0 per 100 WBC   APTT    Collection Time: 11/02/22 12:23 AM   Result Value Ref Range    PTT 21.5 20.5 - 30.5 sec   Prior labs obtained from [facility] independently reviewed by myself    Labs  No results for input(s): POCGLU in the last 168 hours. Recent Labs   Lab 10/31/22  0650 10/30/22  0650 10/29/22  0630 10/28/22  1255    135* 132* 135*   K 4.7 4.2 4.9 4.5   CL 99 99 98 100   CO2 25.9 27.2 25.2 25.7   BUN 39* 21 25* 14   CREATININE 1.20 1.06 1.15 0.95   CALCIUM 8.6 9.9 9.6 9.4   PROT 6.7 -- -- --   BILITOT 0.80 -- -- --   ALKPHOS 39* -- -- --   ALT 23 -- -- --   AST 27 -- -- --   GLUCOSE 129* 167* 159* 104     Recent Labs   Lab 10/31/22  0650 10/30/22  0650 10/29/22  0630 10/28/22  1255   WBC 10.2 20.6* 14.5* 10.4   HGB 9.6* 13.8* 14.5 15.7   HCT 30.7* 40.0 40.6 45.0    234 258 265      Imaging/Diagnostics:    Records reviewed from [facility] independently by myself  Xray Chest Portable 1 View 10/28  IMPRESSION: No active disease. Xray Chest Portable 1 View 10/29  IMPRESSION: No active disease. CT Chest Without Contrast 10/30  IMPRESSION:  1. Low-grade subsegmental opacities are present in the right upper lobe and  lingular segment of the left upper lobe. These findings are superimposed upon  chronic lung parenchymal scarring and most likely reflect low-grade  subsegmental infiltrate or atelectasis.   2. No additional significant abnormalities demonstrated on this exam    EKG reviewed independently by myself, initial sinus mechanism without acute ST-T changes 10/31. EKG from 11/0 1 with A. fib with RVR with nonspecific ST-T changes diffusely    Assessment :      Hospital Problems             Last Modified POA    * (Principal) Atrial fibrillation with RVR ([address] 11/1/2022 Yes    Acute respiratory failure with hypoxemia ([address] 11/2/2022 Yes    Acute bronchitis with COPD ([address] 11/2/2022 Yes    Tobacco use 11/2/2022 Yes    Overview Signed 4/8/2014  1:57 PM by Nancy Wilson MD     Chronic. GERD (gastroesophageal reflux disease) 11/2/2022 Yes    Head ache 11/2/2022 Yes    Seizure disorder ([facility] [address] 11/2/2022 Yes       Plan:     Patient status inpatient in the Progressive Unit/Step down    A. fib with RVR status post conversion after Cardizem drip. ? Accessory pathway with excitement syndrome. Await cardiology input/EP eval. check echo. Avoid albuterol. Acute bronchitis with COPD exacerbation/community-acquired pneumonia- continue prednisone, bronchodilators pulmonary hygiene. Discussed with patient outpatient pulmonary follow-up and strongly encouraged tobacco cessation, counseled on tobacco abuse ongoing  Acute hypoxic respiratory failure, continue to wean FiO2 as tolerated, down to 2 to 3 L, previously on 5 at outside hospital  Seizure disorder, continue antiepileptics  Chronic GERD possibly triggered by recurrent steroid use, tobacco in combination with ongoing NSAID use. Continue GI prophylaxis  Chronic pain syndrome myalgias with history of cervical spine stenosis, lumbago with sciatica. Check vitamin D level. Continue NSAIDs for now  History of TIA with vertebral basilar insufficiency. Continue statin, aspirin. Anticipate likely discharge in 2 to 4 days contingent on clinical progress. Counseled on tobacco cessation. Records from [facility] reviewed in detail.   Serial EKGs reviewed independently by myself. Consultations:   IP CONSULT TO CARDIOLOGY    Patient is admitted as inpatient status because of co-morbidities listed above, severity of signs and symptoms as outlined, requirement for current medical therapies and most importantly because of direct risk to patient if care not provided in a hospital setting. Expected length of stay > 48 hours.     Rianna Lynn MD  11/2/2022  7:19 AM    Copy sent to Dr. Irma Swenson MD

## 2022-11-03 ENCOUNTER — TELEPHONE (OUTPATIENT)
Dept: FAMILY MEDICINE CLINIC | Age: 42
End: 2022-11-03

## 2022-11-03 VITALS
WEIGHT: 195.33 LBS | BODY MASS INDEX: 31.39 KG/M2 | HEART RATE: 85 BPM | SYSTOLIC BLOOD PRESSURE: 130 MMHG | DIASTOLIC BLOOD PRESSURE: 105 MMHG | TEMPERATURE: 97.9 F | OXYGEN SATURATION: 94 % | RESPIRATION RATE: 15 BRPM | HEIGHT: 66 IN

## 2022-11-03 PROBLEM — I48.91 NEW ONSET A-FIB (HCC): Status: ACTIVE | Noted: 2022-11-03

## 2022-11-03 LAB
EKG ATRIAL RATE: 79 BPM
EKG P AXIS: 15 DEGREES
EKG P-R INTERVAL: 148 MS
EKG Q-T INTERVAL: 366 MS
EKG QRS DURATION: 74 MS
EKG QTC CALCULATION (BAZETT): 419 MS
EKG R AXIS: 0 DEGREES
EKG T AXIS: 25 DEGREES
EKG VENTRICULAR RATE: 79 BPM
HCT VFR BLD CALC: 39.5 % (ref 40.7–50.3)
HEMOGLOBIN: 14.3 G/DL (ref 13–17)
LV EF: 50 %
LV EF: 68 %
LVEF MODALITY: NORMAL
LVEF MODALITY: NORMAL
MCH RBC QN AUTO: 35.1 PG (ref 25.2–33.5)
MCHC RBC AUTO-ENTMCNC: 36.2 G/DL (ref 28.4–34.8)
MCV RBC AUTO: 97.1 FL (ref 82.6–102.9)
NRBC AUTOMATED: 0 PER 100 WBC
PARTIAL THROMBOPLASTIN TIME: 60 SEC (ref 20.5–30.5)
PDW BLD-RTO: 12 % (ref 11.8–14.4)
PLATELET # BLD: 206 K/UL (ref 138–453)
PMV BLD AUTO: 10 FL (ref 8.1–13.5)
RBC # BLD: 4.07 M/UL (ref 4.21–5.77)
WBC # BLD: 11.2 K/UL (ref 3.5–11.3)

## 2022-11-03 PROCEDURE — 6370000000 HC RX 637 (ALT 250 FOR IP): Performed by: NURSE PRACTITIONER

## 2022-11-03 PROCEDURE — 6370000000 HC RX 637 (ALT 250 FOR IP): Performed by: STUDENT IN AN ORGANIZED HEALTH CARE EDUCATION/TRAINING PROGRAM

## 2022-11-03 PROCEDURE — 93005 ELECTROCARDIOGRAM TRACING: CPT | Performed by: NURSE PRACTITIONER

## 2022-11-03 PROCEDURE — 96366 THER/PROPH/DIAG IV INF ADDON: CPT

## 2022-11-03 PROCEDURE — 6360000002 HC RX W HCPCS: Performed by: INTERNAL MEDICINE

## 2022-11-03 PROCEDURE — 85730 THROMBOPLASTIN TIME PARTIAL: CPT

## 2022-11-03 PROCEDURE — 3430000000 HC RX DIAGNOSTIC RADIOPHARMACEUTICAL: Performed by: INTERNAL MEDICINE

## 2022-11-03 PROCEDURE — A9500 TC99M SESTAMIBI: HCPCS | Performed by: INTERNAL MEDICINE

## 2022-11-03 PROCEDURE — 96365 THER/PROPH/DIAG IV INF INIT: CPT

## 2022-11-03 PROCEDURE — 2700000000 HC OXYGEN THERAPY PER DAY

## 2022-11-03 PROCEDURE — G0378 HOSPITAL OBSERVATION PER HR: HCPCS

## 2022-11-03 PROCEDURE — 36415 COLL VENOUS BLD VENIPUNCTURE: CPT

## 2022-11-03 PROCEDURE — 94640 AIRWAY INHALATION TREATMENT: CPT

## 2022-11-03 PROCEDURE — 2580000003 HC RX 258: Performed by: INTERNAL MEDICINE

## 2022-11-03 PROCEDURE — 93010 ELECTROCARDIOGRAM REPORT: CPT | Performed by: INTERNAL MEDICINE

## 2022-11-03 PROCEDURE — 6370000000 HC RX 637 (ALT 250 FOR IP): Performed by: INTERNAL MEDICINE

## 2022-11-03 PROCEDURE — 85027 COMPLETE CBC AUTOMATED: CPT

## 2022-11-03 PROCEDURE — 99225 PR SBSQ OBSERVATION CARE/DAY 25 MINUTES: CPT | Performed by: STUDENT IN AN ORGANIZED HEALTH CARE EDUCATION/TRAINING PROGRAM

## 2022-11-03 PROCEDURE — 93306 TTE W/DOPPLER COMPLETE: CPT

## 2022-11-03 PROCEDURE — 6360000002 HC RX W HCPCS: Performed by: NURSE PRACTITIONER

## 2022-11-03 PROCEDURE — 93017 CV STRESS TEST TRACING ONLY: CPT

## 2022-11-03 RX ORDER — SODIUM CHLORIDE 0.9 % (FLUSH) 0.9 %
10 SYRINGE (ML) INJECTION PRN
Status: DISCONTINUED | OUTPATIENT
Start: 2022-11-03 | End: 2022-11-03 | Stop reason: HOSPADM

## 2022-11-03 RX ORDER — NICOTINE 21 MG/24HR
1 PATCH, TRANSDERMAL 24 HOURS TRANSDERMAL DAILY
Qty: 30 PATCH | Refills: 3 | Status: SHIPPED | OUTPATIENT
Start: 2022-11-04 | End: 2022-11-07

## 2022-11-03 RX ORDER — ALBUTEROL SULFATE 90 UG/1
2 AEROSOL, METERED RESPIRATORY (INHALATION) PRN
Status: DISCONTINUED | OUTPATIENT
Start: 2022-11-03 | End: 2022-11-03 | Stop reason: ALTCHOICE

## 2022-11-03 RX ORDER — ATORVASTATIN CALCIUM 40 MG/1
40 TABLET, FILM COATED ORAL NIGHTLY
Qty: 30 TABLET | Refills: 3 | Status: SHIPPED | OUTPATIENT
Start: 2022-11-03

## 2022-11-03 RX ORDER — LEVOFLOXACIN 500 MG/1
500 TABLET, FILM COATED ORAL DAILY
Qty: 5 TABLET | Refills: 0 | Status: SHIPPED | OUTPATIENT
Start: 2022-11-04 | End: 2022-11-09

## 2022-11-03 RX ORDER — BENZONATATE 200 MG/1
200 CAPSULE ORAL 3 TIMES DAILY PRN
Qty: 21 CAPSULE | Refills: 0 | Status: SHIPPED | OUTPATIENT
Start: 2022-11-03 | End: 2022-11-07

## 2022-11-03 RX ORDER — GUAIFENESIN 600 MG/1
600 TABLET, EXTENDED RELEASE ORAL 2 TIMES DAILY
Qty: 14 TABLET | Refills: 0 | Status: SHIPPED | OUTPATIENT
Start: 2022-11-03 | End: 2022-11-07

## 2022-11-03 RX ORDER — ATROPINE SULFATE 0.1 MG/ML
0.5 INJECTION INTRAVENOUS EVERY 5 MIN PRN
Status: DISCONTINUED | OUTPATIENT
Start: 2022-11-03 | End: 2022-11-03 | Stop reason: ALTCHOICE

## 2022-11-03 RX ORDER — METOPROLOL TARTRATE 5 MG/5ML
5 INJECTION INTRAVENOUS EVERY 5 MIN PRN
Status: DISCONTINUED | OUTPATIENT
Start: 2022-11-03 | End: 2022-11-03 | Stop reason: ALTCHOICE

## 2022-11-03 RX ORDER — NITROGLYCERIN 0.4 MG/1
0.4 TABLET SUBLINGUAL EVERY 5 MIN PRN
Qty: 25 TABLET | Refills: 3 | Status: SHIPPED | OUTPATIENT
Start: 2022-11-03

## 2022-11-03 RX ORDER — CALCIUM CARBONATE 200(500)MG
500 TABLET,CHEWABLE ORAL 3 TIMES DAILY PRN
Status: DISCONTINUED | OUTPATIENT
Start: 2022-11-03 | End: 2022-11-03 | Stop reason: HOSPADM

## 2022-11-03 RX ORDER — SODIUM CHLORIDE 9 MG/ML
500 INJECTION, SOLUTION INTRAVENOUS CONTINUOUS PRN
Status: DISCONTINUED | OUTPATIENT
Start: 2022-11-03 | End: 2022-11-03 | Stop reason: ALTCHOICE

## 2022-11-03 RX ORDER — PREDNISONE 20 MG/1
40 TABLET ORAL DAILY
Qty: 6 TABLET | Refills: 0 | Status: SHIPPED | OUTPATIENT
Start: 2022-11-04 | End: 2022-11-07

## 2022-11-03 RX ORDER — SODIUM CHLORIDE 0.9 % (FLUSH) 0.9 %
5-40 SYRINGE (ML) INJECTION PRN
Status: DISCONTINUED | OUTPATIENT
Start: 2022-11-03 | End: 2022-11-03 | Stop reason: ALTCHOICE

## 2022-11-03 RX ORDER — NITROGLYCERIN 0.4 MG/1
0.4 TABLET SUBLINGUAL EVERY 5 MIN PRN
Status: DISCONTINUED | OUTPATIENT
Start: 2022-11-03 | End: 2022-11-03 | Stop reason: ALTCHOICE

## 2022-11-03 RX ORDER — NITROGLYCERIN 0.4 MG/1
0.4 TABLET SUBLINGUAL EVERY 5 MIN PRN
Status: DISCONTINUED | OUTPATIENT
Start: 2022-11-03 | End: 2022-11-03 | Stop reason: HOSPADM

## 2022-11-03 RX ORDER — AMINOPHYLLINE DIHYDRATE 25 MG/ML
50 INJECTION, SOLUTION INTRAVENOUS PRN
Status: DISCONTINUED | OUTPATIENT
Start: 2022-11-03 | End: 2022-11-03 | Stop reason: ALTCHOICE

## 2022-11-03 RX ADMIN — SODIUM CHLORIDE, PRESERVATIVE FREE 10 ML: 5 INJECTION INTRAVENOUS at 09:51

## 2022-11-03 RX ADMIN — TETRAKIS(2-METHOXYISOBUTYLISOCYANIDE)COPPER(I) TETRAFLUOROBORATE 18 MILLICURIE: 1 INJECTION, POWDER, LYOPHILIZED, FOR SOLUTION INTRAVENOUS at 09:51

## 2022-11-03 RX ADMIN — METOPROLOL TARTRATE 12.5 MG: 25 TABLET ORAL at 08:49

## 2022-11-03 RX ADMIN — NITROGLYCERIN 0.4 MG: 0.4 TABLET SUBLINGUAL at 12:19

## 2022-11-03 RX ADMIN — REGADENOSON 0.4 MG: 0.08 INJECTION, SOLUTION INTRAVENOUS at 09:46

## 2022-11-03 RX ADMIN — LEVOFLOXACIN 500 MG: 500 TABLET, FILM COATED ORAL at 08:49

## 2022-11-03 RX ADMIN — LISINOPRIL 20 MG: 20 TABLET ORAL at 08:49

## 2022-11-03 RX ADMIN — IPRATROPIUM BROMIDE 0.5 MG: 0.5 SOLUTION RESPIRATORY (INHALATION) at 16:21

## 2022-11-03 RX ADMIN — GUAIFENESIN 600 MG: 600 TABLET, EXTENDED RELEASE ORAL at 08:48

## 2022-11-03 RX ADMIN — LEVALBUTEROL HYDROCHLORIDE 1.25 MG: 1.25 SOLUTION RESPIRATORY (INHALATION) at 11:57

## 2022-11-03 RX ADMIN — DOCUSATE SODIUM 100 MG: 100 CAPSULE ORAL at 08:48

## 2022-11-03 RX ADMIN — MELOXICAM 15 MG: 7.5 TABLET ORAL at 08:48

## 2022-11-03 RX ADMIN — METOPROLOL TARTRATE 12.5 MG: 25 TABLET ORAL at 16:36

## 2022-11-03 RX ADMIN — IPRATROPIUM BROMIDE 0.5 MG: 0.5 SOLUTION RESPIRATORY (INHALATION) at 11:57

## 2022-11-03 RX ADMIN — ANTACID TABLETS 500 MG: 500 TABLET, CHEWABLE ORAL at 05:08

## 2022-11-03 RX ADMIN — LAMOTRIGINE 100 MG: 100 TABLET ORAL at 08:49

## 2022-11-03 RX ADMIN — FAMOTIDINE 20 MG: 20 TABLET, FILM COATED ORAL at 08:48

## 2022-11-03 RX ADMIN — LEVALBUTEROL HYDROCHLORIDE 1.25 MG: 1.25 SOLUTION RESPIRATORY (INHALATION) at 16:22

## 2022-11-03 RX ADMIN — PREDNISONE 40 MG: 20 TABLET ORAL at 08:49

## 2022-11-03 RX ADMIN — HEPARIN SODIUM 18 UNITS/KG/HR: 10000 INJECTION, SOLUTION INTRAVENOUS at 12:00

## 2022-11-03 ASSESSMENT — ENCOUNTER SYMPTOMS
CHOKING: 0
EYE REDNESS: 0
COUGH: 0
APNEA: 0
EYE DISCHARGE: 0

## 2022-11-03 ASSESSMENT — PAIN SCALES - GENERAL
PAINLEVEL_OUTOF10: 5
PAINLEVEL_OUTOF10: 3

## 2022-11-03 NOTE — PROCEDURES
[address]                  [facility]                              CARDIAC STRESS TEST    PATIENT NAME: Jerry Hoyt                  :        1980  MED REC NO:   [id number]                             ROOM:         ACCOUNT NO:   [de-identified]                           ADMIT DATE: 2022  PROVIDER:     Jeremie Viveros    DATE OF STUDY:  2022    ORDERING PROVIDER:  Sugar Baltazar MD    INTERPRETING PHYSICIAN:  Jeremie Viveros MD    LEXISCAN STRESS STUDY:  Indication:  chest pain and shortness of breath    Procedure was explained and consent form was signed    Medications:  Lexiscan, 0.4 mg  Resting heart rate:  72 bpm  Resting blood pressure:  129/92 mm/Hg  Infusion heart rate:  126 bpm  Infusion blood pressure: 120/93 mm/Hg  Resting EKG:  Normal  Stress heart response:  Normal  Stress BP response:  Appropriate  Stress EKG(S):  Normal  Chest discomfort:  Mild chest pain (4/10)  Ischemic EKG changes:  None    IMPRESSION:  Electrocardiographically negative Lexiscan stress study. Radio-isotope  results to follow from the department of Nuclear Medicine.         Cailin Mccloud    D: 2022 13:37:12       T: 2022 14:07:46     JEFFRY/AARON  Job#: [id number]     Doc#: Unknown    CC:    ()

## 2022-11-03 NOTE — PROGRESS NOTES
Pt c/o sudden onset chest pain, midsternal, 5/10, squeezing quality. Cardio fellow notified. VS taken and stable on 2L NC, EKG done per cardio fellow. Nitro ordered. No other interventions at this time. As writer was finishing EKG pt states chest pain had subsided.

## 2022-11-03 NOTE — TELEPHONE ENCOUNTER
[facility] Transitions Initial Follow Up Call    Call within 2 business days of discharge: Yes     Patient: Betsy Lima Patient : 1980 MRN: [id number]      RARS: Readmission Risk Score: 8.9       Spoke with: patient    Discharge department/facility: [facility]    Non-face-to-face services provided:  Scheduled appointment with PCP-liliana    Follow Up  Future Appointments   Date Time Provider Sydnie Conway   2022 10:40 AM Yaniv Nelson DO [facility]   2022  9:34 PM Froylan Gilliam MD [facility] Neurology -   3/30/2023  3:30 PM Garth Lopez MD [address] [place] [address]

## 2022-11-03 NOTE — CARE COORDINATION
11/03/22 1455   IMM Letter   Observation Status Letter date given: 11/03/22   Observation Status Letter time given: 1450   Code 40 letter provided to patient

## 2022-11-03 NOTE — DISCHARGE SUMMARY
[facility]  Office: [address], , Claudette Mendoza, DO, Venus Felder, DO, Lisa Figueroa, DO, Constantino Coffey MD, Enedina Bianchi MD, Lito Pandya MD, Arturo García MD,  Reggie Machado MD, Chichi Orta MD, Zachary aMhoney, DO, Nader Tran MD,  Kaushik Castro MD, Kathleen Domínguez MD, Tiana Portillo DO, Blanca Trammell MD, Linda Leger MD, Heydi Wright DO, Socorro Naqvi MD, Tiana Guo MD, Damaris Olea MD, Rajat Mattson MD, Laxmi Nieves DO, Cristiana Jenkins MD, Tonny Moseley MD, Aneta Daley, CNP,  Winnie Segura, CNP, Masood Hendrickson, CNP, Aracelis Barbosa, CNP,  Edilia Smith, [facility], Ivonne Benavides, CNP, Karolyn Dobson, CNP, Mamie Garcia, CNP, Carmen Greenberg, CNP, Kita Mi, CNP, Vanesa Beavers PA-C, Adam Frazier, CNS, Rohan Benavides, [facility], Vamshi Oleary, CNP, Jaky Diggs, [facility], Britton Haile, [facility]    Discharge Summary     Patient ID: Cassi Gibson  :  1980   MRN: [id number]     ACCOUNT:  [de-identified]   Patient's PCP: Irma Swenson MD  Admit Date: 2022   Discharge Date: 11/3/2022     Length of Stay: 2  Code Status:  Full Code  Admitting Physician: Nader Tran MD  Discharge Physician: Nader Tran MD     Active Discharge Diagnoses:     Hospital Problem Lists:  Principal Problem:    Atrial fibrillation with RVR ([facility]  Active Problems:    Acute respiratory failure with hypoxemia (HCC)    Acute bronchitis with COPD ([facility]    New onset a-fib ([facility]    Tobacco use    GERD (gastroesophageal reflux disease)    Head ache    Seizure disorder ([facility]  Resolved Problems:    * No resolved hospital problems.  *      Admission Condition:  stable     Discharged Condition: stable    Hospital Stay:     Hospital Course:  Cassi Gibson is a 36 y.o. male who was admitted for the management of   Atrial fibrillation with RVR ([facility] , presented to ER with shortness of breath    76-year-old male past medical history of COPD, GERD, anxiety, tobacco abuse presented originally to outlying facility with shortness of breath found to have COPD exacerbation while admitted was noted to be in A. fib with RVR with heart rate around 200s and patient was transferred with Cardene drip to [facility] for further care. Patient converted to sinus rhythm and currently on Lopressor twice daily for rate control underwent stress test showing no definitive ischemia and low risk ratification and patient transition to Eliquis 5 mg twice daily. Patient to follow-up with cardiology as outpatient and to follow-up with PCP, educated to stop smoking. Significant therapeutic interventions:   Stress test:  Impression   1. No definitive scintigraphic evidence for reversible ischemia or infarct. 2. Left ventricular ejection fraction of 68%. 3.  Please see report for EKG portion of the examination which will be   performed separately by physician from cardiology.    Risk stratification:  Low risk           Significant Diagnostic Studies:   Labs / Micro:  CBC:   Lab Results   Component Value Date/Time    WBC 11.2 11/03/2022 06:51 AM    RBC 4.07 11/03/2022 06:51 AM    HGB 14.3 11/03/2022 06:51 AM    HCT 39.5 11/03/2022 06:51 AM    MCV 97.1 11/03/2022 06:51 AM    MCH 35.1 11/03/2022 06:51 AM    MCHC 36.2 11/03/2022 06:51 AM    RDW 12.0 11/03/2022 06:51 AM     11/03/2022 06:51 AM     BMP:    Lab Results   Component Value Date/Time    GLUCOSE 126 11/02/2022 11:34 AM     11/02/2022 11:34 AM    K 4.0 11/02/2022 11:34 AM    CL 97 11/02/2022 11:34 AM    CO2 25 11/02/2022 11:34 AM    ANIONGAP 11 11/02/2022 11:34 AM    BUN 26 11/02/2022 11:34 AM    CREATININE 1.00 11/02/2022 11:34 AM    BUNCRER 12 12/08/2019 05:32 PM    CALCIUM 8.6 11/02/2022 11:34 AM    LABGLOM >60 11/02/2022 11:34 AM    GFRAA >60 12/08/2019 05:32 PM    GFR      12/08/2019 05:32 PM    GFR NOT REPORTED 12/08/2019 05:32 PM        Radiology:  NM Cardiac Stress Test Nuclear Imaging    Result Date: 11/3/2022  1. No definitive scintigraphic evidence for reversible ischemia or infarct. 2. Left ventricular ejection fraction of 68%. 3.  Please see report for EKG portion of the examination which will be performed separately by physician from cardiology. Risk stratification:  Low risk Note:  Risk stratification incorporates both clinical history and test results. Final risk determination is the responsibility of the ordering provider as history and other test results may increase or decrease the risk stratification reported for this examination. Risk stratification criteria are adapted from \"Noninvasive Risk Stratification\" criteria from [employer]. Al, ACC/AATS/AHA/ASE/ASNC/SCAI/SCCT/STS 2017 Appropriate Use Criteria For Coronary Revascularization in Patients With Stable Ischemic Heart Disease [facility] Volume 69, Issue 17, May 2017 High risk (>3% annual death or MI) 1. Severe resting LV dysfunction (LVEF >35%) not readily explained by non coronary causes 2. Resting perfusion abnormalities greater than 10% of the myocardium in patients without prior history or evidence of MI 3. Stress-induced perfusion abnormalities encumbering greater than or equal to 10% myocardium or stress segmental scores indicating multiple vascular territories with abnormalities 4. Stress-induced LV dilatation (TID ratio greater than 1.19 for exercise and greater than 1.39 for regadenoson) Intermediate risk (1% to 3% annual death or MI) 1. Mild/moderate resting LV dysfunction (LVEF 35% to 49%) not readily explained by non coronary causes. 2.  Resting perfusion abnormalities in 5%-9.9% of the myocardium in patients without a history or prior evidence of MI 3. Stress-induced perfusion abnormality encumbering 5%-9.9% of the myocardium or stress segmental scores indicating 1 vascular territory with abnormalities but without LV dilation 4.   Small wall motion abnormality involving 1-2 segments and only 1 coronary bed. Low Risk (Less than 1% annual death or MI) 1. Normal or small myocardial perfusion defect at rest or with stress encumbering less than 5% of the myocardium. This examination was read in conjunction with the cardiology fellow, Dr. Nicola Rodriguez. Consultations:    Consults:     Final Specialist Recommendations/Findings:   IP CONSULT TO CARDIOLOGY      The patient was seen and examined on day of discharge and this discharge summary is in conjunction with any daily progress note from day of discharge. Discharge plan:     Disposition: Home    Physician Follow Up:     Valeriano Singh DO  [id number] [facility] [phone]    Schedule an appointment as soon as possible for a visit in 2 week(s)  for hospital f/u with cardiology    Surya Quiroga MD    Follow up      Surya Quiroga MD             Requiring Further Evaluation/Follow Up POST HOSPITALIZATION/Incidental Findings:  Follow-up PCP, encourage smoking cessation, continue prednisone taper, continue Levaquin, Eliquis for anticoagulation, Lopressor increased to 25 mg twice daily    Diet: cardiac diet    Activity: As tolerated    Instructions to Patient: Follow-up PCP, encourage smoking cessation, continue prednisone taper, continue Levaquin, Eliquis for anticoagulation, Lopressor increased to 25 mg twice daily    Discharge Medications:      Medication List        START taking these medications      apixaban 5 MG Tabs tablet  Commonly known as: ELIQUIS  Take 1 tablet by mouth 2 times daily     atorvastatin 40 MG tablet  Commonly known as: LIPITOR  Take 1 tablet by mouth nightly     benzonatate 200 MG capsule  Commonly known as: TESSALON  Take 1 capsule by mouth 3 times daily as needed for Cough     guaiFENesin 600 MG extended release tablet  Commonly known as: MUCINEX  Take 1 tablet by mouth 2 times daily for 7 days     levoFLOXacin 500 MG tablet  Commonly known as: LEVAQUIN  Take 1 tablet by mouth daily for 5 doses  Start taking on: November 4, 2022     metoprolol tartrate 25 MG tablet  Commonly known as: LOPRESSOR  Take 1 tablet by mouth 2 times daily     nicotine 14 MG/24HR  Commonly known as: NICODERM CQ  Place 1 patch onto the skin daily  Start taking on: November 4, 2022     nitroGLYCERIN 0.4 MG SL tablet  Commonly known as: NITROSTAT  Place 1 tablet under the tongue every 5 minutes as needed for Chest pain up to max of 3 total doses. If no relief after 1 dose, call 911.      predniSONE 20 MG tablet  Commonly known as: DELTASONE  Take 2 tablets by mouth daily for 3 doses  Start taking on: November 4, 2022            CONTINUE taking these medications      Breztri Aerosphere 160-9-4.8 MCG/ACT Aero  Generic drug: Budeson-Glycopyrrol-Formoterol  Inhale 2 inhalations into the lungs in the morning and at bedtime     divalproex 250 MG DR tablet  Commonly known as: DEPAKOTE  take 3 tablets by mouth twice a day     lamoTRIgine 100 MG tablet  Commonly known as: LAMICTAL  1 TABLET DAILY AT BEDTIME     lisinopril 20 MG tablet  Commonly known as: PRINIVIL;ZESTRIL     meloxicam 15 MG tablet  Commonly known as: MOBIC  take 1 tablet by mouth once daily     promethazine 25 MG tablet  Commonly known as: PHENERGAN  take 1 tablet by mouth every 8 hours if needed for nausea     SUMAtriptan 50 MG tablet  Commonly known as: IMITREX  take 1 tablet by mouth once daily if needed for MIGRAINE               Where to Get Your Medications        These medications were sent to [address] [place] [address], [address] [id number]      Phone: [phone]   benzonatate 200 MG capsule  guaiFENesin 600 MG extended release tablet  levoFLOXacin 500 MG tablet  metoprolol tartrate 25 MG tablet  nicotine 14 MG/24HR  predniSONE 20 MG tablet       These medications were sent to [facility] [address] [facility], [address] [place] [address] [address] [facility] [address], [facility] [address]      Phone: [phone]   apixaban 5 MG Tabs tablet  atorvastatin 40 MG tablet  nitroGLYCERIN 0.4 MG SL tablet         No discharge procedures on file. Time Spent on discharge is  31 mins in patient examination, evaluation, counseling as well as medication reconciliation, prescriptions for required medications, discharge plan and follow up. Electronically signed by   Eusebia Jackson MD  11/3/2022  3:07 PM      Thank you Dr. Surya Quiroga MD for the opportunity to be involved in this patient's care.

## 2022-11-03 NOTE — PLAN OF CARE
Problem: Discharge Planning  Goal: Discharge to home or other facility with appropriate resources  11/3/2022 0125 by Jordon Rollins RN  Outcome: Progressing  Flowsheets (Taken 11/2/2022 2000)  Discharge to home or other facility with appropriate resources: Identify barriers to discharge with patient and caregiver     Problem: Pain  Goal: Verbalizes/displays adequate comfort level or baseline comfort level  11/3/2022 0125 by Jordon Rollins RN  Outcome: Progressing     Problem: Safety - Adult  Goal: Free from fall injury  11/3/2022 0125 by Jordon Rollins RN  Outcome: Progressing     Problem: Cardiovascular - Adult  Goal: Maintains optimal cardiac output and hemodynamic stability  Outcome: Progressing  Goal: Absence of cardiac dysrhythmias or at baseline  Outcome: Progressing     Problem: Infection - Adult  Goal: Absence of infection at discharge  Outcome: Progressing  Goal: Absence of infection during hospitalization  Outcome: Progressing  Goal: Absence of fever/infection during anticipated neutropenic period  Outcome: Progressing
Problem: Discharge Planning  Goal: Discharge to home or other facility with appropriate resources  11/3/2022 0936 by González Dugan RN  Outcome: Progressing  11/3/2022 0125 by Caitlin Calix RN  Outcome: Progressing  Flowsheets (Taken 11/2/2022 2000)  Discharge to home or other facility with appropriate resources: Identify barriers to discharge with patient and caregiver     Problem: Pain  Goal: Verbalizes/displays adequate comfort level or baseline comfort level  11/3/2022 0936 by González Dugan RN  Outcome: Progressing  11/3/2022 0125 by Caitlin Calix RN  Outcome: Progressing     Problem: Safety - Adult  Goal: Free from fall injury  11/3/2022 0936 by González Dugan RN  Outcome: Progressing  11/3/2022 0125 by Caitlin Calix RN  Outcome: Progressing     Problem: Cardiovascular - Adult  Goal: Maintains optimal cardiac output and hemodynamic stability  11/3/2022 0936 by González Dugan RN  Outcome: Progressing  11/3/2022 0125 by Caitlin Calix RN  Outcome: Progressing  Goal: Absence of cardiac dysrhythmias or at baseline  11/3/2022 0936 by González Dugan RN  Outcome: Progressing  11/3/2022 0125 by Caitlin Calix RN  Outcome: Progressing     Problem: Infection - Adult  Goal: Absence of infection at discharge  11/3/2022 0936 by González Dugan RN  Outcome: Progressing  11/3/2022 0125 by Caitlin Calix RN  Outcome: Progressing  Goal: Absence of infection during hospitalization  11/3/2022 0936 by González Dugan RN  Outcome: Progressing  11/3/2022 0125 by Caitlin Calix RN  Outcome: Progressing  Goal: Absence of fever/infection during anticipated neutropenic period  11/3/2022 0936 by González Dugan RN  Outcome: Progressing  11/3/2022 0125 by Caitlin Calix RN  Outcome: Progressing
Problem: Discharge Planning  Goal: Discharge to home or other facility with appropriate resources  11/3/2022 1553 by José Miguel Resterpo RN  Outcome: Completed  11/3/2022 0936 by José Miguel Restrepo RN  Outcome: Progressing     Problem: Pain  Goal: Verbalizes/displays adequate comfort level or baseline comfort level  11/3/2022 1553 by José Miguel Restrepo RN  Outcome: Completed  11/3/2022 0936 by José Miguel Restrepo RN  Outcome: Progressing     Problem: Safety - Adult  Goal: Free from fall injury  11/3/2022 1553 by José Miguel Restrepo RN  Outcome: Completed  11/3/2022 0936 by José Miguel Restrepo RN  Outcome: Progressing     Problem: Cardiovascular - Adult  Goal: Maintains optimal cardiac output and hemodynamic stability  11/3/2022 1553 by José Miguel Restrepo RN  Outcome: Completed  11/3/2022 0936 by José Miguel Restrepo RN  Outcome: Progressing  Goal: Absence of cardiac dysrhythmias or at baseline  11/3/2022 1553 by José Miguel Restrepo RN  Outcome: Completed  11/3/2022 0936 by José Miguel Restrepo RN  Outcome: Progressing     Problem: Infection - Adult  Goal: Absence of infection at discharge  11/3/2022 1553 by José Miguel Restrepo RN  Outcome: Completed  11/3/2022 0936 by José Miguel Restrepo RN  Outcome: Progressing  Goal: Absence of infection during hospitalization  11/3/2022 1553 by José Miguel Restrepo RN  Outcome: Completed  11/3/2022 0936 by José Miguel Restrepo RN  Outcome: Progressing  Goal: Absence of fever/infection during anticipated neutropenic period  11/3/2022 1553 by José Miguel Restrepo RN  Outcome: Completed  11/3/2022 0936 by José Miguel Restrepo RN  Outcome: Progressing
Problem: Discharge Planning  Goal: Discharge to home or other facility with appropriate resources  Outcome: Progressing     Problem: Pain  Goal: Verbalizes/displays adequate comfort level or baseline comfort level  Outcome: Progressing     Problem: Safety - Adult  Goal: Free from fall injury  Outcome: Progressing
Problem: Discharge Planning  Goal: Discharge to home or other facility with appropriate resources  Outcome: Progressing     Problem: Pain  Goal: Verbalizes/displays adequate comfort level or baseline comfort level  Outcome: Progressing     Problem: Safety - Adult  Goal: Free from fall injury  Outcome: Progressing
[facility]  Office: [address], DO, Valorie Kirk, DO, Nicky Ramos, DO, Shannan Diaz Blood, DO, Stephanie Rhodes MD, Tiffany Ware MD, Antonina Neville MD, Efrain Bronson MD,  Alo Starr MD, Jose Lozoay MD, Nano Cat, DO, Diego Casper MD,  Alfonso Dubon, DO, Reece Zhao MD, Maikol Perry MD, Juanjo Edmonds, DO, Simba Rivera MD, Joanne Chery MD, Rafaela Bryan, DO, Amauri aSlgado MD, Marya Soria MD, Darci Miranda MD, Chano Farooq MD, Murali Menendez DO, Radha Sexton MD, Marion Shine MD, Anny Dalton, Susy Keating, CNP, Mirtha Rodriguez, CNP, Feliciano Goodson, CNP,  Karis Gabriel, DNP, Ronda Taylor, CNP, Norma Aden, CNP, Jessica Zapata, CNP, Fred Mishra, CNP, Roberta Lane, CNP, Zay León, PA-C, Lamonte Ruthain, CNS, Jan Hoyt, DNP, Karla Carl, CNP, Aiyana Stubbs, CNP, Cruzito Antonio, CNP         [address]    Second Visit Note  For more detailed information please refer to the progress note of the day      11/2/2022    12:20 PM    Name:   Hilda Abraham  MRN:     [id number]     [place]:      [de-identified]   Room:   2020/2020-01  IP Day:  1  Admit Date:  11/1/2022 11:56 PM    PCP:   Jyoti Mills MD  Code Status:  Full Code      Pt vitals were reviewed   New labs were reviewed   Patient was seen    Updated plan :     Having some left sided chest pain, point tenderness denies feeling palpitations, heart rate sinus from 70s-90s. Heparin drip running  Echo pending  Discussed with family at bedside.        Diego Casper MD  11/2/2022  12:20 PM
Stress test reviewed:  Impression   1. No definitive scintigraphic evidence for reversible ischemia or infarct. 2. Left ventricular ejection fraction of 68%. 3.  Please see report for EKG portion of the examination which will be   performed separately by physician from cardiology. Risk stratification:  Low risk     No plans for further ischemic work-up at this time. Continue PO BB and switch Heparin gtt to Eliquis 5mg PO BID  Continue BB & ACE for BP support. OK for discharge from CV standpoint with OP f/u in clinic.     Mirna Colon, APRN - CNP
Otolaryngology (ENT)

## 2022-11-03 NOTE — PROGRESS NOTES
[facility]  Office: [address], DO, Seng Kearney, DO, Laurel Adler, DO, Yudelka Simon Blood, DO, Jamia De Jesus MD, Elin Wilder MD, Yvone Phalen, MD, Brayan Desai MD,  Homer Hashimoto, MD, Faraz Crisostomo MD, Marce Tapia DO, Abigail Blount MD,  Stephie Argueta MD, Lyla Ronquillo MD, Tyler Alvarez DO, Miguel Jack MD, Rosalee Costello MD, Dona Vasquez, DO, Jamari Guerrero MD, Nahun Sheriff MD, Reji Lozada MD, Irina Sanders MD, Isidro Hamilton DO, Annmarie Foster MD, Yoshi Hoffman MD, Pauline Webb, CNP,  Tiffany Coffey, CNP, Papo Jean, CNP, Ruperto Lockhart, CNP,  Jair Dickerson, [facility], Jessi Ambriz, CNP, Ayan Palencia, CNP, Darrel Armijo, CNP, Nancy Lu, CNP, Nereyda Berger, CNP, Bertha Ramirez PASHYAM, Raquel Cee, CNS, Pete Tucker, [facility], Terrell Sharma, CNP, Darrell Hermosillo, CNP, Gibran Vaz, CNP         [address]    Progress Note    11/3/2022    12:22 PM    Name:   Rosa Portillo  MRN:     [id number]     [place]:      [de-identified]   Room:   2020/2020-01  IP Day:  2  Admit Date:  11/1/2022 11:56 PM    PCP:   Sivakumar Roach MD  Code Status:  Full Code    Subjective:     C/C: shortness of breath  Interval History Status: improved. Patient states he had feeling much better at bedside. Reviewed results of stress test discussed about anticoagulation with Eliquis patient is agreeable. Slightly tachycardic will titrate up Lopressor. Brief History:     26-year-old male past medical history of COPD, GERD, anxiety, tobacco abuse [facility] breath found to have COPD exacerbation while admitted was noted to be in A. fib with RVR with heart rate around 200s and patient was transferred with Cardene drip to [facility] for further care.   Patient converted to sinus rhythm and currently on Lopressor twice daily for rate control underwent stress test showing no definitive ischemia and low risk ratification and patient transition to Eliquis 5 mg twice daily. Patient to follow-up with cardiology as outpatient and to follow-up with PCP, educated to stop smoking. Review of Systems:     Review of Systems   Constitutional:  Negative for activity change and fatigue. HENT:  Negative for hearing loss and sneezing. Eyes:  Negative for discharge and redness. Respiratory:  Negative for apnea, cough and choking. Cardiovascular:  Negative for chest pain, palpitations and leg swelling. Genitourinary:  Negative for dysuria and frequency. Musculoskeletal:  Negative for arthralgias and myalgias. Neurological:  Negative for dizziness and syncope. Psychiatric/Behavioral:  Negative for agitation and confusion. Medications:      Allergies:  No Known Allergies    Current Meds:   Scheduled Meds:    nicotine  1 patch TransDERmal Daily    atorvastatin  40 mg Oral Nightly    levalbuterol  1.25 mg Nebulization Q4H WA    ipratropium  0.5 mg Nebulization 4x daily    levoFLOXacin  500 mg Oral Daily    guaiFENesin  600 mg Oral BID    famotidine  20 mg Oral BID    meloxicam  15 mg Oral Daily    predniSONE  40 mg Oral Daily    metoprolol tartrate  12.5 mg Oral BID    docusate sodium  100 mg Oral Daily    lamoTRIgine  100 mg Oral Daily    lisinopril  20 mg Oral Daily    sodium chloride flush  5-40 mL IntraVENous 2 times per day     Continuous Infusions:    sodium chloride      heparin (PORCINE) Infusion 18 Units/kg/hr (11/03/22 1200)     PRN Meds: calcium carbonate, nitroGLYCERIN, sodium chloride flush, sodium chloride flush, dextromethorphan-guaiFENesin, benzonatate, oxyCODONE-acetaminophen, sodium chloride flush, sodium chloride, potassium chloride **OR** potassium alternative oral replacement **OR** potassium chloride, magnesium sulfate, ondansetron **OR** ondansetron, polyethylene glycol, acetaminophen **OR** acetaminophen, heparin (porcine), heparin (porcine)    Data:     Past Medical History:   has a past medical history of Anxiety, mild, Colon polyps, Dizziness, Eczema, Epilepsy ([address] GERD (gastroesophageal reflux disease), H/O fall, Head ache, Head injury, Hiatal hernia, Inguinal hernia, Low back pain, Lumbar sprain, Migraine, Plantar fasciitis, bilateral, Seizure disorder ([address] Sigmoid diverticulosis, Sleep difficulties, Status migrainosus, TIA (transient ischemic attack), and Tobacco use. Social History:   reports that he has been smoking cigarettes. He has a 5.00 pack-year smoking history. He has never used smokeless tobacco. He reports that he does not drink alcohol and does not use drugs. Family History:   Family History   Problem Relation Age of Onset    COPD Mother     High Blood Pressure Mother    [de-identified] Cancer Father         Hodgekin's Lymphoma    High Blood Pressure Father     Eczema Sister     Alzheimer's Disease Maternal Grandmother     COPD Maternal Grandmother     Cancer Maternal Grandmother     Cancer Paternal Grandmother     Cancer Paternal Grandfather     Cancer Sister         colon cancer    High Blood Pressure Sister     Arthritis Sister     Asthma Daughter     Eczema Daughter     Glaucoma Neg Hx     Diabetes Neg Hx     Cataracts Neg Hx        Vitals:  BP (!) 130/105   Pulse 85   Temp 97.9 °F (36.6 °C) (Oral)   Resp 15   Ht 5' 6\" (1.676 m)   Wt 195 lb 5.2 oz (88.6 kg)   SpO2 97%   BMI 31.53 kg/m²   Temp (24hrs), Av.8 °F (36.6 °C), Min:97.6 °F (36.4 °C), Max:97.9 °F (36.6 °C)    No results for input(s): POCGLU in the last 72 hours. I/O (24Hr):     Intake/Output Summary (Last 24 hours) at 11/3/2022 1222  Last data filed at 11/3/2022 0821  Gross per 24 hour   Intake 422.54 ml   Output 2150 ml   Net -1727.46 ml       Labs:  Hematology:  Recent Labs     22  0023 22  0819 22  0651   WBC 11.3  --  11.2   RBC 4.15*  --  4.07*   HGB 14.4  --  14.3   HCT 40.6*  --  39.5* MCV 97.8  --  97.1   MCH 34.7*  --  35.1*   MCHC 35.5*  --  36.2*   RDW 12.1  --  12.0     --  206   MPV 9.9  --  10.0   INR  --  1.1  --      Chemistry:  Recent Labs     11/02/22  1134   *   K 4.0   CL 97*   CO2 25   GLUCOSE 126*   BUN 26*   CREATININE 1.00   ANIONGAP 11   LABGLOM >60   CALCIUM 8.6   No results for input(s): PROT, LABALBU, LABA1C, J9JVLHZ, K2GAVXD, FT4, TSH, AST, ALT, LDH, GGT, ALKPHOS, LABGGT, BILITOT, BILIDIR, AMMONIA, AMYLASE, LIPASE, LACTATE, CHOL, HDL, LDLCHOLESTEROL, CHOLHDLRATIO, TRIG, VLDL, VTF07TR, PHENYTOIN, PHENYF, URICACID, POCGLU in the last 72 hours. ABG:No results found for: POCPH, PHART, PH, POCPCO2, DRB0HWY, PCO2, POCPO2, PO2ART, PO2, POCHCO3, DZY6YCL, HCO3, NBEA, PBEA, BEART, BE, THGBART, THB, ZOD5NPL, OZUL9TYO, I7RWPMRW, O2SAT, FIO2  Lab Results   Component Value Date/Time    SPECIAL NOT REPORTED 08/29/2018 03:34 PM     Lab Results   Component Value Date/Time    CULTURE NO GROWTH 08/29/2018 03:34 PM       Radiology:  No results found. Physical Examination:        Physical Exam  Constitutional:       Appearance: Normal appearance. He is obese. HENT:      Head: Normocephalic and atraumatic. Right Ear: External ear normal.      Left Ear: External ear normal.      Nose: Nose normal.      Mouth/Throat:      Mouth: Mucous membranes are moist.   Eyes:      Extraocular Movements: Extraocular movements intact. Pupils: Pupils are equal, round, and reactive to light. Cardiovascular:      Rate and Rhythm: Regular rhythm. Tachycardia present. Pulses: Normal pulses. Heart sounds: No murmur heard. Pulmonary:      Breath sounds: No wheezing or rales. Comments: Decreased breath sounds bilaterally, no wheezing  Abdominal:      General: There is no distension. Palpations: Abdomen is soft. Tenderness: There is no abdominal tenderness. Musculoskeletal:      Cervical back: Neck supple. Right lower leg: No edema.       Left lower leg: No edema.   Skin:     General: Skin is warm and dry. Neurological:      General: No focal deficit present. Mental Status: He is alert and oriented to person, place, and time. Psychiatric:         Mood and Affect: Mood normal.         Behavior: Behavior normal.       Assessment:        Hospital Problems             Last Modified POA    * (Principal) Atrial fibrillation with RVR ([facility] 11/1/2022 Yes    Acute respiratory failure with hypoxemia ([address] 11/2/2022 Yes    Acute bronchitis with COPD ([address] 11/2/2022 Yes    New onset a-fib ([address] 11/3/2022 Yes    Tobacco use 11/2/2022 Yes    Overview Signed 4/8/2014  1:57 PM by Ashlie Keane MD     Chronic. GERD (gastroesophageal reflux disease) 11/2/2022 Yes    Head ache 11/2/2022 Yes    Seizure disorder ([address] 11/2/2022 Yes       Plan:        Discussed with patient about Eliquis, discussed about Lopressor  Encourage smoking cessation  Plan to discharge today  Discussed with case management.       Abigail Blount MD  11/3/2022  12:22 PM

## 2022-11-03 NOTE — PROGRESS NOTES
[facility]  Progress Note                   Date:   11/3/2022  Patient name: Dalton Tapia  Date of admission:  11/1/2022 11:56 PM  MRN:   [id number]  YOB: 1980  PCP: Jamel Hernandez MD    Reason for Admission: Atrial fibrillation with RVR ([facility] [address] [I48.91]    Subjective:       Clinical Changes /Abnormalities: Patient seen and examined in stress lab. He denies chest pain and shortness of breath. He states he had pain this morning but he believes it was indigestion versus chest pain. Labs/vitals/tele reviewed, NSR. On heparin gtt. Echo completed this AM    Review of Systems    Medications:   Scheduled Meds:   nicotine  1 patch TransDERmal Daily    atorvastatin  40 mg Oral Nightly    levalbuterol  1.25 mg Nebulization Q4H WA    ipratropium  0.5 mg Nebulization 4x daily    levoFLOXacin  500 mg Oral Daily    guaiFENesin  600 mg Oral BID    famotidine  20 mg Oral BID    meloxicam  15 mg Oral Daily    predniSONE  40 mg Oral Daily    metoprolol tartrate  12.5 mg Oral BID    docusate sodium  100 mg Oral Daily    lamoTRIgine  100 mg Oral Daily    lisinopril  20 mg Oral Daily    sodium chloride flush  5-40 mL IntraVENous 2 times per day     Continuous Infusions:   sodium chloride      sodium chloride      heparin (PORCINE) Infusion 18 Units/kg/hr (11/02/22 1817)     CBC:   Recent Labs     11/02/22  0023 11/03/22  0651   WBC 11.3 11.2   HGB 14.4 14.3    206     BMP:    Recent Labs     11/02/22  1134   *   K 4.0   CL 97*   CO2 25   BUN 26*   CREATININE 1.00   GLUCOSE 126*     Hepatic:No results for input(s): AST, ALT, ALB, BILITOT, ALKPHOS in the last 72 hours. Troponin: No results for input(s): TROPHS in the last 72 hours. BNP: No results for input(s): BNP in the last 72 hours. Lipids: No results for input(s): CHOL, HDL in the last 72 hours. Invalid input(s): LDLCALCU  INR:   Recent Labs     11/02/22  0819   INR 1.1     ECHO: 08/11/2021  Normal LV size and function. Ejection fraction is 60%. No diastolic dysfunction. Normal LA and right cavities size. Normal function of all valves. No evidence of pericardial effusion. Nuclear stress test 08/2021. EF:  71%  TID:  N/A  LHR:  N/A     FINDINGS:  Ischemia (Reversible Defect):           No.  Infarction (Irreversible Defect):       No.  Ejection fraction Calculated:           Yes. Segmental wall motion:                  Yes. Low risk study. IMPRESSION:  1. No ischemia or infarction. 2. Left ventricular ejection fraction 71%. INTERPRETATION:  1. Stress EKG inconclusive for ischemia. 2. Nuclear scan to follow. Echo 11/3/22  Summary   Left ventricle is normal in size. Global left ventricular systolic function   is low normal. Estimated ejection fraction is 50 %. Trivial mitral regurgitation. Trivial tricuspid regurgitation. Estimated right ventricular systolic   pressure is 19 mmHg. Objective:   Vitals: BP (!) 129/99   Pulse 74   Temp 97.9 °F (36.6 °C) (Oral)   Resp 18   Ht 5' 6\" (1.676 m)   Wt 195 lb 5.2 oz (88.6 kg)   SpO2 98%   BMI 31.53 kg/m²   General appearance: alert and cooperative with exam  HEENT: Head: Normocephalic, no lesions, without obvious abnormality. Neck:no JVD, trachea midline, no adenopathy  Lungs: Clear to auscultation  Heart: Regular rate and rhythm, s1/s2 auscultated, no murmurs  Abdomen: soft, non-tender, bowel sounds active  Extremities: no edema  Neurologic: not done        Assessment / Acute Cardiac Problems:   A. fib with RVR at outside hospital.  Presumably new onset, converted back to NSR. ChadsVasc of 3. History of hypertension at home. Recent admission for dyspnea, likely secondary to COPD versus pneumonia. Being treated by primary team.  History of TIA in 2017.     Patient Active Problem List:     Low back pain with sciatica     Migraine     Anxiety, mild     Disturbance, sleep     Tobacco use     GERD (gastroesophageal reflux disease)     Hiatal hernia Head ache     Status migrainosus     Dizziness     Depression     H/O fall     Head injury     Seizure disorder (HCC)     Astigmatism     Rectal bleed     Cervical radiculopathy     Ventricular premature beats     Nausea and vomiting     Colon polyps     TIA (transient ischemic attack)     VBI (vertebrobasilar insufficiency)     Pilonidal cyst     Wound dehiscence     Sleep difficulties     Partial idiopathic epilepsy with seizures of localized onset, not intractable, without status epilepticus ([address]     Convulsions ([address]     Recurrent seizures (HCC)     Atrial fibrillation with RVR ([address]     Acute respiratory failure with hypoxemia (HCC)     Acute bronchitis with COPD (HCC)    [id number] Score for Atrial Fibrillation Stroke Risk   Risk   Factors  Component Value   C CHF No 0   H HTN No 0   A2 Age >= 75 No,  (43 y.o.) 0   D DM No 0   S2 Prior Stroke/TIA Yes 2   V Vascular Disease No 0   A Age 74-69 No,  (43 y.o.) 0   Sc Sex male 0    [id number]  Score  2   Score last updated 11/3/22 3:81 AM EDT    Click here for a link to the UpToDate guideline \"Atrial Fibrillation: Anticoagulation therapy to prevent embolization    Disclaimer: Risk Score calculation is dependent on accuracy of patient problem list and past encounter diagnosis. Plan of Treatment:   Awaiting stress results. Echo as above with preserved LVEF. Afib RVR. Current NSR. Denies palpitations. Continue BB and heparin gtt. Hypertension controlled. Continue ACE and BB. Keep K >4 and Mag >2.     Electronically signed by CLAUDIA Sow CNP on 11/3/2022 at 9:46 AM  [address]  [phone]

## 2022-11-03 NOTE — PROGRESS NOTES
[facility] [address]  PROGRESS NOTE    Shift date: 11/3/2022  Shift day: Thursday   Shift # 1    Room # 2020/2020-01   Name: Booker Maxwell                Zoroastrian: Unknown   Place of Restorationist: Unknown    Referral: Routine Visit    Admit Date & Time: 11/1/2022 11:56 PM    Assessment:  Booker Maxwell is a 36 y.o. male in the hospital because atrial fibrillation with RVR. Upon entering the room writer observes patient is eating, sitting up in bed. Intervention:  Writer introduced self and title as  Writer offered space for patient  to express feelings, needs, and concerns and provided a ministry presence. Patient shares that he is a little stressed. He has been the one taking care of things at home, but he is now in the hospital and not sure what has caused his issues. His wife is home, off work, due to foot surgery. Now they have to move out of their house temporarily due to a mold issue. He feels there is a lot and he is not there for it. Outcome:   listens to patient and they offers prayer for patient, for his health, his wife and the situation at home. Plan:  Chaplains will remain available to offer spiritual and emotional support as needed. Electronically signed by Yordan Celis, on 11/3/2022 at 2:40 PM.  Tahir Alvarado  [phone]       11/03/22 1438   Encounter Summary   Service Provided For: Patient   Referral/Consult From: [address]; Children   Last Encounter  11/03/22   Complexity of Encounter Moderate   Begin Time 1400   End Time  1410   Total Time Calculated 10 min   Assessment/Intervention/Outcome   Assessment Calm; Concerns with suffering;Coping;Stress overload   Intervention Active listening;Discussed belief system/Amish practices/sneha;Discussed relationship with God;Explored Coping Skills/Resources;Prayer (assurance of)/[place];Sustaining Presence/Ministry of presence   Outcome Connection/Belonging;Coping;Encouraged;Engaged in conversation; Less anxious, Less agitated;Expressed Gratitude

## 2022-11-03 NOTE — DISCHARGE INSTR - DIET

## 2022-11-07 ENCOUNTER — OFFICE VISIT (OUTPATIENT)
Dept: FAMILY MEDICINE CLINIC | Age: 42
End: 2022-11-07

## 2022-11-07 ENCOUNTER — OFFICE VISIT (OUTPATIENT)
Dept: CARDIOLOGY | Age: 42
End: 2022-11-07
Payer: MEDICARE

## 2022-11-07 VITALS
HEIGHT: 66 IN | OXYGEN SATURATION: 95 % | RESPIRATION RATE: 16 BRPM | HEART RATE: 93 BPM | DIASTOLIC BLOOD PRESSURE: 80 MMHG | SYSTOLIC BLOOD PRESSURE: 118 MMHG | BODY MASS INDEX: 30.5 KG/M2 | WEIGHT: 189.8 LBS

## 2022-11-07 VITALS
WEIGHT: 195 LBS | SYSTOLIC BLOOD PRESSURE: 127 MMHG | BODY MASS INDEX: 31.34 KG/M2 | DIASTOLIC BLOOD PRESSURE: 87 MMHG | HEIGHT: 66 IN | HEART RATE: 87 BPM

## 2022-11-07 DIAGNOSIS — Z09 HOSPITAL DISCHARGE FOLLOW-UP: Primary | ICD-10-CM

## 2022-11-07 DIAGNOSIS — J96.01 ACUTE RESPIRATORY FAILURE WITH HYPOXEMIA (HCC): ICD-10-CM

## 2022-11-07 DIAGNOSIS — Z98.890 HISTORY OF LOOP RECORDER: ICD-10-CM

## 2022-11-07 DIAGNOSIS — B37.0 THRUSH: ICD-10-CM

## 2022-11-07 DIAGNOSIS — I63.89 CEREBROVASCULAR ACCIDENT (CVA) DUE TO OTHER MECHANISM (HCC): ICD-10-CM

## 2022-11-07 DIAGNOSIS — R07.9 CHEST PAIN, MODERATE CORONARY ARTERY RISK: ICD-10-CM

## 2022-11-07 DIAGNOSIS — R05.3 CHRONIC COUGH: ICD-10-CM

## 2022-11-07 DIAGNOSIS — I48.91 ATRIAL FIBRILLATION WITH RVR (HCC): Primary | ICD-10-CM

## 2022-11-07 DIAGNOSIS — K44.9 HIATAL HERNIA: ICD-10-CM

## 2022-11-07 DIAGNOSIS — I48.91 ATRIAL FIBRILLATION WITH RVR (HCC): ICD-10-CM

## 2022-11-07 PROCEDURE — 99214 OFFICE O/P EST MOD 30 MIN: CPT | Performed by: INTERNAL MEDICINE

## 2022-11-07 PROCEDURE — G8483 FLU IMM NO ADMIN DOC REA: HCPCS | Performed by: INTERNAL MEDICINE

## 2022-11-07 PROCEDURE — G8417 CALC BMI ABV UP PARAM F/U: HCPCS | Performed by: INTERNAL MEDICINE

## 2022-11-07 PROCEDURE — 4004F PT TOBACCO SCREEN RCVD TLK: CPT | Performed by: INTERNAL MEDICINE

## 2022-11-07 PROCEDURE — G8427 DOCREV CUR MEDS BY ELIG CLIN: HCPCS | Performed by: INTERNAL MEDICINE

## 2022-11-07 PROCEDURE — 93005 ELECTROCARDIOGRAM TRACING: CPT | Performed by: INTERNAL MEDICINE

## 2022-11-07 PROCEDURE — 93010 ELECTROCARDIOGRAM REPORT: CPT | Performed by: INTERNAL MEDICINE

## 2022-11-07 RX ORDER — METOPROLOL SUCCINATE 100 MG/1
100 TABLET, EXTENDED RELEASE ORAL NIGHTLY
Qty: 90 TABLET | Refills: 3 | Status: SHIPPED | OUTPATIENT
Start: 2022-11-07

## 2022-11-07 ASSESSMENT — ENCOUNTER SYMPTOMS
DIARRHEA: 0
COUGH: 1
VOMITING: 0
EYE PAIN: 0
NAUSEA: 0
BLOOD IN STOOL: 0
ABDOMINAL PAIN: 0
TROUBLE SWALLOWING: 0
SHORTNESS OF BREATH: 0
CONSTIPATION: 0

## 2022-11-07 NOTE — PROGRESS NOTES
Post-Discharge Transitional Care Follow Up      Mp Kern   YOB: 1980    Date of Office Visit:  11/7/2022  Date of Hospital Admission: 11/1/22  Date of Hospital Discharge: 11/3/22  Readmission Risk Score (high >=14%. Medium >=10%):Readmission Risk Score: 8.9      Care management risk score Rising risk (score 2-5) and Complex Care (Scores >=6): No Risk Score On File     Non face to face  following discharge, date last encounter closed (first attempt may have been earlier): 11/03/2022     Call initiated 2 business days of discharge: Yes     Hospital discharge follow-up  -     NE DISCHARGE MEDS RECONCILED W/ CURRENT OUTPATIENT MED LIST  Atrial fibrillation with RVR ([address]  -Follow-up with cardiology earlier this morning. No significant findings on echo or stress test.  Recommend continuing metoprolol, Eliquis at this time. Currently in normal sinus rhythm. Recommend follow-up with cardiology as previously scheduled. Chronic cough  -Diagnosed with COPD with acute exacerbation while hospitalized however patient does not carry a diagnosis of COPD. Recommend continue Levaquin until completion and then will check full PFT study for further evaluation of his lung disease. I did recommend at this time that patient should stop smoking and patient will consider this going forward. -     Full PFT Study With Bronchodilator; Future  Hiatal hernia  -Symptomatic hiatal hernia with reflux. Patient requesting referral to general surgery at this time for further evaluation.  -     Nancy Adan MD, General Surgery, [facility] secondary to antibiotic use we will start nystatin suspension 4 times a day for 7 days. -     nystatin (MYCOSTATIN) 998050 UNIT/ML suspension; Take 5 mLs by mouth 4 times daily for 7 days, Oral, 4 TIMES DAILY Starting Mon 11/7/2022, Until Mon 11/14/2022, For 7 days, Disp-140 mL, R-0, Normal    Medical Decision Making: high complexity  No follow-ups on file. On this date 11/7/2022 I have spent 60 minutes reviewing previous notes, test results and face to face with the patient discussing the diagnosis and importance of compliance with the treatment plan as well as documenting on the day of the visit. Subjective:   HPI  Patient is a 44-year-old male who presents to the office today for hospital follow-up. Patient recently hospitalized at [facility] with questionable COPD exacerbation. Patient states that he is never had the diagnosis of COPD previously. Probably a pneumonia and started on Levaquin. Was about to be discharged when he converted in the atrial fibrillation with RVR. He was then transferred to [facility] for further management of this condition. He was started on Eliquis, metoprolol for this issue. He was told to follow-up with cardiology after undergoing stress test in the hospital.  Inpatient course: Discharge summary reviewed- see chart. Interval history/Current status: Since his hospitalization he states that he has been feeling okay. States that he has not had any palpitations chest pain or shortness of breath. States that he does still continue to have this chronic cough which she has had for around 5 months now he states that he has 1 more day of Levaquin to complete. He denies any fevers, chills. He does state that he has a burning sensation in the back of his mouth on his tongue and on the sides of his mouth. He also states that he has had issues with GERD recently and on one of his CT scans he said there was a moderate to large hiatal hernia. He would like referral to general surgery at this time for further evaluation of his hiatal hernia. He denies any significant history of COPD. States that he was told that he had a COPD exacerbation but is unsure where this diagnosis came from. He denies any history of PFTs.     Patient Active Problem List   Diagnosis    Low back pain with sciatica    Migraine Anxiety, mild    Disturbance, sleep    Tobacco use    GERD (gastroesophageal reflux disease)    Hiatal hernia    Head ache    Status migrainosus    Dizziness    Depression    H/O fall    Head injury    Seizure disorder (HCC)    Astigmatism    Rectal bleed    Cervical radiculopathy    Ventricular premature beats    Nausea and vomiting    Colon polyps    TIA (transient ischemic attack)    VBI (vertebrobasilar insufficiency)    Pilonidal cyst    Wound dehiscence    Sleep difficulties    Partial idiopathic epilepsy with seizures of localized onset, not intractable, without status epilepticus ([address]    Convulsions ([address]    Recurrent seizures ([address]    Atrial fibrillation with RVR ([address]    Acute respiratory failure with hypoxemia ([address]    Acute bronchitis with COPD ([address]    New onset a-fib ([facility]       Medications listed as ordered at the time of discharge from hospital     Medication List            Accurate as of November 7, 2022 12:12 PM. If you have any questions, ask your nurse or doctor. START taking these medications      metoprolol succinate 100 MG extended release tablet  Commonly known as:  Toprol XL  Take 1 tablet by mouth at bedtime  Started by: Javier Vargas DO     nystatin 357802 UNIT/ML suspension  Commonly known as: MYCOSTATIN  Take 5 mLs by mouth 4 times daily for 7 days  Started by: Danette Lechuga DO            CONTINUE taking these medications      apixaban 5 MG Tabs tablet  Commonly known as: ELIQUIS  Take 1 tablet by mouth 2 times daily     atorvastatin 40 MG tablet  Commonly known as: LIPITOR  Take 1 tablet by mouth nightly     divalproex 250 MG DR tablet  Commonly known as: DEPAKOTE  take 3 tablets by mouth twice a day     lamoTRIgine 100 MG tablet  Commonly known as: LAMICTAL  1 TABLET DAILY AT BEDTIME     levoFLOXacin 500 MG tablet  Commonly known as: LEVAQUIN  Take 1 tablet by mouth daily for 5 doses     lisinopril 20 MG tablet  Commonly known as: PRINIVIL;ZESTRIL     meloxicam 15 MG tablet  Commonly known as: MOBIC  take 1 tablet by mouth once daily     nitroGLYCERIN 0.4 MG SL tablet  Commonly known as: NITROSTAT  Place 1 tablet under the tongue every 5 minutes as needed for Chest pain up to max of 3 total doses. If no relief after 1 dose, call 911. predniSONE 20 MG tablet  Commonly known as: DELTASONE  Take 2 tablets by mouth daily for 3 doses     promethazine 25 MG tablet  Commonly known as: PHENERGAN  take 1 tablet by mouth every 8 hours if needed for nausea     SUMAtriptan 50 MG tablet  Commonly known as: IMITREX  take 1 tablet by mouth once daily if needed for MIGRAINE            STOP taking these medications      metoprolol tartrate 25 MG tablet  Commonly known as: LOPRESSOR  Stopped by: Margoth Goodson, DO               Where to Get Your Medications        These medications were sent to [address] [place] [address] [id number] Maggy Brooke [address] [place] [address] [phone] [address], [address] [id number]      Phone: [phone]   metoprolol succinate 100 MG extended release tablet  nystatin [id number] UNIT/ML suspension          Medications marked \"taking\" at this time  Outpatient Medications Marked as Taking for the 11/7/22 encounter (Office Visit) with Isabell You, DO   Medication Sig Dispense Refill    metoprolol succinate (TOPROL XL) 100 MG extended release tablet Take 1 tablet by mouth at bedtime 90 tablet 3    nystatin (MYCOSTATIN) [id number] UNIT/ML suspension Take 5 mLs by mouth 4 times daily for 7 days 140 mL 0    nitroGLYCERIN (NITROSTAT) 0.4 MG SL tablet Place 1 tablet under the tongue every 5 minutes as needed for Chest pain up to max of 3 total doses.  If no relief after 1 dose, call 911. 25 tablet 3    apixaban (ELIQUIS) 5 MG TABS tablet Take 1 tablet by mouth 2 times daily 60 tablet 11    atorvastatin (LIPITOR) 40 MG tablet Take 1 tablet by mouth nightly 30 tablet 3    levoFLOXacin (LEVAQUIN) 500 MG tablet Take 1 tablet by mouth daily for 5 doses 5 tablet 0    predniSONE (DELTASONE) 20 MG tablet Take 2 tablets by mouth daily for 3 doses 6 tablet 0    meloxicam (MOBIC) 15 MG tablet take 1 tablet by mouth once daily 30 tablet 5    lamoTRIgine (LAMICTAL) 100 MG tablet 1 TABLET DAILY AT BEDTIME 30 tablet 5    divalproex (DEPAKOTE) 250 MG DR tablet take 3 tablets by mouth twice a day 180 tablet 5    promethazine (PHENERGAN) 25 MG tablet take 1 tablet by mouth every 8 hours if needed for nausea 60 tablet 2    SUMAtriptan (IMITREX) 50 MG tablet take 1 tablet by mouth once daily if needed for MIGRAINE 30 tablet 2    lisinopril (PRINIVIL;ZESTRIL) 20 MG tablet take 1 tablet by mouth daily          Medications patient taking as of now reconciled against medications ordered at time of hospital discharge: Yes    Review of Systems   Constitutional:  Negative for chills, fatigue and fever. HENT:  Negative for ear pain, postnasal drip and trouble swallowing. Eyes:  Negative for pain and visual disturbance. Respiratory:  Positive for cough. Negative for shortness of breath. Cardiovascular:  Negative for chest pain and palpitations. Gastrointestinal:  Negative for abdominal pain, blood in stool, constipation, diarrhea, nausea and vomiting. Reflux   Genitourinary:  Negative for dysuria and urgency. Skin:  Negative for rash and wound. Neurological:  Negative for dizziness and headaches. Psychiatric/Behavioral:  Negative for dysphoric mood. The patient is not nervous/anxious. Objective:    /80   Pulse 93   Resp 16   Ht 5' 6\" (1.676 m)   Wt 189 lb 12.8 oz (86.1 kg)   SpO2 95%   BMI 30.63 kg/m²   Physical Exam  Vitals and nursing note reviewed. Constitutional:       General: He is not in acute distress. Appearance: He is well-developed. He is not diaphoretic. HENT:      Head: Normocephalic and atraumatic.       Right Ear: External ear normal.      Left Ear: External ear normal.      Nose: Nose normal.   Eyes:      General: No scleral icterus. Right eye: No discharge. Left eye: No discharge. Conjunctiva/sclera: Conjunctivae normal.   Cardiovascular:      Rate and Rhythm: Normal rate and regular rhythm. Heart sounds: Normal heart sounds. No murmur heard. Pulmonary:      Effort: Pulmonary effort is normal.      Breath sounds: Normal breath sounds. No wheezing. Musculoskeletal:      Cervical back: Normal range of motion. Skin:     General: Skin is warm and dry. Findings: No erythema or rash. Neurological:      Mental Status: He is alert and oriented to person, place, and time. Cranial Nerves: No cranial nerve deficit. Psychiatric:         Behavior: Behavior normal.         Thought Content: Thought content normal.         Judgment: Judgment normal.       An electronic signature was used to authenticate this note.   --Isabell You, DO

## 2022-11-07 NOTE — PROGRESS NOTES
Cardiology Consultation  [facility]      11/07/22      CC:  Parox AF    HPI:  Ara Benjamin  is here for routine follow-up. Was at [address] for PNA. Day of discharge noted to get into AF with RVR. Then admitted to [facility]. Vs.   Back to [facility]. Had low risk echo and stress test.   D/cd on eliquis, lopressor, nitro. All new for him. States has been having CP- squeezing pain. Also a stabbing pain as well on the Left. Took 3-4 nitros with no benefit. Usually occurs at rest.   Has been dealing with infections on and off for months, with coughing. Seen by Dr. Hilton Benavides on 11/22:  Paroxysmal A. fib has been converted to normal sinus rhythm  Last year echocardiogram normal ejection fraction awaiting for the today echo  SQ patient is complaining of chest tightness and pressure patient family is here there is a strong family history of early CAD massive MI before age 48  Plan to do [facility] stress test before discharge    Past Medical History:   Diagnosis Date    Anxiety, mild     Colon polyps     Dizziness     Eczema     Epilepsy ([address]     GERD (gastroesophageal reflux disease)     H/O fall     Head ache     Head injury     Hiatal hernia     Inguinal hernia     Right. Low back pain     Lumbar sprain     Migraine     Plantar fasciitis, bilateral     Seizure disorder (HCC)     Sigmoid diverticulosis     Sleep difficulties     Status migrainosus     TIA (transient ischemic attack) 09/2017    Tobacco use     Chronic. Past Surgical History:   Procedure Laterality Date    COLONOSCOPY  04/29/2011    Internal hemorrhoids, possible anal fissure, few scattered diverticula. COLONOSCOPY  06/04/2010    Mild sigmoid diverticulosis. COLONOSCOPY  08/28/2009    Sigmoid diverticulosis, internal hemorrhoids. COLONOSCOPY  05/27/2008    Internal hemorrhoids.     COLONOSCOPY  11/19/2014    polyp in rectum    COLONOSCOPY  6/1/16    sigmoid diverticulosis, colon polyp, internal hemorrhoids Never true    Ran Out of Food in the Last Year: Never true        No Known Allergies    Current Outpatient Medications   Medication Sig Dispense Refill    nitroGLYCERIN (NITROSTAT) 0.4 MG SL tablet Place 1 tablet under the tongue every 5 minutes as needed for Chest pain up to max of 3 total doses.  If no relief after 1 dose, call 911. 25 tablet 3    apixaban (ELIQUIS) 5 MG TABS tablet Take 1 tablet by mouth 2 times daily 60 tablet 11    atorvastatin (LIPITOR) 40 MG tablet Take 1 tablet by mouth nightly 30 tablet 3    benzonatate (TESSALON) 200 MG capsule Take 1 capsule by mouth 3 times daily as needed for Cough 21 capsule 0    metoprolol tartrate (LOPRESSOR) 25 MG tablet Take 1 tablet by mouth 2 times daily 60 tablet 0    predniSONE (DELTASONE) 20 MG tablet Take 2 tablets by mouth daily for 3 doses 6 tablet 0    Budeson-Glycopyrrol-Formoterol (BREZTRI AEROSPHERE) 160-9-4.8 MCG/ACT AERO Inhale 2 inhalations into the lungs in the morning and at bedtime 1 each 3    lamoTRIgine (LAMICTAL) 100 MG tablet 1 TABLET DAILY AT BEDTIME 30 tablet 5    divalproex (DEPAKOTE) 250 MG DR tablet take 3 tablets by mouth twice a day 180 tablet 5    promethazine (PHENERGAN) 25 MG tablet take 1 tablet by mouth every 8 hours if needed for nausea 60 tablet 2    SUMAtriptan (IMITREX) 50 MG tablet take 1 tablet by mouth once daily if needed for MIGRAINE 30 tablet 2    lisinopril (PRINIVIL;ZESTRIL) 20 MG tablet take 1 tablet by mouth daily      guaiFENesin (MUCINEX) 600 MG extended release tablet Take 1 tablet by mouth 2 times daily for 7 days (Patient not taking: Reported on 11/7/2022) 14 tablet 0    levoFLOXacin (LEVAQUIN) 500 MG tablet Take 1 tablet by mouth daily for 5 doses (Patient not taking: Reported on 11/7/2022) 5 tablet 0    nicotine (NICODERM CQ) 14 MG/24HR Place 1 patch onto the skin daily (Patient not taking: Reported on 11/7/2022) 30 patch 3    meloxicam (MOBIC) 15 MG tablet take 1 tablet by mouth once daily 30 tablet 5     No current facility-administered medications for this visit. REVIEW OF SYSTEMS:    Constitutional: there has been no unanticipated weight loss. There's been No change in energy level, No change in activity level. Eyes: No visual changes or diplopia. No scleral icterus. ENT: No Headaches, hearing loss or vertigo. No mouth sores or sore throat. Cardiovascular: per hpi  Respiratory: per hpi  Gastrointestinal: No abdominal pain, appetite loss, blood in stools. No change in bowel or bladder habits. Genitourinary: No dysuria, trouble voiding, or hematuria. Musculoskeletal:  No gait disturbance, No weakness or joint complaints. Integumentary: No rash or pruritis. Neurological: Positive for seizure disorder and CVA, stage  Psychiatric: No new anxiety or depression. Endocrine: No temperature intolerance. No excessive thirst, fluid intake, or urination. No tremor. Hematologic/Lymphatic: No abnormal bruising or bleeding, blood clots or swollen lymph nodes. Allergic/Immunologic: No nasal congestion or hives. Physical Exam:   Vitals: /87   Pulse 87   Ht 5' 6\" (1.676 m)   Wt 195 lb (88.5 kg)   BMI 31.47 kg/m²   General appearance: alert and cooperative with exam  HEENT: Head: Normocephalic, no lesions, without obvious abnormality. Eyes: PERRL, EOMI  Ears: Not obvious deformations or lack of hearing  Neck: no carotid bruit, no JVD  Lungs: clear to auscultation bilaterally  Heart: regular rate and rhythm, S1, S2 normal, no murmur, click, rub or gallop  Abdomen: soft, non-tender; bowel sounds normal; no masses,  no organomegaly  Extremities: extremities normal, atraumatic, no cyanosis or edema  Neurologic: Mental status: Alert, oriented, thought content appropriate  Skin: WNL for age and condition, no obvious rashes or leasions      EKG:  Sinus  Rhythm   WITHIN NORMAL LIMITS    LAST ECHO: 8/11/21  Normal LV size and function. Ejection fraction is 60%. No diastolic dysfunction.   Normal LA and right cavities size. Normal function of all valves. No evidence of pericardial effusion. LAST STRESS: 8/11/21  Negative perfusion scan  EF 71%    Stress 11/22:  Impression   1. No definitive scintigraphic evidence for reversible ischemia or infarct. 2. Left ventricular ejection fraction of 68%. Echo 11/22:  Left ventricle is normal in size. Global left ventricular systolic function  is low normal. Estimated ejection fraction is 50 %. Trivial mitral regurgitation. Trivial tricuspid regurgitation. Estimated right ventricular systolic  pressure is 19 mmHg. Past Medical and Surgical History, Problem List, Allergies, Medications, Labs, Imaging, all reviewed extensively in EMR and with the patient. Assessment and Plan:    Chest pain, mixed features, cannot rule out angina. We will plan for coronary CTA with coronary artery calcium scoring for further restratification. Paroxysmal atrial fibrillation with RVR. Back in sinus rhythm. Now on Eliquis. We will change his Lopressor to Toprol XL q. nightly in hopes of overcoming some of the side effects of fatigue. HTN- controlled  H/o CVA  DL  Low risk echo and stress on 11/22  H/o loop s/p explant  Seizure disorder  Chronic anxiety    The patient is to continue heart healthy diet, weight loss and exercise as tolerated. Patient's medications and side effects were discussed. Medication refills were provided if needed. Follow up appointment timing was discussed. All questions and concerns were addressed to patient's satisfaction. The patient is to follow up in 6 months or sooner if necessary. Thank you for allowing me to participate in the care of this patient, please do not hesitate to call if you have any questions. Uri Benjamin DO, [facility], [address], [address], [place] [facility]  [facility]. [facility]  [phone]

## 2022-11-11 ENCOUNTER — OFFICE VISIT (OUTPATIENT)
Dept: PRIMARY CARE CLINIC | Age: 42
End: 2022-11-11
Payer: MEDICARE

## 2022-11-11 VITALS
DIASTOLIC BLOOD PRESSURE: 90 MMHG | BODY MASS INDEX: 30.12 KG/M2 | SYSTOLIC BLOOD PRESSURE: 110 MMHG | HEIGHT: 66 IN | HEART RATE: 91 BPM | WEIGHT: 187.4 LBS | TEMPERATURE: 98.3 F | OXYGEN SATURATION: 97 %

## 2022-11-11 DIAGNOSIS — R05.3 CHRONIC COUGH: Primary | ICD-10-CM

## 2022-11-11 PROCEDURE — 4004F PT TOBACCO SCREEN RCVD TLK: CPT | Performed by: STUDENT IN AN ORGANIZED HEALTH CARE EDUCATION/TRAINING PROGRAM

## 2022-11-11 PROCEDURE — 99212 OFFICE O/P EST SF 10 MIN: CPT | Performed by: STUDENT IN AN ORGANIZED HEALTH CARE EDUCATION/TRAINING PROGRAM

## 2022-11-11 PROCEDURE — 99213 OFFICE O/P EST LOW 20 MIN: CPT | Performed by: STUDENT IN AN ORGANIZED HEALTH CARE EDUCATION/TRAINING PROGRAM

## 2022-11-11 PROCEDURE — G8427 DOCREV CUR MEDS BY ELIG CLIN: HCPCS | Performed by: STUDENT IN AN ORGANIZED HEALTH CARE EDUCATION/TRAINING PROGRAM

## 2022-11-11 PROCEDURE — G8483 FLU IMM NO ADMIN DOC REA: HCPCS | Performed by: STUDENT IN AN ORGANIZED HEALTH CARE EDUCATION/TRAINING PROGRAM

## 2022-11-11 PROCEDURE — G8417 CALC BMI ABV UP PARAM F/U: HCPCS | Performed by: STUDENT IN AN ORGANIZED HEALTH CARE EDUCATION/TRAINING PROGRAM

## 2022-11-11 RX ORDER — LEVALBUTEROL INHALATION SOLUTION 0.63 MG/3ML
SOLUTION RESPIRATORY (INHALATION)
COMMUNITY
Start: 2022-11-01

## 2022-11-11 ASSESSMENT — ENCOUNTER SYMPTOMS
BLOOD IN STOOL: 0
TROUBLE SWALLOWING: 0
DIARRHEA: 0
VOMITING: 0
COUGH: 1
NAUSEA: 0
CONSTIPATION: 0
ABDOMINAL PAIN: 0
EYE PAIN: 0
WHEEZING: 1
SHORTNESS OF BREATH: 1

## 2022-11-11 NOTE — PROGRESS NOTES
[facility]             [address] [place] [address] [place], [address]                        Telephone [phone]             Fax [phone]       Samreen Sharma  :  1980  Age:  36 y.o. MRN:  [id number]  Date of visit:  2022     Assessment and Plan:    1. Chronic cough  Possible exposure to black mold. Currently moving. Recommend trying ICS that he has at home. Recommend starting OTC allergy medication for his symptoms. No concerning signs of COPD exacerbation or PNA on exam.     Subjective:    Samreen Sharma is a 36 y.o. male who presents to [facility] today (2022) for evaluation of:  Cough (Pt was in hospital and had f/u with Dr. Alia Ibnaez on Monday and now pt is sounding crackling and wheezing noises, pt does not feel good again. )    35 yo male who presents to  for reevaluation of respiratory symptoms. Hospitalized last week for possible COPD exacerbation and new onset Afib. Never had PFTs completed. Denies any fevers or chills. Completed levaquin this week. Having some noises in chest. Symptoms generally worse within the home and seem to improve when he has left the house. Black mold present at the house and they are in the process of moving. Chief Complaint   Patient presents with    Cough     Pt was in hospital and had f/u with Dr. Alia Ibanez on Monday and now pt is sounding crackling and wheezing noises, pt does not feel good again.       He has the following problem list:  Patient Active Problem List   Diagnosis    Low back pain with sciatica    Migraine    Anxiety, mild    Disturbance, sleep    Tobacco use    GERD (gastroesophageal reflux disease)    Hiatal hernia    Head ache    Status migrainosus    Dizziness    Depression    H/O fall    Head injury    Seizure disorder (HCC)    Astigmatism    Rectal bleed    Cervical radiculopathy    Ventricular premature beats    Nausea and vomiting Colon polyps    TIA (transient ischemic attack)    VBI (vertebrobasilar insufficiency)    Pilonidal cyst    Wound dehiscence    Sleep difficulties    Partial idiopathic epilepsy with seizures of localized onset, not intractable, without status epilepticus ([facility]    [facility]    Recurrent seizures ([facility]    Atrial fibrillation with RVR ([facility]    Acute respiratory failure with hypoxemia (HCC)    Acute bronchitis with COPD ([facility]    New onset a-fib ([facility]        Review of Systems   Constitutional:  Negative for chills, fatigue and fever. HENT:  Negative for ear pain, postnasal drip and trouble swallowing. Eyes:  Negative for pain and visual disturbance. Respiratory:  Positive for cough, shortness of breath and wheezing. Cardiovascular:  Negative for chest pain and palpitations. Gastrointestinal:  Negative for abdominal pain, blood in stool, constipation, diarrhea, nausea and vomiting. Genitourinary:  Negative for dysuria and urgency. Skin:  Negative for rash and wound. Neurological:  Negative for dizziness and headaches. Psychiatric/Behavioral:  Negative for dysphoric mood. The patient is not nervous/anxious. Current medications are:  Current Outpatient Medications   Medication Sig Dispense Refill    levalbuterol (XOPENEX) 0.63 MG/3ML nebulization inhale orally contents of 1 vial in nebulizer every 6 hours if needed      metoprolol succinate (TOPROL XL) 100 MG extended release tablet Take 1 tablet by mouth at bedtime 90 tablet 3    nystatin (MYCOSTATIN) 179779 UNIT/ML suspension Take 5 mLs by mouth 4 times daily for 7 days 140 mL 0    nitroGLYCERIN (NITROSTAT) 0.4 MG SL tablet Place 1 tablet under the tongue every 5 minutes as needed for Chest pain up to max of 3 total doses.  If no relief after 1 dose, call 911. 25 tablet 3    apixaban (ELIQUIS) 5 MG TABS tablet Take 1 tablet by mouth 2 times daily 60 tablet 11    atorvastatin (LIPITOR) 40 MG tablet Take 1 tablet by mouth nightly 30 tablet 3    meloxicam (MOBIC) 15 MG tablet take 1 tablet by mouth once daily 30 tablet 5    lamoTRIgine (LAMICTAL) 100 MG tablet 1 TABLET DAILY AT BEDTIME 30 tablet 5    divalproex (DEPAKOTE) 250 MG DR tablet take 3 tablets by mouth twice a day 180 tablet 5    promethazine (PHENERGAN) 25 MG tablet take 1 tablet by mouth every 8 hours if needed for nausea 60 tablet 2    SUMAtriptan (IMITREX) 50 MG tablet take 1 tablet by mouth once daily if needed for MIGRAINE 30 tablet 2    lisinopril (PRINIVIL;ZESTRIL) 20 MG tablet take 1 tablet by mouth daily       No current facility-administered medications for this visit. He has No Known Allergies. He  reports that he has been smoking cigarettes. He has a 5.00 pack-year smoking history. He has never used smokeless tobacco.      Objective:    Vitals:    11/11/22 1427   BP: (!) 110/90   Pulse: 91   Temp: 98.3 °F (36.8 °C)   TempSrc: Tympanic   SpO2: 97%   Weight: 187 lb 6.4 oz (85 kg)   Height: 5' 6\" (1.676 m)     Body mass index is 30.25 kg/m². Physical Exam  Vitals and nursing note reviewed. Constitutional:       General: He is not in acute distress. Appearance: He is well-developed. He is not diaphoretic. HENT:      Head: Normocephalic and atraumatic. Right Ear: External ear normal.      Left Ear: External ear normal.      Nose: Nose normal.   Eyes:      General: No scleral icterus. Right eye: No discharge. Left eye: No discharge. Conjunctiva/sclera: Conjunctivae normal.   Cardiovascular:      Rate and Rhythm: Normal rate and regular rhythm. Heart sounds: Normal heart sounds. No murmur heard. Pulmonary:      Effort: Pulmonary effort is normal. No respiratory distress. Breath sounds: Normal breath sounds. No wheezing, rhonchi or rales. Musculoskeletal:      Cervical back: Normal range of motion. Skin:     General: Skin is warm and dry. Findings: No erythema or rash.    Neurological:      Mental Status: He is alert and oriented to person, place, and time. Cranial Nerves: No cranial nerve deficit. Psychiatric:         Behavior: Behavior normal.         Thought Content:  Thought content normal.         Judgment: Judgment normal.             (Please note that portions of this note were completed with a voice-recognition program. Efforts were made to edit the dictation but occasionally words are mis-transcribed.)

## 2022-11-16 ENCOUNTER — TELEMEDICINE (OUTPATIENT)
Dept: NEUROLOGY | Age: 42
End: 2022-11-16
Payer: MEDICARE

## 2022-11-16 DIAGNOSIS — M54.12 CERVICAL RADICULOPATHY: ICD-10-CM

## 2022-11-16 DIAGNOSIS — Z72.0 TOBACCO USE: ICD-10-CM

## 2022-11-16 DIAGNOSIS — G40.909 SEIZURE DISORDER (HCC): Primary | ICD-10-CM

## 2022-11-16 DIAGNOSIS — R42 DIZZINESS: ICD-10-CM

## 2022-11-16 DIAGNOSIS — I48.91 ATRIAL FIBRILLATION WITH RVR (HCC): ICD-10-CM

## 2022-11-16 DIAGNOSIS — G43.709 CHRONIC MIGRAINE WITHOUT AURA WITHOUT STATUS MIGRAINOSUS, NOT INTRACTABLE: ICD-10-CM

## 2022-11-16 DIAGNOSIS — G47.9 DISTURBANCE, SLEEP: ICD-10-CM

## 2022-11-16 DIAGNOSIS — Z91.81 H/O FALL: ICD-10-CM

## 2022-11-16 DIAGNOSIS — K21.9 GASTROESOPHAGEAL REFLUX DISEASE WITHOUT ESOPHAGITIS: ICD-10-CM

## 2022-11-16 DIAGNOSIS — G45.9 TIA (TRANSIENT ISCHEMIC ATTACK): ICD-10-CM

## 2022-11-16 DIAGNOSIS — G45.0 VBI (VERTEBROBASILAR INSUFFICIENCY): ICD-10-CM

## 2022-11-16 DIAGNOSIS — G40.009 PARTIAL IDIOPATHIC EPILEPSY WITH SEIZURES OF LOCALIZED ONSET, NOT INTRACTABLE, WITHOUT STATUS EPILEPTICUS (HCC): ICD-10-CM

## 2022-11-16 DIAGNOSIS — R51.9 NONINTRACTABLE HEADACHE, UNSPECIFIED CHRONICITY PATTERN, UNSPECIFIED HEADACHE TYPE: ICD-10-CM

## 2022-11-16 DIAGNOSIS — G44.039 NONINTRACTABLE PAROXYSMAL HEMICRANIA, UNSPECIFIED CHRONICITY PATTERN: ICD-10-CM

## 2022-11-16 DIAGNOSIS — G47.9 SLEEP DIFFICULTIES: ICD-10-CM

## 2022-11-16 DIAGNOSIS — F41.9 ANXIETY, MILD: ICD-10-CM

## 2022-11-16 DIAGNOSIS — F44.5 PSYCHOGENIC NONEPILEPTIC SEIZURE: ICD-10-CM

## 2022-11-16 DIAGNOSIS — F32.0 CURRENT MILD EPISODE OF MAJOR DEPRESSIVE DISORDER, UNSPECIFIED WHETHER RECURRENT (HCC): ICD-10-CM

## 2022-11-16 DIAGNOSIS — G43.009 MIGRAINE WITHOUT AURA AND WITHOUT STATUS MIGRAINOSUS, NOT INTRACTABLE: ICD-10-CM

## 2022-11-16 PROCEDURE — G8417 CALC BMI ABV UP PARAM F/U: HCPCS | Performed by: PSYCHIATRY & NEUROLOGY

## 2022-11-16 PROCEDURE — 99211 OFF/OP EST MAY X REQ PHY/QHP: CPT | Performed by: PSYCHIATRY & NEUROLOGY

## 2022-11-16 PROCEDURE — G8427 DOCREV CUR MEDS BY ELIG CLIN: HCPCS | Performed by: PSYCHIATRY & NEUROLOGY

## 2022-11-16 PROCEDURE — G8483 FLU IMM NO ADMIN DOC REA: HCPCS | Performed by: PSYCHIATRY & NEUROLOGY

## 2022-11-16 PROCEDURE — 99214 OFFICE O/P EST MOD 30 MIN: CPT | Performed by: PSYCHIATRY & NEUROLOGY

## 2022-11-16 PROCEDURE — 4004F PT TOBACCO SCREEN RCVD TLK: CPT | Performed by: PSYCHIATRY & NEUROLOGY

## 2022-11-16 RX ORDER — DIVALPROEX SODIUM 250 MG/1
TABLET, DELAYED RELEASE ORAL
Qty: 180 TABLET | Refills: 5 | Status: SHIPPED | OUTPATIENT
Start: 2022-11-16

## 2022-11-16 RX ORDER — LAMOTRIGINE 100 MG/1
TABLET ORAL
Qty: 30 TABLET | Refills: 5 | Status: SHIPPED | OUTPATIENT
Start: 2022-11-16

## 2022-11-16 ASSESSMENT — ENCOUNTER SYMPTOMS
ABDOMINAL PAIN: 0
COUGH: 0
SWOLLEN GLANDS: 0
BLURRED VISION: 0
EYE DISCHARGE: 0
ABDOMINAL DISTENTION: 0
CONSTIPATION: 0
CHOKING: 0
SINUS PRESSURE: 0
EYE PAIN: 0
FACIAL SWELLING: 0
COLOR CHANGE: 0
CHEST TIGHTNESS: 0
EYE REDNESS: 0
RHINORRHEA: 0
TROUBLE SWALLOWING: 0
SHORTNESS OF BREATH: 0
SCALP TENDERNESS: 0
BACK PAIN: 0
CHANGE IN BOWEL HABIT: 0
EYE ITCHING: 0
APNEA: 0
FACIAL SWEATING: 0
DIARRHEA: 0
NAUSEA: 1
EYE WATERING: 0
SORE THROAT: 0
VOMITING: 0
VISUAL CHANGE: 0
BLOOD IN STOOL: 0
WHEEZING: 0
PHOTOPHOBIA: 1
VOICE CHANGE: 0

## 2022-11-16 NOTE — PROGRESS NOTES
Subjective:        Patient ID: Vearl Peabody is a 36 y. o. RIGHT   HANDED male. Seizures  This is a chronic problem. Episode onset: SINCE    2014. The problem occurs intermittently. The problem has been waxing and waning. Associated symptoms include headaches and nausea. Pertinent negatives include no abdominal pain, anorexia, arthralgias, change in bowel habit, chest pain, chills, congestion, coughing, diaphoresis, fatigue, fever, joint swelling, myalgias, neck pain, numbness, rash, sore throat, swollen glands, urinary symptoms, vertigo, visual change, vomiting or weakness. Exacerbated by: LACK OF  SLEEP,  PROLONGED   TV ,   STRESS,   LOUD  NOISES   AND  BRIGHT  LIGHTS   Treatments tried: ANTI  EPILEPTIC  MEDICATION. The treatment provided moderate relief. Headache   This is a chronic problem. Episode onset: MORE  THAN   10  YEARS. The problem occurs intermittently. The problem has been waxing and waning. The pain is located in the Vertex region. The pain does not radiate. The pain quality is similar to prior headaches. The quality of the pain is described as aching and throbbing. The pain is at a severity of 3/10. The pain is mild. Associated symptoms include insomnia, nausea, phonophobia, photophobia, seizures and tingling. Pertinent negatives include no abdominal pain, abnormal behavior, anorexia, back pain, blurred vision, coughing, dizziness, drainage, ear pain, eye pain, eye redness, eye watering, facial sweating, fever, hearing loss, loss of balance, muscle aches, neck pain, numbness, rhinorrhea, scalp tenderness, sinus pressure, sore throat, swollen glands, tinnitus, visual change, vomiting, weakness or weight loss. The symptoms are aggravated by unknown. He has tried darkened room, NSAIDs, Excedrin and triptans for the symptoms. The treatment provided moderate relief. His past medical history is significant for migraine headaches and migraines in the family.  There is no history of cancer, cluster headaches, hypertension, immunosuppression, obesity, pseudotumor cerebri, recent head traumas, sinus disease or TMJ. Migraine   This is a chronic problem. Episode onset: MORE  THAN   10  YEARS. The problem occurs intermittently. The problem has been waxing and waning. The pain is located in the Right unilateral and frontal region. The pain does not radiate. The pain quality is similar to prior headaches. The quality of the pain is described as aching and throbbing. The pain is at a severity of 3/10. The pain is mild. Associated symptoms include insomnia, nausea, phonophobia, photophobia, seizures and tingling. Pertinent negatives include no abdominal pain, abnormal behavior, anorexia, back pain, blurred vision, coughing, dizziness, drainage, ear pain, eye pain, eye redness, eye watering, facial sweating, fever, hearing loss, loss of balance, muscle aches, neck pain, numbness, rhinorrhea, scalp tenderness, sinus pressure, sore throat, swollen glands, tinnitus, visual change, vomiting, weakness or weight loss. The symptoms are aggravated by unknown. He has tried triptans and NSAIDs (TOPAMAX) for the symptoms. The treatment provided moderate relief. His past medical history is significant for migraine headaches and migraines in the family. There is no history of cancer, cluster headaches, hypertension, immunosuppression, obesity, pseudotumor cerebri, recent head traumas, sinus disease or TMJ. History obtained from  The patient             and other  available medical records were  Also  reviewed.                 1)    KNOWN    H/O   CHRONIC  SEVERE MIGRAINES                         FOR     10  YEARS                           -     WELL  CONTROLLED                      -   ON  IMITREX,   PHENERGAN   AS  NEEDED               2)     H/O   CHRONIC   TENSION  HEADACHE                       --   BETTER  CONTROLLED                            3)        H/O  SEIZURE  DISORDER /  EPILEPSY    SINCE      2014 BETTER      SEIZURE  CONTROL                                 -    ON  DEPAKOTE     -     STABLE            4)     PREVIOUS  H/O     INTOLERANCE  TO  KEPPRA                    DUE  TO  IRRITABILITY  AND MOOD PROBLEMS            5)       PREVIOUSLY    TRIED     VIMPAT  AND  TOPAMAX                      AND  PATIENT  STOPPED  THE  SAME. 6)     H/O   CHRONIC  ANXIETY  AND  DEPRESSION                    -     STABLE    ON  LEXAPRO                 HAD    FOLLOW    WITH    [facility]  [facility]OF[facility]               7)      H/O  PREVIOUS  MULTIPLE MILD  HEAD  INJURIES                             DUE  TO   FALLS            8)   H/O     CHRONIC    SLEEP  DIFFICULTIES                      AND  DISTURBED  SLEEP  WAKE  CYCLES                              -  STABLE                9)    PREVIOUS  H/O  DIZZY  EPISODES                          -  NO  RECURRENCE            10)   EMU  AT  [address]   IN  2014                  -  POSSIBLE  FRONTAL    FOCUS            11)   EMU   AT  [facility] 7  TO  NOV. 11, 2016    SHOWED :              6  EPISODES  OF  PSYCHOGENIC  NON EPILEPTIC SPELLS. EEG  DURING  THE SPELLS  AND  INTERICTAL  PERIODS                         WAS REPORTED  TO BE  NORMAL. 12)     PREVIOUS   H/O    BRIEF  SEIZURES  AND  MEMORY  LOSS                           -  PARTIAL  COMPLEX       SEIZURES                             DUE  TO  SLEEP  DEPRIVATION                               AND  PROLONGED  TV   WATCHING                                         13)      EPISODES   OF  RIGHT  SIDED  NUMBNESS   AND  SPEECH  PROBLEMS. H/O  HOSPITALIZATION   FROM  SEPT. 2017                     AT  [facility] [address]     TIA  /   STROKE                        NO  SIGNIFICANT  ABNORMALITIES     REPORTED .           14)         PREVIOUS   H/O  CARDIAC  ARRYTHMIA TO    FOLLOW  WITH    CARDOLOGY              15)     PREVIOUS   H/O    HAD  COGNITIVE  BEHAVIOR THERAPY  AT  [facility]                       PATIENT  TO  FOLLOW  UP   WITH  MENTAL  HEALTH  PROFESSIONALS                        FOR   EVALUATIONS  OF   HIS  ANXIETY,  DEPRESSION,                                 NON  EPILEPTIC  PSYCHOGENIC  SPELLS,                                16)    H/O   [facility]   NEUROLOGY    FOLLOW  UP    EVALUATION     IN      2018                          PATIENT     NOT  PLANNING  TO  RETURN  BACK   TO                                    [facility]                18)     PATIENT  DID NOT  HAVE  NEUROLOGY   FOLLOW  UP    AT  [facility]                           FROM   October 2017     TO  DEC. 2018               19)       PREVIOUS    H/O    BRIEF   SEIZURE    2-3  PER MONTH                            STARING  EPISODES. IN      2019               20)          PATIENT   ON      DEPAKOTE  FOR  SEIZURES                                       AND  MIGRAINE  PROPHYLAXIS                                                       PREVIOUS    H/O   BEING      ON     KLONOPIN       FOR                            SEIZURE  CONTROL   AND  SLEEP  DIFFICULTIES                         21)     H/O    BRIEF  SEIZURES     RECURRENCES                           -  PARTIAL  COMPLEX       SEIZURES                          DUE  TO  SLEEP  DEPRIVATION     AND  PROLONGED  TV   WATCHING                     25)      H/O    BRIEF    SEIZURES        WITH    FALLING                                  AND  MILD     HEAD   &   NECK INJURY         TWICE                                  IN  SEPT. AND October 2019                        23)   H/O      STARING  EPISODE  WITH    HEAD TURNING   TO  THE                           RIGHT  SIDE    LASTING  FOR   ONE  MINUTE          WITH                                LETHARGY    NOTICED   VISIT  On   11/11/ 2019   . 22)     CT   HEAD    AND  CT  CERVICAL  SPINE  IN NOV. 2019                                 SHOWED  NO  SIGNIFICANT  ABNORMALITIES                                 VALPROIC  ACID  LEVEL  IN NOV. 2019       NORMAL  LIMITS                       26)      EXPECTATIONS,   GOALS   OF  SEIZURE  MANAGEMENT                           AND  SIDE  EFFECTS  MEDICATIONS    WERE                                 REVIEWED     AND   DISCUSSED    IN    DETAIL. 27)           SEIZURES     AND      MIGRAINES  WELL  CONTROLLED                         FROM     DEC.   2019        TO     April 2021                              28)        H/O     TWO   EPISODES  OF    SEIZURE   RECURRENCE   IN    MAY   2021                            ASSOCIATED      WITH     SPENDING  TIME   IN   Ynsect     IN  [place]              34)         H/O  BRIEF  RECURRENT  SEIZURES     IN   July 2021                    LOW  DOSE  LAMICTAL       WAS   ADDED   TO   DEPAKOTE                              IN    July 2021                      PATIENT'S    SEIZURES    ARE   BETTER   CONTROLLED     SINCE    AUG.  2021                            PATIENT  DENIES    ANY  NEW  NEUROLOGICAL   CONCERNS. 30)        RECOMMENDED  :                        A)    TO  CONTINUE     SEIZURE  PRECAUTIONS                       B)     TO  CONTINUE        LAMICTAL   AND  DEPAKOTE  AS  BEFORE                        C)   PERIODIC    ANTI  EPILEPTIC  MEDICATION  LEVELS                                  EVERY    3 - 4   MONTHS                           -  REVIEWED     WITH   PATIENT    IN  DETAIL                   31)           VARIOUS  RISK   FACTORS   WERE  REVIEWED   AND   DISCUSSED. PATIENT   HAS  MULTIPLE   MEDICAL, MENTAL HEALTH                                    &      NEUROLOGICAL   PROBLEMS . PATIENT'S   MANAGEMENT  IS  CHALLENGING.                             The Duration,  Quality,  Severity,  Location,  Timing,  Context,  Modifying  Factors   Of   The   Chief   Complaint       And  Present  Illness   Was   Reviewed   In   Chronological   [facility]. Patient   Indicates   The  Presence   And  The  Absence  Of  The  Following  Associated  And       Additional  Neurological    Symptoms:                                Balance  And coordination problems  absent           Gait problems     absent            Headaches      present              Migraines           present           Memory problems        Present             Confusion        absent            Paresthesia numbness          absent           Seizures  And  Starring  Episodes           PRESENT           Syncope,  Near  syncopal episodes         absent           Speech problems           absent             Swallowing  Problems      absent            Dizziness,  Light headedness           present                        Vertigo        absent             Generalized   Weakness    absent              focal  Weakness     absent             Tremors         absent              Sleep  Problems     present             History  Of   Recent   Head  Injury     absent             History  Of   Recent  TIA     absent             History  Of   Recent    Stroke     absent             Neck  Pain and  Neck muscle  Spasms  Absent               Radiating  down   And   Weakness           absent            Lower back   Pain  And     Spasms  Present              Radiating    Down   And   Weakness          absent                H/O   FALLS        absent               History  Of   Visual  Symptoms    Absent                  Associated   Diplopia       absent                                                                  Also   Additional   Symptoms   Present    As  Documented    In   The detailed      Review  Of  Systems   And    Please   Refer   To    Them for   Additional  Information.            INFORMATION   REVIEWED:      VITAL SIGNS,  MEDICATIONS   LIST,   ALLERGIES AND  DRUG  INTOLERANCES,    MEDICAL   HISTORY,     SURGICAL   HISTORY,    FAMILY   HISTORY,  SOCIAL  HISTORY,    PROBLEM  LIST   FOR  PATIENT  CARE   COORDINATION        RECORDS   REVIEWED:    historical medical records, lab reports and office notes         Past Medical History:   Diagnosis Date    Anxiety, mild     Colon polyps     Dizziness     Eczema     Epilepsy ([address]     GERD (gastroesophageal reflux disease)     H/O fall     Head ache     Head injury     Hiatal hernia     Inguinal hernia     Right. Low back pain     Lumbar sprain     Migraine     Plantar fasciitis, bilateral     Seizure disorder (HCC)     Sigmoid diverticulosis     Sleep difficulties     Status migrainosus     TIA (transient ischemic attack) 09/2017    Tobacco use     Chronic. Past Surgical History:   Procedure Laterality Date    COLONOSCOPY  04/29/2011    Internal hemorrhoids, possible anal fissure, few scattered diverticula. COLONOSCOPY  06/04/2010    Mild sigmoid diverticulosis. COLONOSCOPY  08/28/2009    Sigmoid diverticulosis, internal hemorrhoids. COLONOSCOPY  05/27/2008    Internal hemorrhoids. COLONOSCOPY  11/19/2014    polyp in rectum    COLONOSCOPY  6/1/16    sigmoid diverticulosis, colon polyp, internal hemorrhoids    COLONOSCOPY  04/19/2019    [id number]    GASTRIC FUNDOPLICATION  67/92/0789    HERNIA REPAIR Left 11/30/2017    Left Inguinal Hernia Repair w/ Mesh performed by Rene German MD at [address] [place] [address] Right 05/30/2013    INGUINAL HERNIA REPAIR Left 01/24/2013    INGUINAL HERNIA REPAIR Right 09/24/2015    KNEE ARTHROSCOPY Left     PILONIDAL CYST EXCISION N/A 7/23/2020    PILONIDAL CYSTECTOMY performed by Shaye Ledesma DO at [address]  03/05/2010    Recurrent hiatal hernia.     UPPER GASTROINTESTINAL ENDOSCOPY  6/1/16    S/P brenda fundoplication, good repair, retained gastric fluid VASECTOMY                   Current Outpatient Medications   Medication Sig Dispense Refill    levalbuterol (XOPENEX) 0.63 MG/3ML nebulization inhale orally contents of 1 vial in nebulizer every 6 hours if needed      metoprolol succinate (TOPROL XL) 100 MG extended release tablet Take 1 tablet by mouth at bedtime 90 tablet 3    nitroGLYCERIN (NITROSTAT) 0.4 MG SL tablet Place 1 tablet under the tongue every 5 minutes as needed for Chest pain up to max of 3 total doses. If no relief after 1 dose, call 911. 25 tablet 3    apixaban (ELIQUIS) 5 MG TABS tablet Take 1 tablet by mouth 2 times daily 60 tablet 11    atorvastatin (LIPITOR) 40 MG tablet Take 1 tablet by mouth nightly 30 tablet 3    meloxicam (MOBIC) 15 MG tablet take 1 tablet by mouth once daily 30 tablet 5    lamoTRIgine (LAMICTAL) 100 MG tablet 1 TABLET DAILY AT BEDTIME 30 tablet 5    divalproex (DEPAKOTE) 250 MG DR tablet take 3 tablets by mouth twice a day 180 tablet 5    promethazine (PHENERGAN) 25 MG tablet take 1 tablet by mouth every 8 hours if needed for nausea 60 tablet 2    SUMAtriptan (IMITREX) 50 MG tablet take 1 tablet by mouth once daily if needed for MIGRAINE 30 tablet 2    lisinopril (PRINIVIL;ZESTRIL) 20 MG tablet take 1 tablet by mouth daily       No current facility-administered medications for this visit.            No Known Allergies            Family History   Problem Relation Age of Onset    COPD Mother     High Blood Pressure Mother     Cancer Father         Hodgekin's Lymphoma    High Blood Pressure Father     Eczema Sister     Alzheimer's Disease Maternal Grandmother     COPD Maternal Grandmother     Cancer Maternal Grandmother     Cancer Paternal Grandmother     Cancer Paternal Grandfather     Cancer Sister         colon cancer    High Blood Pressure Sister     Arthritis Sister     Asthma Daughter     Eczema Daughter     Glaucoma Neg Hx     Diabetes Neg Hx     Cataracts Neg Hx              Social History     Socioeconomic History Marital status:      Spouse name: Not on file    Number of children: Not on file    Years of education: Not on file    Highest education level: Not on file   Occupational History    Not on file   Tobacco Use    Smoking status: Every Day     Packs/day: 0.25     Years: 20.00     Pack years: 5.00     Types: Cigarettes     Last attempt to quit: 2016     Years since quittin.5    Smokeless tobacco: Never    Tobacco comments:     2-3 weekly   Vaping Use    Vaping Use: Never used   Substance and Sexual Activity    Alcohol use: No     Alcohol/week: 0.0 standard drinks     Comment: rarely    Drug use: No    Sexual activity: Not on file   Other Topics Concern    Not on file   Social History Narrative    Not on file     Social Determinants of Health     Financial Resource Strain: Low Risk     Difficulty of Paying Living Expenses: Not hard at all   Food Insecurity: No Food Insecurity    Worried About Running Out of Food in the Last Year: Never true    Ran Out of Food in the Last Year: Never true   Transportation Needs: Not on file   Physical Activity: Not on file   Stress: Not on file   Social Connections: Not on file   Intimate Partner Violence: Not on file   Housing Stability: Not on file         There were no vitals filed for this visit.         Wt Readings from Last 3 Encounters:   22 187 lb 6.4 oz (85 kg)   22 189 lb 12.8 oz (86.1 kg)   22 195 lb (88.5 kg)         BP Readings from Last 3 Encounters:   22 (!) 110/90   22 118/80   22 127/87       Hematology and Coagulation  Lab Results   Component Value Date/Time    WBC 11.2 2022 06:51 AM    RBC 4.07 2022 06:51 AM    HGB 14.3 2022 06:51 AM    HCT 39.5 2022 06:51 AM    MCV 97.1 2022 06:51 AM    MCH 35.1 2022 06:51 AM    MCHC 36.2 2022 06:51 AM    RDW 12.0 2022 06:51 AM     2022 06:51 AM    MPV 10.0 2022 06:51 AM         Chemistries  Lab Results   Component Value Date/Time     11/02/2022 11:34 AM    K 4.0 11/02/2022 11:34 AM    CL 97 11/02/2022 11:34 AM    CO2 25 11/02/2022 11:34 AM    BUN 26 11/02/2022 11:34 AM    CREATININE 1.00 11/02/2022 11:34 AM    CALCIUM 8.6 11/02/2022 11:34 AM    PROT 7.8 12/08/2019 05:32 PM    LABALBU 4.8 12/08/2019 05:32 PM    BILITOT 0.34 12/08/2019 05:32 PM    ALKPHOS 75 12/08/2019 05:32 PM    AST 22 03/16/2022 03:13 PM    ALT 32 03/16/2022 03:13 PM     Lab Results   Component Value Date/Time    ALKPHOS 75 12/08/2019 05:32 PM    ALT 32 03/16/2022 03:13 PM    AST 22 03/16/2022 03:13 PM    PROT 7.8 12/08/2019 05:32 PM    BILITOT 0.34 12/08/2019 05:32 PM    BILIDIR 0.11 12/08/2019 05:32 PM    LABALBU 4.8 12/08/2019 05:32 PM     Lab Results   Component Value Date/Time    BUN 26 11/02/2022 11:34 AM    CREATININE 1.00 11/02/2022 11:34 AM     Lab Results   Component Value Date/Time    CALCIUM 8.6 11/02/2022 11:34 AM    MG 1.8 09/18/2013 09:18 PM     Lab Results   Component Value Date/Time    AST 22 03/16/2022 03:13 PM    ALT 32 03/16/2022 03:13 PM         Lab Results   Component Value Date/Time    CKTOTAL 67 09/19/2013 09:50 AM     Lab Results   Component Value Date/Time    [id number] 590 01/25/2019 03:08 PM         Drug Levels  Lab Results   Component Value Date/Time    PHENYTOIN <0.8 09/28/2017 04:24 PM    PHENYTOIN <0.8 04/04/2016 02:23 PM    VALPROATE 57 03/16/2022 03:13 PM    VALPROATE 85 05/26/2021 03:16 PM           Review of Systems   Constitutional:  Negative for appetite change, chills, diaphoresis, fatigue, fever, unexpected weight change and weight loss. HENT:  Negative for congestion, dental problem, drooling, ear discharge, ear pain, facial swelling, hearing loss, mouth sores, nosebleeds, postnasal drip, rhinorrhea, sinus pressure, sore throat, tinnitus, trouble swallowing and voice change. Eyes:  Positive for photophobia. Negative for blurred vision, pain, discharge, redness, itching and visual disturbance.    Respiratory: Negative for apnea, cough, choking, chest tightness, shortness of breath and wheezing. Cardiovascular:  Negative for chest pain, palpitations and leg swelling. Gastrointestinal:  Positive for nausea. Negative for abdominal distention, abdominal pain, anorexia, blood in stool, change in bowel habit, constipation, diarrhea and vomiting. Endocrine: Negative for cold intolerance, heat intolerance, polydipsia, polyphagia and polyuria. Musculoskeletal:  Positive for gait problem. Negative for arthralgias, back pain, joint swelling, myalgias, neck pain and neck stiffness. Skin:  Negative for color change, pallor, rash and wound. Allergic/Immunologic: Negative for environmental allergies, food allergies and immunocompromised state. Neurological:  Positive for tingling, seizures, light-headedness and headaches. Negative for dizziness, vertigo, tremors, syncope, facial asymmetry, speech difficulty, weakness, numbness and loss of balance. Hematological:  Negative for adenopathy. Does not bruise/bleed easily. Psychiatric/Behavioral:  Positive for decreased concentration and sleep disturbance. Negative for agitation, behavioral problems, confusion, dysphoric mood, hallucinations, self-injury and suicidal ideas. The patient is nervous/anxious and has insomnia. The patient is not hyperactive. Objective:       LIMITED     DUE    TO  VIDEO  VISIT           PREVIOUS    EXAM   SHOWED :         Physical Exam  Constitutional:       Appearance: He is well-developed. HENT:      Head: Normocephalic and atraumatic. No raccoon eyes or Vaughn's sign. Right Ear: External ear normal.      Left Ear: External ear normal.      Nose: Nose normal.   Eyes:      Conjunctiva/sclera: Conjunctivae normal.      Pupils: Pupils are equal, round, and reactive to light. Neck:      Thyroid: No thyroid mass or thyromegaly. Vascular: No carotid bruit. Trachea: No tracheal deviation.       Meningeal: Brudzinski's sign and Kernig's sign absent. Cardiovascular:      Rate and Rhythm: Normal rate and regular rhythm. Pulmonary:      Effort: Pulmonary effort is normal.   Musculoskeletal:         General: No tenderness. Cervical back: Normal range of motion and neck supple. No rigidity. No muscular tenderness. Normal range of motion. Skin:     General: Skin is warm. Coloration: Skin is not pale. Findings: No erythema or rash. Nails: There is no clubbing. Psychiatric:         Attention and Perception: He is attentive. Mood and Affect: Mood is anxious and depressed. Affect is blunt. Affect is not labile or inappropriate. Speech: He is communicative. Speech is not rapid and pressured, delayed, slurred or tangential.         Behavior: Behavior is slowed. Behavior is not agitated, aggressive, withdrawn, hyperactive or combative. Behavior is cooperative. Thought Content: Thought content is not paranoid or delusional. Thought content does not include homicidal or suicidal ideation. Thought content does not include homicidal or suicidal plan. Cognition and Memory: Memory is impaired. He does not exhibit impaired recent memory or impaired remote memory. Judgment: Judgment is not impulsive or inappropriate. NEUROLOGICAL EXAMINATION :        LIMITED     DUE    TO  VIDEO  VISIT           PREVIOUS    EXAM   SHOWED :       A) MENTAL STATUS:                   Alert and  oriented  To time, place  And  Person. No Aphasia. No  Dysarthria. Able   To  Follow  commands without   Any  Difficulty. No right  To left confusion. Normal  Speech  And language function.                    Insight and  Judgment ,Fund  Of  Knowledge  DECREASED                Recent  And  Remote memory  DECREASED                Attention &Concentration are  DECREASED                                                    B) CRANIAL NERVES : 2 CN : Visual  Acuity  And  Visual fields  within normal limits                        Fundi  Could  Not  Be  Could  Not  Be  Evaluated. 3,4,6 CN : Both  Pupils are   Reactive and  Equal.                            Extraocular   Movements  Are  Intact. No  Nystagmus. No  MOO. No  Afferent  Pupillary  Defect noted. 5 CN :  Normal  Facial sensations and Corneal  Reflexes           7 CN :  Normal  Facial  Symmetry  And  Strength. No facial  Weakness. 8 CN :  Hearing  Appears within normal limits          9, 10 CN: Normal spontaneous, reflex palate movements         11 CN:   Normal  Shoulder shrug and  Strength         12 CN :   Normal  Tongue movements and  Tongue  In midline                        No tongue   Fasciculations or atrophy         C) MOTOR  EXAM:                 Strength  In upper  And  Lower extremities   within normal limits               No  Drift. No  Atrophy               Rapid alternating  And  repetitions  Movements  within normal limits                 Muscle  Tone  In upper  And  Lower  Extremities  within normal limits                No rigidity. No  Spasticity. Bradykinesia   absent               No  Asterixis. Sustention  Tremor , Resting  Tremor   absent                    No other  Abnormal  Movements noted             D) SENSORY :               light touch, pinprick, position  And  Vibration DECREASED   IN   HANDS      E) REFLEXES:                   Deep  Tendon  Reflexes within normal limits                    No pathological  Reflexes  Bilaterally.                                   F) COORDINATION  AND  GAIT :                                Station and  Gait  within normal limits                                        Romberg's test negative                          Ataxia negative            Assessment:     Patient Active Problem List   Diagnosis    Low back pain with sciatica    Migraine Anxiety, mild    Disturbance, sleep    Tobacco use    GERD (gastroesophageal reflux disease)    Hiatal hernia    Head ache    Status migrainosus    Dizziness    Depression    H/O fall    Head injury    Seizure disorder (HCC)    Astigmatism    Rectal bleed    Cervical radiculopathy    Ventricular premature beats    Nausea and vomiting    Colon polyps    TIA (transient ischemic attack)    VBI (vertebrobasilar insufficiency)    Pilonidal cyst    Wound dehiscence    Sleep difficulties    Partial idiopathic epilepsy with seizures of localized onset, not intractable, without status epilepticus ([address]    Convulsions ([address]    Recurrent seizures ([address]    Atrial fibrillation with RVR ([address]    Acute respiratory failure with hypoxemia (HCC)    Acute bronchitis with COPD ([address]    New onset a-fib ([address]           CT OF THE HEAD WITHOUT CONTRAST; CT OF THE CERVICAL SPINE WITHOUT CONTRAST   11/15/2019 10:21 am       TECHNIQUE:   CT of the head was performed without the administration of intravenous   contrast. Dose modulation, iterative reconstruction, and/or weight based   adjustment of the mA/kV was utilized to reduce the radiation dose to as low   as reasonably achievable.; CT of the cervical spine was performed without the   administration of intravenous contrast. Multiplanar reformatted images are   provided for review. Dose modulation, iterative reconstruction, and/or weight   based adjustment of the mA/kV was utilized to reduce the radiation dose to as   low as reasonably achievable. COMPARISON:   01/25/2019       HISTORY:   ORDERING SYSTEM PROVIDED HISTORY: Status migrainosus   TECHNOLOGIST PROVIDED HISTORY:   migraine, seizures   Reason for Exam: Hx chronic migraines. C/o increase headaches since recent   falls   Acuity: Acute   Type of Exam: Initial       FINDINGS:   BRAIN/VENTRICLES: There is no acute intracranial hemorrhage, mass effect or   midline shift. No abnormal extra-axial fluid collection.   The gray-white   differentiation is maintained without evidence of an acute infarct. There is   no evidence of hydrocephalus. ORBITS: The visualized portion of the orbits demonstrate no acute abnormality. SINUSES: The visualized paranasal sinuses and mastoid air cells demonstrate   no acute abnormality. SOFT TISSUES/SKULL:  No acute abnormality of the visualized skull or soft   tissues. CERVICAL SPINE: Cervical spine maintains its normal lordotic curvature. There are subtle degenerative changes C6-C7. No acute fracture or   malalignment. Vertebral body heights and intervertebral disc spaces are   preserved. Overlying soft tissues are unremarkable. Visualized lung apices   are clear. Impression   No acute intracranial abnormality. No acute osseus abnormality of the cervical spine. Procedure: [facility] 11/13/2014 [id number] CT BRAIN WITHOUT CONTRAST    Reason for Exam: \      FULL RESULT: EXAM: CT Head without Contrast - 11/13/2014 7:27 PM      HISTORY: Syncope/Near-Syncope. Seizures. COMPARISON: 10/8/14       TECHNIQUE: Contiguous axial CT images were obtained from the skull base    to the vertex and reviewed in soft tissue, subdural, bone, and brain    windows. FINDINGS: The ventricular system is normal in size and configuration. There are no intra-axial or extra-axial fluid collections or mass    lesions. No acute intracranial hemorrhage or midline shift. Visualized    paranasal sinuses and mastoid air cells are clear. No acute calvarial    fracture is identified. The brainstem and the relationship of the    craniocervical junction structures are normal. There is incomplete fusion    of the posterior elements of C1, stable from prior study and compatible    with a normal variant. Visualized orbits and globes are intact. IMPRESSION:    No evidence for acute intracranial pathology. No bleed, mass effect, or    midline shift. Procedure: [facility] 06/02/2014 [id number] MRI BRAIN COMBINED    Reason for Exam: \      FULL RESULT: MRI brain with and without intravenous contrast, 6/2/2014      History: Migraines      Technique: Multiplanar multisequence MR imaging of the brain was    performed before and after intravenous administration of 16 mL Magnevist.      Findings: No evidence for shift of midline structures, mass effect or    compression of the ventricles noted. No acute intra-or extra-axial    hemorrhage is seen. No abnormal intracranial fluid collections are    identified. The basal cisterns are patent. Posterior fossa structures demonstrate no    discrete mass. Few scattered foci of T2/FLAIR hyperintensity noted in the    periventricular and subcortical white matter which essentially are    nonspecific in imaging appearance however differential considerations    include demyelinating or inflammatory process, migraine induced changes,    chronic small vessel disease or vascular disease. No associated restricted diffusion or abnormal enhancing seen. A small    left occipital lobe developmental venous anomaly seen. No abnormal    enhancing intracranial mass seen. Visualized orbital contents appear unremarkable. Mild mucosal thickening of the ethmoid air cells noted. Skull base flow voids are patent. Superior sagittal sinus and straight sinus flow voids are patent. Heterogeneity of the T1-weighted marrow signal intensity seen. IMPRESSION:    1. No acute or subacute ischemic insult noted. No abnormal enhancing    intracranial mass seen. 2.scattered foci of T2/FLAIR hyperintensity noted in the periventricular    and subcortical white matter which essentially are nonspecific in imaging    appearance however differential considerations include demyelinating or    inflammatory process, migraine induced changes, chronic small vessel    disease or vascular disease.      Procedure: DCT 10/08/2014 [id number] CT BRAIN WITHOUT CONTRAST    Reason for Exam: \      FULL RESULT: EXAM: Brain CT without contrast dated 10/8/2014. HISTORY: Headache after head injury. COMPARISON: Brain CT dated 4/16/2014. TECHNIQUE: Multi-detector axial images are obtained through the head. Findings: There is no evidence for acute intracranial hemorrhage,    midline shift or mass effect. Ventricular and cistern spaces are normal    and symmetric. Derl Tesfaye and white matter differentiation is well preserved. No intra- or extra-axial fluid collections or mass occupying lesions    seen. Bilateral cerebellopontine angles are normal. Visualized orbital    contents are normal. Study viewed in bone windows shows no    abnormalities. Mild left ethmoid sinusitis otherwise all visualized    sinuses are normally aerated. Bilateral temporomandibular joints are    normally aligned. IMPRESSION: No acute intracranial abnormalities. Mild left ethmoid    sinusitis. Plan:           VISITING DIAGNOSIS:      ICD-10-CM    1. Seizure disorder ([facility]  G40.909       2. Atrial fibrillation with RVR (HCC)  I48.91       3. Cervical radiculopathy  M54.12       4. Partial idiopathic epilepsy with seizures of localized onset, not intractable, without status epilepticus ([address]  G40.009       5. Migraine without aura and without status migrainosus, not intractable  G43.009       6. VBI (vertebrobasilar insufficiency)  G45.0       7. TIA (transient ischemic attack)  G45.9       8. Tobacco use  Z72.0       9. Current mild episode of major depressive disorder, unspecified whether recurrent ([address]  F32.0       10. Dizziness  R42       11. Disturbance, sleep  G47.9       12. Sleep difficulties  G47.9       13.  Anxiety, mild  F41.9       14. H/O fall  Z91.81                    CONCERNS   &   INCREASED   RISK   FOR          * TIA,  CEREBRO  VASCULAR  ISCHEMIA, STROKE       *  SEIZURE  ACTIVITY,  EPILEPSY ,        *  Chronic Headaches &  Migraines      *   COGNITIVE  &   MEMORY PROBLEMS  AND  DEMENTIAS                     VARIOUS  RISK   FACTORS   WERE  REVIEWED   AND   DISCUSSED. *  PATIENT   HAS  MULTIPLE   MEDICAL, MENTAL HEALTH         &      NEUROLOGICAL   PROBLEMS . PATIENT'S   MANAGEMENT  IS  CHALLENGING. PLAN:       Hammad Ahr  Of  The  Diagnoses,  The  Management & Treatment  Options           AND    Care  plan  Were        Reviewed and   Discussed   With  patient. * Goals  And  Expectations  Of  The  Therapy  Discussed   And  Reviewed. *   Benefits   And   Side  Effect  Profile  Of  Medication/s   Were   Discussed             * Need   For  Further   Follow up For  The  Various  Problems  Were discussed. * Results  Of  The  Previous  Diagnostic tests were reviewed and questions answered. patient  understand the same. Medical  Decision  Making  Was  Made  Based on the   Complexity  Of  Above  Mentioned  Diagnoses,        Data reviewed   & diagnostic  Tests  Reviewed,  Risk  Of  Significant   Co morbidities  & complicating   Factors. Medical  Decision  Was   High  Complexity  Due   To  The  Patient's  Multiple  Symptoms,  ,      Complex  Treatment  Regimen,  Multiple medications and   Risk  Of   Side  Effects,      Difficulty  In  Medication  Management  And  Diagnostic  Challenges   In  View  Of  The  Associated       Co  Morbid  Conditions   And  Problems. * FALL   PRECAUTIONS. THESE  REVIEWED   AND  DISCUSSED      *   ABSOLUTELY   NO  DRIVING    -       REVIEWED     WITH  PATIENT      *   BE  CAREFUL  WITH  ACTIVITIES             *   ADEQUATE   FLUID  INTAKE   AND  ELECTROLYTE  BALANCE             * KEEP  DAIRY  OF   THE  NEUROLOGICAL  SYMPTOMS        RECORDING THE    DURATION  AND  FREQUENCY. *  AVOID    CONDITIONS  AND  FACTORS   THAT  MAKE   NEUROLOGICAL  SYMPTOMS  WORSE. *   SEIZURE  PRECAUTIONS.   - DISCUSSED                  A)  Avoid  Working  At   THE [employer]. B)  Avoid  Working  With  Heavy machinery  . C)  Avoid   Swimming,  Climbing  A  Ladder   Unattended. D)  Avoid   Driving   If  You   Have  A  Seizure. E)  Must   Be  Seizure  Free   For  At   Least   6 months,  Before   You  Can drive. F) Some times  Your  May  Feel  Seizure coming  Before  It  Begins. You  May feel               Strange smell or funny  Feeling  In  Your  Stomach,  Which is  Called   Aura. TIPS  TO  REDUCE/ PREVENT  SEIZURES         1. Take  Your  Anti seizure  Medications   As   Recommended. 2. Get   Enough   Sleep. Sleep  Deprivation   Can  Trigger  A  Seizure. 3. If   You   Have  A fever,  Treat  It  At  Once,  And  Contact   Your  Primary  Care Providers. 4. Avoid   Alcohol. 5. Avoid  Flashing  Lights,  Loud  Noises and  TV  And  Video  Games,             As   These  May  Trigger   Your  Seizures       6. Control  Your  Stress  And   Have  Adequate  Rest.       7.   If  You  Feel  A  Seizure  Coming   On :             A) warn people  Who  Are  With  You             B)  Make  Sure  There  Are  No  Sharp or  Hard  Objects  Around you. C)  Lay down  On  Your  Side  And  Relax. *  TO  MAINTAIN  REGULAR  SLEEP  WAKE  CYCLES. *   TO  HAVE  ADEQUATE  REST  AND   SLEEP    HOURS.          *    AVOID  ANY USAGE OF                   TOBACCO,  EXCESSIVE  ALCOHOL  AND   ILLEGAL   SUBSTANCES          *  CONTINUE MEDICATIONS PRESCRIBED BY NEUROLOGIST AS    RECOMMENDED     *   Compliance   With  Medications   And  Instructions          * CURRENTLY  TOLERATING  THE  PRESCRIBED   MEDICATIONS. WITHOUT  ANY  SIGNIFICANT  SIDE  EFFECTS   &  GETTING BENEFIT. *  May   Use  Pill  Box,    If  Needed      *  MEDICATIONS TO AVOID:    WELLBUTRIN,  ULTRAM          *     Antiplatelet  therapy    As   Recommended  Was   Discussed          *    Prophylactic  Use   Of     Vitamin   B   Complex,  Folic  Acid,    Vitamin  B12        Multivitamin   Tablet  Daily    Supplementations   Over  The  Counter  Discussed           *  PATIENT  IS  ALSO   COUNSELED   TO  KEEP    ACTIVITIES         A)   SIMPLE      B)  ORGANIZED      C)  WRITE   DOWN                     *  EVALUATIONS  AND  FOLLOW UP:                                          * CARDIOLOGY               * EPILEPSY  MONITORING   UNIT                                 *    PREVIOUS     H/O    PARTIAL  COMPLEX       SEIZURES                          DUE  TO  SLEEP  DEPRIVATION     AND  PROLONGED  TV   WATCHING                                *     PATIENT   RECOMMENDED  :                        A)    TO  CONTINUE     SEIZURE  PRECAUTIONS                       B)     TO  CONTINUE        LAMICTAL   AND  DEPAKOTE  AS  BEFORE                        C)   PERIODIC    ANTI  EPILEPTIC  MEDICATION  LEVELS                                  EVERY    3   MONTHS                         VARIOUS  RISK   FACTORS   WERE  REVIEWED   AND   DISCUSSED.                     Orders Placed This Encounter   Procedures    Valproic Acid Level, Total    Lamotrigine Level       Orders Placed This Encounter   Medications    divalproex (DEPAKOTE) 250 MG DR tablet     Sig: take 3 tablets by mouth twice a day     Dispense:  180 tablet     Refill:  5    lamoTRIgine (LAMICTAL) 100 MG tablet     Si TABLET DAILY AT BEDTIME     Dispense:  30 tablet     Refill:  5                       *PATIENT   TO  FOLLOW  UP  WITH   PRIMARY  CARE   AND   OTHER  CONSULTANTS  AS  BEFORE.           *TO  FOLLOW  WITH   MENTAL  HEALTH  PROFESSIONALS ,  INCLUDING            PSYCHOLOGICAL  COUNSELING   AND  PSYCHIATRIC TeleHealth encounter (During QLHQU-57 public health emergency), evaluation of the following organ systems was limited:     Vitals/Constitutional/EENT/Resp/CV/GI//MS/Neuro/Skin/Heme-Lymph-Imm. Pursuant to the emergency declaration under the [address] [place] [address] and the [employer] Act, this Virtual Visit was conducted with patient's (and/or legal guardian's) consent, to reduce the patient's risk of exposure to COVID-19 and provide necessary medical care. The patient (and/or legal guardian) has also been advised to contact this office for worsening conditions or problems, and seek emergency medical treatment and/or call 911 if deemed necessary. Patient identification was verified at the start of the visit: Yes    Total time spent for this encounter:  ( Time Spent:   25 minutes )    Services were provided through a video synchronous discussion   and  limited    examination  virtually to substitute for in-person clinic visit. Patient    located at    [address] [place] [address]. Nash Hong, was evaluated through a synchronous (real-Time) audio-video encounter. The patient (or guardian if applicable) is aware that this is a billable service, which includes applicable co-pays. This Virtual Visit was conducted with patient's (and/or legal guardian's) consent. The visit was conducted pursuant to the emergency declaration under the [address] [place] [address] and the [employer] Act. Patient identification was verified and a caregiver was present when appropriate. The patient was located in a state where the provider was licensed to provider care. [facility].    MD Tiffanie Shah MD on 11/16/2022 at 2:06 PM    An electronic signature was used to authenticate this note. Electronically signed by Luisa Hess MD.,  [facility]       Board Certified in  Neurology &  In  [facility] (ABPN)      DISCLAIMER:   Although every effort was made to ensure the accuracy of this  electronic transcription, some errors in transcription may have occurred. GENERAL PATIENT INSTRUCTIONS:     A Healthy Lifestyle: Care Instructions  Your Care Instructions  A healthy lifestyle can help you feel good, stay at a healthy weight, and have plenty of energy for both work and play. A healthy lifestyle is something you can share with your whole family. A healthy lifestyle also can lower your risk for serious health problems, such as high blood pressure, heart disease, and diabetes. You can follow a few steps listed below to improve your health and the health of your family. Follow-up care is a key part of your treatment and safety. Be sure to make and go to all appointments, and call your doctor if you are having problems. Its also a good idea to know your test results and keep a list of the medicines you take. How can you care for yourself at home? Do not eat too much sugar, fat, or fast foods. You can still have dessert and treats now and then. The goal is moderation. Start small to improve your eating habits. Pay attention to portion sizes, drink less juice and soda pop, and eat more fruits and vegetables. Eat a healthy amount of food. A 3-ounce serving of meat, for example, is about the size of a deck of cards. Fill the rest of your plate with vegetables and whole grains. Limit the amount of soda and sports drinks you have every day. Drink more water when you are thirsty. Eat at least 5 servings of fruits and vegetables every day.  It may seem like a lot, but it is not hard to reach this goal. A serving or helping is 1 piece of fruit, 1 cup of vegetables, or 2 cups of leafy, raw vegetables. Have an apple or some carrot sticks as an afternoon snack instead of a candy bar. Try to have fruits and/or vegetables at every meal.  Make exercise part of your daily routine. You may want to start with simple activities, such as walking, bicycling, or slow swimming. Try to be active 30 to 60 minutes every day. You do not need to do all 30 to 60 minutes all at once. For example, you can exercise 3 times a day for 10 or 20 minutes. Moderate exercise is safe for most people, but it is always a good idea to talk to your doctor before starting an exercise program.  Keep moving. Roise Mingle the lawn, work in the garden, or [place]. Take the stairs instead of the elevator at work. If you smoke, quit. People who smoke have an increased risk for heart attack, stroke, cancer, and other lung illnesses. Quitting is hard, but there are ways to boost your chance of quitting tobacco for good. Use nicotine gum, patches, or lozenges. Ask your doctor about stop-smoking programs and medicines. Keep trying. In addition to reducing your risk of diseases in the future, you will notice some benefits soon after you stop using tobacco. If you have shortness of breath or asthma symptoms, they will likely get better within a few weeks after you quit. Limit how much alcohol you drink. Moderate amounts of alcohol (up to 2 drinks a day for men, 1 drink a day for women) are okay. But drinking too much can lead to liver problems, high blood pressure, and other health problems. Family health  If you have a family, there are many things you can do together to improve your health. Eat meals together as a family as often as possible. Eat healthy foods. This includes fruits, vegetables, lean meats and dairy, and whole grains. Include your family in your fitness plan.  Most people think of activities such as jogging or tennis as the way to fitness, but there are many ways you and your family can be more active. Anything that makes you breathe hard and gets your heart pumping is exercise. Here are some tips:  Walk to do errands or to take your child to school or the bus. Go for a family bike ride after dinner instead of watching TV. Where can you learn more? Go to https://chpepiceweb.[employer]. org and sign in to your [employer] account. Enter A248 in the Zoom box to learn more about \"A Healthy Lifestyle: Care Instructions. \"     If you do not have an account, please click on the \"Sign Up Now\" link. Current as of: July 26, 2016  Content Version: 11.2  © 1902-9966 [email or web address]. Care instructions adapted under license by [facility] ([facility]). If you have questions about a medical condition or this instruction, always ask your healthcare professional. [address] any warranty or liability for your use of this information.

## 2022-11-18 ENCOUNTER — INITIAL CONSULT (OUTPATIENT)
Dept: SURGERY | Age: 42
End: 2022-11-18
Payer: MEDICARE

## 2022-11-18 VITALS
DIASTOLIC BLOOD PRESSURE: 80 MMHG | BODY MASS INDEX: 29.73 KG/M2 | SYSTOLIC BLOOD PRESSURE: 118 MMHG | HEIGHT: 66 IN | TEMPERATURE: 97.4 F | RESPIRATION RATE: 18 BRPM | HEART RATE: 78 BPM | WEIGHT: 185 LBS

## 2022-11-18 DIAGNOSIS — K21.9 GASTROESOPHAGEAL REFLUX DISEASE WITHOUT ESOPHAGITIS: Primary | ICD-10-CM

## 2022-11-18 PROCEDURE — 99214 OFFICE O/P EST MOD 30 MIN: CPT | Performed by: SURGERY

## 2022-11-18 NOTE — PROGRESS NOTES
Patient evaluated at the request of Bear Carpenter  Reason for evaluation: Hiatal Hernia       Nausea: yes, 3-4x/week for past 6 months   Vomiting: no  Heartburn:yes, everyday for past 6 months   Dysphagia:yes, trouble with taking pills   Hematemesis:no  Epigastric pain:yes, occasionally   Anemia: no  Previous work up date:April242019 with Dr. Lu Shen @ [facility] - normal   Current Treatment:TUMS as needed       Lap Nissen- 3/19/2010 Dr. Talya Schultz @ [facility]       CT Scan Abd/pelvis 10/3022 @ [facility]- moderate sized hiatal hernia

## 2022-11-18 NOTE — PROGRESS NOTES
Donte Benson is a 36 y.o. male      CC:    GERD    HISTORY OF PRESENT ILLNESS:    Patient is a 44-year-old male who is here because of GERD and a moderate sized recurrent hiatal hernia. He had antireflux surgery by Dr. Yann Duran in [place] in 2010. The patient states that he was told he had a large hernia over 6 cm. They used mesh in the repair and he was in the hospital for several day. He did very well and had no heartburn until this year. About 6 months ago it started getting very bad. He recently had a CT scan which showed a moderate sized recurrent hiatal hernia. He is here because he wants to have the hernia repaired.         Review of Systems:    General:  Fever: Negative  Weight Change:Negative  Night Sweats: Negative    Eye:  Blurry Vision:Negative  Double Vision: Negative    Ent:  Headaches: Negative  Sore throat: Negative    Allergy/Immunology:  Hives: Negative  Latex allergy: Negative    Hematology/Lymphatic:  Bleeding Problems: Negative  Blood Clots: Negative  Swollen Lymph Nodes: Negative    Lungs:  Cough: Negative  SOB: Negative  Wheezing:Negative    Cardiovascular:  Chest Pain: Negative  Palpitations:Negative    GI:   Decreased Appetite: Negative  Heartburn: Negative  Dysphagia: Negative  Nausea/Vomiting: Negative  Abdominal Pain: Negative  Change in Bowels:Negative  Constipation: Negative  Diarrhea: Negative  Rectal Bleeding: Negative    :   Dysuria: Negative  Increase Urinary Frequency/Urgency: Negative    Neuro:  Seizures: Negative  Confusion: Negative        PAST MEDICAL HISTORY:      Family History   Problem Relation Age of Onset    COPD Mother     High Blood Pressure Mother     Cancer Father         Hodgekin's Lymphoma    High Blood Pressure Father     Eczema Sister     Alzheimer's Disease Maternal Grandmother     COPD Maternal Grandmother     Cancer Maternal Grandmother     Cancer Paternal Grandmother     Cancer Paternal Grandfather     Cancer Sister         colon cancer High Blood Pressure Sister     Arthritis Sister     Asthma Daughter     Eczema Daughter     Glaucoma Neg Hx     Diabetes Neg Hx     Cataracts Neg Hx      Social History     Socioeconomic History    Marital status:      Spouse name: Not on file    Number of children: Not on file    Years of education: Not on file    Highest education level: Not on file   Occupational History    Not on file   Tobacco Use    Smoking status: Every Day     Packs/day: 0.25     Years: 20.00     Pack years: 5.00     Types: Cigarettes     Last attempt to quit: 2016     Years since quittin.5    Smokeless tobacco: Never    Tobacco comments:     2-3 weekly   Vaping Use    Vaping Use: Never used   Substance and Sexual Activity    Alcohol use: No     Alcohol/week: 0.0 standard drinks     Comment: rarely    Drug use: No    Sexual activity: Not on file   Other Topics Concern    Not on file   Social History Narrative    Not on file     Social Determinants of Health     Financial Resource Strain: Low Risk     Difficulty of Paying Living Expenses: Not hard at all   Food Insecurity: No Food Insecurity    Worried About Running Out of Food in the Last Year: Never true    Ran Out of Food in the Last Year: Never true   Transportation Needs: Not on file   Physical Activity: Not on file   Stress: Not on file   Social Connections: Not on file   Intimate Partner Violence: Not on file   Housing Stability: Not on file     Past Surgical History:   Procedure Laterality Date    COLONOSCOPY  2011    Internal hemorrhoids, possible anal fissure, few scattered diverticula. COLONOSCOPY  2010    Mild sigmoid diverticulosis. COLONOSCOPY  2009    Sigmoid diverticulosis, internal hemorrhoids. COLONOSCOPY  2008    Internal hemorrhoids.     COLONOSCOPY  2014    polyp in rectum    COLONOSCOPY  16    sigmoid diverticulosis, colon polyp, internal hemorrhoids    COLONOSCOPY  2019    [id number]    GASTRIC FUNDOPLICATION  04/31/0820    HERNIA REPAIR Left 11/30/2017    Left Inguinal Hernia Repair w/ Mesh performed by Leighann High MD at [facility] Right 05/30/2013    INGUINAL HERNIA REPAIR Left 01/24/2013    INGUINAL HERNIA REPAIR Right 09/24/2015    KNEE ARTHROSCOPY Left     PILONIDAL CYST EXCISION N/A 7/23/2020    PILONIDAL CYSTECTOMY performed by Gale Delacruz DO at [address]  03/05/2010    Recurrent hiatal hernia. UPPER GASTROINTESTINAL ENDOSCOPY  6/1/16    S/P brenda fundoplication, good repair, retained gastric fluid    VASECTOMY       Past Medical History:   Diagnosis Date    Anxiety, mild     Colon polyps     Dizziness     Eczema     Epilepsy ([facility] [address]     GERD (gastroesophageal reflux disease)     H/O fall     Head ache     Head injury     Hiatal hernia     Inguinal hernia     Right. Low back pain     Lumbar sprain     Migraine     Plantar fasciitis, bilateral     Psychogenic nonepileptic seizure 11/16/2022    Seizure disorder ([facility] [address]     Sigmoid diverticulosis     Sleep difficulties     Status migrainosus     TIA (transient ischemic attack) 09/2017    Tobacco use     Chronic. Current Outpatient Medications on File Prior to Visit   Medication Sig Dispense Refill    divalproex (DEPAKOTE) 250 MG DR tablet take 3 tablets by mouth twice a day 180 tablet 5    lamoTRIgine (LAMICTAL) 100 MG tablet 1 TABLET DAILY AT BEDTIME 30 tablet 5    levalbuterol (XOPENEX) 0.63 MG/3ML nebulization inhale orally contents of 1 vial in nebulizer every 6 hours if needed      metoprolol succinate (TOPROL XL) 100 MG extended release tablet Take 1 tablet by mouth at bedtime 90 tablet 3    nitroGLYCERIN (NITROSTAT) 0.4 MG SL tablet Place 1 tablet under the tongue every 5 minutes as needed for Chest pain up to max of 3 total doses.  If no relief after 1 dose, call 911. 25 tablet 3    apixaban (ELIQUIS) 5 MG TABS tablet Take 1 tablet by mouth 2 times daily 60 tablet 11 atorvastatin (LIPITOR) 40 MG tablet Take 1 tablet by mouth nightly 30 tablet 3    meloxicam (MOBIC) 15 MG tablet take 1 tablet by mouth once daily 30 tablet 5    promethazine (PHENERGAN) 25 MG tablet take 1 tablet by mouth every 8 hours if needed for nausea 60 tablet 2    SUMAtriptan (IMITREX) 50 MG tablet take 1 tablet by mouth once daily if needed for MIGRAINE 30 tablet 2    lisinopril (PRINIVIL;ZESTRIL) 20 MG tablet take 1 tablet by mouth daily       No current facility-administered medications on file prior to visit. Allergies as of 11/18/2022    (No Known Allergies)         PHYSICAL EXAM:    Blood pressure 118/80, pulse 78, temperature 97.4 °F (36.3 °C), temperature source Infrared, resp. rate 18, height 5' 6\" (1.676 m), weight 185 lb (83.9 kg). Gen:  A and O x 3, NAD, well nourished  Eyes:  Sclera non icterus, PERRL  Head:  Normocephalic, non-tender  Neck:  Supple, no adenopathy, thyroid non tender and no masses,no carotid bruits  Lungs:  CTA, symmetrical  Chest:  RRR, no murmurs  Abd:  Soft, NT, ND, no HSM, no hernias, no bruits  Ext:  No edema, no cyanosis  Psych: reveals appropriate mood, memory and judgment,  Neuro:  Reveals no gross motor or sensory deficits,   Msk:  5/5 strength all 4 extremities, no joint tenderness            ASSESS MENT:     GERD with recurrent HH  Last Antireflux surgery done in [place] by dr Pedro Dominguez. At this point I think this is a complicated situation. He clearly needs a work-up including EGD 48-hour Bravo testing manometry esophagram gastric emptying study to evaluate for possible repeat antireflux surgery. However he clearly had a large hernia repair with possible mesh before and this is probably going to be a very difficult surgery when this should probably be done at a tertiary center in  with the surgeon doing more recurrent hernia hernias. Patient would like to go to [place].   We will set up a referral to antireflux surgeon in [place] and see if they will consider the case.       PLAN:     [facility] to [facility] view per pt request.

## 2022-12-02 ENCOUNTER — TELEPHONE (OUTPATIENT)
Dept: SURGERY | Age: 42
End: 2022-12-02

## 2022-12-02 NOTE — TELEPHONE ENCOUNTER
Spoke with [facility]. They did receive referral that was placed 11/22. However because of the number of referral's they receive they usually have the patient call them to schedule. This RN spoke with patient and relayed info and phone number to call. Patient verbalized understanding.

## 2022-12-05 ENCOUNTER — HOSPITAL ENCOUNTER (OUTPATIENT)
Dept: PULMONOLOGY | Age: 42
Discharge: HOME | End: 2022-12-05
Payer: MEDICARE

## 2022-12-05 DIAGNOSIS — R05.3 CHRONIC COUGH: ICD-10-CM

## 2022-12-05 LAB
DLCO %PRED: NORMAL
DLCO PRED: NORMAL
DLCO/VA %PRED: NORMAL
DLCO/VA PRED: NORMAL
DLCO/VA: NORMAL
DLCO: NORMAL
EXPIRATORY TIME-POST: NORMAL
EXPIRATORY TIME: NORMAL
FEF 25-75% %CHNG: NORMAL
FEF 25-75% %PRED-POST: NORMAL
FEF 25-75% %PRED-PRE: NORMAL
FEF 25-75% PRED: NORMAL
FEF 25-75%-POST: NORMAL
FEF 25-75%-PRE: NORMAL
FEV1 %PRED-POST: NORMAL
FEV1 %PRED-PRE: NORMAL
FEV1 PRED: NORMAL
FEV1-POST: NORMAL
FEV1-PRE: NORMAL
FEV1/FVC %PRED-POST: NORMAL
FEV1/FVC %PRED-PRE: NORMAL
FEV1/FVC PRED: NORMAL
FEV1/FVC-POST: NORMAL
FEV1/FVC-PRE: NORMAL
FVC %PRED-POST: NORMAL
FVC %PRED-PRE: NORMAL
FVC PRED: NORMAL
FVC-POST: NORMAL
FVC-PRE: NORMAL
GAW %PRED: NORMAL
GAW PRED: NORMAL
GAW: NORMAL
IC %PRED: NORMAL
IC PRED: NORMAL
IC: NORMAL
MEP: NORMAL
MIP: NORMAL
MVV %PRED-PRE: NORMAL
MVV PRED: NORMAL
MVV-PRE: NORMAL
PEF %PRED-POST: NORMAL
PEF %PRED-PRE: NORMAL
PEF PRED: NORMAL
PEF%CHNG: NORMAL
PEF-POST: NORMAL
PEF-PRE: NORMAL
RAW %PRED: NORMAL
RAW PRED: NORMAL
RAW: NORMAL
RV %PRED: NORMAL
RV PRED: NORMAL
RV: NORMAL
SVC %PRED: NORMAL
SVC PRED: NORMAL
SVC: NORMAL
TLC %PRED: NORMAL
TLC PRED: NORMAL
TLC: NORMAL
VA %PRED: NORMAL
VA PRED: NORMAL
VA: NORMAL
VTG %PRED: NORMAL
VTG PRED: NORMAL
VTG: NORMAL

## 2022-12-05 PROCEDURE — 94726 PLETHYSMOGRAPHY LUNG VOLUMES: CPT

## 2022-12-05 PROCEDURE — 94010 BREATHING CAPACITY TEST: CPT

## 2022-12-05 PROCEDURE — 94729 DIFFUSING CAPACITY: CPT

## 2022-12-09 NOTE — PROCEDURES
[address]                 [address] [place] [id number]                               PULMONARY FUNCTION    PATIENT NAME: Jayla Augustine                  :        1980  MED REC NO:   [id number]                             ROOM:  ACCOUNT NO:   [de-identified]                           ADMIT DATE: 2022  PROVIDER:     Annie Albrecht    DATE OF PROCEDURE:  2022    The patient's spirometry shows a normal flow volume loop. FEV1 95%  predicted, FVC 90% predicted. FEV1/FVC ratio 85. Total lung capacity 82% predicted, RV 70% predicted, and diffusion  capacity corrected for alveolar volume 88% predicted and uncorrected 82%  predicted. Airway resistance mildly increased. FINAL IMPRESSION:  Normal spirometry, normal total lung capacity, mild  decrease in RV but diffusion capacity is normal.  Clinical correlation  advised. Clay Waterman    D: 2022 22:17:16       T: 2022 6:04:41     /[email or web address]  Job#: [id number]     Doc#: [id number]    CC:  Gavin Root.  DO Asim

## 2023-02-07 ENCOUNTER — HOSPITAL ENCOUNTER (OUTPATIENT)
Dept: NON INVASIVE DIAGNOSTICS | Age: 43
Discharge: HOME | End: 2023-02-07
Payer: MEDICARE

## 2023-02-07 ENCOUNTER — HOSPITAL ENCOUNTER (OUTPATIENT)
Dept: GENERAL RADIOLOGY | Age: 43
Discharge: HOME | End: 2023-02-09
Payer: MEDICARE

## 2023-02-07 ENCOUNTER — HOSPITAL ENCOUNTER (OUTPATIENT)
Age: 43
Discharge: HOME | End: 2023-02-07
Payer: MEDICARE

## 2023-02-07 ENCOUNTER — TELEPHONE (OUTPATIENT)
Dept: SURGERY | Age: 43
End: 2023-02-07

## 2023-02-07 ENCOUNTER — INITIAL CONSULT (OUTPATIENT)
Dept: SURGERY | Age: 43
End: 2023-02-07
Payer: MEDICARE

## 2023-02-07 VITALS
HEIGHT: 66 IN | SYSTOLIC BLOOD PRESSURE: 120 MMHG | BODY MASS INDEX: 30.7 KG/M2 | WEIGHT: 191 LBS | DIASTOLIC BLOOD PRESSURE: 86 MMHG | HEART RATE: 82 BPM

## 2023-02-07 DIAGNOSIS — Z01.818 PRE-OP TESTING: ICD-10-CM

## 2023-02-07 DIAGNOSIS — G40.909 SEIZURE DISORDER (HCC): ICD-10-CM

## 2023-02-07 DIAGNOSIS — K40.21 BILATERAL RECURRENT INGUINAL HERNIA WITHOUT OBSTRUCTION OR GANGRENE: Primary | ICD-10-CM

## 2023-02-07 DIAGNOSIS — K43.2 INCISIONAL HERNIA, WITHOUT OBSTRUCTION OR GANGRENE: ICD-10-CM

## 2023-02-07 LAB
ABSOLUTE EOS #: 0.08 K/UL (ref 0–0.44)
ABSOLUTE IMMATURE GRANULOCYTE: 0.05 K/UL (ref 0–0.3)
ABSOLUTE LYMPH #: 2.73 K/UL (ref 1.1–3.7)
ABSOLUTE MONO #: 0.71 K/UL (ref 0.1–1.2)
ALBUMIN SERPL-MCNC: 4.4 G/DL (ref 3.5–5.2)
ALBUMIN/GLOBULIN RATIO: 1.7 (ref 1–2.5)
ALP SERPL-CCNC: 71 U/L (ref 40–129)
ALT SERPL-CCNC: 23 U/L (ref 5–41)
ANION GAP SERPL CALCULATED.3IONS-SCNC: 8 MMOL/L (ref 9–17)
ANION GAP SERPL CALCULATED.3IONS-SCNC: 8 MMOL/L (ref 9–17)
AST SERPL-CCNC: 18 U/L
BASOPHILS # BLD: 1 % (ref 0–2)
BASOPHILS ABSOLUTE: 0.04 K/UL (ref 0–0.2)
BILIRUB SERPL-MCNC: 0.3 MG/DL (ref 0.3–1.2)
BUN SERPL-MCNC: 17 MG/DL (ref 6–20)
BUN/CREAT BLD: 18 (ref 9–20)
CALCIUM SERPL-MCNC: 9.5 MG/DL (ref 8.6–10.4)
CHLORIDE SERPL-SCNC: 104 MMOL/L (ref 98–107)
CHLORIDE SERPL-SCNC: 104 MMOL/L (ref 98–107)
CO2 SERPL-SCNC: 27 MMOL/L (ref 20–31)
CO2 SERPL-SCNC: 27 MMOL/L (ref 20–31)
CREAT SERPL-MCNC: 0.95 MG/DL (ref 0.7–1.2)
EOSINOPHILS RELATIVE PERCENT: 1 % (ref 1–4)
GFR SERPL CREATININE-BSD FRML MDRD: >60 ML/MIN/1.73M2
GLUCOSE SERPL-MCNC: 87 MG/DL (ref 70–99)
HCT VFR BLD AUTO: 42.1 % (ref 40.7–50.3)
HCT VFR BLD AUTO: 42.1 % (ref 40.7–50.3)
HGB BLD-MCNC: 14.9 G/DL (ref 13–17)
HGB BLD-MCNC: 14.9 G/DL (ref 13–17)
IMMATURE GRANULOCYTES: 1 %
LYMPHOCYTES # BLD: 41 % (ref 24–43)
MCH RBC QN AUTO: 34.3 PG (ref 25.2–33.5)
MCH RBC QN AUTO: 34.3 PG (ref 25.2–33.5)
MCHC RBC AUTO-ENTMCNC: 35.4 G/DL (ref 25.2–33.5)
MCHC RBC AUTO-ENTMCNC: 35.4 G/DL (ref 25.2–33.5)
MCV RBC AUTO: 97 FL (ref 82.6–102.9)
MCV RBC AUTO: 97 FL (ref 82.6–102.9)
MONOCYTES # BLD: 11 % (ref 3–12)
NRBC AUTOMATED: 0 PER 100 WBC
NRBC AUTOMATED: 0 PER 100 WBC
PDW BLD-RTO: 12.1 % (ref 11.8–14.4)
PDW BLD-RTO: 12.1 % (ref 11.8–14.4)
PLATELET # BLD AUTO: 270 K/UL (ref 138–453)
PLATELET # BLD AUTO: 270 K/UL (ref 138–453)
PMV BLD AUTO: 9.7 FL (ref 8.1–13.5)
PMV BLD AUTO: 9.7 FL (ref 8.1–13.5)
POTASSIUM SERPL-SCNC: 4.2 MMOL/L (ref 3.7–5.3)
POTASSIUM SERPL-SCNC: 4.2 MMOL/L (ref 3.7–5.3)
PROT SERPL-MCNC: 7 G/DL (ref 6.4–8.3)
RBC # BLD: 4.34 M/UL (ref 4.21–5.77)
RBC # BLD: 4.34 M/UL (ref 4.21–5.77)
SEG NEUTROPHILS: 45 % (ref 36–65)
SEGMENTED NEUTROPHILS ABSOLUTE COUNT: 3.12 K/UL (ref 1.5–8.1)
SODIUM SERPL-SCNC: 139 MMOL/L (ref 135–144)
SODIUM SERPL-SCNC: 139 MMOL/L (ref 135–144)
VALPROATE SERPL-MCNC: 94 UG/ML (ref 50–125)
VALPROIC DATE LAST DOSE: NORMAL
VALPROIC TIME LAST DOSE: 2030
WBC # BLD AUTO: 6.7 K/UL (ref 3.5–11.3)
WBC # BLD AUTO: 6.7 K/UL (ref 3.5–11.3)

## 2023-02-07 PROCEDURE — 71046 X-RAY EXAM CHEST 2 VIEWS: CPT

## 2023-02-07 PROCEDURE — 4004F PT TOBACCO SCREEN RCVD TLK: CPT | Performed by: SURGERY

## 2023-02-07 PROCEDURE — 99215 OFFICE O/P EST HI 40 MIN: CPT | Performed by: SURGERY

## 2023-02-07 PROCEDURE — 99214 OFFICE O/P EST MOD 30 MIN: CPT | Performed by: SURGERY

## 2023-02-07 PROCEDURE — 36415 COLL VENOUS BLD VENIPUNCTURE: CPT

## 2023-02-07 PROCEDURE — 80175 DRUG SCREEN QUAN LAMOTRIGINE: CPT

## 2023-02-07 PROCEDURE — G8417 CALC BMI ABV UP PARAM F/U: HCPCS | Performed by: SURGERY

## 2023-02-07 PROCEDURE — 93005 ELECTROCARDIOGRAM TRACING: CPT

## 2023-02-07 PROCEDURE — G8427 DOCREV CUR MEDS BY ELIG CLIN: HCPCS | Performed by: SURGERY

## 2023-02-07 PROCEDURE — 85027 COMPLETE CBC AUTOMATED: CPT

## 2023-02-07 PROCEDURE — 85025 COMPLETE CBC W/AUTO DIFF WBC: CPT

## 2023-02-07 PROCEDURE — 80164 ASSAY DIPROPYLACETIC ACD TOT: CPT

## 2023-02-07 PROCEDURE — 80051 ELECTROLYTE PANEL: CPT

## 2023-02-07 PROCEDURE — G8483 FLU IMM NO ADMIN DOC REA: HCPCS | Performed by: SURGERY

## 2023-02-07 PROCEDURE — 80053 COMPREHEN METABOLIC PANEL: CPT

## 2023-02-07 NOTE — PROGRESS NOTES
Subjective   Ophelia Brock is a 36 y.o. male who presents today for further evaluation and recommendations for multiple hernias. Patient reports that he has had bilateral inguinal hernia repairs in the past and has had each side repaired twice previously. He also reports that he has had a prior fundoplication and soon after that surgery began to have some discomfort at the periumbilical incision and has been told from prior imaging that he has a hernia at that area as well. He has been dealing with these hernias and has begun to notice a bulge in the right groin is also starting to have some pain in the right groin as well as at the periumbilical incision. He was seen by his PCP and then referred on to general surgery for discussion of repair of these hernias. He has also prior fundoplication for hiatal hernia and is now had a recurrence there as well. He is seeing a surgeon in [facility] for discussion of repair of the recurrent hiatal hernia. However he states that he would like to go ahead and proceed with repair of the other hernias as soon as possible due to worsening symptoms. Denies any changes in bowel movements. No nausea or emesis. Past Medical History:   Diagnosis Date    Anxiety, mild     Colon polyps     Dizziness     Eczema     Epilepsy ([address]     GERD (gastroesophageal reflux disease)     H/O fall     Head ache     Head injury     Hiatal hernia     Inguinal hernia     Right. Low back pain     Lumbar sprain     Migraine     Plantar fasciitis, bilateral     Psychogenic nonepileptic seizure 11/16/2022    Seizure disorder ([address]     Sigmoid diverticulosis     Sleep difficulties     Status migrainosus     TIA (transient ischemic attack) 09/2017    Tobacco use     Chronic. Past Surgical History:   Procedure Laterality Date    COLONOSCOPY  04/29/2011    Internal hemorrhoids, possible anal fissure, few scattered diverticula. COLONOSCOPY  06/04/2010    Mild sigmoid diverticulosis. COLONOSCOPY  08/28/2009    Sigmoid diverticulosis, internal hemorrhoids. COLONOSCOPY  05/27/2008    Internal hemorrhoids. COLONOSCOPY  11/19/2014    polyp in rectum    COLONOSCOPY  6/1/16    sigmoid diverticulosis, colon polyp, internal hemorrhoids    COLONOSCOPY  04/19/2019    [id number]    GASTRIC FUNDOPLICATION  39/71/0797    HERNIA REPAIR Left 11/30/2017    Left Inguinal Hernia Repair w/ Mesh performed by Kian Barrios MD at [address] [place] [address] Right 05/30/2013    INGUINAL HERNIA REPAIR Left 01/24/2013    INGUINAL HERNIA REPAIR Right 09/24/2015    KNEE ARTHROSCOPY Left     PILONIDAL CYST EXCISION N/A 7/23/2020    PILONIDAL CYSTECTOMY performed by Kelin Hoover DO at [address]  03/05/2010    Recurrent hiatal hernia. UPPER GASTROINTESTINAL ENDOSCOPY  6/1/16    S/P brenda fundoplication, good repair, retained gastric fluid    VASECTOMY         Current Outpatient Medications   Medication Sig Dispense Refill    divalproex (DEPAKOTE) 250 MG DR tablet take 3 tablets by mouth twice a day 180 tablet 5    lamoTRIgine (LAMICTAL) 100 MG tablet 1 TABLET DAILY AT BEDTIME 30 tablet 5    nitroGLYCERIN (NITROSTAT) 0.4 MG SL tablet Place 1 tablet under the tongue every 5 minutes as needed for Chest pain up to max of 3 total doses.  If no relief after 1 dose, call 911. 25 tablet 3    atorvastatin (LIPITOR) 40 MG tablet Take 1 tablet by mouth nightly 30 tablet 3    meloxicam (MOBIC) 15 MG tablet take 1 tablet by mouth once daily 30 tablet 5    promethazine (PHENERGAN) 25 MG tablet take 1 tablet by mouth every 8 hours if needed for nausea 60 tablet 2    SUMAtriptan (IMITREX) 50 MG tablet take 1 tablet by mouth once daily if needed for MIGRAINE 30 tablet 2    levalbuterol (XOPENEX) 0.63 MG/3ML nebulization inhale orally contents of 1 vial in nebulizer every 6 hours if needed (Patient not taking: Reported on 2/7/2023)      metoprolol succinate (TOPROL XL) 100 MG extended release tablet Take 1 tablet by mouth at bedtime (Patient not taking: Reported on 2023) 90 tablet 3    apixaban (ELIQUIS) 5 MG TABS tablet Take 1 tablet by mouth 2 times daily (Patient not taking: Reported on 2023) 60 tablet 11    lisinopril (PRINIVIL;ZESTRIL) 20 MG tablet take 1 tablet by mouth daily       No current facility-administered medications for this visit.        No Known Allergies    Family History   Problem Relation Age of Onset    COPD Mother     High Blood Pressure Mother     Cancer Father         Hodgekin's Lymphoma    High Blood Pressure Father     Eczema Sister     Alzheimer's Disease Maternal Grandmother     COPD Maternal Grandmother     Cancer Maternal Grandmother     Cancer Paternal Grandmother     Cancer Paternal Grandfather     Cancer Sister         colon cancer    High Blood Pressure Sister     Arthritis Sister     Asthma Daughter     Eczema Daughter     Glaucoma Neg Hx     Diabetes Neg Hx     Cataracts Neg Hx        Social History     Socioeconomic History    Marital status:      Spouse name: Not on file    Number of children: Not on file    Years of education: Not on file    Highest education level: Not on file   Occupational History    Not on file   Tobacco Use    Smoking status: Every Day     Packs/day: 0.25     Years: 20.00     Pack years: 5.00     Types: Cigarettes     Last attempt to quit: 2016     Years since quittin.7    Smokeless tobacco: Never    Tobacco comments:     2-3 weekly   Vaping Use    Vaping Use: Never used   Substance and Sexual Activity    Alcohol use: No     Alcohol/week: 0.0 standard drinks     Comment: rarely    Drug use: No    Sexual activity: Not on file   Other Topics Concern    Not on file   Social History Narrative    Not on file     Social Determinants of Health     Financial Resource Strain: Low Risk     Difficulty of Paying Living Expenses: Not hard at all   Food Insecurity: No Food Insecurity    Worried About Running Out of Food in the Last Year: Never true    Ran Out of Food in the Last Year: Never true   Transportation Needs: Not on file   Physical Activity: Not on file   Stress: Not on file   Social Connections: Not on file   Intimate Partner Violence: Not on file   Housing Stability: Not on file       ROS:   Review of Systems - General ROS: negative  Psychological ROS: negative  Ophthalmic ROS: negative  ENT ROS: negative  Respiratory ROS: no cough, shortness of breath, or wheezing  Cardiovascular ROS: no chest pain or dyspnea on exertion  Gastrointestinal ROS: per HPI  Genito-Urinary ROS: no dysuria, trouble voiding, or hematuria  Musculoskeletal ROS: negative  Dermatological ROS: negative      Objective   Vitals:    02/07/23 1133   BP: 120/86   Pulse: 82     General:in no apparent distress and well developed and well nourished  Eyes: No gross abnormalities. Ears, Nose, Throat: hearing grossly normal bilaterally  Neck: neck supple and non tender without mass  Lungs: clear to auscultation without wheezes or rales   Heart: S1S2, no mumurs, RRR  Abdomen: Soft, nondistended, tender to palpation at areas of hernias but otherwise nontender, bowel sounds present. Patient does have some fullness on the right groin as compared to the left and does have palpable hernia that is reducible. Small hernia on the left inguinal region that occurs with Valsalva but spontaneously reduces. There is a small periumbilical defect probably 2 cm. Extremity: negative  Neuro: CN II-XII grossly intact      1. Bilateral recurrent inguinal hernia without obstruction or gangrene  Assessment & Plan:  The patient does have bilateral inguinal hernias on exam and has had a CT scan in March of this year that also demonstrated fat-containing bilateral inguinal hernias. Seems that the right is bigger than the left on exam.  Has had prior repairs x2 on both sides open and I am going to plan for robotic assisted laparoscopic repair bilaterally with mesh.   Risks of the procedure including bleeding, infection, scarring, pain, damage surrounding structures including spermatic cord structures, potential loss of testicle, need for additional surgery, hernia recurrence, mesh complications and anesthesia risk are discussed and consent is obtained. 2. Incisional hernia, without obstruction or gangrene  Assessment & Plan:  Patient does have a small hernia at the umbilical region. Little difficult to determine whether this is primary umbilical versus incisional from his prior lap incision. Does seem fairly small and is reducible. Ashli Waters go ahead and plan for robotic assisted laparoscopic repair of this hernia at the same time as the inguinal hernias. Risks of the procedure including bleeding, infection, scarring, pain, damage surrounding tissue, need for additional surgery, hernia recurrence, mesh complications and anesthesia risk are discussed and consent is obtained. We will get preoperative testing to include CBC, BMP, chest x-ray and EKG. 3. Pre-op testing  -     CBC with Auto Differential; Future  -     Comprehensive Metabolic Panel; Future  -     EKG 12 lead; Future  -     XR CHEST STANDARD (2 VW);  Future         (Please note that portions of this note were completed with a voice recognition program.  Efforts were made to edit the dictations but occasionally words are mis-transcribed.)

## 2023-02-07 NOTE — TELEPHONE ENCOUNTER
Arabella Tomas [address]         Patient:Josh Huang  :1980  Surgical/Procedure Planned: Lapaoscopic Robotic Assisted Recurrent Bilateral Inguinal and Umbilical hernia repairs with mesh    Date & Location: 3/3/23         Outpatient     Planned Length of OR: 3.5 hours    Sedation: general      Estimated Cardiac Risk for Non-Cardiac Surgery/Procedure     Low______ Moderate______ High______         Provider:Dr. Bradley Memory      Signature of Provider Giving Orders for Medication holds:    _____________________________________________

## 2023-02-07 NOTE — ASSESSMENT & PLAN NOTE
Patient does have a small hernia at the umbilical region. Little difficult to determine whether this is primary umbilical versus incisional from his prior lap incision. Does seem fairly small and is reducible. Elena Pfeiffer go ahead and plan for robotic assisted laparoscopic repair of this hernia at the same time as the inguinal hernias. Risks of the procedure including bleeding, infection, scarring, pain, damage surrounding tissue, need for additional surgery, hernia recurrence, mesh complications and anesthesia risk are discussed and consent is obtained. We will get preoperative testing to include CBC, BMP, chest x-ray and EKG.

## 2023-02-07 NOTE — TELEPHONE ENCOUNTER
Pt notified. He is willing to proceed. I called the [facility] scheduling at [phone]. There is an issue with the phone line. Please call again later.

## 2023-02-07 NOTE — ASSESSMENT & PLAN NOTE
The patient does have bilateral inguinal hernias on exam and has had a CT scan in March of this year that also demonstrated fat-containing bilateral inguinal hernias. Seems that the right is bigger than the left on exam.  Has had prior repairs x2 on both sides open and I am going to plan for robotic assisted laparoscopic repair bilaterally with mesh. Risks of the procedure including bleeding, infection, scarring, pain, damage surrounding structures including spermatic cord structures, potential loss of testicle, need for additional surgery, hernia recurrence, mesh complications and anesthesia risk are discussed and consent is obtained.

## 2023-02-07 NOTE — TELEPHONE ENCOUNTER
[facility] SPECIALTY [facility]    Pre-Operative Evaluation/Consultation    Name:  Marcie Bracket                                         Age:  36 y.o. MRN:  [id number]       :  1980   Date:  2023         Sex: male    There were no encounter diagnoses. Surgeon:  Dr. Zeke Pascal  Procedure (Planned):  Laparoscopic Robotic Assisted bilateral Inguinal hernia repair with mesh and umbilical hernia repair  Date Scheduled surgery: 3/3/23    Attending : No att. providers found    Primary Physician: Naomie Ibrahim  Cardiologist: Leeanna Cruz    Type of Anesthesia Requested: General    Patient Medical history:  No Known Allergies  Patient Active Problem List   Diagnosis    Low back pain with sciatica    Migraine    Anxiety, mild    Disturbance, sleep    Tobacco use    GERD (gastroesophageal reflux disease)    Hiatal hernia    Head ache    Status migrainosus    Dizziness    Depression    H/O fall    Head injury    Seizure disorder ([address]    Astigmatism    Rectal bleed    Cervical radiculopathy    Ventricular premature beats    Nausea and vomiting    Colon polyps    TIA (transient ischemic attack)    VBI (vertebrobasilar insufficiency)    Pilonidal cyst    Wound dehiscence    Sleep difficulties    Partial idiopathic epilepsy with seizures of localized onset, not intractable, without status epilepticus ([address]    Convulsions ([address]    Recurrent seizures ([address]    Atrial fibrillation with RVR ([address]    Acute respiratory failure with hypoxemia (HCC)    Acute bronchitis with COPD ([address]    New onset a-fib ([address]    Psychogenic nonepileptic seizure    Bilateral recurrent inguinal hernia without obstruction or gangrene    Incisional hernia, without obstruction or gangrene     Past Medical History:   Diagnosis Date    Anxiety, mild     Colon polyps     Dizziness     Eczema     Epilepsy ([address]     GERD (gastroesophageal reflux disease)     H/O fall     Head ache     Head injury     Hiatal hernia     Inguinal hernia     Right. Low back pain     Lumbar sprain     Migraine     Plantar fasciitis, bilateral     Psychogenic nonepileptic seizure 2022    Seizure disorder ([address]     Sigmoid diverticulosis     Sleep difficulties     Status migrainosus     TIA (transient ischemic attack) 2017    Tobacco use     Chronic. Past Surgical History:   Procedure Laterality Date    COLONOSCOPY  2011    Internal hemorrhoids, possible anal fissure, few scattered diverticula. COLONOSCOPY  2010    Mild sigmoid diverticulosis. COLONOSCOPY  2009    Sigmoid diverticulosis, internal hemorrhoids. COLONOSCOPY  2008    Internal hemorrhoids. COLONOSCOPY  2014    polyp in rectum    COLONOSCOPY  16    sigmoid diverticulosis, colon polyp, internal hemorrhoids    COLONOSCOPY  2019    [id number]    GASTRIC FUNDOPLICATION      HERNIA REPAIR Left 2017    Left Inguinal Hernia Repair w/ Mesh performed by Shasha Brito MD at [address] [place] [address] Right 2013    INGUINAL HERNIA REPAIR Left 2013    INGUINAL HERNIA REPAIR Right 2015    KNEE ARTHROSCOPY Left     PILONIDAL CYST EXCISION N/A 2020    PILONIDAL CYSTECTOMY performed by Mayra Cox DO at [address]  2010    Recurrent hiatal hernia.     UPPER GASTROINTESTINAL ENDOSCOPY  16    S/P brenda fundoplication, good repair, retained gastric fluid    VASECTOMY       Social History     Tobacco Use    Smoking status: Every Day     Packs/day: 0.25     Years: 20.00     Pack years: 5.00     Types: Cigarettes     Last attempt to quit: 2016     Years since quittin.7    Smokeless tobacco: Never    Tobacco comments:     2-3 weekly   Vaping Use    Vaping Use: Never used   Substance Use Topics    Alcohol use: No     Alcohol/week: 0.0 standard drinks     Comment: rarely    Drug use: No     Medications:  Current Outpatient Medications   Medication Sig Dispense Refill    divalproex (DEPAKOTE) 250 MG DR tablet take 3 tablets by mouth twice a day 180 tablet 5    lamoTRIgine (LAMICTAL) 100 MG tablet 1 TABLET DAILY AT BEDTIME 30 tablet 5    levalbuterol (XOPENEX) 0.63 MG/3ML nebulization inhale orally contents of 1 vial in nebulizer every 6 hours if needed (Patient not taking: Reported on 2/7/2023)      metoprolol succinate (TOPROL XL) 100 MG extended release tablet Take 1 tablet by mouth at bedtime (Patient not taking: Reported on 2/7/2023) 90 tablet 3    nitroGLYCERIN (NITROSTAT) 0.4 MG SL tablet Place 1 tablet under the tongue every 5 minutes as needed for Chest pain up to max of 3 total doses. If no relief after 1 dose, call 911. 25 tablet 3    apixaban (ELIQUIS) 5 MG TABS tablet Take 1 tablet by mouth 2 times daily (Patient not taking: Reported on 2/7/2023) 60 tablet 11    atorvastatin (LIPITOR) 40 MG tablet Take 1 tablet by mouth nightly 30 tablet 3    meloxicam (MOBIC) 15 MG tablet take 1 tablet by mouth once daily 30 tablet 5    promethazine (PHENERGAN) 25 MG tablet take 1 tablet by mouth every 8 hours if needed for nausea 60 tablet 2    SUMAtriptan (IMITREX) 50 MG tablet take 1 tablet by mouth once daily if needed for MIGRAINE 30 tablet 2    lisinopril (PRINIVIL;ZESTRIL) 20 MG tablet take 1 tablet by mouth daily       No current facility-administered medications for this visit. Scheduled Meds:  Continuous Infusions:  PRN Meds:. Prior to Admission medications    Medication Sig Start Date End Date Taking?  Authorizing Provider   divalproex (DEPAKOTE) 250 MG DR tablet take 3 tablets by mouth twice a day 02/24/60   Mario Santana MD   lamoTRIgine (LAMICTAL) 100 MG tablet 1 TABLET DAILY AT BEDTIME 53/20/98   Mario Santana MD   levalbuterol Earnest Kika) 0.63 MG/3ML nebulization inhale orally contents of 1 vial in nebulizer every 6 hours if needed  Patient not taking: Reported on 2/7/2023 11/1/22   Historical Provider, MD   metoprolol succinate (TOPROL XL) 100 MG extended release tablet Take 1 tablet by mouth at bedtime  Patient not taking: Reported on 2/7/2023 11/7/22   Javier Vargas DO   nitroGLYCERIN (NITROSTAT) 0.4 MG SL tablet Place 1 tablet under the tongue every 5 minutes as needed for Chest pain up to max of 3 total doses. If no relief after 1 dose, call 911. 11/3/22   CLAUDIA Vega CNP   apixaban (ELIQUIS) 5 MG TABS tablet Take 1 tablet by mouth 2 times daily  Patient not taking: Reported on 2/7/2023 11/3/22   CLAUDIA Vega CNP   atorvastatin (LIPITOR) 40 MG tablet Take 1 tablet by mouth nightly 11/3/22   CLAUDIA Vega CNP   meloxicam (MOBIC) 15 MG tablet take 1 tablet by mouth once daily 8/29/22   Urban Kendrick MD   promethazine (PHENERGAN) 25 MG tablet take 1 tablet by mouth every 8 hours if needed for nausea 63/88/55   Solo Wakefield MD   SUMAtriptan (IMITREX) 50 MG tablet take 1 tablet by mouth once daily if needed for MIGRAINE 02/08/48   Solo Wakefield MD   lisinopril (PRINIVIL;ZESTRIL) 20 MG tablet take 1 tablet by mouth daily 9/24/21   Historical Provider, MD     Vital Signs (Current) [unfilled]    Weight:   Wt Readings from Last 1 Encounters:   02/07/23 191 lb (86.6 kg)     Height:   Ht Readings from Last 1 Encounters:   02/07/23 5' 6\" (1.676 m)      BMI:  There is no height or weight on file to calculate BMI. Estimated body mass index is 30.83 kg/m² as calculated from the following:    Height as of an earlier encounter on 2/7/23: 5' 6\" (1.676 m). Weight as of an earlier encounter on 2/7/23: 191 lb (86.6 kg). body mass index is unknown because there is no height or weight on file. Cardiac Clearance:      Medical Clearance:None   Appointment for surgery Clearance scheduled for:None     Preoperative Testing:   These are the current and completed labs:  CBC:   Lab Results   Component Value Date/Time    WBC 11.2 11/03/2022 06:51 AM    RBC 4.07 11/03/2022 06:51 AM    HGB 14.3 11/03/2022 06:51 AM HCT 39.5 11/03/2022 06:51 AM    MCV 97.1 11/03/2022 06:51 AM    RDW 12.0 11/03/2022 06:51 AM     11/03/2022 06:51 AM     CMP:   Lab Results   Component Value Date/Time     11/02/2022 11:34 AM    K 4.0 11/02/2022 11:34 AM    CL 97 11/02/2022 11:34 AM    CO2 25 11/02/2022 11:34 AM    BUN 26 11/02/2022 11:34 AM    CREATININE 1.00 11/02/2022 11:34 AM    GFRAA >60 12/08/2019 05:32 PM    LABGLOM >60 11/02/2022 11:34 AM    GLUCOSE 126 11/02/2022 11:34 AM    PROT 7.8 12/08/2019 05:32 PM    CALCIUM 8.6 11/02/2022 11:34 AM    BILITOT 0.34 12/08/2019 05:32 PM    ALKPHOS 75 12/08/2019 05:32 PM    AST 22 03/16/2022 03:13 PM    ALT 32 03/16/2022 03:13 PM     POC Tests: No results for input(s): POCGLU, POCNA, POCK, POCCL, POCBUN, POCHEMO, POCHCT in the last 72 hours.   Coags    Lab Results   Component Value Date/Time    PROTIME 11.9 11/02/2022 08:19 AM    INR 1.1 11/02/2022 08:19 AM    APTT 60.0 11/03/2022 06:51 AM     HCG (If Applicable) No results found for: PREGTESTUR, PREGSERUM, HCG, HCGQUANT   ABGs No results found for: PHART, PO2ART, YKF1QLH, VRC8OCU, BEART, L6BYULXR   Type & Screen (If Applicable)  No results found for: Jose Luis Garcia    Additional ordered pre-operative testing:  [x]CBC 2/7/23    []ABG      [] BMP   []URINALYSIS   [x]CMP 2/7/23    []HCG   []COAGS PT/INR  []T&C  []LFTs   []TYPE AND SCREEN    [x] EKG 2/7/23  [x] Chest X-Ray  2/7/23  [] Other Radiology    [] Sent to Hospitalist None  [x] Sent to Anesthesia for your review:       [] Additional Orders: None     Comments:None   Requests: None    Signed: Jesús Beauchamp LPN 7/6/2192 99:53 PM

## 2023-02-08 ENCOUNTER — TELEPHONE (OUTPATIENT)
Dept: CARDIOLOGY | Age: 43
End: 2023-02-08

## 2023-02-08 LAB — LAMOTRIGINE LEVEL: 2.8 UG/ML (ref 3–15)

## 2023-02-08 NOTE — TELEPHONE ENCOUNTER
Pt called stating he is suppose to have some testing done. Pt has questions about the testing. Pt stated he was suppose to hear back from someone yesterday and he didn't hear back. Please advise.     [phone]    Last Appt:  11/7/2022  Next Appt:   5/8/2023  Med verified in [address] [place] [address] Rd

## 2023-02-10 LAB
EKG ATRIAL RATE: 78 BPM
EKG P AXIS: 8 DEGREES
EKG P-R INTERVAL: 150 MS
EKG Q-T INTERVAL: 394 MS
EKG QRS DURATION: 66 MS
EKG QTC CALCULATION (BAZETT): 449 MS
EKG R AXIS: 11 DEGREES
EKG T AXIS: 24 DEGREES
EKG VENTRICULAR RATE: 78 BPM

## 2023-02-21 ENCOUNTER — HOSPITAL ENCOUNTER (OUTPATIENT)
Dept: CT IMAGING | Age: 43
Discharge: HOME | End: 2023-02-23
Payer: MEDICARE

## 2023-02-21 DIAGNOSIS — R07.9 CHEST PAIN, MODERATE CORONARY ARTERY RISK: ICD-10-CM

## 2023-02-21 DIAGNOSIS — J96.01 ACUTE RESPIRATORY FAILURE WITH HYPOXEMIA (HCC): ICD-10-CM

## 2023-02-21 DIAGNOSIS — I48.91 ATRIAL FIBRILLATION WITH RVR (HCC): ICD-10-CM

## 2023-02-21 PROCEDURE — 2709999900 HC NON-CHARGEABLE SUPPLY

## 2023-02-21 PROCEDURE — 2500000003 HC RX 250 WO HCPCS: Performed by: INTERNAL MEDICINE

## 2023-02-21 PROCEDURE — 6360000004 HC RX CONTRAST MEDICATION: Performed by: INTERNAL MEDICINE

## 2023-02-21 PROCEDURE — 75574 CT ANGIO HRT W/3D IMAGE: CPT

## 2023-02-21 RX ORDER — METOPROLOL TARTRATE 5 MG/5ML
2.5 INJECTION INTRAVENOUS ONCE
Status: COMPLETED | OUTPATIENT
Start: 2023-02-21 | End: 2023-02-21

## 2023-02-21 RX ORDER — METOPROLOL TARTRATE 5 MG/5ML
5 INJECTION INTRAVENOUS ONCE
Status: COMPLETED | OUTPATIENT
Start: 2023-02-21 | End: 2023-02-21

## 2023-02-21 RX ADMIN — METOPROLOL TARTRATE 5 MG: 1 INJECTION, SOLUTION INTRAVENOUS at 11:11

## 2023-02-21 RX ADMIN — METOPROLOL TARTRATE 5 MG: 5 INJECTION, SOLUTION INTRAVENOUS at 11:06

## 2023-02-21 RX ADMIN — METOPROLOL TARTRATE 2.5 MG: 5 INJECTION, SOLUTION INTRAVENOUS at 10:55

## 2023-02-21 RX ADMIN — IOPAMIDOL 109 ML: 755 INJECTION, SOLUTION INTRAVENOUS at 12:11

## 2023-02-21 RX ADMIN — METOPROLOL TARTRATE 2.5 MG: 5 INJECTION, SOLUTION INTRAVENOUS at 11:01

## 2023-02-21 NOTE — PROGRESS NOTES
Patient to CT for coronary CTA. Connected to monitors. 10:55am   HR 88    /103  2.5mg IV lopressor given per protocol    11:00am    HR 89   /98  2.5mg IV lopressor given per protocol    11:05am   HR 82   /101  5mg IV lopressor given per protocol    11:10  HR 80    /100  5mg IV lopressor given per protocol. 11:15    HR 80   /101    11:26       /104    Scanned completed. Patient tolerated well and is ambulatory from dept.

## 2023-02-22 NOTE — RESULT ENCOUNTER NOTE
He has elevated Calcium score with some calcium in Left Main, although there is no mention of hemodynamic significant stenosis. I recommend cardiac cath, let me know if he is agreeable.

## 2023-02-23 ENCOUNTER — TELEPHONE (OUTPATIENT)
Dept: CARDIOLOGY | Age: 43
End: 2023-02-23

## 2023-02-23 ENCOUNTER — TELEPHONE (OUTPATIENT)
Dept: SURGERY | Age: 43
End: 2023-02-23

## 2023-02-23 DIAGNOSIS — R94.39 ABNORMAL STRESS TEST: Primary | ICD-10-CM

## 2023-02-23 NOTE — TELEPHONE ENCOUNTER
Patient agrees to heart cath. Please place order. He would like to know if he can still have hernia repair on march 3rd. I informed him not until after cath, but he still wants Dr Nguyen opinion.        The following orders will be implemented.   Orders with a [] are choices and are NOT implemented unless the box is checked.    Pre-Procedure Orders    Josh Beyer     1980     Diagnosis:Abnormal stress      Procedure scheduled for:   Procedure ordered was discussed with the patient  including risks, benefits and alternative therapies.    [] Left Heart Catheterization and coronary angiography    [x] Left Heart Catheterization and coronary angiography w/possible PCI/Coronary Stent PCI    [] Right Heart Catheterization  [] With Flolan  [] Aortic Root    [] MITCHELL  [] Cardioversion    [] EPS  [] Tilt Table Study per guidelines  [] With Isuprel  [] With NTG    [] Without medication    [] ILR[]    ICD: [] Replacement  [] New implant  [] Lead Extraction    Permanent Pacemaker:  [] Replacement  [] New Implant    [] Lead Extraction    Irrigation:  [] Vancomycin 1 gram ( Check lupe for PPM/ICD)    Ablation:   [] CRYO  [] SVT  [] A-Fib  [] A-Flutter  [] PVC  [] VT    [] With Anesthesia  [] W/O Anesthesia [] With SASKIA  [] W/O SASKIA     [] With CARTO  [] W/O CARTO    ORDERS    [x] EKG   [x] Point of care testing  [] Urine Pregnancy  [] PLT    [x] PT/INR if on Coumadin  [x] NA, K, Glucose, CL, Creat, Calc, HBG/HCT    IV:  [x] Start Peripheral IV: [x] N/S at   75   ml/hr. [] D5 1/2 N/S at     ml/hr.     Patient has history of contrast reaction; give below meds as prophylaxis 30-60 min prior to procedure:    [] Benadryl (diphenhydramine) 25 mg IV x 1 dose    [] Benadryl (diphenhydramine) 50 mg IV x 1 dose    [] Solu-Medrol (methylprednisolone) 125 mg IV x 1 dose    [] Hydrocortisone (SOLU-CORTEF) 100 mg. IV x 1 dose    [] Lidocaine 1% 0.4 ml subq for IV start    Electronically signed by provider _____Javier Vargas___________    CTA CORON EJECT [facility] WALL MOTION  Order: [id number]  Status: Final result    Visible to patient: Yes (seen)    Dx: Atrial fibrillation with RVR ([address] C...    2 Result Notes  Details    Reading Physician Reading Date Result Priority   Nicole Childress MD  [phone] 2023      Narrative & Impression  EXAMINATION:  CTA OF THE CORONARY ARTERIES WITH CALCIUM SCORE     2023     TECHNIQUE:  Automated exposure control, iterative reconstruction, and/or weight based  adjustment of the mA/kV was utilized to reduce the radiation dose to as low  as reasonably achievable. 3D reconstructed images were performed on a  separate workstation and provided for review. COMPARISON:  None     HISTORY:  ORDERING SYSTEM PROVIDED HISTORY: Atrial fibrillation with RVR ([facility])     FINDINGS:  CALCIUM SCORE:     Left main: 80     Right coronary artery 69     Left anterior descendin     Left circumflex: 36     Total Agatston calcium score: 296     CTA:     There is motion artifact which obscures portions of some of the vessels. Left Main: Atherosclerotic calcifications in the distal portion up to the  bifurcation without evidence for hemodynamically significant stenosis. LAD: Scattered atherosclerotic calcifications in the proximal segment without  hemodynamically significant stenosis. D1 branch and a diminutive D2 branch  noted. Left circumflex:  Atherosclerotic calcifications close to the origin without  evidence for a manic dynamically significant stenosis. RCA:  Motion artifacts limits evaluation of the midportion. A few  atherosclerotic calcifications noted. No clear evidence for hemodynamically  significant stenosis. Circulation is right dominant. Cardiac findings: Cardiac size normal.  No suspicious masses. No pericardial  effusion. Seymour Octavio LVEF 52.5%. Non-cardiac findings: There is a sliding hiatal hernia. No acute bony  abnormality.      CALCIUM SCORE INTERPRETATION:     0  No identifiable atherosclerotic plaque. Very low cardiovascular disease  risk.  Less than 5% chance of presence of coronary artery disease.  A  negative examination.     1-10 Minimal plaque burden.  Significant coronary artery disease very  unlikely.      Mild plaque burden. Likely mild or minimal coronary stenosis.     101-400 Moderate plaque burden.  Moderate non-obstructive coronary artery  disease highly likely.     Over 400 Extensive plaque burden.  High likelihood of at least one  significant coronary artery stenosis (>50% diameter).     CALCIUM SCORING OVERVIEW:     Coronary calcium is a marker for plaque in a blood vessel or atherosclerosis  (hardening of the arteries).  The presence and amount of calcium detected in  the coronary artery by the CT scan estimates the presence and amount of  atherosclerotic plaque.  These calcium deposits can appear years before the  development of heart disease symptoms such as chest pain and shortness of  breath.     A calcium score is computed for each of the coronary arteries based upon the  volume and density of the calcium deposits.  This can be referred to as your  calcified plaque burden.  It does not correspond directly to the percentage  of narrowing in the artery, but does correlate with the severity of the  overall coronary atherosclerotic burden.     This score is then used to determine the calcium percentile which compares  your calcified plaque burden to that of other asymptomatic men and women of  the same age.  The calcium score, in combination with the percentile, enables  your physician to determine your risk of developing symptomatic coronary  artery disease, and to measure the progression of disease as well as the  effectiveness of treatment.     A score of zero indicates that there is no calcified plaque burden. This  implies that there is no significant coronary artery narrowing and very low  likelihood of a cardiac  event over at least the next 3 years. It does not  absolutely rule out the presence of soft, noncalcified plaque or totally  eliminate the possibility of a cardiac event. A score greater than zero indicates at least some coronary artery disease. As the score increases, so does the likelihood of a significant coronary  narrowing and the likelihood of a coronary event over the next 3 years. Similarly, the likelihood of a coronary event increases with increasing  calcium percentiles. IMPRESSION:  1. Atherosclerotic calcifications in the distal left main and proximal  portions of the other coronary arteries. No evidence for hemodynamically  significant stenosis. 2. Total calcium score of 296 places patient in the 90th percentile for the  patient's age. Omits 10% of males at the ages from 45-41 will have a higher  calcium score than patient. 3. Incidental small sliding hiatal hernia.      RECOMMENDATIONS:  Unavailable

## 2023-02-24 NOTE — TELEPHONE ENCOUNTER
Spoke with patient, canceled surgery for 3/3/23, he will call us after his heart cath to reschedule.

## 2023-03-07 ENCOUNTER — HOSPITAL ENCOUNTER (OUTPATIENT)
Dept: CARDIAC CATH/INVASIVE PROCEDURES | Age: 43
Discharge: HOME | End: 2023-03-07
Payer: COMMERCIAL

## 2023-03-07 VITALS
HEIGHT: 66 IN | TEMPERATURE: 97.7 F | BODY MASS INDEX: 30.37 KG/M2 | SYSTOLIC BLOOD PRESSURE: 104 MMHG | WEIGHT: 189 LBS | DIASTOLIC BLOOD PRESSURE: 80 MMHG | RESPIRATION RATE: 25 BRPM | HEART RATE: 76 BPM | OXYGEN SATURATION: 96 %

## 2023-03-07 LAB
EGFR, POC: >60 ML/MIN/1.73M2
GLUCOSE BLD-MCNC: 112 MG/DL (ref 74–100)
PLATELET # BLD AUTO: 219 K/UL (ref 138–453)
POC BUN: 12 MG/DL (ref 8–26)
POC CHLORIDE: 106 MMOL/L (ref 98–107)
POC CREATININE: 0.88 MG/DL (ref 0.51–1.19)
POC HEMATOCRIT: 41 % (ref 41–53)
POC HEMOGLOBIN: 13.9 G/DL (ref 13.5–17.5)
POC POTASSIUM: 3.6 MMOL/L (ref 3.5–4.5)
POC SODIUM: 143 MMOL/L (ref 138–146)

## 2023-03-07 PROCEDURE — 6360000004 HC RX CONTRAST MEDICATION

## 2023-03-07 PROCEDURE — C1894 INTRO/SHEATH, NON-LASER: HCPCS

## 2023-03-07 PROCEDURE — 82947 ASSAY GLUCOSE BLOOD QUANT: CPT

## 2023-03-07 PROCEDURE — 85049 AUTOMATED PLATELET COUNT: CPT

## 2023-03-07 PROCEDURE — 2709999900 HC NON-CHARGEABLE SUPPLY

## 2023-03-07 PROCEDURE — 84132 ASSAY OF SERUM POTASSIUM: CPT

## 2023-03-07 PROCEDURE — 82565 ASSAY OF CREATININE: CPT

## 2023-03-07 PROCEDURE — 2500000003 HC RX 250 WO HCPCS

## 2023-03-07 PROCEDURE — 82435 ASSAY OF BLOOD CHLORIDE: CPT

## 2023-03-07 PROCEDURE — 6360000002 HC RX W HCPCS

## 2023-03-07 PROCEDURE — 85014 HEMATOCRIT: CPT

## 2023-03-07 PROCEDURE — 7100000010 HC PHASE II RECOVERY - FIRST 15 MIN

## 2023-03-07 PROCEDURE — 84295 ASSAY OF SERUM SODIUM: CPT

## 2023-03-07 PROCEDURE — 7100000011 HC PHASE II RECOVERY - ADDTL 15 MIN

## 2023-03-07 PROCEDURE — 84520 ASSAY OF UREA NITROGEN: CPT

## 2023-03-07 PROCEDURE — 2580000003 HC RX 258: Performed by: INTERNAL MEDICINE

## 2023-03-07 PROCEDURE — 93454 CORONARY ARTERY ANGIO S&I: CPT

## 2023-03-07 RX ORDER — SODIUM CHLORIDE 9 MG/ML
INJECTION, SOLUTION INTRAVENOUS PRN
Status: CANCELLED | OUTPATIENT
Start: 2023-03-07

## 2023-03-07 RX ORDER — SODIUM CHLORIDE 9 MG/ML
INJECTION, SOLUTION INTRAVENOUS CONTINUOUS
Status: DISCONTINUED | OUTPATIENT
Start: 2023-03-07 | End: 2023-03-08 | Stop reason: HOSPADM

## 2023-03-07 RX ORDER — ASPIRIN 81 MG/1
81 TABLET ORAL DAILY
Qty: 30 TABLET | Refills: 5 | Status: SHIPPED | OUTPATIENT
Start: 2023-03-07

## 2023-03-07 RX ORDER — SODIUM CHLORIDE 0.9 % (FLUSH) 0.9 %
5-40 SYRINGE (ML) INJECTION EVERY 12 HOURS SCHEDULED
Status: CANCELLED | OUTPATIENT
Start: 2023-03-07

## 2023-03-07 RX ORDER — SODIUM CHLORIDE 0.9 % (FLUSH) 0.9 %
5-40 SYRINGE (ML) INJECTION PRN
Status: CANCELLED | OUTPATIENT
Start: 2023-03-07

## 2023-03-07 RX ORDER — ACETAMINOPHEN 325 MG/1
650 TABLET ORAL EVERY 4 HOURS PRN
Status: CANCELLED | OUTPATIENT
Start: 2023-03-07

## 2023-03-07 RX ADMIN — SODIUM CHLORIDE: 9 INJECTION, SOLUTION INTRAVENOUS at 08:19

## 2023-03-07 NOTE — DISCHARGE INSTRUCTIONS
DISCHARGE INSTRUCTIONS / ARM CARE POST CATHERIZATION        ENCOURAGE FLUIDS    NO STRENUOUS LIFTING WITH AFFECTED ARM FOR 3 DAYS ANYTHING HEAVIER THAN 8 TO 10 POUNDS    REMOVE BAND-AID/PRESSURE DRESSING THE FOLLOWING DAY AND DO NOT APPLY ANY FURTHER BAND-AIDS    KEEP INCISION CLEAN DRY AND OPEN TO THE AIR / NO HAND LOTION NEAR PUNCTURE SITE    WATCH FOR SIGNS OF INFECTION /  REDNESS / SWELLING / DRAINAGE / [place] / TEMPERATURE GREATER THAN 101    IF BLEEDING OCCURS HOLD MANUAL PRESSURE DIRECTLY OVER SITE  (YOU WILL FEEL PULSATION OF ARTERY) AND IF BLEEDING DOES NOT STOP AFTER 2 MINUTES CALL 911    OK TO SHOWER THE NEXT DAY, NO TUB BATHING OR HOT TUBS/SWIMMING FOR 7 DAYS    IF AREA BECOMES HARD AND SWOLLEN AND IF YOU ARE AT ALL CONCERNED SEEK HELP IMMEDIATELY    SEEK HELP IMMEDIATELY IF AFFECTED ARM BECOMES COLD / Kirsten Crease / SEVERE PAIN / NAILBEDS TURN BLUE     IF ON METFORMIN / GLUCOPHAGE DO NOT RESTART MEDICATION FOR 48 HOURS    PLEASE PRACTICE GOOD HAND WASHING AND INCLUDE PUNCTURE SITE ESPECIALLY AFTER USING THE RESTROOM    AVOID USING ALCOHOL BASED HAND SANITIZERS FOR ONE WEEK      CALL 911 if you have symptoms including:   Drooping facial muscles   Changes in vision or speech   Difficulty walking or using your limbs   Change in sensation to affected leg, including numbness, feeling cold, or change in color   Extreme sweating, nausea or vomiting   Dizziness or lightheadedness   Chest pain   Rapid, irregular heartbeat   Palpitations   Cough, shortness of breath, or difficulty breathing   Weakness or fainting   If you think you have an emergency, CALL 911 . SEDATION / ANALGESIA INFORMATION / Floridalma Becker 84 have received the sedation/analgesia medication during your visit    Sedation/analgesia is used during short medical procedures under controlled supervision. The medication will produce a strong relaxation.  You will be able to hear, speak and follow instructions, but your memory and alertness will be decreased. You will be able to swallow and breathe on your own. During sedation/analgesia your blood pressure, heart and breathing will be watched closely. After the procedure, you may not remember what was said or done. You may have the following effects from the medication. \" Drowsiness, dizziness, sleepiness or confusion. \" Difficulty remembering or delayed reaction times. \" Loss of fine muscle control or difficulty with your balance especially while walking. \" Difficulty focusing or blurred vision. You may not be aware of slight changes in your behavior and/or your reaction time because of the medication used during the procedure. Therefore you should follow these instructions. \" Have someone responsible help you with your care. \" Do not drive for 24 hours. \" Do not operate equipment for 24 hours (lawnmowers, power tools, kitchen accessories, stove). \" Do not drink any alcoholic beverages for a minimum of 24 hours. \" Do not make important personal, legal or business decisions for 24 hours. \" You may experience dizziness or lightheadedness. Move slowly and carefully, do not make sudden position changes. \" Drink extra amounts of fluids today. \" Increase your diet as tolerated (unless you have received specific instructions from your doctor). \" If you feel nauseated, continue with liquids until the nausea is gone. \" Notify your physician if you have not urinated within 8 hours after the procedure. \" Resume your medications unless otherwise instructed. Coronary artery disease (CAD) occurs when plaque builds up in the arteries that bring oxygen-rich blood to your heart. Plaque is a fatty substance made of cholesterol, calcium, and other substances in the blood. This process is called hardening of the arteries, or atherosclerosis. What happens when you have coronary artery disease? Plaque may narrow the coronary arteries. Narrowed arteries cause poor blood flow.  This can lead to angina symptoms such as chest pain or discomfort. If blood flow is completely blocked, you could have a heart attack. You can slow CAD and reduce the risk of future problems by making changes in your lifestyle. These include quitting smoking and eating heart-healthy foods. Treatments for CAD, along with changes in your lifestyle, can help you live a longer and healthier life. How can you prevent coronary artery disease? Do not smoke. It may be the best thing you can do to prevent heart disease. If you need help quitting, talk to your doctor about stop-smoking programs and medicines. These can increase your chances of quitting for good. Be active. Get at least 30 minutes of exercise on most days of the week. Walking is a good choice. You also may want to do other activities, such as running, swimming, cycling, or playing tennis or team sports. Eat heart-healthy foods. Eat more fruits and vegetables and less foods that contain saturated and trans fats. Limit alcohol, sodium, and sweets. Stay at a healthy weight. Lose weight if you need to. Manage other health problems such as diabetes, high blood pressure, and high cholesterol. Talk to your doctor about taking a daily aspirin. Manage stress. Stress can hurt your heart. To keep stress low, talk about your problems and feelings. Don't keep your feelings hidden. How is coronary artery disease treated? Your doctor will suggest that you make lifestyle changes. For example, your doctor may ask you to eat healthy foods, quit smoking, lose extra weight, and be more active. You will have to take medicines. Your doctor may suggest a procedure to open narrowed or blocked arteries. This is called angioplasty. Or your doctor may suggest using healthy blood vessels to create detours around narrowed or blocked arteries. This is called bypass surgery. Follow-up care is a key part of your treatment and safety.  Be sure to make and go to all appointments, and call your doctor if you are having problems. It's also a good idea to know your test results and keep a list of the medicines you take. Where can you learn more? Go to https://anay.[employer]. org and sign in to your [employer] account. Enter [phone] in the [employer] box to learn more about Learning About Coronary Artery Disease (CAD).     If you do not have an account, please click on the Sign Up Now link.

## 2023-03-07 NOTE — OP NOTE
[facility]    CARDIAC CATHETERIZATION    Date:   3/7/2023  Patient name:  Erick Smart  Date of admission:  3/7/2023  7:43 AM  MRN:   [id number]  YOB: 1980    Operators:  Primary:   MARKEL Magaña MD (Attending Physician)    Assistant/CV fellow:  Annie Narayan MD      Procedure performed:       [x] Left Heart Catheterization. [] Graft Angiography. [x] Left Ventriculography. [] Right Heart Catheterization. [] Coronary Angiography. [] Aortic Valve Studies. [] PCI:      [] Other:       Pre Procedure Conscious Sedation Data:  ASA Class:    [] I [x] II [] III [] IV    Mallampati Class:  [x] I [] II [] III [] IV      Indication:  [] STEMI      [] + Stress test  [] ACS      [] + EKG Changes  [] Non Q MI       [] Significant Risk Factors  [] Recurrent Angina             [] Diabetes Mellitus    [] New LBBB      [] Uncontrolled HTN. [] CHF / Low EF changes     [x] Abnormal CTA / Ca Score      Procedure:  Access:  [] Femoral  [x] Radial  artery       [x] Right  [] Left    Procedure: After informed consent was obtained with explanation of the risks and benefits, patient was brought to the cath lab. The access area was prepped and draped in sterile fashion. 1% lidocaine was used for local block. The artery was cannulated with 6  Fr sheath with brisk arterial blood return. The side port was frequently flushed and aspirated with normal saline. Cardiac Arteries and Lesion Findings       LMCA: has mild calcification with no angiographic disease. LAD: has mild calcification with 20% stenosis. The D1 is normal.     LCx: has proximal 20% stenosis. The OM1 is normal. The OM2 has proximal 20%   stenosis. The OM2 has 20% stenosis. RCA: has proximal aneurysmal segment with 20% diffuse stenosis. Coronary Tree        Dominance: Right        Procedure Summary        Mild coronary artery disease. Difficulty engaging with JL 3.5 catheter.  Recommend small catheter for further angiography or femoral approach        Recommendations        Medical therapy as needed. Risk factor modification. Estimated Blood Loss: 10 mL        ____________________________________________________________________    History and Risk Factors    [x] Hypertension     [] Family history of CAD  [x] Hyperlipidemia     [] Cerebrovascular Disease   [] Prior MI       [] Peripheral Vascular disease   [] Prior PCI              [] Diabetes Mellitus    [] Left Main PCI. [] Currently on Dialysis. [] Prior CABG. [] Currently smoker. [] Cardiac Arrest outside of healthcare facility. [] Yes    [x] No        Witnessed     [] Yes   [] No     Arrest after arrival of EMS  [] Yes   [] No     [] Cardiac Arrest at other Facility. [] Yes   [x] No    Pre-Procedure Information. Heart Failure       [] Yes    [x] No        Class  [] I      [] II  [] III    [] IV. New Diagnosis    [] Yes  [] No    HF Type      [] Systolic   [] Diastolic          [] Unknown. Diagnostic Test:   EKG       [] Normal   [x] Abnormal    New antiarrhythmia medications:    [] Yes   [] No   New onset atrial fibrillation / Flutter     [] Yes   [] No   ECG Abnormalities:      [] V. Fib   [] Jerri V. Tach           [] NS V. T   [] New LBBB           [] T. Inv  []  ST dev > 0.5 mm         [] PVC's freq  [] PVC's infrequent    Stress Test Performed:      [] Yes    [x] No     Type:     [] Stress Echo   [] Exercise Stress Test (no imaging)      [] Stress Nuclear  [] Stress Imaging     Results   [] Negative   [] Positive        [] Indeterminate  [] Unavailable     If Positive/ Risk / Extent of Ischemia:       [] Low  [] Intermediate         [] High  [] Unavailable      Cardiac CTA Performed:     [] Yes    [] No      Results   [] CAD   [] Non obstructive CAD      [] No CAD   [] Uncertain      [] Unknown   [] Structural Disease.      Pre Procedure Medications:   [] Yes    [] No         [] ASA   [] Beta Blockers      [] Nitrate   [] Ca Channel Blockers      [] Ranolazine   [] Statin       [] Plavix/Others antiplatelets      Electronically signed on 3/7/2023 at 9:14 AM by:    Aline Chang MD  Fellow, [address] I was present during entire procedure and performed all critical elements of the procedure.     Anna Marie Galicia MD

## 2023-03-07 NOTE — PROGRESS NOTES
Patient received post cath to [facility] room 4. Assessment obtained. Post cath pathway initiated. Right radial site with Vascband intact, with 10 ml of air. No hematoma noted. Restrictions reviewed with patient and spouse. Patient without complaints.

## 2023-03-07 NOTE — PROGRESS NOTES
All discharge instructions reviewed, questions answered, paper signed and given copy. Patient discharged  with Spouse and belongings.

## 2023-03-07 NOTE — H&P
[facility]    Consult / H&P               Today's Date: 3/7/2023  Patient Name: Paula Montenegro  Date of admission: 3/7/2023  7:43 AM  Patient's age: 36 y.o., 1980  Admission Dx: Abnormal stress ECG [R94.39]    Reason for Consult:  Cardiac evaluation    Requesting Physician: No admitting provider for patient encounter. CHIEF COMPLAINT:  elective cath    History Obtained From:  patient    HISTORY OF PRESENT ILLNESS:      The patient is a 36 y.o. is here for elective cath   C/o chest pain. Had CTA done and was abnormal as below. ECHO and Stress test- unremarkable       Past Medical History:   has a past medical history of Anxiety, mild, Colon polyps, Dizziness, Eczema, Epilepsy ([address] GERD (gastroesophageal reflux disease), H/O fall, Head ache, Head injury, Hiatal hernia, Inguinal hernia, Low back pain, Lumbar sprain, Migraine, Plantar fasciitis, bilateral, Psychogenic nonepileptic seizure, Seizure disorder ([address] Sigmoid diverticulosis, Sleep difficulties, Status migrainosus, TIA (transient ischemic attack), and Tobacco use. Past Surgical History:   has a past surgical history that includes Vasectomy; Knee arthroscopy (Left); Gastric fundoplication (54/12/8387); Upper gastrointestinal endoscopy (03/05/2010); Colonoscopy (04/29/2011); Colonoscopy (06/04/2010); Colonoscopy (08/28/2009); Colonoscopy (05/27/2008); Colonoscopy (11/19/2014); Inguinal hernia repair (Right, 05/30/2013); Inguinal hernia repair (Left, 01/24/2013); Inguinal hernia repair (Right, 09/24/2015); Colonoscopy (06/01/2016); Upper gastrointestinal endoscopy (06/01/2016); hernia repair (Left, 11/30/2017); Colonoscopy (04/19/2019); Pilonidal cyst excision (N/A, 07/23/2020); and Esophagogastroduodenoscopy (02/13/2023). Home Medications:    Prior to Admission medications    Medication Sig Start Date End Date Taking?  Authorizing Provider   divalproex (DEPAKOTE) 250 MG DR tablet take 3 tablets by mouth twice a day 77/75/80   Chelsey Barraza MD   lamoTRIgine (LAMICTAL) 100 MG tablet 1 TABLET DAILY AT BEDTIME 05/46/54   Chelsey Barraza MD   levalbuterol Grayson Eaton) 0.63 MG/3ML nebulization inhale orally contents of 1 vial in nebulizer every 6 hours if needed  Patient not taking: Reported on 2/7/2023 11/1/22   Historical Provider, MD   metoprolol succinate (TOPROL XL) 100 MG extended release tablet Take 1 tablet by mouth at bedtime  Patient not taking: Reported on 2/7/2023 11/7/22   Javier Vargas DO   nitroGLYCERIN (NITROSTAT) 0.4 MG SL tablet Place 1 tablet under the tongue every 5 minutes as needed for Chest pain up to max of 3 total doses. If no relief after 1 dose, call 911. 11/3/22   CLAUDIA Dumont CNP   apixaban (ELIQUIS) 5 MG TABS tablet Take 1 tablet by mouth 2 times daily  Patient not taking: Reported on 2/7/2023 11/3/22   CLAUDIA Dumont CNP   atorvastatin (LIPITOR) 40 MG tablet Take 1 tablet by mouth nightly 11/3/22   CLAUDIA Dumont CNP   meloxicam (MOBIC) 15 MG tablet take 1 tablet by mouth once daily 8/29/22   Mana Tanner MD   promethazine (PHENERGAN) 25 MG tablet take 1 tablet by mouth every 8 hours if needed for nausea 23/81/55   Chelsey Barraza MD   SUMAtriptan (IMITREX) 50 MG tablet take 1 tablet by mouth once daily if needed for MIGRAINE 39/45/49   Chelsey Barraza MD   lisinopril (PRINIVIL;ZESTRIL) 20 MG tablet take 1 tablet by mouth daily 9/24/21   Historical Provider, MD      Current Facility-Administered Medications: 0.9 % sodium chloride infusion, , IntraVENous, Continuous    Allergies:  Patient has no known allergies. Social History:   reports that he has been smoking cigarettes. He has a 5.00 pack-year smoking history. He has never used smokeless tobacco. He reports that he does not drink alcohol and does not use drugs.      Family History: family history includes Alzheimer's Disease in his maternal grandmother; Arthritis in his sister; Asthma in his daughter; COPD in his maternal grandmother and mother; Cancer in his father, maternal grandmother, paternal grandfather, paternal grandmother, and sister; Eczema in his daughter and sister; High Blood Pressure in his father, mother, and sister. No h/o sudden cardiac death. No for premature CAD    REVIEW OF SYSTEMS:    Constitutional: there has been no unanticipated weight loss. There's been No change in energy level, No change in activity level. Eyes: No visual changes or diplopia. No scleral icterus. ENT: No Headaches  Cardiovascular: No cardiac history  Respiratory: No previous pulmonary problems, No cough  Gastrointestinal: No abdominal pain. No change in bowel or bladder habits. Genitourinary: No dysuria, trouble voiding, or hematuria. Musculoskeletal:  No gait disturbance, No weakness or joint complaints. Integumentary: No rash or pruritis. Neurological: No headache, diplopia, change in muscle strength, numbness or tingling. No change in gait, balance, coordination, mood, affect, memory, mentation, behavior. Psychiatric: No anxiety, or depression. Endocrine: No temperature intolerance. No excessive thirst, fluid intake, or urination. No tremor. Hematologic/Lymphatic: No abnormal bruising or bleeding, blood clots or swollen lymph nodes. Allergic/Immunologic: No nasal congestion or hives. PHYSICAL EXAM:      Vitals:    03/07/23 0812   BP: 116/89   Pulse: 92   Resp: 23   Temp: 97.7 °F (36.5 °C)   SpO2: 95%       Constitutional and General Appearance: alert, cooperative, no distress and appears stated age  HEENT: PERRL, no cervical lymphadenopathy. No masses palpable. Normal oral mucosa  Respiratory:  Normal excursion and expansion without use of accessory muscles  Resp Auscultation: Good respiratory effort. No for increased work of breathing. On auscultation: clear to auscultation bilaterally  Cardiovascular:   The apical impulse is not displaced  Heart tones are crisp and normal. regular S1 and S2.  Jugular venous pulsation Normal  The carotid upstroke is normal in amplitude and contour without delay or bruit  Peripheral pulses are symmetrical and full   Abdomen:   No masses or tenderness  Bowel sounds present  Extremities:   No Cyanosis or Clubbing   Lower extremity edema: No   Skin: Warm and dry  Neurological:  Alert and oriented. Moves all extremities well  No abnormalities of mood, affect, memory, mentation, or behavior are noted    DATA:    Diagnostics:      Labs:     CBC: No results for input(s): WBC, HGB, HCT, PLT in the last 72 hours. BMP: No results for input(s): NA, K, CO2, BUN, CREATININE, LABGLOM, GLUCOSE in the last 72 hours. BNP: No results for input(s): BNP in the last 72 hours. PT/INR: No results for input(s): PROTIME, INR in the last 72 hours. APTT:No results for input(s): APTT in the last 72 hours. CARDIAC ENZYMES:No results for input(s): CKTOTAL, CKMB, CKMBINDEX, TROPONINI in the last 72 hours. FASTING LIPID PANEL:  Lab Results   Component Value Date/Time    HDL 26 09/19/2013 03:45 AM    TRIG 363 09/19/2013 03:45 AM     LIVER PROFILE:No results for input(s): AST, ALT, LABALBU in the last 72 hours.     IMPRESSION:    Patient Active Problem List   Diagnosis    Low back pain with sciatica    Migraine    Anxiety, mild    Disturbance, sleep    Tobacco use    GERD (gastroesophageal reflux disease)    Hiatal hernia    Head ache    Status migrainosus    Dizziness    Depression    H/O fall    Head injury    Seizure disorder (HCC)    Astigmatism    Rectal bleed    Cervical radiculopathy    Ventricular premature beats    Nausea and vomiting    Colon polyps    TIA (transient ischemic attack)    VBI (vertebrobasilar insufficiency)    Pilonidal cyst    Wound dehiscence    Sleep difficulties    Partial idiopathic epilepsy with seizures of localized onset, not intractable, without status epilepticus ([address]    Convulsions ([address]    Recurrent seizures ([address]    Atrial fibrillation with RVR ([address] Acute respiratory failure with hypoxemia (HCC)    Acute bronchitis with COPD ([address]    New onset a-fib ([address]    Psychogenic nonepileptic seizure    Bilateral recurrent inguinal hernia without obstruction or gangrene    Incisional hernia, without obstruction or gangrene     1. Atherosclerotic calcifications in the distal left main and proximal   portions of the other coronary arteries. No evidence for hemodynamically   significant stenosis. 2. Total calcium score of 296 places patient in the 90th percentile for the   patient's age. Omits 10% of males at the ages from 45-41 will have a higher   calcium score than patient. 3. Incidental small sliding hiatal hernia. Assessment     Chest pain with abnormal CTA   HTN   HLD   H/o TIA     RECOMMENDATIONS:  Proceed with C   Follow post cath orders. Pre Procedure Conscious Sedation Data:  ASA Class:    [] I [x] II [] III [] IV    Mallampati Class:  [x] I [] II [] III [] IV        Discussed with patient and Nurse. Electronically signed by Joel Barrientos MD on 3/7/2023 at 7:56 AM    I performed a history and physical examination of the patient and discussed management with the resident. I reviewed the residents note and agree with the documented findings and plan of care. Any areas of disagreement are noted on the chart. I was personally present for the key portions of any procedures. I have documented in the chart those procedures where I was not present during the key portions. I have personally evaluated this patient and have completed at least one if not all key elements of the E/M (history, physical exam, and MDM). Additional findings are as noted.     Calixto Mariee MD MD      [facility]

## 2023-03-07 NOTE — PROGRESS NOTES
Patient admitted, consent signed and questions answered. Patient ready for procedure. Call light to reach with side rails up 2 of 2. Bilateral groin and right wrist clipped with writer and Juancho Paris RN present. Family at bedside with patient. History and physical needs to be completed. Ely Lozano

## 2023-03-07 NOTE — PROGRESS NOTES
Remaining air removed from TR band, no bleeding or hematoma noted. Radial pulse palpable.  Pressure dressing applied

## 2023-03-10 ENCOUNTER — TELEPHONE (OUTPATIENT)
Dept: SURGERY | Age: 43
End: 2023-03-10

## 2023-03-10 NOTE — TELEPHONE ENCOUNTER
Patient called the office. Edgar Hou he was to have a heart cath, and is now cleared and ready to schedule procedure.

## 2023-03-13 ENCOUNTER — TELEPHONE (OUTPATIENT)
Dept: SURGERY | Age: 43
End: 2023-03-13

## 2023-03-13 NOTE — TELEPHONE ENCOUNTER
We are currently working on clearance for wero to get Laparoscopic Robotic Assisted bilateral Inguinal hernia repair with mesh and umbilical hernia repair for patient. This procedure is currently not scheduled. Anesthesia wanted me to follow up with Neurology in regards to:  CLAUDIA Art - CRNA  Note     Does this patient follow with Neurology and the seizure disorder? Do we know hi last documented seizure? If he is following with Neurology, do they have any concerns regarding him having the procedure? Please advise      I seen where his last apt was 22 and next apt is 3/15/23. Please advise on any concerns with the following procedure.       [address]  :1980  Surgical/Procedure Planned: Laparoscopic Robotic Assisted bilateral Inguinal hernia repair with mesh and umbilical hernia repair      Date & Location: TBD       Outpatient     Planned Length of OR: 2.5 hours    Sedation: general    Estimated Cardiac Risk for Non-Cardiac Surgery/Procedure     Low______ Moderate______ High______        Provider:Dr. Salina Person      Signature of Provider  _____________________________________________

## 2023-03-13 NOTE — TELEPHONE ENCOUNTER
CHART    REVIEWED. PATIENT  NEEDS     FOLLOW UP    FOR      NEUROLOGY  CLEARANCE   IF    REQUESTED.       [facility]

## 2023-03-15 ENCOUNTER — TELEMEDICINE (OUTPATIENT)
Dept: NEUROLOGY | Age: 43
End: 2023-03-15
Payer: COMMERCIAL

## 2023-03-15 DIAGNOSIS — R42 DIZZINESS: ICD-10-CM

## 2023-03-15 DIAGNOSIS — Z72.0 TOBACCO USE: ICD-10-CM

## 2023-03-15 DIAGNOSIS — G43.709 CHRONIC MIGRAINE WITHOUT AURA WITHOUT STATUS MIGRAINOSUS, NOT INTRACTABLE: ICD-10-CM

## 2023-03-15 DIAGNOSIS — I48.91 ATRIAL FIBRILLATION WITH RVR (HCC): ICD-10-CM

## 2023-03-15 DIAGNOSIS — F44.5 PSYCHOGENIC NONEPILEPTIC SEIZURE: ICD-10-CM

## 2023-03-15 DIAGNOSIS — G40.909 RECURRENT SEIZURES (HCC): ICD-10-CM

## 2023-03-15 DIAGNOSIS — G47.9 SLEEP DIFFICULTIES: ICD-10-CM

## 2023-03-15 DIAGNOSIS — G47.9 DISTURBANCE, SLEEP: ICD-10-CM

## 2023-03-15 DIAGNOSIS — G45.0 VBI (VERTEBROBASILAR INSUFFICIENCY): ICD-10-CM

## 2023-03-15 DIAGNOSIS — F41.9 ANXIETY, MILD: ICD-10-CM

## 2023-03-15 DIAGNOSIS — K21.9 GASTROESOPHAGEAL REFLUX DISEASE WITHOUT ESOPHAGITIS: ICD-10-CM

## 2023-03-15 DIAGNOSIS — G44.039 NONINTRACTABLE PAROXYSMAL HEMICRANIA, UNSPECIFIED CHRONICITY PATTERN: ICD-10-CM

## 2023-03-15 DIAGNOSIS — G40.009 PARTIAL IDIOPATHIC EPILEPSY WITH SEIZURES OF LOCALIZED ONSET, NOT INTRACTABLE, WITHOUT STATUS EPILEPTICUS (HCC): ICD-10-CM

## 2023-03-15 DIAGNOSIS — G40.909 SEIZURE DISORDER (HCC): Primary | ICD-10-CM

## 2023-03-15 DIAGNOSIS — M54.12 CERVICAL RADICULOPATHY: ICD-10-CM

## 2023-03-15 DIAGNOSIS — G43.009 MIGRAINE WITHOUT AURA AND WITHOUT STATUS MIGRAINOSUS, NOT INTRACTABLE: ICD-10-CM

## 2023-03-15 DIAGNOSIS — R51.9 NONINTRACTABLE HEADACHE, UNSPECIFIED CHRONICITY PATTERN, UNSPECIFIED HEADACHE TYPE: ICD-10-CM

## 2023-03-15 PROCEDURE — G8483 FLU IMM NO ADMIN DOC REA: HCPCS | Performed by: PSYCHIATRY & NEUROLOGY

## 2023-03-15 PROCEDURE — G8417 CALC BMI ABV UP PARAM F/U: HCPCS | Performed by: PSYCHIATRY & NEUROLOGY

## 2023-03-15 PROCEDURE — 99214 OFFICE O/P EST MOD 30 MIN: CPT | Performed by: PSYCHIATRY & NEUROLOGY

## 2023-03-15 PROCEDURE — 4004F PT TOBACCO SCREEN RCVD TLK: CPT | Performed by: PSYCHIATRY & NEUROLOGY

## 2023-03-15 PROCEDURE — G8427 DOCREV CUR MEDS BY ELIG CLIN: HCPCS | Performed by: PSYCHIATRY & NEUROLOGY

## 2023-03-15 RX ORDER — DIVALPROEX SODIUM 250 MG/1
TABLET, DELAYED RELEASE ORAL
Qty: 180 TABLET | Refills: 5 | Status: SHIPPED | OUTPATIENT
Start: 2023-03-15

## 2023-03-15 RX ORDER — LAMOTRIGINE 100 MG/1
TABLET ORAL
Qty: 30 TABLET | Refills: 5 | Status: SHIPPED | OUTPATIENT
Start: 2023-03-15

## 2023-03-15 ASSESSMENT — ENCOUNTER SYMPTOMS
ABDOMINAL DISTENTION: 0
EYE DISCHARGE: 0
CHOKING: 0
BLOOD IN STOOL: 0
TROUBLE SWALLOWING: 0
WHEEZING: 0
DIARRHEA: 0
COLOR CHANGE: 0
SHORTNESS OF BREATH: 0
CHEST TIGHTNESS: 0
CONSTIPATION: 0
APNEA: 0
CHANGE IN BOWEL HABIT: 0
VOICE CHANGE: 0
EYE ITCHING: 0
FACIAL SWELLING: 0

## 2023-03-15 NOTE — PROGRESS NOTES
Subjective:        Patient ID: Estevan Brown is a 36 y. o. RIGHT   HANDED male. Seizures  This is a chronic problem. Episode onset: SINCE    2014. The problem occurs intermittently. The problem has been waxing and waning. Pertinent negatives include no arthralgias, change in bowel habit, chest pain, chills, congestion, diaphoresis, fatigue, joint swelling, myalgias, rash or urinary symptoms. Exacerbated by: LACK OF  SLEEP,  PROLONGED   TV ,   STRESS,   LOUD  NOISES   AND  BRIGHT  LIGHTS   Treatments tried: ANTI  EPILEPTIC  MEDICATION. The treatment provided moderate relief. Migraine   This is a chronic problem. Episode onset: MORE  THAN   10  YEARS. The problem occurs intermittently. The problem has been waxing and waning. The pain is located in the Right unilateral and frontal region. The pain does not radiate. The pain quality is similar to prior headaches. The quality of the pain is described as aching and throbbing. The pain is at a severity of 3/10. The pain is mild. The symptoms are aggravated by unknown. He has tried triptans and NSAIDs (TOPAMAX) for the symptoms. The treatment provided moderate relief. History obtained from  The patient             and other  available medical records were  Also  reviewed.                 1)      KNOWN    H/O   CHRONIC  SEVERE MIGRAINES                         FOR     10  YEARS                           -     WELL  CONTROLLED                      -   ON  IMITREX,   PHENERGAN   AS  NEEDED               2)     H/O   CHRONIC   TENSION  HEADACHE                       --   BETTER  CONTROLLED                            3)        H/O  SEIZURE  DISORDER /  EPILEPSY    SINCE      2014                            BETTER      SEIZURE  CONTROL                                        4)     PREVIOUS  H/O     INTOLERANCE  TO  KEPPRA                    DUE  TO  IRRITABILITY  AND MOOD PROBLEMS            5)       PREVIOUSLY    TRIED     VIMPAT  AND  TOPAMAX        AND  PATIENT  STOPPED  THE  SAME.              6)     H/O   CHRONIC  ANXIETY  AND  DEPRESSION                    -     STABLE    ON  LEXAPRO                 HAD    FOLLOW    WITH    H[facility]  [facility]OFESSION[facility]               7)      H/O  PREVIOUS  MULTIPLE MILD  HEAD  INJURIES                             DUE  TO   FALLS            8)   H/O     CHRONIC    SLEEP  DIFFICULTIES                      AND  DISTURBED  SLEEP  WAKE  CYCLES                              -  STABLE                9)    PREVIOUS  H/O  DIZZY  EPISODES                          -  NO  RECURRENCE            10)   EMU  AT  [facility]   IN  2014                  -  POSSIBLE  FRONTAL    FOCUS            11)        EMU   AT  [facility]  IN  NOV. 7  TO  NOV. 11, 2016    SHOWED :                          6  EPISODES  OF  PSYCHOGENIC  NON EPILEPTIC SPELLS.                         EEG  DURING  THE SPELLS  AND  INTERICTAL  PERIODS                         WAS REPORTED  TO BE  NORMAL.              12)     PREVIOUS   H/O    BRIEF  SEIZURES  AND  MEMORY  LOSS                           -  PARTIAL  COMPLEX       SEIZURES                             DUE  TO  SLEEP  DEPRIVATION                               AND  PROLONGED  TV   WATCHING                                         13)      EPISODES   OF  RIGHT  SIDED  NUMBNESS   AND  SPEECH  PROBLEMS.                    H/O  HOSPITALIZATION   FROM  SEPT. 2017                     AT  [facility]  AT  [facility]                     HAD   WORK  UP     FOR     TIA  /   STROKE                        NO  SIGNIFICANT  ABNORMALITIES     REPORTED .          14)         PREVIOUS   H/O  CARDIAC  ARRYTHMIA                         TO    FOLLOW  WITH    CARDOLOGY              15)     PREVIOUS   H/O    HAD  COGNITIVE  BEHAVIOR THERAPY  AT  [facility]                       PATIENT  TO  FOLLOW  UP   WITH  MENTAL  HEALTH  PROFESSIONALS                        FOR   EVALUATIONS  OF   HIS  ANXIETY,   DEPRESSION,                                 NON  EPILEPTIC  PSYCHOGENIC  SPELLS,                                17)    H/O   [facility]   NEUROLOGY    FOLLOW  UP    EVALUATION     IN      2018                          PATIENT     NOT  PLANNING  TO  RETURN  BACK   TO                                    [facility]                18)     PATIENT  DID NOT  HAVE  NEUROLOGY   FOLLOW  UP    AT  [facility]                           FROM   October 2017     TO  DEC. 2018               19)       PREVIOUS    H/O    BRIEF   SEIZURE    2-3  PER MONTH                            STARING  EPISODES. IN      2019               20)          PATIENT   ON      DEPAKOTE  FOR  SEIZURES                                       AND  MIGRAINE  PROPHYLAXIS                                                       PREVIOUS    H/O   BEING      ON     KLONOPIN       FOR                            SEIZURE  CONTROL   AND  SLEEP  DIFFICULTIES                         21)    PREVIOUS     H/O    BRIEF  SEIZURES     RECURRENCES                           -  PARTIAL  COMPLEX       SEIZURES                          DUE  TO  SLEEP  DEPRIVATION     AND  PROLONGED  TV   WATCHING                     25)      H/O    BRIEF    SEIZURES        WITH    FALLING                                  AND  MILD     HEAD   &   NECK INJURY         TWICE                                  IN  SEPT. AND October 2019                        23)   H/O      STARING  EPISODE  WITH    HEAD TURNING   TO  THE                           RIGHT  SIDE    LASTING  FOR   ONE  MINUTE          WITH                                LETHARGY    NOTICED   VISIT  On   11/11/ 2019   .                                    25)     CT   HEAD    AND  CT  CERVICAL  SPINE  IN NOV. 2019                                 SHOWED  NO  SIGNIFICANT  ABNORMALITIES                                 VALPROIC  ACID  LEVEL  IN NOV. 2019       NORMAL  LIMITS                         26) SEIZURES     AND      MIGRAINES  WELL  CONTROLLED                         FROM     DEC.   2019        TO     April 2021                              27)        H/O     TWO   EPISODES  OF    SEIZURE   RECURRENCE   IN    MAY   2021                            ASSOCIATED      WITH     SPENDING  TIME   IN   [facility]     IN  [place]              28)          H/O  BRIEF  RECURRENT  SEIZURES     IN   July 2021                            LOW  DOSE  LAMICTAL   WAS   ADDED   TO   DEPAKOTE                              IN    July 2021                         PATIENT'S    SEIZURES    ARE   BETTER   CONTROLLED     SINCE    AUG.  2021                            PATIENT  DENIES    ANY  NEW  NEUROLOGICAL   CONCERNS. 29)      EXPECTATIONS,   GOALS   OF  SEIZURE  MANAGEMENT                           AND  SIDE  EFFECTS  MEDICATIONS    WERE                                 REVIEWED     AND   DISCUSSED    IN    DETAIL. PATIENT   DENIED      ANY    FURTHER   SEIZURE    RECURRENCES. NO  NEW  NEUROLOGICAL       CONCERNS      REPORTED                     30)        RECOMMENDED  :                          A)      SEIZURE  PRECAUTIONS                       B)     TO  CONTINUE        LAMICTAL   AND  DEPAKOTE  AS  BEFORE                        C)   PERIODIC    ANTI  EPILEPTIC  MEDICATION  LEVELS                                                          -  REVIEWED     WITH   PATIENT    IN  DETAIL                   31)           VARIOUS  RISK   FACTORS   WERE  REVIEWED   AND   DISCUSSED. PATIENT   HAS  MULTIPLE   MEDICAL, MENTAL HEALTH                                    &      NEUROLOGICAL   PROBLEMS . PATIENT'S   MANAGEMENT  IS  CHALLENGING.                             The  Duration,  Quality,  Severity,  Location,  Timing,  Context,  Modifying  Factors   Of   The   Chief   Complaint       And  Present  Illness Was   Reviewed   In   Chronological   [place]. Patient   Indicates   The  Presence   And  The  Absence  Of  The  Following  Associated  And       Additional  Neurological    Symptoms:                                Balance  And coordination problems  absent           Gait problems     absent            Headaches      present              Migraines           present           Memory problems        Present             Confusion        absent            Paresthesia numbness          absent           Seizures  And  Starring  Episodes           PRESENT           Syncope,  Near  syncopal episodes         absent           Speech problems           absent             Swallowing  Problems      absent            Dizziness,  Light headedness           present                        Vertigo        absent             Generalized   Weakness    absent              focal  Weakness     absent             Tremors         absent              Sleep  Problems     present             History  Of   Recent   Head  Injury     absent             History  Of   Recent  TIA     absent             History  Of   Recent    Stroke     absent             Neck  Pain and  Neck muscle  Spasms  Absent               Radiating  down   And   Weakness           absent            Lower back   Pain  And     Spasms  Present              Radiating    Down   And   Weakness          absent                H/O   FALLS        absent               History  Of   Visual  Symptoms    Absent                  Associated   Diplopia       absent                                                                  Also   Additional   Symptoms   Present    As  Documented    In   The detailed      Review  Of  Systems   And    Please   Refer   To    Them for   Additional  Information.            INFORMATION   REVIEWED:      VITAL  SIGNS,  MEDICATIONS   LIST,   ALLERGIES AND  DRUG  INTOLERANCES,    MEDICAL   HISTORY,     SURGICAL   HISTORY,    FAMILY   HISTORY,  SOCIAL HISTORY,    PROBLEM  LIST   FOR  PATIENT  CARE   COORDINATION        RECORDS   REVIEWED:    historical medical records, lab reports and office notes         Past Medical History:   Diagnosis Date    Anxiety, mild     Colon polyps     Dizziness     Eczema     Epilepsy ([facility] [address]     GERD (gastroesophageal reflux disease)     H/O fall     Head ache     Head injury     Hiatal hernia     Inguinal hernia     Right. Low back pain     Lumbar sprain     Migraine     Plantar fasciitis, bilateral     Psychogenic nonepileptic seizure 11/16/2022    Seizure disorder ([facility] [address]     Sigmoid diverticulosis     Sleep difficulties     Status migrainosus     TIA (transient ischemic attack) 09/2017    Tobacco use     Chronic. Past Surgical History:   Procedure Laterality Date    CARDIAC CATHETERIZATION  03/07/2023    Mild coronary artery disease. COLONOSCOPY  04/29/2011    Internal hemorrhoids, possible anal fissure, few scattered diverticula. COLONOSCOPY  06/04/2010    Mild sigmoid diverticulosis. COLONOSCOPY  08/28/2009    Sigmoid diverticulosis, internal hemorrhoids. COLONOSCOPY  05/27/2008    Internal hemorrhoids. COLONOSCOPY  11/19/2014    polyp in rectum    COLONOSCOPY  06/01/2016    sigmoid diverticulosis, colon polyp, internal hemorrhoids    COLONOSCOPY  04/19/2019    [id number]    ESOPHAGOGASTRODUODENOSCOPY  02/13/2023    [facility] with biopsy, Dr. Cipriano Greene  [id number]    HERNIA REPAIR Left 11/30/2017    Left Inguinal Hernia Repair w/ Mesh performed by Erik Marie MD at [address] [place] [address] Right 05/30/2013    INGUINAL HERNIA REPAIR Left 01/24/2013    INGUINAL HERNIA REPAIR Right 09/24/2015    KNEE ARTHROSCOPY Left     PILONIDAL CYST EXCISION N/A 07/23/2020    PILONIDAL CYSTECTOMY performed by Carlton Greenberg DO at [address] [place] [address]  03/05/2010    Recurrent hiatal hernia.     UPPER GASTROINTESTINAL ENDOSCOPY  06/01/2016 S/P brenda fundoplication, good repair, retained gastric fluid    VASECTOMY                   Current Outpatient Medications   Medication Sig Dispense Refill    divalproex (DEPAKOTE) 250 MG DR tablet take 3 tablets by mouth twice a day 180 tablet 5    lamoTRIgine (LAMICTAL) 100 MG tablet 1 TABLET DAILY AT BEDTIME 30 tablet 5    aspirin EC 81 MG EC tablet Take 1 tablet by mouth daily 30 tablet 5    metoprolol succinate (TOPROL XL) 100 MG extended release tablet Take 1 tablet by mouth at bedtime 90 tablet 3    nitroGLYCERIN (NITROSTAT) 0.4 MG SL tablet Place 1 tablet under the tongue every 5 minutes as needed for Chest pain up to max of 3 total doses. If no relief after 1 dose, call 911. 25 tablet 3    atorvastatin (LIPITOR) 40 MG tablet Take 1 tablet by mouth nightly 30 tablet 3    meloxicam (MOBIC) 15 MG tablet take 1 tablet by mouth once daily 30 tablet 5    promethazine (PHENERGAN) 25 MG tablet take 1 tablet by mouth every 8 hours if needed for nausea 60 tablet 2    SUMAtriptan (IMITREX) 50 MG tablet take 1 tablet by mouth once daily if needed for MIGRAINE 30 tablet 2    lisinopril (PRINIVIL;ZESTRIL) 20 MG tablet take 1 tablet by mouth daily (Patient not taking: No sig reported)       No current facility-administered medications for this visit.            No Known Allergies            Family History   Problem Relation Age of Onset    COPD Mother     High Blood Pressure Mother     Cancer Father         Hodgekin's Lymphoma    High Blood Pressure Father     Eczema Sister     Alzheimer's Disease Maternal Grandmother     COPD Maternal Grandmother     Cancer Maternal Grandmother     Cancer Paternal Grandmother     Cancer Paternal Grandfather     Cancer Sister         colon cancer    High Blood Pressure Sister     Arthritis Sister     Asthma Daughter     Eczema Daughter     Glaucoma Neg Hx     Diabetes Neg Hx     Cataracts Neg Hx              Social History     Socioeconomic History    Marital status:      Spouse name: Not on file    Number of children: Not on file    Years of education: Not on file    Highest education level: Not on file   Occupational History    Not on file   Tobacco Use    Smoking status: Every Day     Packs/day: 0.25     Years: 20.00     Pack years: 5.00     Types: Cigarettes     Last attempt to quit: 2016     Years since quittin.8    Smokeless tobacco: Never    Tobacco comments:     2-3 weekly   Vaping Use    Vaping Use: Never used   Substance and Sexual Activity    Alcohol use: No     Alcohol/week: 0.0 standard drinks     Comment: rarely    Drug use: No    Sexual activity: Not on file   Other Topics Concern    Not on file   Social History Narrative    Not on file     Social Determinants of Health     Financial Resource Strain: Low Risk     Difficulty of Paying Living Expenses: Not hard at all   Food Insecurity: No Food Insecurity    Worried About Running Out of Food in the Last Year: Never true    Ran Out of Food in the Last Year: Never true   Transportation Needs: Not on file   Physical Activity: Not on file   Stress: Not on file   Social Connections: Not on file   Intimate Partner Violence: Not on file   Housing Stability: Not on file         There were no vitals filed for this visit.         Wt Readings from Last 3 Encounters:   23 189 lb (85.7 kg)   23 191 lb (86.6 kg)   22 185 lb (83.9 kg)         BP Readings from Last 3 Encounters:   23 104/80   23 120/86   22 118/80       Hematology and Coagulation  Lab Results   Component Value Date/Time    WBC 6.7 2023 12:25 PM    WBC 6.7 2023 12:25 PM    RBC 4.34 2023 12:25 PM    RBC 4.34 2023 12:25 PM    HGB 14.9 2023 12:25 PM    HGB 14.9 2023 12:25 PM    HCT 42.1 2023 12:25 PM    HCT 42.1 2023 12:25 PM    MCV 97.0 2023 12:25 PM    MCV 97.0 2023 12:25 PM    MCH 34.3 2023 12:25 PM    MCH 34.3 2023 12:25 PM    MCHC 35.4 2023 12:25 PM    [facility] 35.4 02/07/2023 12:25 PM    RDW 12.1 02/07/2023 12:25 PM    RDW 12.1 02/07/2023 12:25 PM     03/07/2023 08:29 AM    MPV 9.7 02/07/2023 12:25 PM    MPV 9.7 02/07/2023 12:25 PM         Chemistries  Lab Results   Component Value Date/Time     02/07/2023 12:25 PM     02/07/2023 12:25 PM    K 4.2 02/07/2023 12:25 PM    K 4.2 02/07/2023 12:25 PM     02/07/2023 12:25 PM     02/07/2023 12:25 PM    CO2 27 02/07/2023 12:25 PM    CO2 27 02/07/2023 12:25 PM    BUN 17 02/07/2023 12:25 PM    CREATININE 0.88 03/07/2023 08:21 AM    CREATININE 0.95 02/07/2023 12:25 PM    CALCIUM 9.5 02/07/2023 12:25 PM    PROT 7.0 02/07/2023 12:25 PM    LABALBU 4.4 02/07/2023 12:25 PM    BILITOT 0.3 02/07/2023 12:25 PM    ALKPHOS 71 02/07/2023 12:25 PM    AST 18 02/07/2023 12:25 PM    ALT 23 02/07/2023 12:25 PM     Lab Results   Component Value Date/Time    ALKPHOS 71 02/07/2023 12:25 PM    ALT 23 02/07/2023 12:25 PM    AST 18 02/07/2023 12:25 PM    PROT 7.0 02/07/2023 12:25 PM    BILITOT 0.3 02/07/2023 12:25 PM    BILIDIR 0.11 12/08/2019 05:32 PM    LABALBU 4.4 02/07/2023 12:25 PM     Lab Results   Component Value Date/Time    BUN 17 02/07/2023 12:25 PM    CREATININE 0.88 03/07/2023 08:21 AM    CREATININE 0.95 02/07/2023 12:25 PM     Lab Results   Component Value Date/Time    CALCIUM 9.5 02/07/2023 12:25 PM    MG 1.8 09/18/2013 09:18 PM     Lab Results   Component Value Date/Time    AST 18 02/07/2023 12:25 PM    ALT 23 02/07/2023 12:25 PM         Lab Results   Component Value Date/Time    CKTOTAL 67 09/19/2013 09:50 AM     Lab Results   Component Value Date/Time    [id number] 590 01/25/2019 03:08 PM         Drug Levels  Lab Results   Component Value Date/Time    PHENYTOIN <0.8 09/28/2017 04:24 PM    PHENYTOIN <0.8 04/04/2016 02:23 PM    VALPROATE 94 02/07/2023 12:25 PM    VALPROATE 57 03/16/2022 03:13 PM           Review of Systems   Constitutional:  Negative for appetite change, chills, diaphoresis, fatigue and unexpected weight change. HENT:  Negative for congestion, dental problem, drooling, ear discharge, facial swelling, mouth sores, nosebleeds, postnasal drip, trouble swallowing and voice change. Eyes:  Negative for discharge, itching and visual disturbance. Respiratory:  Negative for apnea, choking, chest tightness, shortness of breath and wheezing. Cardiovascular:  Negative for chest pain, palpitations and leg swelling. Gastrointestinal:  Negative for abdominal distention, blood in stool, change in bowel habit, constipation and diarrhea. Endocrine: Negative for cold intolerance, heat intolerance, polydipsia, polyphagia and polyuria. Musculoskeletal:  Negative for arthralgias, joint swelling, myalgias and neck stiffness. Skin:  Negative for color change, rash and wound. Allergic/Immunologic: Negative for environmental allergies, food allergies and immunocompromised state. Hematological:  Negative for adenopathy. Does not bruise/bleed easily. Psychiatric/Behavioral:  Positive for decreased concentration and sleep disturbance. Negative for agitation, behavioral problems, dysphoric mood, hallucinations, self-injury and suicidal ideas. The patient is not hyperactive. Objective:       LIMITED     DUE    TO  VIDEO  VISIT           PREVIOUS    EXAM   SHOWED :         Physical Exam  Constitutional:       Appearance: He is well-developed. HENT:      Head: Normocephalic and atraumatic. No raccoon eyes or Vaughn's sign. Right Ear: External ear normal.      Left Ear: External ear normal.      Nose: Nose normal.   Eyes:      Conjunctiva/sclera: Conjunctivae normal.      Pupils: Pupils are equal, round, and reactive to light. Neck:      Thyroid: No thyroid mass or thyromegaly. Vascular: No carotid bruit. Trachea: No tracheal deviation. Meningeal: Brudzinski's sign and Kernig's sign absent. Cardiovascular:      Rate and Rhythm: Normal rate and regular rhythm.    Pulmonary: Effort: Pulmonary effort is normal.   Musculoskeletal:         General: No tenderness. Cervical back: Normal range of motion and neck supple. No rigidity. No muscular tenderness. Normal range of motion. Skin:     General: Skin is warm. Coloration: Skin is not pale. Findings: No erythema or rash. Nails: There is no clubbing. Psychiatric:         Attention and Perception: He is attentive. Mood and Affect: Mood is anxious and depressed. Affect is blunt. Affect is not labile or inappropriate. Speech: He is communicative. Speech is not rapid and pressured, delayed, slurred or tangential.         Behavior: Behavior is slowed. Behavior is not agitated, aggressive, withdrawn, hyperactive or combative. Behavior is cooperative. Thought Content: Thought content is not paranoid or delusional. Thought content does not include homicidal or suicidal ideation. Thought content does not include homicidal or suicidal plan. Cognition and Memory: Memory is impaired. He does not exhibit impaired recent memory or impaired remote memory. Judgment: Judgment is not impulsive or inappropriate. NEUROLOGICAL EXAMINATION :        LIMITED     DUE    TO  VIDEO  VISIT           PREVIOUS    EXAM   SHOWED :       A) MENTAL STATUS:                   Alert and  oriented  To time, place  And  Person. No Aphasia. No  Dysarthria. Able   To  Follow  commands without   Any  Difficulty. No right  To left confusion. Normal  Speech  And language function.                    Insight and  Judgment ,Fund  Of  Knowledge  DECREASED                Recent  And  Remote memory  DECREASED                Attention &Concentration are  DECREASED                                                    B) CRANIAL NERVES :             2 CN : Visual  Acuity  And  Visual fields  within normal limits                        Fundi  Could  Not Be  Could  Not  Be  Evaluated. 3,4,6 CN : Both  Pupils are   Reactive and  Equal.                            Extraocular   Movements  Are  Intact. No  Nystagmus. No  MOO. No  Afferent  Pupillary  Defect noted. 5 CN :  Normal  Facial sensations and Corneal  Reflexes           7 CN :  Normal  Facial  Symmetry  And  Strength. No facial  Weakness. 8 CN :  Hearing  Appears within normal limits          9, 10 CN: Normal spontaneous, reflex palate movements         11 CN:   Normal  Shoulder shrug and  Strength         12 CN :   Normal  Tongue movements and  Tongue  In midline                        No tongue   Fasciculations or atrophy         C) MOTOR  EXAM:                 Strength  In upper  And  Lower extremities   within normal limits               No  Drift. No  Atrophy               Rapid alternating  And  repetitions  Movements  within normal limits                 Muscle  Tone  In upper  And  Lower  Extremities  within normal limits                No rigidity. No  Spasticity. Bradykinesia   absent               No  Asterixis. Sustention  Tremor , Resting  Tremor   absent                    No other  Abnormal  Movements noted             D) SENSORY :               light touch, pinprick, position  And  Vibration DECREASED   IN   HANDS      E) REFLEXES:                   Deep  Tendon  Reflexes within normal limits                    No pathological  Reflexes  Bilaterally.                                   F) COORDINATION  AND  GAIT :                                Station and  Gait  within normal limits                                        Romberg's test negative                          Ataxia negative            Assessment:     Patient Active Problem List   Diagnosis    Low back pain with sciatica    Migraine    Anxiety, mild    Disturbance, sleep    Tobacco use    GERD (gastroesophageal reflux disease)    Hiatal hernia Head ache    Status migrainosus    Dizziness    Depression    H/O fall    Head injury    Seizure disorder (HCC)    Astigmatism    Rectal bleed    Cervical radiculopathy    Ventricular premature beats    Nausea and vomiting    Colon polyps    TIA (transient ischemic attack)    VBI (vertebrobasilar insufficiency)    Pilonidal cyst    Wound dehiscence    Sleep difficulties    Partial idiopathic epilepsy with seizures of localized onset, not intractable, without status epilepticus ([address]    Convulsions ([address]    Recurrent seizures ([address]    Atrial fibrillation with RVR ([address]    Acute respiratory failure with hypoxemia (HCC)    Acute bronchitis with COPD ([address]    New onset a-fib ([address]    Psychogenic nonepileptic seizure    Bilateral recurrent inguinal hernia without obstruction or gangrene    Incisional hernia, without obstruction or gangrene           CT OF THE HEAD WITHOUT CONTRAST; CT OF THE CERVICAL SPINE WITHOUT CONTRAST   11/15/2019 10:21 am       TECHNIQUE:   CT of the head was performed without the administration of intravenous   contrast. Dose modulation, iterative reconstruction, and/or weight based   adjustment of the mA/kV was utilized to reduce the radiation dose to as low   as reasonably achievable.; CT of the cervical spine was performed without the   administration of intravenous contrast. Multiplanar reformatted images are   provided for review. Dose modulation, iterative reconstruction, and/or weight   based adjustment of the mA/kV was utilized to reduce the radiation dose to as   low as reasonably achievable. COMPARISON:   01/25/2019       HISTORY:   ORDERING SYSTEM PROVIDED HISTORY: Status migrainosus   TECHNOLOGIST PROVIDED HISTORY:   migraine, seizures   Reason for Exam: Hx chronic migraines. C/o increase headaches since recent   falls   Acuity: Acute   Type of Exam: Initial       FINDINGS:   BRAIN/VENTRICLES: There is no acute intracranial hemorrhage, mass effect or   midline shift.   No abnormal extra-axial fluid collection. The gray-white   differentiation is maintained without evidence of an acute infarct. There is   no evidence of hydrocephalus. ORBITS: The visualized portion of the orbits demonstrate no acute abnormality. SINUSES: The visualized paranasal sinuses and mastoid air cells demonstrate   no acute abnormality. SOFT TISSUES/SKULL:  No acute abnormality of the visualized skull or soft   tissues. CERVICAL SPINE: Cervical spine maintains its normal lordotic curvature. There are subtle degenerative changes C6-C7. No acute fracture or   malalignment. Vertebral body heights and intervertebral disc spaces are   preserved. Overlying soft tissues are unremarkable. Visualized lung apices   are clear. Impression   No acute intracranial abnormality. No acute osseus abnormality of the cervical spine. Procedure: [facility] 11/13/2014 [id number] CT BRAIN WITHOUT CONTRAST    Reason for Exam: \      FULL RESULT: EXAM: CT Head without Contrast - 11/13/2014 7:27 PM      HISTORY: Syncope/Near-Syncope. Seizures. COMPARISON: 10/8/14       TECHNIQUE: Contiguous axial CT images were obtained from the skull base    to the vertex and reviewed in soft tissue, subdural, bone, and brain    windows. FINDINGS: The ventricular system is normal in size and configuration. There are no intra-axial or extra-axial fluid collections or mass    lesions. No acute intracranial hemorrhage or midline shift. Visualized    paranasal sinuses and mastoid air cells are clear. No acute calvarial    fracture is identified. The brainstem and the relationship of the    craniocervical junction structures are normal. There is incomplete fusion    of the posterior elements of C1, stable from prior study and compatible    with a normal variant. Visualized orbits and globes are intact. IMPRESSION:    No evidence for acute intracranial pathology.  No bleed, mass effect, or    midline shift. Procedure: [facility] 06/02/2014 [id number] MRI BRAIN COMBINED    Reason for Exam: \      FULL RESULT: MRI brain with and without intravenous contrast, 6/2/2014      History: Migraines      Technique: Multiplanar multisequence MR imaging of the brain was    performed before and after intravenous administration of 16 mL Magnevist.      Findings: No evidence for shift of midline structures, mass effect or    compression of the ventricles noted. No acute intra-or extra-axial    hemorrhage is seen. No abnormal intracranial fluid collections are    identified. The basal cisterns are patent. Posterior fossa structures demonstrate no    discrete mass. Few scattered foci of T2/FLAIR hyperintensity noted in the    periventricular and subcortical white matter which essentially are    nonspecific in imaging appearance however differential considerations    include demyelinating or inflammatory process, migraine induced changes,    chronic small vessel disease or vascular disease. No associated restricted diffusion or abnormal enhancing seen. A small    left occipital lobe developmental venous anomaly seen. No abnormal    enhancing intracranial mass seen. Visualized orbital contents appear unremarkable. Mild mucosal thickening of the ethmoid air cells noted. Skull base flow voids are patent. Superior sagittal sinus and straight sinus flow voids are patent. Heterogeneity of the T1-weighted marrow signal intensity seen. IMPRESSION:    1. No acute or subacute ischemic insult noted. No abnormal enhancing    intracranial mass seen.    2.scattered foci of T2/FLAIR hyperintensity noted in the periventricular    and subcortical white matter which essentially are nonspecific in imaging    appearance however differential considerations include demyelinating or    inflammatory process, migraine induced changes, chronic small vessel    disease or vascular disease. Procedure: [facility] 10/08/2014 [id number] CT BRAIN WITHOUT CONTRAST    Reason for Exam: \      FULL RESULT: EXAM: Brain CT without contrast dated 10/8/2014. HISTORY: Headache after head injury. COMPARISON: Brain CT dated 4/16/2014. TECHNIQUE: Multi-detector axial images are obtained through the head. Findings: There is no evidence for acute intracranial hemorrhage,    midline shift or mass effect. Ventricular and cistern spaces are normal    and symmetric. Bishop  and white matter differentiation is well preserved. No intra- or extra-axial fluid collections or mass occupying lesions    seen. Bilateral cerebellopontine angles are normal. Visualized orbital    contents are normal. Study viewed in bone windows shows no    abnormalities. Mild left ethmoid sinusitis otherwise all visualized    sinuses are normally aerated. Bilateral temporomandibular joints are    normally aligned. IMPRESSION: No acute intracranial abnormalities. Mild left ethmoid    sinusitis. Plan:           VISITING DIAGNOSIS:      ICD-10-CM    1. Seizure disorder ([facility])  G40.909 divalproex (DEPAKOTE) 250 MG DR tablet     lamoTRIgine (LAMICTAL) 100 MG tablet      2. Partial idiopathic epilepsy with seizures of localized onset, not intractable, without status epilepticus ([facility])  G40.009 divalproex (DEPAKOTE) 250 MG DR tablet      3. Tobacco use  Z72.0 divalproex (DEPAKOTE) 250 MG DR tablet      4. Dizziness  R42 divalproex (DEPAKOTE) 250 MG DR tablet      5. Disturbance, sleep  G47.9 divalproex (DEPAKOTE) 250 MG DR tablet      6. Sleep difficulties  G47.9 divalproex (DEPAKOTE) 250 MG DR tablet      7. Anxiety, mild  F41.9 divalproex (DEPAKOTE) 250 MG DR tablet      8. Gastroesophageal reflux disease without esophagitis  K21.9 divalproex (DEPAKOTE) 250 MG DR tablet      9.  Nonintractable headache, unspecified chronicity pattern, unspecified headache type  R51.9 divalproex (DEPAKOTE) 250 MG DR tablet      10. Nonintractable paroxysmal hemicrania, unspecified chronicity pattern  G44.039 divalproex (DEPAKOTE) 250 MG DR tablet      11. Chronic migraine without aura without status migrainosus, not intractable  G43.709 divalproex (DEPAKOTE) 250 MG DR tablet      12. Psychogenic nonepileptic seizure  F44.5       13. Recurrent seizures ([address]  G40.909       14. Migraine without aura and without status migrainosus, not intractable  G43.009       15. Atrial fibrillation with RVR ([facility])  I48.91       16. Cervical radiculopathy  M54.12       17. VBI (vertebrobasilar insufficiency)  G45.0                    CONCERNS   &   INCREASED   RISK   FOR          * TIA,  CEREBRO  VASCULAR  ISCHEMIA, STROKE       *  SEIZURE  ACTIVITY,  EPILEPSY ,        *  Chronic  Headaches &  Migraines      *   COGNITIVE  &   MEMORY PROBLEMS  AND  DEMENTIAS                     VARIOUS  RISK   FACTORS   WERE  REVIEWED   AND   DISCUSSED. *  PATIENT   HAS  MULTIPLE   MEDICAL, MENTAL HEALTH         &      NEUROLOGICAL   PROBLEMS . PATIENT'S   MANAGEMENT  IS  CHALLENGING. PLAN:       Jasmin Slipper  Of  The  Diagnoses,  The  Management & Treatment  Options           AND    Care  plan  Were        Reviewed and   Discussed   With  patient. * Goals  And  Expectations  Of  The  Therapy  Discussed   And  Reviewed. *   Benefits   And   Side  Effect  Profile  Of  Medication/s   Were   Discussed             * Need   For  Further   Follow up For  The  Various  Problems  Were discussed. * Results  Of  The  Previous  Diagnostic tests were reviewed and questions answered. patient  understand the same. Medical  Decision  Making  Was  Made  Based on the   Complexity  Of  Above  Mentioned  Diagnoses,        Data reviewed   & diagnostic  Tests  Reviewed,  Risk  Of  Significant   Co morbidities  & complicating   Factors.                  Medical  Decision  Was   High  Complexity  Due   To  The  Patient's Multiple  Symptoms,  ,      Complex  Treatment  Regimen,  Multiple medications and   Risk  Of   Side  Effects,      Difficulty  In  Medication  Management  And  Diagnostic  Challenges   In  View  Of  The  Associated       Co  Morbid  Conditions   And  Problems. * FALL   PRECAUTIONS. THESE  REVIEWED   AND  DISCUSSED      *   ABSOLUTELY   NO  DRIVING    -       REVIEWED     WITH  PATIENT      *   BE  CAREFUL  WITH  ACTIVITIES             *   ADEQUATE   FLUID  INTAKE   AND  ELECTROLYTE  BALANCE             * KEEP  DAIRY  OF   THE  NEUROLOGICAL  SYMPTOMS        RECORDING THE    DURATION  AND  FREQUENCY. *  AVOID    CONDITIONS  AND  FACTORS   THAT  MAKE   NEUROLOGICAL  SYMPTOMS  WORSE. *   SEIZURE  PRECAUTIONS.  -   DISCUSSED                  A)  Avoid  Working  At   [employer] B)  Avoid  Working  With  Heavy machinery  . C)  Avoid   Swimming,  Climbing  A  Ladder   Unattended. D)  Avoid   Driving   If  You   Have  A  Seizure. E)  Must   Be  Seizure  Free   For  At   Least   6 months,  Before   You  Can drive. F) Some times  Your  May  Feel  Seizure coming  Before  It  Begins. You  May feel               Strange smell or funny  Feeling  In  Your  Stomach,  Which is  Called   Aura. TIPS  TO  REDUCE/ PREVENT  SEIZURES         1. Take  Your  Anti seizure  Medications   As   Recommended. 2. Get   Enough   Sleep. Sleep  Deprivation   Can  Trigger  A  Seizure. 3. If   You   Have  A fever,  Treat  It  At  Once,  And  Contact   Your  Primary  Care Providers. 4. Avoid   Alcohol.        5. Avoid  Flashing  Lights,  Loud  Noises and  TV  And  Video  Games,             As   These  May Trigger   Your  Seizures       6. Control  Your  Stress  And   Have  Adequate  Rest.       7.   If  You  Feel  A  Seizure  Coming   On :             A) warn people  Who  Are  With  You             B)  Make  Sure  There  Are  No  Sharp or  Hard  Objects  Around you. C)  Lay down  On  Your  Side  And  Relax. *  TO  MAINTAIN  REGULAR  SLEEP  WAKE  CYCLES. *   TO  HAVE  ADEQUATE  REST  AND   SLEEP    HOURS.          *    AVOID  ANY USAGE OF                   TOBACCO,  EXCESSIVE  ALCOHOL  AND   ILLEGAL   SUBSTANCES          *  CONTINUE MEDICATIONS PRESCRIBED BY NEUROLOGIST AS    RECOMMENDED     *   Compliance   With  Medications   And  Instructions          * CURRENTLY  TOLERATING  THE  PRESCRIBED   MEDICATIONS. WITHOUT  ANY  SIGNIFICANT  SIDE  EFFECTS   &  GETTING BENEFIT.           *  May   Use  Pill  Box,    If  Needed      *  MEDICATIONS TO AVOID:    WELLBUTRIN,  ULTRAM          *     Antiplatelet  therapy    As   Recommended  Was   Discussed          *    Prophylactic  Use   Of     Vitamin   B   Complex,  Folic  Acid,    Vitamin  B12        Multivitamin   Tablet  Daily    Supplementations   Over  The  Counter  Discussed           *  PATIENT  IS  ALSO   COUNSELED   TO  KEEP    ACTIVITIES         A)   SIMPLE      B)  ORGANIZED      C)  WRITE   DOWN                     *  EVALUATIONS  AND  FOLLOW UP:                                          * CARDIOLOGY               * EPILEPSY  MONITORING   UNIT                                 *    PREVIOUS     H/O    PARTIAL  COMPLEX       SEIZURES                          DUE  TO  SLEEP  DEPRIVATION     AND  PROLONGED  TV   WATCHING                                *     PATIENT   RECOMMENDED  :                        A)    TO  CONTINUE     SEIZURE  PRECAUTIONS                       B)     TO  CONTINUE        LAMICTAL   AND  DEPAKOTE  AS  BEFORE                        C)   PERIODIC    ANTI  EPILEPTIC  MEDICATION  LEVELS EVERY    3   MONTHS                         VARIOUS  RISK   FACTORS   WERE  REVIEWED   AND   DISCUSSED. Orders Placed This Encounter   Medications    divalproex (DEPAKOTE) 250 MG DR tablet     Sig: take 3 tablets by mouth twice a day     Dispense:  180 tablet     Refill:  5    lamoTRIgine (LAMICTAL) 100 MG tablet     Si TABLET DAILY AT BEDTIME     Dispense:  30 tablet     Refill:  5                       *PATIENT   TO  FOLLOW  UP  WITH   PRIMARY  CARE   AND   OTHER  CONSULTANTS  AS  BEFORE.           *TO  FOLLOW  WITH   MENTAL  HEALTH  PROFESSIONALS ,  INCLUDING            PSYCHOLOGICAL  COUNSELING   AND  PSYCHIATRIC  EVALUTIONS            *  Maintain   Healthy  Life Style    With   Periodic  Monitoring  Of      Any  Medical  Conditions  Including   Elevated  Blood  Pressure,  Lipid  Profile,     Blood  Sugar levels  And   Heart  Disease. *   Period   Screening  For  Cancers  Involving  Breast,  Colon,    Prostate, lungs  And  Other  Organs  As  Applicable,  In consultation   With  Your  Primary Care Providers. * Second  Neurological  Opinion  And  Evaluations  In  [facility]  Setting  If  Patient  Is  Interested. *  If  The  Patient remains  Neurologically  Stable   Return   To  [facility] Neurology Department       IN     4       MONTHS  TIME   FOR  FURTHER  FOLLOW UP.                   *  If   There is  Any  Significant  Worsening   Of  Current  Symptoms  And      Or  If patient  Develops   Any additional  New  Neurological  Symptoms  Or  Significant  Concerns       Should  Call  911 or  Go  To  Emergency  Department  For  Further  Immediate  Evaluation. *   The  Neurological   Findings,  Possible  Diagnosis,  Differential diagnoses   And  Options      For    Further   Investigations   And  management   Are  Discussed  Comprehensively.          Medications   And  Prescription   Risks  And  Side effects Are   Also  Discussed. The  Above  Were  Reviewed  With  patient and       questions  Answered  In  Detail. More   Than   50% of face  To face Time   Was  Spent  On  Counseling   And   Coordination  Of  Care       Of   Patient's multiple   Neurological  Problems   And   Comorbid  Medical   Conditions. VIRTUAL   VIDEO  VISIT          PATIENT    EVALUATED   BY  NEUROLOGIST   VIA   a Virtual  synchronous Visit (Audio - Video visit) encounter          to address concerns as mentioned  ABOVE        ALSO    nursing   staff   was present     before,  during  and  after   the    visit        Due to this being a TeleHealth encounter (During [address] emergency), evaluation of the following organ systems was limited:     Vitals/Constitutional/EENT/Resp/CV/GI//MS/Neuro/Skin/Heme-Lymph-Imm. Pursuant to the emergency declaration under the [address] [place] [address], [address] [place] [address] authority and the [employer] Act, this Virtual Visit was conducted with patient's (and/or legal guardian's) consent, to reduce the patient's risk of exposure to COVID-19 and provide necessary medical care. The patient (and/or legal guardian) has also been advised to contact this office for worsening conditions or problems, and seek emergency medical treatment and/or call 911 if deemed necessary. Patient identification was verified at the start of the visit: Yes    Total time spent for this encounter:  ( Time Spent:   25 minutes )    Services were provided through a video synchronous discussion   and  limited    examination  virtually to substitute for in-person clinic visit. Patient    located at    [address] [facility]. Marianna Emmanuel, was evaluated through a synchronous (real-Time) audio-video encounter.       The patient (or guardian if applicable) is aware that this is a billable service, which includes applicable co-pays. This Virtual Visit was conducted with patient's (and/or legal guardian's) consent. The visit was conducted pursuant to the emergency declaration under the [facility], [address] [place] [address] authority and the [employer] Act. Patient identification was verified and a caregiver was present when appropriate. The patient was located in a state where the provider was licensed to provider care. Michelle Cano. MD Rema Caldwell MD on 2/54/4816 at 2:55 PM    An electronic signature was used to authenticate this note. Electronically signed by Christy Schroeder MD.,  [facility]       Board Certified in  Neurology &  In  [facility] [address] of [employer]iat[employer] and Neurology (ABPN)      DISCLAIMER:   Although every effort was made to ensure the accuracy of this  electronic transcription, some errors in transcription may have occurred. GENERAL PATIENT INSTRUCTIONS:     A Healthy Lifestyle: Care Instructions  Your Care Instructions  A healthy lifestyle can help you feel good, stay at a healthy weight, and have plenty of energy for both work and play. A healthy lifestyle is something you can share with your whole family. A healthy lifestyle also can lower your risk for serious health problems, such as high blood pressure, heart disease, and diabetes. You can follow a few steps listed below to improve your health and the health of your family. Follow-up care is a key part of your treatment and safety. Be sure to make and go to all appointments, and call your doctor if you are having problems. Its also a good idea to know your test results and keep a list of the medicines you take. How can you care for yourself at home? Do not eat too much sugar, fat, or fast foods.  You can still have dessert and treats now and then. The goal is moderation. Start small to improve your eating habits. Pay attention to portion sizes, drink less juice and soda pop, and eat more fruits and vegetables. Eat a healthy amount of food. A 3-ounce serving of meat, for example, is about the size of a deck of cards. Fill the rest of your plate with vegetables and whole grains. Limit the amount of soda and sports drinks you have every day. Drink more water when you are thirsty. Eat at least 5 servings of fruits and vegetables every day. It may seem like a lot, but it is not hard to reach this goal. A serving or helping is 1 piece of fruit, 1 cup of vegetables, or 2 cups of leafy, raw vegetables. Have an apple or some carrot sticks as an afternoon snack instead of a candy bar. Try to have fruits and/or vegetables at every meal.  Make exercise part of your daily routine. You may want to start with simple activities, such as walking, bicycling, or slow swimming. Try to be active 30 to 60 minutes every day. You do not need to do all 30 to 60 minutes all at once. For example, you can exercise 3 times a day for 10 or 20 minutes. Moderate exercise is safe for most people, but it is always a good idea to talk to your doctor before starting an exercise program.  Keep moving. Roise Mingle the lawn, work in the garden, or [employer]. Take the stairs instead of the elevator at work. If you smoke, quit. People who smoke have an increased risk for heart attack, stroke, cancer, and other lung illnesses. Quitting is hard, but there are ways to boost your chance of quitting tobacco for good. Use nicotine gum, patches, or lozenges. Ask your doctor about stop-smoking programs and medicines. Keep trying.   In addition to reducing your risk of diseases in the future, you will notice some benefits soon after you stop using tobacco. If you have shortness of breath or asthma symptoms, they will likely get better within a few weeks after you quit.  Limit how much alcohol you drink. Moderate amounts of alcohol (up to 2 drinks a day for men, 1 drink a day for women) are okay. But drinking too much can lead to liver problems, high blood pressure, and other health problems. Family health  If you have a family, there are many things you can do together to improve your health. Eat meals together as a family as often as possible. Eat healthy foods. This includes fruits, vegetables, lean meats and dairy, and whole grains. Include your family in your fitness plan. Most people think of activities such as jogging or tennis as the way to fitness, but there are many ways you and your family can be more active. Anything that makes you breathe hard and gets your heart pumping is exercise. Here are some tips:  Walk to do errands or to take your child to school or the bus. Go for a family bike ride after dinner instead of watching TV. Where can you learn more? Go to https://[employer]. org and sign in to your Get[employer] account. Enter B933 in the [employer] box to learn more about \"A Healthy Lifestyle: Care Instructions. \"     If you do not have an account, please click on the \"Sign Up Now\" link. Current as of: July 26, 2016  Content Version: 11.2  © 8419-9612 [employer] Â®, [employer]orporated. Care instructions adapted under license by [facility] ([facility]). If you have questions about a medical condition or this instruction, always ask your healthcare professional. [place] [address] any warranty or liability for your use of this information.

## 2023-03-23 ENCOUNTER — ANESTHESIA EVENT (OUTPATIENT)
Dept: OPERATING ROOM | Age: 43
End: 2023-03-23
Payer: MEDICARE

## 2023-03-24 ENCOUNTER — HOSPITAL ENCOUNTER (OUTPATIENT)
Age: 43
Setting detail: OUTPATIENT SURGERY
Discharge: HOME | End: 2023-03-24
Attending: SURGERY | Admitting: SURGERY
Payer: MEDICARE

## 2023-03-24 ENCOUNTER — ANESTHESIA (OUTPATIENT)
Dept: OPERATING ROOM | Age: 43
End: 2023-03-24
Payer: MEDICARE

## 2023-03-24 VITALS
DIASTOLIC BLOOD PRESSURE: 88 MMHG | WEIGHT: 187 LBS | SYSTOLIC BLOOD PRESSURE: 136 MMHG | BODY MASS INDEX: 30.05 KG/M2 | HEIGHT: 66 IN | RESPIRATION RATE: 16 BRPM | OXYGEN SATURATION: 95 % | HEART RATE: 112 BPM | TEMPERATURE: 97.8 F

## 2023-03-24 DIAGNOSIS — G89.18 POST-OP PAIN: Primary | ICD-10-CM

## 2023-03-24 PROCEDURE — C1781 MESH (IMPLANTABLE): HCPCS | Performed by: SURGERY

## 2023-03-24 PROCEDURE — 49505 PRP I/HERN INIT REDUC >5 YR: CPT | Performed by: SURGERY

## 2023-03-24 PROCEDURE — 2500000003 HC RX 250 WO HCPCS

## 2023-03-24 PROCEDURE — 3700000000 HC ANESTHESIA ATTENDED CARE: Performed by: SURGERY

## 2023-03-24 PROCEDURE — 2580000003 HC RX 258: Performed by: SURGERY

## 2023-03-24 PROCEDURE — 6360000002 HC RX W HCPCS

## 2023-03-24 PROCEDURE — 64488 TAP BLOCK BI INJECTION: CPT

## 2023-03-24 PROCEDURE — 2709999900 HC NON-CHARGEABLE SUPPLY: Performed by: SURGERY

## 2023-03-24 PROCEDURE — 7100000010 HC PHASE II RECOVERY - FIRST 15 MIN: Performed by: SURGERY

## 2023-03-24 PROCEDURE — S2900 ROBOTIC SURGICAL SYSTEM: HCPCS | Performed by: SURGERY

## 2023-03-24 PROCEDURE — 3700000001 HC ADD 15 MINUTES (ANESTHESIA): Performed by: SURGERY

## 2023-03-24 PROCEDURE — 49592 RPR AA HRN 1ST < 3 NCR/STRN: CPT | Performed by: SURGERY

## 2023-03-24 PROCEDURE — 3600000019 HC SURGERY ROBOT ADDTL 15MIN: Performed by: SURGERY

## 2023-03-24 PROCEDURE — 6360000002 HC RX W HCPCS: Performed by: NURSE ANESTHETIST, CERTIFIED REGISTERED

## 2023-03-24 PROCEDURE — 7100000001 HC PACU RECOVERY - ADDTL 15 MIN: Performed by: SURGERY

## 2023-03-24 PROCEDURE — 3600000009 HC SURGERY ROBOT BASE: Performed by: SURGERY

## 2023-03-24 PROCEDURE — 6360000002 HC RX W HCPCS: Performed by: SURGERY

## 2023-03-24 PROCEDURE — 6370000000 HC RX 637 (ALT 250 FOR IP): Performed by: SURGERY

## 2023-03-24 PROCEDURE — 2500000003 HC RX 250 WO HCPCS: Performed by: NURSE ANESTHETIST, CERTIFIED REGISTERED

## 2023-03-24 PROCEDURE — 7100000011 HC PHASE II RECOVERY - ADDTL 15 MIN: Performed by: SURGERY

## 2023-03-24 PROCEDURE — 7100000000 HC PACU RECOVERY - FIRST 15 MIN: Performed by: SURGERY

## 2023-03-24 DEVICE — MESH HERN W10XL15CM POLY POLYLACTIC ACID 70% CLLGN 30% GLYC: Type: IMPLANTABLE DEVICE | Site: INGUINAL | Status: FUNCTIONAL

## 2023-03-24 RX ORDER — MORPHINE SULFATE 2 MG/ML
1 INJECTION, SOLUTION INTRAMUSCULAR; INTRAVENOUS EVERY 5 MIN PRN
Status: DISCONTINUED | OUTPATIENT
Start: 2023-03-24 | End: 2023-03-24 | Stop reason: HOSPADM

## 2023-03-24 RX ORDER — LIDOCAINE HYDROCHLORIDE 20 MG/ML
INJECTION, SOLUTION EPIDURAL; INFILTRATION; INTRACAUDAL; PERINEURAL
Status: COMPLETED | OUTPATIENT
Start: 2023-03-24 | End: 2023-03-24

## 2023-03-24 RX ORDER — PROPOFOL 10 MG/ML
INJECTION, EMULSION INTRAVENOUS PRN
Status: DISCONTINUED | OUTPATIENT
Start: 2023-03-24 | End: 2023-03-24 | Stop reason: SDUPTHER

## 2023-03-24 RX ORDER — SODIUM CHLORIDE 0.9 % (FLUSH) 0.9 %
5-40 SYRINGE (ML) INJECTION PRN
Status: DISCONTINUED | OUTPATIENT
Start: 2023-03-24 | End: 2023-03-24 | Stop reason: HOSPADM

## 2023-03-24 RX ORDER — OXYCODONE HYDROCHLORIDE 5 MG/1
5 TABLET ORAL PRN
Status: COMPLETED | OUTPATIENT
Start: 2023-03-24 | End: 2023-03-24

## 2023-03-24 RX ORDER — MORPHINE SULFATE 2 MG/ML
2 INJECTION, SOLUTION INTRAMUSCULAR; INTRAVENOUS EVERY 5 MIN PRN
Status: DISCONTINUED | OUTPATIENT
Start: 2023-03-24 | End: 2023-03-24 | Stop reason: HOSPADM

## 2023-03-24 RX ORDER — SODIUM CHLORIDE 9 MG/ML
INJECTION, SOLUTION INTRAVENOUS PRN
Status: DISCONTINUED | OUTPATIENT
Start: 2023-03-24 | End: 2023-03-24 | Stop reason: HOSPADM

## 2023-03-24 RX ORDER — FENTANYL CITRATE 50 UG/ML
25 INJECTION, SOLUTION INTRAMUSCULAR; INTRAVENOUS EVERY 5 MIN PRN
Status: DISCONTINUED | OUTPATIENT
Start: 2023-03-24 | End: 2023-03-24 | Stop reason: HOSPADM

## 2023-03-24 RX ORDER — DEXAMETHASONE SODIUM PHOSPHATE 10 MG/ML
INJECTION INTRAMUSCULAR; INTRAVENOUS PRN
Status: DISCONTINUED | OUTPATIENT
Start: 2023-03-24 | End: 2023-03-24 | Stop reason: SDUPTHER

## 2023-03-24 RX ORDER — KETAMINE HCL IN NACL, ISO-OSM 100MG/10ML
SYRINGE (ML) INJECTION PRN
Status: DISCONTINUED | OUTPATIENT
Start: 2023-03-24 | End: 2023-03-24 | Stop reason: SDUPTHER

## 2023-03-24 RX ORDER — DEXMEDETOMIDINE HYDROCHLORIDE 100 UG/ML
INJECTION, SOLUTION INTRAVENOUS PRN
Status: DISCONTINUED | OUTPATIENT
Start: 2023-03-24 | End: 2023-03-24 | Stop reason: SDUPTHER

## 2023-03-24 RX ORDER — OXYCODONE HYDROCHLORIDE 5 MG/1
10 TABLET ORAL PRN
Status: COMPLETED | OUTPATIENT
Start: 2023-03-24 | End: 2023-03-24

## 2023-03-24 RX ORDER — SODIUM CHLORIDE, SODIUM LACTATE, POTASSIUM CHLORIDE, CALCIUM CHLORIDE 600; 310; 30; 20 MG/100ML; MG/100ML; MG/100ML; MG/100ML
INJECTION, SOLUTION INTRAVENOUS CONTINUOUS
Status: DISCONTINUED | OUTPATIENT
Start: 2023-03-24 | End: 2023-03-24 | Stop reason: HOSPADM

## 2023-03-24 RX ORDER — ONDANSETRON 2 MG/ML
INJECTION INTRAMUSCULAR; INTRAVENOUS PRN
Status: DISCONTINUED | OUTPATIENT
Start: 2023-03-24 | End: 2023-03-24 | Stop reason: SDUPTHER

## 2023-03-24 RX ORDER — FENTANYL CITRATE 50 UG/ML
INJECTION, SOLUTION INTRAMUSCULAR; INTRAVENOUS PRN
Status: DISCONTINUED | OUTPATIENT
Start: 2023-03-24 | End: 2023-03-24 | Stop reason: SDUPTHER

## 2023-03-24 RX ORDER — IPRATROPIUM BROMIDE AND ALBUTEROL SULFATE 2.5; .5 MG/3ML; MG/3ML
1 SOLUTION RESPIRATORY (INHALATION)
Status: DISCONTINUED | OUTPATIENT
Start: 2023-03-24 | End: 2023-03-24 | Stop reason: HOSPADM

## 2023-03-24 RX ORDER — MIDAZOLAM HYDROCHLORIDE 1 MG/ML
INJECTION INTRAMUSCULAR; INTRAVENOUS PRN
Status: DISCONTINUED | OUTPATIENT
Start: 2023-03-24 | End: 2023-03-24 | Stop reason: SDUPTHER

## 2023-03-24 RX ORDER — ROCURONIUM BROMIDE 10 MG/ML
INJECTION, SOLUTION INTRAVENOUS PRN
Status: DISCONTINUED | OUTPATIENT
Start: 2023-03-24 | End: 2023-03-24 | Stop reason: SDUPTHER

## 2023-03-24 RX ORDER — HYDROCODONE BITARTRATE AND ACETAMINOPHEN 5; 325 MG/1; MG/1
1 TABLET ORAL EVERY 6 HOURS PRN
Qty: 28 TABLET | Refills: 0 | Status: SHIPPED | OUTPATIENT
Start: 2023-03-24 | End: 2023-03-31

## 2023-03-24 RX ORDER — ESMOLOL HYDROCHLORIDE 10 MG/ML
INJECTION INTRAVENOUS PRN
Status: DISCONTINUED | OUTPATIENT
Start: 2023-03-24 | End: 2023-03-24 | Stop reason: SDUPTHER

## 2023-03-24 RX ORDER — ONDANSETRON 2 MG/ML
4 INJECTION INTRAMUSCULAR; INTRAVENOUS
Status: DISCONTINUED | OUTPATIENT
Start: 2023-03-24 | End: 2023-03-24 | Stop reason: HOSPADM

## 2023-03-24 RX ORDER — SODIUM CHLORIDE 0.9 % (FLUSH) 0.9 %
5-40 SYRINGE (ML) INJECTION EVERY 12 HOURS SCHEDULED
Status: DISCONTINUED | OUTPATIENT
Start: 2023-03-24 | End: 2023-03-24 | Stop reason: HOSPADM

## 2023-03-24 RX ORDER — KETOROLAC TROMETHAMINE 30 MG/ML
INJECTION, SOLUTION INTRAMUSCULAR; INTRAVENOUS PRN
Status: DISCONTINUED | OUTPATIENT
Start: 2023-03-24 | End: 2023-03-24 | Stop reason: SDUPTHER

## 2023-03-24 RX ORDER — BUPIVACAINE HYDROCHLORIDE 2.5 MG/ML
INJECTION, SOLUTION INFILTRATION; PERINEURAL
Status: COMPLETED | OUTPATIENT
Start: 2023-03-24 | End: 2023-03-24

## 2023-03-24 RX ADMIN — Medication 2 G: at 09:14

## 2023-03-24 RX ADMIN — DEXMEDETOMIDINE HYDROCHLORIDE 4 MCG: 100 INJECTION, SOLUTION INTRAVENOUS at 12:45

## 2023-03-24 RX ADMIN — Medication 0.5 MG: at 09:24

## 2023-03-24 RX ADMIN — ESMOLOL HYDROCHLORIDE 10 MG: 10 INJECTION, SOLUTION INTRAVENOUS at 10:51

## 2023-03-24 RX ADMIN — MIDAZOLAM HYDROCHLORIDE 2 MG: 1 INJECTION, SOLUTION INTRAMUSCULAR; INTRAVENOUS at 09:03

## 2023-03-24 RX ADMIN — SUGAMMADEX 200 MG: 100 INJECTION, SOLUTION INTRAVENOUS at 12:39

## 2023-03-24 RX ADMIN — ROCURONIUM BROMIDE 30 MG: 10 INJECTION, SOLUTION INTRAVENOUS at 09:49

## 2023-03-24 RX ADMIN — ESMOLOL HYDROCHLORIDE 10 MG: 10 INJECTION, SOLUTION INTRAVENOUS at 09:39

## 2023-03-24 RX ADMIN — DEXMEDETOMIDINE HYDROCHLORIDE 4 MCG: 100 INJECTION, SOLUTION INTRAVENOUS at 12:04

## 2023-03-24 RX ADMIN — PROPOFOL 200 MG: 10 INJECTION, EMULSION INTRAVENOUS at 09:07

## 2023-03-24 RX ADMIN — LIDOCAINE HYDROCHLORIDE 5 ML: 20 INJECTION, SOLUTION EPIDURAL; INFILTRATION; INTRACAUDAL; PERINEURAL at 12:35

## 2023-03-24 RX ADMIN — Medication 10 MG: at 12:06

## 2023-03-24 RX ADMIN — LIDOCAINE HYDROCHLORIDE 5 ML: 20 INJECTION, SOLUTION EPIDURAL; INFILTRATION; INTRACAUDAL; PERINEURAL at 12:36

## 2023-03-24 RX ADMIN — ESMOLOL HYDROCHLORIDE 10 MG: 10 INJECTION, SOLUTION INTRAVENOUS at 10:14

## 2023-03-24 RX ADMIN — ONDANSETRON 4 MG: 2 INJECTION INTRAMUSCULAR; INTRAVENOUS at 09:08

## 2023-03-24 RX ADMIN — Medication 0.5 MG: at 10:02

## 2023-03-24 RX ADMIN — BUPIVACAINE HYDROCHLORIDE 15 ML: 2.5 INJECTION, SOLUTION INFILTRATION; PERINEURAL at 12:36

## 2023-03-24 RX ADMIN — LIDOCAINE HYDROCHLORIDE 5 ML: 20 INJECTION, SOLUTION EPIDURAL; INFILTRATION; INTRACAUDAL; PERINEURAL at 12:34

## 2023-03-24 RX ADMIN — SODIUM CHLORIDE, POTASSIUM CHLORIDE, SODIUM LACTATE AND CALCIUM CHLORIDE: 600; 310; 30; 20 INJECTION, SOLUTION INTRAVENOUS at 09:37

## 2023-03-24 RX ADMIN — ROCURONIUM BROMIDE 20 MG: 10 INJECTION, SOLUTION INTRAVENOUS at 10:52

## 2023-03-24 RX ADMIN — OXYCODONE 5 MG: 5 TABLET ORAL at 14:39

## 2023-03-24 RX ADMIN — ESMOLOL HYDROCHLORIDE 15 MG: 10 INJECTION, SOLUTION INTRAVENOUS at 09:27

## 2023-03-24 RX ADMIN — DEXAMETHASONE SODIUM PHOSPHATE 10 MG: 10 INJECTION INTRAMUSCULAR; INTRAVENOUS at 09:08

## 2023-03-24 RX ADMIN — DEXMEDETOMIDINE HYDROCHLORIDE 4 MCG: 100 INJECTION, SOLUTION INTRAVENOUS at 12:29

## 2023-03-24 RX ADMIN — FENTANYL CITRATE 50 MCG: 50 INJECTION, SOLUTION INTRAMUSCULAR; INTRAVENOUS at 09:05

## 2023-03-24 RX ADMIN — BUPIVACAINE HYDROCHLORIDE 15 ML: 2.5 INJECTION, SOLUTION INFILTRATION; PERINEURAL at 12:34

## 2023-03-24 RX ADMIN — SODIUM CHLORIDE, POTASSIUM CHLORIDE, SODIUM LACTATE AND CALCIUM CHLORIDE: 600; 310; 30; 20 INJECTION, SOLUTION INTRAVENOUS at 08:29

## 2023-03-24 RX ADMIN — LIDOCAINE HYDROCHLORIDE 5 ML: 20 INJECTION, SOLUTION EPIDURAL; INFILTRATION; INTRACAUDAL; PERINEURAL at 12:37

## 2023-03-24 RX ADMIN — FENTANYL CITRATE 50 MCG: 50 INJECTION, SOLUTION INTRAMUSCULAR; INTRAVENOUS at 09:10

## 2023-03-24 RX ADMIN — BUPIVACAINE HYDROCHLORIDE 15 ML: 2.5 INJECTION, SOLUTION INFILTRATION; PERINEURAL at 12:37

## 2023-03-24 RX ADMIN — KETOROLAC TROMETHAMINE 30 MG: 30 INJECTION, SOLUTION INTRAMUSCULAR at 12:26

## 2023-03-24 RX ADMIN — ROCURONIUM BROMIDE 30 MG: 10 INJECTION, SOLUTION INTRAVENOUS at 12:00

## 2023-03-24 RX ADMIN — SODIUM CHLORIDE, POTASSIUM CHLORIDE, SODIUM LACTATE AND CALCIUM CHLORIDE: 600; 310; 30; 20 INJECTION, SOLUTION INTRAVENOUS at 09:49

## 2023-03-24 RX ADMIN — ROCURONIUM BROMIDE 50 MG: 10 INJECTION, SOLUTION INTRAVENOUS at 09:08

## 2023-03-24 RX ADMIN — BUPIVACAINE HYDROCHLORIDE 15 ML: 2.5 INJECTION, SOLUTION INFILTRATION; PERINEURAL at 12:35

## 2023-03-24 ASSESSMENT — PAIN DESCRIPTION - LOCATION
LOCATION: ABDOMEN

## 2023-03-24 ASSESSMENT — PAIN DESCRIPTION - PAIN TYPE
TYPE: SURGICAL PAIN

## 2023-03-24 ASSESSMENT — PAIN SCALES - GENERAL
PAINLEVEL_OUTOF10: 3
PAINLEVEL_OUTOF10: 3
PAINLEVEL_OUTOF10: 0
PAINLEVEL_OUTOF10: 5

## 2023-03-24 ASSESSMENT — PAIN DESCRIPTION - ORIENTATION
ORIENTATION: RIGHT

## 2023-03-24 ASSESSMENT — LIFESTYLE VARIABLES: SMOKING_STATUS: 1

## 2023-03-24 ASSESSMENT — PAIN - FUNCTIONAL ASSESSMENT: PAIN_FUNCTIONAL_ASSESSMENT: 0-10

## 2023-03-24 NOTE — PROGRESS NOTES
CLINICAL PHARMACY NOTE: MEDS TO BEDS    Total # of Prescriptions Filled: 1   The following medications were delivered to the patient:  Hydrocodone/apap 5/325    Additional Documentation:

## 2023-03-24 NOTE — ANESTHESIA POSTPROCEDURE EVALUATION
Department of Anesthesiology  Postprocedure Note    Patient: Hailey Yanez  MRN: [id number]  [place]: 1980  Date of evaluation: 3/24/2023      Procedure Summary     Date: 03/24/23 Room / Location: [address] [place] [address]    Anesthesia Start: 8349 Anesthesia Stop: 3186    Procedure: Laparoscopic Robotic Assisted Recurrent Bilateral Inguinal hernia repair with mesh and  Umbilical hernia repair (Bilateral: Abdomen) Diagnosis:       Bilateral recurrent inguinal hernia without obstruction or gangrene      Incisional hernia, without obstruction or gangrene      Umbilical hernia without obstruction and without gangrene      (Bilateral recurrent inguinal hernia without obstruction or gangrene [K40.21])      (Incisional hernia, without obstruction or gangrene [Z21.3])      (Umbilical hernia without obstruction and without gangrene [K42.9])    Surgeons: Sherryle Huxley, DO Responsible Provider: CLAUDIA Weinstein CRNA    Anesthesia Type: general ASA Status: 3          Anesthesia Type: No value filed.     Denver Phase I: Denver Score: 6    Denver Phase II:        Anesthesia Post Evaluation    Patient location during evaluation: PACU  Patient participation: complete - patient participated  Level of consciousness: awake  Pain score: 0  Airway patency: patent  Nausea & Vomiting: no nausea and no vomiting  Complications: no  Cardiovascular status: blood pressure returned to baseline  Respiratory status: acceptable  Hydration status: euvolemic

## 2023-03-24 NOTE — OP NOTE
procedure were verified. Prior to  the time of incision, the patient received preoperative antibiotics in  accordance with SCIP protocol. The patient's abdomen was prepped and  draped in the sterile fashion. The skin in the left upper quadrant of  the abdomen approximately 2 to 3 cm below the costal margin at  midclavicular line was anesthetized with local anesthetic. A small  transverse incision was made through this area. Veress needle was  advanced into the abdomen. Once in, saline drop test showed no  aspiration of blood or enteric fluid and free flow of saline. Insufflation tubing was connected and the patient's abdomen was  insufflated to 12 mmHg. Once this was complete, the Veress needle was  removed and an 8 mm robotic trocar with laparoscope in place was  advanced into the abdomen in an Optiview fashion. Once in, inspection  of underlying tissue showed no damage during entrance. I did inspect  the abdomen at this point and really could not appreciate any hernias in  the pelvis due to a lot of bowel and omentum that was falling down into  the area. I did inspect the anterior abdominal wall and it seemed that  the patient had a small hernia just to the right and above the level of  the umbilicus at a prior port site. At this point, I did go ahead and  place the patient into some head-down position which allowed some of the  tissue in the pelvis to fall away and I was able to identify what  appeared to be a fairly large direct inguinal hernia on the right side  and what looked like a fairly small hernia on the left side. Under  visualization with the scope, two additional robotic trocars were placed  in the upper abdomen in the midline and in the right upper quadrant. Once we had the trocars in place, the robot was docked and targeted to  the pelvis. Working instruments of Force bipolar and hot scissors were  placed.   As the hernia on the right side appeared slightly larger, I  decided to defect in a running fashion in two  layers. Once I had the defect closed, I then closed the peritoneal  window that was created with absorbable 3-0 V-Loc suture. There was a  small hole in the peritoneum that was created during the closure of the  flap and I used a 3-0 Vicryl to close this in a figure-of-eight fashion. Inspection of the anterior abdominal wall showed that we did have good  hemostasis at this point and the peritoneal window was completely  closed. I then inspected the abdomen to make sure that there was no  obvious injuries to intra-abdominal tissue and none were noted. The  additional suture and needles were then removed along with the measuring  tape that had been placed in at the beginning of the case as well as a  small sponge that had been placed during the procedure. At this point,  the robotic instruments were removed and the robot was undocked. It  should be noted that during the case, I did end up scrubbing back in for  a brief period to place a 5 mm assist port to help with retraction of  some of the omentum and bowel that was falling down into the pelvis to  make the dissection a little bit easier. Once we had the robot  undocked, I uncapped the trocars to allow evacuation of CO2 from the  abdomen and then all trocars were removed. All four trocar sites were  then anesthetized with local anesthetic and closed at skin level with  4-0 Monocryl in a subcuticular fashion. After the completion of the  surgical case, Anesthesia did perform bilateral TAP blocks. Please see  their notations for that portion of the procedure. After completing TAP  blocks, the patient was awoken from anesthesia and taken to the PACU in  stable condition.         Vivian Schroeder    D: 03/24/2023 12:47:09       T: 03/24/2023 12:53:10     [id number]  Job#: [id number]     Doc#: [id number]    CC:  Primary Care

## 2023-03-24 NOTE — ANESTHESIA PROCEDURE NOTES
Peripheral Block    Patient location during procedure: OR  Reason for block: post-op pain management  Start time: 3/24/2023 12:32 PM  End time: 3/24/2023 12:38 PM  Staffing  Performed: resident/CRNA   Resident/CRNA: CLAUDIA Gaston CRNA  Preanesthetic Checklist  Completed: patient identified, IV checked, site marked, risks and benefits discussed, surgical/procedural consents, equipment checked, pre-op evaluation, timeout performed, anesthesia consent given, oxygen available, monitors applied/VS acknowledged, fire risk safety assessment completed and verbalized and blood product R/B/A discussed and consented  Peripheral Block   Patient position: supine  Prep: ChloraPrep  Provider prep: mask and sterile gloves  Patient monitoring: cardiac monitor, continuous pulse ox, continuous capnometry, IV access, oxygen and frequent blood pressure checks  Block type: TAP  Laterality: bilateral  Injection technique: single-shot  Guidance: ultrasound guided    Needle   Needle type: insulated echogenic nerve stimulator needle   Needle gauge: 20 G  Needle localization: ultrasound guidance  Needle length: 5 cm  Assessment   Injection assessment: negative aspiration for heme, local visualized surrounding nerve on ultrasound and no intravascular symptoms  Slow fractionated injection: yes  Hemodynamics: stable  Real-time US image taken/store: yes  Outcomes: uncomplicated and patient tolerated procedure well    Medications Administered  lidocaine PF 2 % - Perineural   5 mL - 3/24/2023 12:34:00 PM   5 mL - 3/24/2023 12:35:00 PM   5 mL - 3/24/2023 12:36:00 PM   5 mL - 3/24/2023 12:37:00 PM  bupivacaine 0.25 % - Perineural   15 mL - 3/24/2023 12:34:00 PM   15 mL - 3/24/2023 12:35:00 PM   15 mL - 3/24/2023 12:36:00 PM   15 mL - 3/24/2023 12:37:00 PM  dexamethasone 4 MG/ML - Perineural   4 mg - 3/24/2023 12:34:00 PM   4 mg - 3/24/2023 12:35:00 PM   4 mg - 3/24/2023 12:36:00 PM   4 mg - 3/24/2023 12:37:00 PM

## 2023-03-24 NOTE — ANESTHESIA PRE PROCEDURE
Department of Anesthesiology  Preprocedure Note       Name:  Lilibeth Tai   Age:  36 y.o.  :  1980                                          MRN:  [id number]         Date:  3/24/2023      Surgeon: Zackary Sullivan):  Sincere Whitehead DO    Procedure: Procedure(s):  Laparoscopic Robotic Assisted Recurrent Bilateral Inguinal hernia repair with mesh and  Umbilical vs Incisional hernia repair    Medications prior to admission:   Prior to Admission medications    Medication Sig Start Date End Date Taking? Authorizing Provider   divalproex (DEPAKOTE) 250 MG DR tablet take 3 tablets by mouth twice a day    Sebastian Yi MD   lamoTRIgine (LAMICTAL) 100 MG tablet 1 TABLET DAILY AT BEDTIME 82   Sebastian Yi MD   aspirin EC 81 MG EC tablet Take 1 tablet by mouth daily 3/7/23   Elbridge Bosworth, MD   metoprolol succinate (TOPROL XL) 100 MG extended release tablet Take 1 tablet by mouth at bedtime 22   Javier Vargas DO   nitroGLYCERIN (NITROSTAT) 0.4 MG SL tablet Place 1 tablet under the tongue every 5 minutes as needed for Chest pain up to max of 3 total doses.  If no relief after 1 dose, call 911. 11/3/22   CLAUDIA Sin CNP   atorvastatin (LIPITOR) 40 MG tablet Take 1 tablet by mouth nightly 11/3/22   CLAUDIA Sin CNP   meloxicam (MOBIC) 15 MG tablet take 1 tablet by mouth once daily 22   Autumn Antonio MD   promethazine (PHENERGAN) 25 MG tablet take 1 tablet by mouth every 8 hours if needed for nausea /   Sebastian Yi MD   SUMAtriptan (IMITREX) 50 MG tablet take 1 tablet by mouth once daily if needed for MIGRAINE 76/32/60   Sebastian Yi MD   lisinopril (PRINIVIL;ZESTRIL) 20 MG tablet take 1 tablet by mouth daily  Patient not taking: No sig reported 21   Historical Provider, MD       Current medications:    Current Facility-Administered Medications   Medication Dose Route Frequency Provider Last Rate Last Admin    lactated ringers IV soln

## 2023-03-24 NOTE — BRIEF OP NOTE
Brief Postoperative Note      Patient: Marcie Tena  YOB: 1980  MRN: [id number]    Date of Procedure: 3/24/2023    Pre-Op Diagnosis: Bilateral recurrent inguinal hernia without obstruction or gangrene [K40.21]  Incisional hernia, without obstruction or gangrene [U73.6]  Umbilical hernia without obstruction and without gangrene [K42.9]    Post-Op Diagnosis: Same       Procedure(s):  Laparoscopic Robotic Assisted Recurrent Bilateral Inguinal hernia repair with mesh and  Umbilical hernia repair    Surgeon(s):  Kate Marroquin DO    Assistant:  * No surgical staff found *    Anesthesia: General    Estimated Blood Loss (mL): 82WB    Complications: None    Specimens:   * No specimens in log *    Implants:  Implant Name Type Inv. Item Serial No.  Lot No. LRB No. Used Action   MESH SYLWIA [id number] POLY POLYLACTIC ACID 70% CLLGN 30% GLYC - [id number]  MESH SYLWIA [id number] POLY POLYLACTIC ACID 70% CLLGN 30% GLYC  [employer] COVID[employer]EN  SURGICAL-WD [id number] Right 1 Implanted   MESH SYLWIA [id number] POLY POLYLACTIC ACID 70% CLLGN 30% GLYC - [id number]  MESH SYLWIA [id number] POLY POLYLACTIC ACID 70% CLLGN 30% GLYC  [employer]  SURGICAL-WD [id number] Left 1 Implanted         Drains:   Urinary Catheter 03/24/23 Elizalde (Active)       Findings: Bilateral direct inguinal hernias. Small incarcerated ventral incisional hernia measuring approx 2cm.     Electronically signed by Kate Marroquin DO on 3/24/2023 at 12:47 PM

## 2023-03-24 NOTE — H&P
Subjective   Lola Barraza is a 36 y.o. male who presents today for further evaluation and recommendations for multiple hernias. Patient reports that he has had bilateral inguinal hernia repairs in the past and has had each side repaired twice previously. He also reports that he has had a prior fundoplication and soon after that surgery began to have some discomfort at the periumbilical incision and has been told from prior imaging that he has a hernia at that area as well. He has been dealing with these hernias and has begun to notice a bulge in the right groin is also starting to have some pain in the right groin as well as at the periumbilical incision. He was seen by his PCP and then referred on to general surgery for discussion of repair of these hernias. He has also prior fundoplication for hiatal hernia and is now had a recurrence there as well. He is seeing a surgeon in [facility] for discussion of repair of the recurrent hiatal hernia. However he states that he would like to go ahead and proceed with repair of the other hernias as soon as possible due to worsening symptoms. Denies any changes in bowel movements. No nausea or emesis. Past Medical History        Past Medical History:   Diagnosis Date    Anxiety, mild      Colon polyps      Dizziness      Eczema      Epilepsy ([address]      GERD (gastroesophageal reflux disease)      H/O fall      Head ache      Head injury      Hiatal hernia      Inguinal hernia       Right. Low back pain      Lumbar sprain      Migraine      Plantar fasciitis, bilateral      Psychogenic nonepileptic seizure 11/16/2022    Seizure disorder ([address]      Sigmoid diverticulosis      Sleep difficulties      Status migrainosus      TIA (transient ischemic attack) 09/2017    Tobacco use       Chronic.             Past Surgical History         Past Surgical History:   Procedure Laterality Date    COLONOSCOPY   04/29/2011     Internal hemorrhoids, possible anal fissure, nebulization inhale orally contents of 1 vial in nebulizer every 6 hours if needed (Patient not taking: Reported on 2023)        metoprolol succinate (TOPROL XL) 100 MG extended release tablet Take 1 tablet by mouth at bedtime (Patient not taking: Reported on 2023) 90 tablet 3    apixaban (ELIQUIS) 5 MG TABS tablet Take 1 tablet by mouth 2 times daily (Patient not taking: Reported on 2023) 60 tablet 11    lisinopril (PRINIVIL;ZESTRIL) 20 MG tablet take 1 tablet by mouth daily          No current facility-administered medications for this visit.             No Known Allergies     Family History         Family History   Problem Relation Age of Onset    COPD Mother      High Blood Pressure Mother      Cancer Father           Hodgekin's Lymphoma    High Blood Pressure Father      Eczema Sister      Alzheimer's Disease Maternal Grandmother      COPD Maternal Grandmother      Cancer Maternal Grandmother      Cancer Paternal Grandmother      Cancer Paternal Grandfather      Cancer Sister           colon cancer    High Blood Pressure Sister      Arthritis Sister      Asthma Daughter      Eczema Daughter      Glaucoma Neg Hx      Diabetes Neg Hx      Cataracts Neg Hx              Social History               Socioeconomic History    Marital status:        Spouse name: Not on file    Number of children: Not on file    Years of education: Not on file    Highest education level: Not on file   Occupational History    Not on file   Tobacco Use    Smoking status: Every Day       Packs/day: 0.25       Years: 20.00       Pack years: 5.00       Types: Cigarettes       Last attempt to quit: 2016       Years since quittin.7    Smokeless tobacco: Never    Tobacco comments:       2-3 weekly   Vaping Use    Vaping Use: Never used   Substance and Sexual Activity    Alcohol use: No       Alcohol/week: 0.0 standard drinks       Comment: rarely    Drug use: No    Sexual activity: Not on file   Other Topics Concern

## 2023-03-24 NOTE — DISCHARGE INSTRUCTIONS
Patient Discharge Instructions  Discharge Date:  03/24/23       Discharged To: Home    Home with Home Health Care: No    RESUME ACTIVITY:      BATHING: Ok to shower starting the day after surgery. No tub baths or submerging in water until after follow up in office. DRIVING: No driving for 1week  or while taking narcotic pain medications    RETURN TO WORK: Eliceo Olea to return as tolerated 1 week following surgery with the following restrictions:  No lifting more than 10 pounds  The above restrictions are in effect for 6 week(s)    WALKING:  Yes    STAIRS:  Yes    LIFTING: Less than 10 pounds for 6weeks     DIET: common adult    SPECIAL INSTRUCTIONS:     Call the office at [phone] if you have a fever > 100 F, or if your incision becomes red, tender, or drains more than a small amount of clear fluid. Call for follow up appointment with Dr. Leny José in:  1-2weeks    May use ibuprofen, if able, for additional pain control. Use up to 400mg every 6 hours as needed. Ok to use ice packs to incisions for comfort. Use 15 minutes on, 30 minutes off and repeat as desired. Use over the counter stool softeners such as miralax or colace as needed for constipation.

## 2023-03-27 PROBLEM — K40.90 LEFT INGUINAL HERNIA: Status: ACTIVE | Noted: 2023-03-27

## 2023-03-27 PROBLEM — G89.18 POST-OP PAIN: Status: ACTIVE | Noted: 2023-03-27

## 2023-03-27 PROBLEM — K40.90 RIGHT INGUINAL HERNIA: Status: ACTIVE | Noted: 2023-03-27

## 2023-03-28 ENCOUNTER — TELEPHONE (OUTPATIENT)
Dept: FAMILY MEDICINE CLINIC | Age: 43
End: 2023-03-28

## 2023-04-06 ENCOUNTER — OFFICE VISIT (OUTPATIENT)
Dept: SURGERY | Age: 43
End: 2023-04-06
Payer: COMMERCIAL

## 2023-04-06 VITALS
SYSTOLIC BLOOD PRESSURE: 114 MMHG | DIASTOLIC BLOOD PRESSURE: 84 MMHG | BODY MASS INDEX: 30.7 KG/M2 | HEIGHT: 66 IN | WEIGHT: 191 LBS | HEART RATE: 88 BPM

## 2023-04-06 DIAGNOSIS — Z09 POSTOP CHECK: Primary | ICD-10-CM

## 2023-04-06 PROCEDURE — G8417 CALC BMI ABV UP PARAM F/U: HCPCS | Performed by: SURGERY

## 2023-04-06 PROCEDURE — 4004F PT TOBACCO SCREEN RCVD TLK: CPT | Performed by: SURGERY

## 2023-04-06 PROCEDURE — 99213 OFFICE O/P EST LOW 20 MIN: CPT | Performed by: SURGERY

## 2023-04-06 PROCEDURE — G8427 DOCREV CUR MEDS BY ELIG CLIN: HCPCS | Performed by: SURGERY

## 2023-04-06 NOTE — ASSESSMENT & PLAN NOTE
The patient seems to be doing well overall and is progressing as expected. It is not unexpected that he is having some pain as we did close the bilateral direct defects before mesh placement and so certainly its not unexpected that he is having some pain from the closure of the hernias. However there is no acute findings and nothing to suggest any recurrence of hernia or problems with the repairs. I have discussed the findings with him and he voiced understanding. Patient is encouraged to continue activity restrictions for full 6 weeks. Okay to begin submerging in water. Okay to drive if not taking narcotic pain medication. We will plan to see him back in 3-4 weeks for recheck.

## 2023-04-06 NOTE — PROGRESS NOTES
[place] with biopsy, Dr. Henrique Hawkins  03/95/5186    HERNIA REPAIR Left 11/30/2017    Left Inguinal Hernia Repair w/ Mesh performed by Tabby Rowland MD at [address] [place] Bilateral 3/24/2023    Laparoscopic Robotic Assisted Recurrent Bilateral Inguinal hernia repair with mesh and  Umbilical hernia repair performed by Salvador Chicas DO at [address] Right 05/30/2013    INGUINAL HERNIA REPAIR Left 01/24/2013    INGUINAL HERNIA REPAIR Right 09/24/2015    KNEE ARTHROSCOPY Left     PILONIDAL CYST EXCISION N/A 07/23/2020    PILONIDAL CYSTECTOMY performed by Salvador Chicas DO at [address] [facility]  03/05/2010    Recurrent hiatal hernia. UPPER GASTROINTESTINAL ENDOSCOPY  06/01/2016    S/P brenda fundoplication, good repair, retained gastric fluid    VASECTOMY         Current Outpatient Medications   Medication Sig Dispense Refill    divalproex (DEPAKOTE) 250 MG DR tablet take 3 tablets by mouth twice a day 180 tablet 5    lamoTRIgine (LAMICTAL) 100 MG tablet 1 TABLET DAILY AT BEDTIME 30 tablet 5    aspirin EC 81 MG EC tablet Take 1 tablet by mouth daily 30 tablet 5    metoprolol succinate (TOPROL XL) 100 MG extended release tablet Take 1 tablet by mouth at bedtime 90 tablet 3    nitroGLYCERIN (NITROSTAT) 0.4 MG SL tablet Place 1 tablet under the tongue every 5 minutes as needed for Chest pain up to max of 3 total doses.  If no relief after 1 dose, call 911. 25 tablet 3    atorvastatin (LIPITOR) 40 MG tablet Take 1 tablet by mouth nightly 30 tablet 3    meloxicam (MOBIC) 15 MG tablet take 1 tablet by mouth once daily 30 tablet 5    promethazine (PHENERGAN) 25 MG tablet take 1 tablet by mouth every 8 hours if needed for nausea 60 tablet 2    SUMAtriptan (IMITREX) 50 MG tablet take 1 tablet by mouth once daily if needed for MIGRAINE 30 tablet 2    lisinopril (PRINIVIL;ZESTRIL) 20 MG tablet take 1 tablet by mouth daily (Patient not taking:

## 2023-04-07 RX ORDER — MELOXICAM 15 MG/1
TABLET ORAL
Qty: 30 TABLET | Refills: 2 | Status: SHIPPED | OUTPATIENT
Start: 2023-04-07

## 2023-05-06 PROBLEM — Z09 POSTOP CHECK: Status: RESOLVED | Noted: 2023-04-06 | Resolved: 2023-05-06

## 2023-05-16 ENCOUNTER — OFFICE VISIT (OUTPATIENT)
Dept: SURGERY | Age: 43
End: 2023-05-16
Payer: COMMERCIAL

## 2023-05-16 VITALS
HEIGHT: 66 IN | OXYGEN SATURATION: 97 % | HEART RATE: 75 BPM | SYSTOLIC BLOOD PRESSURE: 124 MMHG | BODY MASS INDEX: 30.53 KG/M2 | DIASTOLIC BLOOD PRESSURE: 82 MMHG | WEIGHT: 190 LBS

## 2023-05-16 DIAGNOSIS — Z09 POSTOP CHECK: Primary | ICD-10-CM

## 2023-05-16 PROCEDURE — G8427 DOCREV CUR MEDS BY ELIG CLIN: HCPCS | Performed by: SURGERY

## 2023-05-16 PROCEDURE — 99212 OFFICE O/P EST SF 10 MIN: CPT | Performed by: SURGERY

## 2023-05-16 PROCEDURE — G8417 CALC BMI ABV UP PARAM F/U: HCPCS | Performed by: SURGERY

## 2023-05-16 PROCEDURE — 4004F PT TOBACCO SCREEN RCVD TLK: CPT | Performed by: SURGERY

## 2023-05-16 NOTE — ASSESSMENT & PLAN NOTE
Patient has been doing well overall and certainly there is no complications. Is having a little bit of pain in the right groin after returning to full activity. We discussed that he may continue have a little pain over the next few months as he is starting callus repair but certainly nothing of concern today. Is encouraged to perform physical activity as tolerated. No restrictions at this time. Plan to see patient back in office as needed. Encouraged call with any questions concerns or problems.

## 2023-05-16 NOTE — PROGRESS NOTES
Martha Bernard is a 36 y.o. male who presents today for follow-up after previous bilateral laparoscopic inguinal hernia and ventral incisional hernia repairs with mesh. Patient procedure approximately 7 weeks ago. He was initially seen back he was having some pain in the right groin as well as at the ventral incision sites. He injected restrictions and states the pain resolved. However he states that about a week ago he went out and mowed the grass with a push lower and has been having a lot of pain in the right groin seems to be worse with activity. No bulges noted. Pain is fairly minimal.  No other complaints today    Past Medical History:   Diagnosis Date    Anxiety, mild     Colon polyps     Dizziness     Eczema     Epilepsy ([address]     GERD (gastroesophageal reflux disease)     H/O fall     Head ache     Head injury     Hiatal hernia     Inguinal hernia     Right. Low back pain     Lumbar sprain     Migraine     Plantar fasciitis, bilateral     Psychogenic nonepileptic seizure 11/16/2022    Seizure disorder ([address]     Sigmoid diverticulosis     Sleep difficulties     Status migrainosus     TIA (transient ischemic attack) 09/2017    Tobacco use     Chronic. Past Surgical History:   Procedure Laterality Date    CARDIAC CATHETERIZATION  03/07/2023    Mild coronary artery disease. COLONOSCOPY  04/29/2011    Internal hemorrhoids, possible anal fissure, few scattered diverticula. COLONOSCOPY  06/04/2010    Mild sigmoid diverticulosis. COLONOSCOPY  08/28/2009    Sigmoid diverticulosis, internal hemorrhoids. COLONOSCOPY  05/27/2008    Internal hemorrhoids.     COLONOSCOPY  11/19/2014    polyp in rectum    COLONOSCOPY  06/01/2016    sigmoid diverticulosis, colon polyp, internal hemorrhoids    COLONOSCOPY  04/19/2019    [id number]    ESOPHAGOGASTRODUODENOSCOPY  02/13/2023    [facility] with biopsy, Dr. Jyoti Mcgrath  58/27/1042    HERNIA REPAIR Left

## 2023-06-15 PROBLEM — Z09 POSTOP CHECK: Status: RESOLVED | Noted: 2023-04-06 | Resolved: 2023-06-15

## 2023-07-12 ENCOUNTER — OFFICE VISIT (OUTPATIENT)
Dept: NEUROLOGY | Age: 43
End: 2023-07-12
Payer: COMMERCIAL

## 2023-07-12 ENCOUNTER — HOSPITAL ENCOUNTER (OUTPATIENT)
Age: 43
Discharge: HOME | End: 2023-07-12
Payer: COMMERCIAL

## 2023-07-12 VITALS
OXYGEN SATURATION: 95 % | RESPIRATION RATE: 14 BRPM | HEIGHT: 66 IN | WEIGHT: 195 LBS | BODY MASS INDEX: 31.34 KG/M2 | SYSTOLIC BLOOD PRESSURE: 130 MMHG | DIASTOLIC BLOOD PRESSURE: 88 MMHG | HEART RATE: 104 BPM

## 2023-07-12 DIAGNOSIS — G47.9 SLEEP DIFFICULTIES: ICD-10-CM

## 2023-07-12 DIAGNOSIS — G47.9 DISTURBANCE, SLEEP: ICD-10-CM

## 2023-07-12 DIAGNOSIS — I48.91 ATRIAL FIBRILLATION WITH RVR (HCC): ICD-10-CM

## 2023-07-12 DIAGNOSIS — R42 DIZZINESS: ICD-10-CM

## 2023-07-12 DIAGNOSIS — G40.909 SEIZURE DISORDER (HCC): ICD-10-CM

## 2023-07-12 DIAGNOSIS — D68.69 SECONDARY HYPERCOAGULABLE STATE (HCC): ICD-10-CM

## 2023-07-12 DIAGNOSIS — Z72.0 TOBACCO USE: ICD-10-CM

## 2023-07-12 DIAGNOSIS — G44.039 NONINTRACTABLE PAROXYSMAL HEMICRANIA, UNSPECIFIED CHRONICITY PATTERN: ICD-10-CM

## 2023-07-12 DIAGNOSIS — G43.709 CHRONIC MIGRAINE WITHOUT AURA WITHOUT STATUS MIGRAINOSUS, NOT INTRACTABLE: ICD-10-CM

## 2023-07-12 DIAGNOSIS — F44.5 PSYCHOGENIC NONEPILEPTIC SEIZURE: ICD-10-CM

## 2023-07-12 DIAGNOSIS — F34.1 DYSTHYMIA: ICD-10-CM

## 2023-07-12 DIAGNOSIS — I20.9 ANGINA PECTORIS, UNSPECIFIED (HCC): ICD-10-CM

## 2023-07-12 DIAGNOSIS — G45.0 VBI (VERTEBROBASILAR INSUFFICIENCY): ICD-10-CM

## 2023-07-12 DIAGNOSIS — K21.9 GASTROESOPHAGEAL REFLUX DISEASE WITHOUT ESOPHAGITIS: ICD-10-CM

## 2023-07-12 DIAGNOSIS — F41.9 ANXIETY, MILD: ICD-10-CM

## 2023-07-12 DIAGNOSIS — G40.009 PARTIAL IDIOPATHIC EPILEPSY WITH SEIZURES OF LOCALIZED ONSET, NOT INTRACTABLE, WITHOUT STATUS EPILEPTICUS (HCC): ICD-10-CM

## 2023-07-12 DIAGNOSIS — R51.9 NONINTRACTABLE HEADACHE, UNSPECIFIED CHRONICITY PATTERN, UNSPECIFIED HEADACHE TYPE: ICD-10-CM

## 2023-07-12 DIAGNOSIS — G40.909 SEIZURE DISORDER (HCC): Primary | ICD-10-CM

## 2023-07-12 PROBLEM — I25.119 ATHEROSCLEROTIC HEART DISEASE OF NATIVE CORONARY ARTERY WITH UNSPECIFIED ANGINA PECTORIS (HCC): Status: ACTIVE | Noted: 2023-07-12

## 2023-07-12 LAB
LAMOTRIGINE SERPL-MCNC: 3.1 UG/ML (ref 3–15)
VALPROATE SERPL-MCNC: 85 UG/ML (ref 50–125)

## 2023-07-12 PROCEDURE — 36415 COLL VENOUS BLD VENIPUNCTURE: CPT

## 2023-07-12 PROCEDURE — 4004F PT TOBACCO SCREEN RCVD TLK: CPT | Performed by: PSYCHIATRY & NEUROLOGY

## 2023-07-12 PROCEDURE — G8427 DOCREV CUR MEDS BY ELIG CLIN: HCPCS | Performed by: PSYCHIATRY & NEUROLOGY

## 2023-07-12 PROCEDURE — G8417 CALC BMI ABV UP PARAM F/U: HCPCS | Performed by: PSYCHIATRY & NEUROLOGY

## 2023-07-12 PROCEDURE — 80164 ASSAY DIPROPYLACETIC ACD TOT: CPT

## 2023-07-12 PROCEDURE — 99214 OFFICE O/P EST MOD 30 MIN: CPT | Performed by: PSYCHIATRY & NEUROLOGY

## 2023-07-12 PROCEDURE — 80175 DRUG SCREEN QUAN LAMOTRIGINE: CPT

## 2023-07-12 RX ORDER — LAMOTRIGINE 100 MG/1
TABLET ORAL
Qty: 30 TABLET | Refills: 5 | Status: SHIPPED | OUTPATIENT
Start: 2023-07-12

## 2023-07-12 RX ORDER — DIVALPROEX SODIUM 250 MG/1
TABLET, DELAYED RELEASE ORAL
Qty: 180 TABLET | Refills: 5 | Status: SHIPPED | OUTPATIENT
Start: 2023-07-12

## 2023-07-12 RX ORDER — SUMATRIPTAN 50 MG/1
TABLET, FILM COATED ORAL
Qty: 30 TABLET | Refills: 2 | Status: SHIPPED | OUTPATIENT
Start: 2023-07-12

## 2023-07-12 RX ORDER — PROMETHAZINE HYDROCHLORIDE 25 MG/1
TABLET ORAL
Qty: 60 TABLET | Refills: 2 | Status: SHIPPED | OUTPATIENT
Start: 2023-07-12

## 2023-07-12 ASSESSMENT — ENCOUNTER SYMPTOMS
ABDOMINAL DISTENTION: 0
VOICE CHANGE: 0
BLOOD IN STOOL: 0
EYE ITCHING: 0
DIARRHEA: 0
CONSTIPATION: 0
WHEEZING: 0
SHORTNESS OF BREATH: 0
EYE DISCHARGE: 0
CHANGE IN BOWEL HABIT: 0
FACIAL SWELLING: 0
COLOR CHANGE: 0
TROUBLE SWALLOWING: 0
APNEA: 0
CHEST TIGHTNESS: 0
CHOKING: 0

## 2023-07-12 NOTE — PROGRESS NOTES
The  Duration,  Quality,  Severity,  Location,  Timing,  Context,  Modifying  Factors   Of   The   Chief   Complaint       And  Present  Illness   Was   Reviewed   In   Chronological   [place]. Patient   Indicates   The  Presence   And  The  Absence  Of  The  Following  Associated  And       Additional  Neurological    Symptoms:                                Balance  And coordination problems  absent           Gait problems     absent            Headaches      present              Migraines           present           Memory problems        Present             Confusion        absent            Paresthesia numbness          absent           Seizures  And  Starring  Episodes           PRESENT           Syncope,  Near  syncopal episodes         absent           Speech problems           absent             Swallowing  Problems      absent            Dizziness,  Light headedness           present                        Vertigo        absent             Generalized   Weakness    absent              focal  Weakness     absent             Tremors         absent              Sleep  Problems     present             History  Of   Recent   Head  Injury     absent             History  Of   Recent  TIA     absent             History  Of   Recent    Stroke     absent             Neck  Pain and  Neck muscle  Spasms  Absent               Radiating  down   And   Weakness           absent            Lower back   Pain  And     Spasms  Present              Radiating    Down   And   Weakness          absent                H/O   FALLS        absent               History  Of   Visual  Symptoms    Absent                  Associated   Diplopia       absent                                                                  Also   Additional   Symptoms   Present    As  Documented    In   The detailed      Review  Of  Systems   And    Please   Refer   To    Them for   Additional  Information.            INFORMATION   REVIEWED:

## 2023-08-25 RX ORDER — MELOXICAM 15 MG/1
TABLET ORAL
Qty: 30 TABLET | Refills: 2 | OUTPATIENT
Start: 2023-08-25

## 2023-08-25 RX ORDER — MELOXICAM 15 MG/1
15 TABLET ORAL DAILY
Qty: 30 TABLET | Refills: 2 | Status: SHIPPED | OUTPATIENT
Start: 2023-08-25

## 2023-08-25 NOTE — TELEPHONE ENCOUNTER
Brandt Arzate called requesting a refill of the below medication which has been pended for you:     Requested Prescriptions     Pending Prescriptions Disp Refills    meloxicam (MOBIC) 15 MG tablet 30 tablet 2     Sig: Take 1 tablet by mouth daily       Last Appointment Date: 8/29/2022  Next Appointment Date: Visit date not found    No Known Allergies

## 2023-09-02 ENCOUNTER — HOSPITAL ENCOUNTER (EMERGENCY)
Age: 43
Discharge: HOME | End: 2023-09-02
Attending: EMERGENCY MEDICINE
Payer: COMMERCIAL

## 2023-09-02 ENCOUNTER — APPOINTMENT (OUTPATIENT)
Dept: CT IMAGING | Age: 43
End: 2023-09-02
Payer: COMMERCIAL

## 2023-09-02 VITALS
HEIGHT: 66 IN | RESPIRATION RATE: 15 BRPM | BODY MASS INDEX: 30.7 KG/M2 | HEART RATE: 89 BPM | DIASTOLIC BLOOD PRESSURE: 103 MMHG | TEMPERATURE: 98.4 F | WEIGHT: 191 LBS | OXYGEN SATURATION: 96 % | SYSTOLIC BLOOD PRESSURE: 130 MMHG

## 2023-09-02 DIAGNOSIS — I10 PRIMARY HYPERTENSION: ICD-10-CM

## 2023-09-02 DIAGNOSIS — R51.9 NONINTRACTABLE HEADACHE, UNSPECIFIED CHRONICITY PATTERN, UNSPECIFIED HEADACHE TYPE: Primary | ICD-10-CM

## 2023-09-02 LAB
ANION GAP SERPL CALCULATED.3IONS-SCNC: 13 MMOL/L (ref 9–17)
BASOPHILS # BLD: 0.04 K/UL (ref 0–0.2)
BASOPHILS NFR BLD: 1 % (ref 0–2)
BUN SERPL-MCNC: 15 MG/DL (ref 6–20)
BUN/CREAT SERPL: 15 (ref 9–20)
CALCIUM SERPL-MCNC: 9.7 MG/DL (ref 8.6–10.4)
CHLORIDE SERPL-SCNC: 102 MMOL/L (ref 98–107)
CO2 SERPL-SCNC: 23 MMOL/L (ref 20–31)
CREAT SERPL-MCNC: 1 MG/DL (ref 0.7–1.2)
EOSINOPHIL # BLD: 0.08 K/UL (ref 0–0.44)
EOSINOPHILS RELATIVE PERCENT: 1 % (ref 1–4)
ERYTHROCYTE [DISTWIDTH] IN BLOOD BY AUTOMATED COUNT: 12.2 % (ref 11.8–14.4)
GFR SERPL CREATININE-BSD FRML MDRD: >60 ML/MIN/1.73M2
GLUCOSE SERPL-MCNC: 117 MG/DL (ref 70–99)
HCT VFR BLD AUTO: 43.6 % (ref 40.7–50.3)
HGB BLD-MCNC: 15.6 G/DL (ref 13–17)
IMM GRANULOCYTES # BLD AUTO: 0.04 K/UL (ref 0–0.3)
IMM GRANULOCYTES NFR BLD: 1 %
LYMPHOCYTES NFR BLD: 2.1 K/UL (ref 1.1–3.7)
LYMPHOCYTES RELATIVE PERCENT: 27 % (ref 24–43)
MCH RBC QN AUTO: 33.8 PG (ref 25.2–33.5)
MCHC RBC AUTO-ENTMCNC: 35.8 G/DL (ref 25.2–33.5)
MCV RBC AUTO: 94.4 FL (ref 82.6–102.9)
MONOCYTES NFR BLD: 0.73 K/UL (ref 0.1–1.2)
MONOCYTES NFR BLD: 9 % (ref 3–12)
NEUTROPHILS NFR BLD: 61 % (ref 36–65)
NEUTS SEG NFR BLD: 4.81 K/UL (ref 1.5–8.1)
NRBC BLD-RTO: 0 PER 100 WBC
PLATELET # BLD AUTO: 222 K/UL (ref 138–453)
PMV BLD AUTO: 9.4 FL (ref 8.1–13.5)
POTASSIUM SERPL-SCNC: 4.2 MMOL/L (ref 3.7–5.3)
RBC # BLD AUTO: 4.62 M/UL (ref 4.21–5.77)
SODIUM SERPL-SCNC: 138 MMOL/L (ref 135–144)
VALPROATE SERPL-MCNC: 18 UG/ML (ref 50–125)
WBC OTHER # BLD: 7.8 K/UL (ref 3.5–11.3)

## 2023-09-02 PROCEDURE — 36415 COLL VENOUS BLD VENIPUNCTURE: CPT

## 2023-09-02 PROCEDURE — 85025 COMPLETE CBC W/AUTO DIFF WBC: CPT

## 2023-09-02 PROCEDURE — 99284 EMERGENCY DEPT VISIT MOD MDM: CPT

## 2023-09-02 PROCEDURE — 70450 CT HEAD/BRAIN W/O DYE: CPT

## 2023-09-02 PROCEDURE — 80164 ASSAY DIPROPYLACETIC ACD TOT: CPT

## 2023-09-02 PROCEDURE — 96374 THER/PROPH/DIAG INJ IV PUSH: CPT

## 2023-09-02 PROCEDURE — 80048 BASIC METABOLIC PNL TOTAL CA: CPT

## 2023-09-02 PROCEDURE — 80175 DRUG SCREEN QUAN LAMOTRIGINE: CPT

## 2023-09-02 PROCEDURE — 2500000003 HC RX 250 WO HCPCS: Performed by: EMERGENCY MEDICINE

## 2023-09-02 RX ORDER — METOPROLOL TARTRATE 5 MG/5ML
5 INJECTION INTRAVENOUS ONCE
Status: COMPLETED | OUTPATIENT
Start: 2023-09-02 | End: 2023-09-02

## 2023-09-02 RX ADMIN — METOPROLOL TARTRATE 5 MG: 1 INJECTION, SOLUTION INTRAVENOUS at 12:44

## 2023-09-02 ASSESSMENT — PAIN DESCRIPTION - LOCATION
LOCATION: HEAD
LOCATION: HEAD

## 2023-09-02 ASSESSMENT — PAIN DESCRIPTION - PAIN TYPE
TYPE: ACUTE PAIN
TYPE: ACUTE PAIN

## 2023-09-02 ASSESSMENT — PAIN DESCRIPTION - ORIENTATION
ORIENTATION: RIGHT
ORIENTATION: RIGHT

## 2023-09-02 ASSESSMENT — PAIN DESCRIPTION - FREQUENCY
FREQUENCY: INTERMITTENT
FREQUENCY: INTERMITTENT

## 2023-09-02 ASSESSMENT — PAIN DESCRIPTION - DESCRIPTORS
DESCRIPTORS: DISCOMFORT
DESCRIPTORS: DISCOMFORT

## 2023-09-02 ASSESSMENT — PAIN SCALES - GENERAL
PAINLEVEL_OUTOF10: 3
PAINLEVEL_OUTOF10: 4

## 2023-09-02 ASSESSMENT — PAIN - FUNCTIONAL ASSESSMENT
PAIN_FUNCTIONAL_ASSESSMENT: 0-10
PAIN_FUNCTIONAL_ASSESSMENT: ACTIVITIES ARE NOT PREVENTED

## 2023-09-02 ASSESSMENT — PAIN DESCRIPTION - ONSET: ONSET: PROGRESSIVE

## 2023-09-02 NOTE — ED PROVIDER NOTES
Carmel Nelson Name: Tabby Glynn  MRN: [id number]  [address] 1980  Date of evaluation: 9/2/2023      CHIEF COMPLAINT       Chief Complaint   Patient presents with    Headache         HISTORY OF PRESENT ILLNESS      The patient presents with headache. He says that he fell 2 weeks ago striking his head. He said he had a seizure while on the toilet. He did not get checked out. Over the past couple days he had intermittent headaches. He had one last night when he was bending over to put something down on the ground and had another one today. The headache was located in the back of the head. The headache lasted about for 5 minutes. He says he has a mild headache right now in the right side of his head measuring about a 3 out of 10. He has had no focal deficits. The patient does have a history of a seizure disorder and is on 2 different medicines for it. He reports no seizure since the episode 2 weeks ago. The patient has a history of hypertension but took himself off his medication 2 months ago and has not been taking it. He is hypertensive upon arrival.  He denies focal weakness or numbness. He denies vision change. He denies chest pain or shortness of breath. REVIEW OF SYSTEMS       All systems reviewed and negative unless noted in HPI. The patient denies fever or constitutional symptoms. Denies vision change. Denies any sore throat or rhinorrhea. Denies any neck pain or stiffness. Denies chest pain or shortness of breath. No nausea,  vomiting or diarrhea. Denies any dysuria. Denies musculoskeletal injury or pain. Denies any weakness, numbness or focal neurologic deficit. History of seizure disorder. Reports right-sided headache as noted in HPI. Denies any skin rash or edema. No recent psychiatric issues. No easy bruising or bleeding. Denies any polyuria, polydypsia or history of immunocompromise.        PAST for CVA is low. He is discharged in good condition. FINAL IMPRESSION      1. Nonintractable headache, unspecified chronicity pattern, unspecified headache type    2.  Primary hypertension          DISPOSITION/PLAN   DISPOSITION Decision To Discharge 09/02/2023 01:34:43 PM      Condition on Disposition    good    PATIENT REFERRED TO:  Radha Schultz MD    In 3 days        DISCHARGE MEDICATIONS:  New Prescriptions    No medications on file       (Please note that portions of this note were completed with a voice recognition program.  Efforts were made to edit the dictations but occasionally words are mis-transcribed.)    Rubi Hermosillo MD,, MD   Attending Emergency Physician          Rubi Hermosillo MD  09/02/23 [id number]

## 2023-09-02 NOTE — DISCHARGE INSTRUCTIONS
Continue seizure medications and other medications as directed. Talk to your doctor about blood pressure medicines. Return for headache, weakness, seizure, or if worse in any way. PLEASE RETURN TO THE EMERGENCY DEPARTMENT IMMEDIATELY if your symptoms worsen in anyway or in 8-12 hours if not improved for re-evaluation. You should immediately return to the ER for symptoms such as new or worsening pain, fever, visual or hearing changes, stiff neck, rash, a feeling of passing out, chest pain, shortness of breath, persistent nausea and/or vomiting, numbness or weakness to the arms or legs, coolness or color change of the arms or legs. Take your medication as indicated and prescribed. If you are given an antibiotic then, make sure you get the prescription filled and take the antibiotics until finished. Please understand that at this time there is no evidence for a more serious underlying process, but that early in the process of an illness or injury, an emergency department workup can be falsely reassuring. You should contact your family doctor within the next 24 hours for a follow up appointment    [address] [facility]!!!    From [facility]) and [facility]    On behalf of the Emergency Department staff at [facility]), I would like to thank you for giving us the opportunity to address your health care needs and concerns. We hope that during your visit, our service was delivered in a professional and caring manner. Please keep [facility]) in mind as we walk with you down the path to your own personal wellness. Please expect an automated text message or email from us so we can ask a few questions about your health and progress. Based on your answers, a clinician may call you back to offer help and instructions. Please understand that early in the process of an illness or injury, an emergency department workup can be falsely reassuring.   If you notice any worsening, changing or

## 2023-09-03 LAB — LAMOTRIGINE SERPL-MCNC: <1 UG/ML (ref 3–15)

## 2023-09-06 ENCOUNTER — OFFICE VISIT (OUTPATIENT)
Dept: CARDIOLOGY | Age: 43
End: 2023-09-06
Payer: COMMERCIAL

## 2023-09-06 VITALS
DIASTOLIC BLOOD PRESSURE: 92 MMHG | HEIGHT: 66 IN | HEART RATE: 95 BPM | BODY MASS INDEX: 30.7 KG/M2 | SYSTOLIC BLOOD PRESSURE: 138 MMHG | WEIGHT: 191 LBS

## 2023-09-06 DIAGNOSIS — I10 PRIMARY HYPERTENSION: ICD-10-CM

## 2023-09-06 DIAGNOSIS — I25.10 CORONARY ARTERY DISEASE INVOLVING NATIVE CORONARY ARTERY OF NATIVE HEART WITHOUT ANGINA PECTORIS: ICD-10-CM

## 2023-09-06 DIAGNOSIS — I48.0 PAF (PAROXYSMAL ATRIAL FIBRILLATION) (HCC): Primary | ICD-10-CM

## 2023-09-06 PROCEDURE — G8417 CALC BMI ABV UP PARAM F/U: HCPCS | Performed by: INTERNAL MEDICINE

## 2023-09-06 PROCEDURE — 99214 OFFICE O/P EST MOD 30 MIN: CPT | Performed by: INTERNAL MEDICINE

## 2023-09-06 PROCEDURE — 3080F DIAST BP >= 90 MM HG: CPT | Performed by: INTERNAL MEDICINE

## 2023-09-06 PROCEDURE — 93000 ELECTROCARDIOGRAM COMPLETE: CPT | Performed by: INTERNAL MEDICINE

## 2023-09-06 PROCEDURE — G8427 DOCREV CUR MEDS BY ELIG CLIN: HCPCS | Performed by: INTERNAL MEDICINE

## 2023-09-06 PROCEDURE — 4004F PT TOBACCO SCREEN RCVD TLK: CPT | Performed by: INTERNAL MEDICINE

## 2023-09-06 PROCEDURE — 3075F SYST BP GE 130 - 139MM HG: CPT | Performed by: INTERNAL MEDICINE

## 2023-09-06 RX ORDER — ATORVASTATIN CALCIUM 40 MG/1
40 TABLET, FILM COATED ORAL NIGHTLY
Qty: 90 TABLET | Refills: 3 | Status: SHIPPED | OUTPATIENT
Start: 2023-09-06

## 2023-09-06 NOTE — PROGRESS NOTES
Today's Date: 9/6/2023  Patient's Name: Agustin Davis  Patient's age: 36 y.o., 1980    Subjective:  Agustin Davis is being seen in clinic today regarding PAF, HTN, CAD    he is doing well from a cardiac standpoint. No chest pain, no dyspnea, no PND, no syncope or pre-syncope, no orthopnea. He is not taking taking any cardiac medication. He continues to smoking        Past Medical History:   has a past medical history of Anxiety, mild, Colon polyps, Dizziness, Eczema, Epilepsy ([address]), GERD (gastroesophageal reflux disease), H/O fall, Head ache, Head injury, Hiatal hernia, Inguinal hernia, Low back pain, Lumbar sprain, Migraine, Plantar fasciitis, bilateral, Psychogenic nonepileptic seizure, Seizure disorder ([address]), Sigmoid diverticulosis, Sleep difficulties, Status migrainosus, TIA (transient ischemic attack), and Tobacco use. Past Surgical History:   has a past surgical history that includes Vasectomy; Knee arthroscopy (Left); Gastric fundoplication (11/75/5090); Upper gastrointestinal endoscopy (03/05/2010); Colonoscopy (04/29/2011); Colonoscopy (06/04/2010); Colonoscopy (08/28/2009); Colonoscopy (05/27/2008); Colonoscopy (11/19/2014); Inguinal hernia repair (Right, 05/30/2013); Inguinal hernia repair (Left, 01/24/2013); Inguinal hernia repair (Right, 09/24/2015); Colonoscopy (06/01/2016); Upper gastrointestinal endoscopy (06/01/2016); hernia repair (Left, 11/30/2017); Colonoscopy (04/19/2019); Pilonidal cyst excision (N/A, 07/23/2020); Esophagogastroduodenoscopy (02/13/2023); Cardiac catheterization (03/07/2023); and hernia repair (Bilateral, 3/24/2023). Home Medications:  Prior to Admission medications    Medication Sig Start Date End Date Taking?  Authorizing Provider   meloxicam (MOBIC) 15 MG tablet Take 1 tablet by mouth daily 8/25/23   Enedina Felder MD   promethazine (PHENERGAN) 25 MG tablet take 1 tablet by mouth every 8 hours if needed for nausea 8/98/43   Holli Dubois MD

## 2023-09-07 ENCOUNTER — TELEPHONE (OUTPATIENT)
Dept: CARDIOLOGY | Age: 43
End: 2023-09-07

## 2023-09-07 NOTE — TELEPHONE ENCOUNTER
Patient called stating that he seen Dr Bernice Werner yesterday and was placed back on Eliquis but stated he cannot afford it. Patient is wondering if there is anything else cheaper for him to try?      Last Appt:  9/6/2023  Next Appt:   3/4/2024  Med verified in Epic

## 2023-09-08 DIAGNOSIS — I48.0 PAF (PAROXYSMAL ATRIAL FIBRILLATION) (HCC): Primary | ICD-10-CM

## 2023-09-08 RX ORDER — DABIGATRAN ETEXILATE 150 MG/1
150 CAPSULE ORAL 2 TIMES DAILY
Qty: 60 CAPSULE | Refills: 5 | Status: SHIPPED | OUTPATIENT
Start: 2023-09-08

## 2023-09-08 NOTE — TELEPHONE ENCOUNTER
LM on pt's voicemail of new rx sent to pharmacy and to call office if the new rx is still too costly.

## 2023-09-11 ENCOUNTER — HOSPITAL ENCOUNTER (OUTPATIENT)
Age: 43
Discharge: HOME | End: 2023-09-11
Payer: COMMERCIAL

## 2023-09-11 ENCOUNTER — OFFICE VISIT (OUTPATIENT)
Dept: NEUROLOGY | Age: 43
End: 2023-09-11
Payer: COMMERCIAL

## 2023-09-11 VITALS
OXYGEN SATURATION: 97 % | SYSTOLIC BLOOD PRESSURE: 144 MMHG | BODY MASS INDEX: 31.79 KG/M2 | WEIGHT: 197.8 LBS | HEIGHT: 66 IN | DIASTOLIC BLOOD PRESSURE: 86 MMHG | HEART RATE: 94 BPM

## 2023-09-11 DIAGNOSIS — G40.909 SEIZURE DISORDER (HCC): Primary | ICD-10-CM

## 2023-09-11 DIAGNOSIS — F41.9 ANXIETY, MILD: ICD-10-CM

## 2023-09-11 DIAGNOSIS — Z91.81 H/O FALL: ICD-10-CM

## 2023-09-11 DIAGNOSIS — F34.1 DYSTHYMIA: ICD-10-CM

## 2023-09-11 DIAGNOSIS — I48.91 ATRIAL FIBRILLATION WITH RVR (HCC): ICD-10-CM

## 2023-09-11 DIAGNOSIS — G40.009 PARTIAL IDIOPATHIC EPILEPSY WITH SEIZURES OF LOCALIZED ONSET, NOT INTRACTABLE, WITHOUT STATUS EPILEPTICUS (HCC): ICD-10-CM

## 2023-09-11 DIAGNOSIS — G40.909 SEIZURE DISORDER (HCC): ICD-10-CM

## 2023-09-11 DIAGNOSIS — Z72.0 TOBACCO USE: ICD-10-CM

## 2023-09-11 DIAGNOSIS — F44.5 PSYCHOGENIC NONEPILEPTIC SEIZURE: ICD-10-CM

## 2023-09-11 DIAGNOSIS — G40.909 RECURRENT SEIZURES (HCC): ICD-10-CM

## 2023-09-11 DIAGNOSIS — G47.9 SLEEP DIFFICULTIES: ICD-10-CM

## 2023-09-11 DIAGNOSIS — S09.90XS INJURY OF HEAD, SEQUELA: ICD-10-CM

## 2023-09-11 DIAGNOSIS — Z86.73 H/O TRANSIENT ISCHEMIC ATTACK INVOLVING ANTERIOR CIRCULATION: ICD-10-CM

## 2023-09-11 DIAGNOSIS — M54.12 CERVICAL RADICULOPATHY: ICD-10-CM

## 2023-09-11 LAB
ANION GAP SERPL CALCULATED.3IONS-SCNC: 10 MMOL/L (ref 9–17)
CHLORIDE SERPL-SCNC: 104 MMOL/L (ref 98–107)
CO2 SERPL-SCNC: 27 MMOL/L (ref 20–31)
ERYTHROCYTE [DISTWIDTH] IN BLOOD BY AUTOMATED COUNT: 12.2 % (ref 11.8–14.4)
HCT VFR BLD AUTO: 40.5 % (ref 40.7–50.3)
HGB BLD-MCNC: 14.4 G/DL (ref 13–17)
LAMOTRIGINE SERPL-MCNC: 5.2 UG/ML (ref 3–15)
MCH RBC QN AUTO: 33.6 PG (ref 25.2–33.5)
MCHC RBC AUTO-ENTMCNC: 35.6 G/DL (ref 25.2–33.5)
MCV RBC AUTO: 94.4 FL (ref 82.6–102.9)
NRBC BLD-RTO: 0 PER 100 WBC
PLATELET # BLD AUTO: 235 K/UL (ref 138–453)
PMV BLD AUTO: 9.3 FL (ref 8.1–13.5)
POTASSIUM SERPL-SCNC: 4.4 MMOL/L (ref 3.7–5.3)
RBC # BLD AUTO: 4.29 M/UL (ref 4.21–5.77)
SODIUM SERPL-SCNC: 141 MMOL/L (ref 135–144)
VALPROATE SERPL-MCNC: 76 UG/ML (ref 50–125)
WBC OTHER # BLD: 7.8 K/UL (ref 3.5–11.3)

## 2023-09-11 PROCEDURE — 85027 COMPLETE CBC AUTOMATED: CPT

## 2023-09-11 PROCEDURE — 80175 DRUG SCREEN QUAN LAMOTRIGINE: CPT

## 2023-09-11 PROCEDURE — 4004F PT TOBACCO SCREEN RCVD TLK: CPT | Performed by: PSYCHIATRY & NEUROLOGY

## 2023-09-11 PROCEDURE — G8427 DOCREV CUR MEDS BY ELIG CLIN: HCPCS | Performed by: PSYCHIATRY & NEUROLOGY

## 2023-09-11 PROCEDURE — 36415 COLL VENOUS BLD VENIPUNCTURE: CPT

## 2023-09-11 PROCEDURE — 99214 OFFICE O/P EST MOD 30 MIN: CPT | Performed by: PSYCHIATRY & NEUROLOGY

## 2023-09-11 PROCEDURE — 80164 ASSAY DIPROPYLACETIC ACD TOT: CPT

## 2023-09-11 PROCEDURE — G8417 CALC BMI ABV UP PARAM F/U: HCPCS | Performed by: PSYCHIATRY & NEUROLOGY

## 2023-09-11 PROCEDURE — 80051 ELECTROLYTE PANEL: CPT

## 2023-09-11 ASSESSMENT — ENCOUNTER SYMPTOMS
CHEST TIGHTNESS: 0
EYE DISCHARGE: 0
DIARRHEA: 0
WHEEZING: 0
BLOOD IN STOOL: 0
FACIAL SWELLING: 0
CONSTIPATION: 0
CHOKING: 0
VOICE CHANGE: 0
SHORTNESS OF BREATH: 0
CHANGE IN BOWEL HABIT: 0
TROUBLE SWALLOWING: 0
ABDOMINAL DISTENTION: 0
COLOR CHANGE: 0
EYE ITCHING: 0
APNEA: 0

## 2023-09-11 NOTE — PROGRESS NOTES
Subjective:        Patient ID: Sunny Adjutant is a 36 y. o. RIGHT   HANDED male. Seizures  This is a chronic problem. Episode onset: SINCE    2014. The problem occurs intermittently. The problem has been waxing and waning. Pertinent negatives include no arthralgias, change in bowel habit, chest pain, chills, congestion, diaphoresis, fatigue, joint swelling, myalgias, rash or urinary symptoms. Exacerbated by: LACK OF  SLEEP,  PROLONGED   TV ,   STRESS,   LOUD  NOISES   AND  BRIGHT  LIGHTS   Treatments tried: ANTI  EPILEPTIC  MEDICATION. The treatment provided moderate relief. Migraine   This is a chronic problem. Episode onset: MORE  THAN   10  YEARS. The problem occurs intermittently. The problem has been waxing and waning. The pain is located in the Right unilateral and frontal region. The pain does not radiate. The pain quality is similar to prior headaches. The quality of the pain is described as aching and throbbing. The pain is at a severity of 3/10. The pain is mild. Associated symptoms include seizures. The symptoms are aggravated by unknown. He has tried triptans and NSAIDs (TOPAMAX) for the symptoms. The treatment provided moderate relief. History obtained from  The patient          and other  available medical records were  Also  reviewed.                 1)      KNOWN    H/O   CHRONIC  SEVERE MIGRAINES                         FOR     10  YEARS                           -     WELL  CONTROLLED                      -   ON  IMITREX,   PHENERGAN   AS  NEEDED               2)     H/O   CHRONIC   TENSION  HEADACHE                       --   BETTER  CONTROLLED                            3)        H/O  SEIZURE  DISORDER /  EPILEPSY    SINCE      2014                            BETTER      SEIZURE  CONTROL                                        4)        PREVIOUS  H/O     INTOLERANCE  TO  KEPPRA                    DUE  TO  IRRITABILITY  AND MOOD PROBLEMS            5)       PREVIOUSLY

## 2023-09-12 RX ORDER — NITROGLYCERIN 0.4 MG/1
0.4 TABLET SUBLINGUAL EVERY 5 MIN PRN
Qty: 25 TABLET | Refills: 3 | Status: SHIPPED | OUTPATIENT
Start: 2023-09-12

## 2023-09-12 NOTE — TELEPHONE ENCOUNTER
Pt called to have nitro sent in. States he is out and has been needing it.     Last Appt:  9/6/2023  Next Appt:   3/4/2024  Med verified in [address]

## 2023-09-13 ENCOUNTER — HOSPITAL ENCOUNTER (EMERGENCY)
Age: 43
Discharge: HOME | End: 2023-09-13
Attending: EMERGENCY MEDICINE
Payer: COMMERCIAL

## 2023-09-13 ENCOUNTER — TELEPHONE (OUTPATIENT)
Dept: PRIMARY CARE CLINIC | Age: 43
End: 2023-09-13

## 2023-09-13 VITALS
OXYGEN SATURATION: 97 % | DIASTOLIC BLOOD PRESSURE: 98 MMHG | TEMPERATURE: 98.4 F | RESPIRATION RATE: 18 BRPM | SYSTOLIC BLOOD PRESSURE: 128 MMHG | HEART RATE: 116 BPM

## 2023-09-13 DIAGNOSIS — T23.261A PARTIAL THICKNESS BURN OF BACK OF RIGHT HAND, INITIAL ENCOUNTER: Primary | ICD-10-CM

## 2023-09-13 PROCEDURE — 6360000002 HC RX W HCPCS: Performed by: EMERGENCY MEDICINE

## 2023-09-13 PROCEDURE — 99284 EMERGENCY DEPT VISIT MOD MDM: CPT

## 2023-09-13 PROCEDURE — 6370000000 HC RX 637 (ALT 250 FOR IP)

## 2023-09-13 PROCEDURE — 96372 THER/PROPH/DIAG INJ SC/IM: CPT

## 2023-09-13 PROCEDURE — 6370000000 HC RX 637 (ALT 250 FOR IP): Performed by: EMERGENCY MEDICINE

## 2023-09-13 RX ORDER — KETOROLAC TROMETHAMINE 30 MG/ML
30 INJECTION, SOLUTION INTRAMUSCULAR; INTRAVENOUS ONCE
Status: COMPLETED | OUTPATIENT
Start: 2023-09-13 | End: 2023-09-13

## 2023-09-13 RX ORDER — GINSENG 100 MG
CAPSULE ORAL ONCE
Status: COMPLETED | OUTPATIENT
Start: 2023-09-13 | End: 2023-09-13

## 2023-09-13 RX ORDER — BACITRACIN ZINC AND POLYMYXIN B SULFATE 500; 1000 [USP'U]/G; [USP'U]/G
OINTMENT TOPICAL
Qty: 28 G | Refills: 1 | Status: SHIPPED | OUTPATIENT
Start: 2023-09-13 | End: 2023-09-20

## 2023-09-13 RX ORDER — BACITRACIN ZINC 500 [USP'U]/G
OINTMENT TOPICAL
Status: COMPLETED
Start: 2023-09-13 | End: 2023-09-13

## 2023-09-13 RX ORDER — HYDROCODONE BITARTRATE AND ACETAMINOPHEN 5; 325 MG/1; MG/1
1 TABLET ORAL EVERY 6 HOURS PRN
Qty: 20 TABLET | Refills: 0 | Status: SHIPPED | OUTPATIENT
Start: 2023-09-13 | End: 2023-09-16

## 2023-09-13 RX ORDER — HYDROCODONE BITARTRATE AND ACETAMINOPHEN 5; 325 MG/1; MG/1
2 TABLET ORAL ONCE
Status: CANCELLED | OUTPATIENT
Start: 2023-09-13 | End: 2023-09-13

## 2023-09-13 RX ORDER — HYDROCODONE BITARTRATE AND ACETAMINOPHEN 5; 325 MG/1; MG/1
2 TABLET ORAL ONCE
Status: COMPLETED | OUTPATIENT
Start: 2023-09-13 | End: 2023-09-13

## 2023-09-13 RX ADMIN — Medication: at 19:07

## 2023-09-13 RX ADMIN — HYDROCODONE BITARTRATE AND ACETAMINOPHEN 2 TABLET: 5; 325 TABLET ORAL at 19:05

## 2023-09-13 RX ADMIN — KETOROLAC TROMETHAMINE 30 MG: 30 INJECTION, SOLUTION INTRAMUSCULAR; INTRAVENOUS at 19:06

## 2023-09-13 RX ADMIN — BACITRACIN ZINC: 500 OINTMENT TOPICAL at 19:07

## 2023-09-13 ASSESSMENT — PAIN DESCRIPTION - ONSET: ONSET: SUDDEN

## 2023-09-13 ASSESSMENT — PAIN DESCRIPTION - ORIENTATION: ORIENTATION: RIGHT

## 2023-09-13 ASSESSMENT — PAIN DESCRIPTION - FREQUENCY: FREQUENCY: CONTINUOUS

## 2023-09-13 ASSESSMENT — PAIN DESCRIPTION - PAIN TYPE: TYPE: ACUTE PAIN

## 2023-09-13 ASSESSMENT — ENCOUNTER SYMPTOMS
ABDOMINAL PAIN: 0
NAUSEA: 0
TROUBLE SWALLOWING: 0
SORE THROAT: 0
VOMITING: 0
BACK PAIN: 0
WHEEZING: 0
SHORTNESS OF BREATH: 0

## 2023-09-13 ASSESSMENT — PAIN DESCRIPTION - LOCATION: LOCATION: ARM

## 2023-09-13 ASSESSMENT — LIFESTYLE VARIABLES: HOW OFTEN DO YOU HAVE A DRINK CONTAINING ALCOHOL: NEVER

## 2023-09-13 ASSESSMENT — PAIN SCALES - GENERAL: PAINLEVEL_OUTOF10: 8

## 2023-09-13 ASSESSMENT — PAIN - FUNCTIONAL ASSESSMENT: PAIN_FUNCTIONAL_ASSESSMENT: PREVENTS OR INTERFERES WITH ALL ACTIVE AND SOME PASSIVE ACTIVITIES

## 2023-09-13 ASSESSMENT — PAIN DESCRIPTION - DESCRIPTORS: DESCRIPTORS: BURNING
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
## 2023-09-19 ENCOUNTER — OFFICE VISIT (OUTPATIENT)
Dept: BURN CARE | Age: 43
End: 2023-09-19
Payer: COMMERCIAL

## 2023-09-19 VITALS
BODY MASS INDEX: 31.66 KG/M2 | HEART RATE: 86 BPM | DIASTOLIC BLOOD PRESSURE: 96 MMHG | SYSTOLIC BLOOD PRESSURE: 126 MMHG | WEIGHT: 197 LBS | HEIGHT: 66 IN

## 2023-09-19 DIAGNOSIS — T30.0 BURN: Primary | ICD-10-CM

## 2023-09-19 PROCEDURE — 99203 OFFICE O/P NEW LOW 30 MIN: CPT | Performed by: PLASTIC SURGERY

## 2023-09-19 PROCEDURE — 4004F PT TOBACCO SCREEN RCVD TLK: CPT | Performed by: PLASTIC SURGERY

## 2023-09-19 PROCEDURE — G8417 CALC BMI ABV UP PARAM F/U: HCPCS | Performed by: PLASTIC SURGERY

## 2023-09-19 PROCEDURE — G8427 DOCREV CUR MEDS BY ELIG CLIN: HCPCS | Performed by: PLASTIC SURGERY

## 2023-09-19 ASSESSMENT — ENCOUNTER SYMPTOMS
RESPIRATORY NEGATIVE: 1
DIARRHEA: 0
VOMITING: 0
EYES NEGATIVE: 1
NAUSEA: 0
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
## 2023-09-28 ENCOUNTER — HOSPITAL ENCOUNTER (OUTPATIENT)
Dept: NEUROLOGY | Age: 43
Discharge: HOME | End: 2023-09-28
Payer: COMMERCIAL

## 2023-09-28 DIAGNOSIS — G40.909 SEIZURE DISORDER (HCC): ICD-10-CM

## 2023-09-28 PROCEDURE — 95813 EEG EXTND MNTR 61-119 MIN: CPT

## 2023-09-28 NOTE — PROGRESS NOTES
EEG / EXTENDED EEG Completed with [facility]. PCP: Yvonne Gregorio MD    Ordering: Marilia Manjarrez Neurologist    Interpreting Physician: Courtney Qiu Neurologist    Technician: Shanice KELLY/PEPITO

## 2023-09-29 NOTE — PROCEDURES
sinus rhythm without any significant  abnormality. IMPRESSION:        No significant focal, lateralized or epileptiform features     noted during the current recording. Further clinical correlation and followup recommended. Ivania Du MD, Brendon Amanda     Board Certified in Neurology  & in  [address] [place] [address]  of Psychiatry and Neurology ([employer]).               D: 09/29/2023 9:26:29       T: 09/29/2023 9:59:19     [id number]  Job#: [id number]     Doc#: [id number]    CC:

## 2023-10-03 ENCOUNTER — TELEPHONE (OUTPATIENT)
Dept: FAMILY MEDICINE CLINIC | Age: 43
End: 2023-10-03

## 2023-10-03 NOTE — TELEPHONE ENCOUNTER
Attempted to reach patient regarding missed appointment on 10/3/2023. Unable to contact at this time. Unable to leave message. Letter mailed to reschedule.

## 2023-10-04 ENCOUNTER — CARE COORDINATION (OUTPATIENT)
Dept: CARE COORDINATION | Age: 43
End: 2023-10-04

## 2023-10-04 NOTE — CARE COORDINATION
Shyma Benoit was referred for ACM from office. He missed PCP F/U apt 10/3/2023.     10/4/2023- 1:19 pm  Left HIPAA compliant voice message requesting return call @ [phone] to assist with R/S apt with PCP and assess for ACM.      Future Appointments   Date Time Provider [address]   34/0/3681  5:58 PM [facility]   3/4/2024 11:30 AM Antonella Sierra, APRN - CNP [facility]

## 2023-10-06 ENCOUNTER — CARE COORDINATION (OUTPATIENT)
Dept: CARE COORDINATION | Age: 43
End: 2023-10-06

## 2023-10-06 NOTE — CARE COORDINATION
Layne Lechuga was referred for ACM from office. He missed PCP F/U apt 10/3/2023.      10/4/2023- 1:19 pm  Left HIPAA compliant voice message requesting return call @ [phone] to assist with R/S apt with PCP and assess for ACM. 10/6/2023- 11:12 am  Left HIPAA compliant voice message requesting return call @ [phone].      Future Appointments   Date Time Provider [address] [place] [address]   16/6/2315  7:91 PM Karolina Romano MD [facility]   3/4/2024 11:30 AM Kar Sierra, APRN - CNP [facility]

## 2023-10-10 ENCOUNTER — CARE COORDINATION (OUTPATIENT)
Dept: CARE COORDINATION | Age: 43
End: 2023-10-10

## 2023-10-10 NOTE — CARE COORDINATION
Kimberly See was referred for ACM from office. He missed PCP F/U apt 10/3/2023.      10/4/2023- 1:19 pm  Left HIPAA compliant voice message requesting return call @ [phone] to assist with R/S apt with PCP and assess for ACM. 10/6/2023- 11:12 am  Left HIPAA compliant voice message requesting return call @ [phone].     10/10/2023- no response from Patient. Letter to be mailed 10/12/2023 requesting return call.      Future Appointments   Date Time Provider [address]   09/7/4537  5:88 PM Rosendo Durbin MD [facility]   3/4/2024 11:30 AM Jessica Sierra APRN - CNP [facility]

## 2023-10-18 ENCOUNTER — APPOINTMENT (OUTPATIENT)
Dept: CT IMAGING | Age: 43
End: 2023-10-18
Payer: COMMERCIAL

## 2023-10-18 ENCOUNTER — HOSPITAL ENCOUNTER (EMERGENCY)
Age: 43
Discharge: HOME | End: 2023-10-18
Attending: EMERGENCY MEDICINE
Payer: COMMERCIAL

## 2023-10-18 VITALS
BODY MASS INDEX: 29.73 KG/M2 | RESPIRATION RATE: 27 BRPM | SYSTOLIC BLOOD PRESSURE: 117 MMHG | OXYGEN SATURATION: 93 % | DIASTOLIC BLOOD PRESSURE: 89 MMHG | HEART RATE: 113 BPM | WEIGHT: 185 LBS | HEIGHT: 66 IN | TEMPERATURE: 96.8 F

## 2023-10-18 DIAGNOSIS — G40.309 GENERALIZED NONCONVULSIVE EPILEPSY (HCC): Primary | ICD-10-CM

## 2023-10-18 DIAGNOSIS — R19.7 DIARRHEA, UNSPECIFIED TYPE: ICD-10-CM

## 2023-10-18 DIAGNOSIS — E86.0 DEHYDRATION: ICD-10-CM

## 2023-10-18 DIAGNOSIS — R10.84 GENERALIZED ABDOMINAL PAIN: ICD-10-CM

## 2023-10-18 LAB
ALBUMIN SERPL-MCNC: 5.3 G/DL (ref 3.5–5.2)
ALBUMIN/GLOB SERPL: 1.4 {RATIO} (ref 1–2.5)
ALP SERPL-CCNC: 111 U/L (ref 40–129)
ALT SERPL-CCNC: 54 U/L (ref 5–41)
AMORPH SED URNS QL MICRO: ABNORMAL
ANION GAP SERPL CALCULATED.3IONS-SCNC: 15 MMOL/L (ref 9–17)
AST SERPL-CCNC: 36 U/L
BILIRUB SERPL-MCNC: 0.6 MG/DL (ref 0.3–1.2)
BILIRUB UR QL STRIP: ABNORMAL
BUN SERPL-MCNC: 16 MG/DL (ref 6–20)
BUN/CREAT SERPL: 11 (ref 9–20)
CALCIUM SERPL-MCNC: 10.7 MG/DL (ref 8.6–10.4)
CASTS #/AREA URNS LPF: ABNORMAL /LPF (ref 0–2)
CASTS #/AREA URNS LPF: ABNORMAL /LPF (ref 0–2)
CHARACTER UR: ABNORMAL
CHLORIDE SERPL-SCNC: 103 MMOL/L (ref 98–107)
CLARITY UR: CLEAR
CO2 SERPL-SCNC: 23 MMOL/L (ref 20–31)
COLOR UR: YELLOW
CREAT SERPL-MCNC: 1.4 MG/DL (ref 0.7–1.2)
EKG ATRIAL RATE: 110 BPM
EKG ATRIAL RATE: 124 BPM
EKG P AXIS: 44 DEGREES
EKG P AXIS: 46 DEGREES
EKG P-R INTERVAL: 158 MS
EKG P-R INTERVAL: 172 MS
EKG Q-T INTERVAL: 302 MS
EKG Q-T INTERVAL: 316 MS
EKG QRS DURATION: 66 MS
EKG QRS DURATION: 66 MS
EKG QTC CALCULATION (BAZETT): 427 MS
EKG QTC CALCULATION (BAZETT): 433 MS
EKG R AXIS: 16 DEGREES
EKG R AXIS: 8 DEGREES
EKG T AXIS: 42 DEGREES
EKG T AXIS: 44 DEGREES
EKG VENTRICULAR RATE: 110 BPM
EKG VENTRICULAR RATE: 124 BPM
EPI CELLS #/AREA URNS HPF: ABNORMAL /HPF (ref 0–5)
FLUAV AG SPEC QL: NEGATIVE
FLUBV AG SPEC QL: NEGATIVE
GFR SERPL CREATININE-BSD FRML MDRD: >60 ML/MIN/1.73M2
GLUCOSE SERPL-MCNC: 116 MG/DL (ref 70–99)
GLUCOSE UR STRIP-MCNC: NEGATIVE MG/DL
HGB UR QL STRIP.AUTO: NEGATIVE
KETONES UR STRIP-MCNC: ABNORMAL MG/DL
LACTATE BLDV-SCNC: 1.3 MMOL/L (ref 0.5–2.2)
LACTATE BLDV-SCNC: 2.3 MMOL/L (ref 0.5–1.9)
LACTATE BLDV-SCNC: 3.5 MMOL/L (ref 0.5–1.9)
LEUKOCYTE ESTERASE UR QL STRIP: NEGATIVE
NITRITE UR QL STRIP: NEGATIVE
PH UR STRIP: 5.5 [PH] (ref 5–6)
POTASSIUM SERPL-SCNC: 5.1 MMOL/L (ref 3.7–5.3)
PROT SERPL-MCNC: 9 G/DL (ref 6.4–8.3)
PROT UR STRIP-MCNC: ABNORMAL MG/DL
RBC #/AREA URNS HPF: ABNORMAL /HPF (ref 0–4)
SARS-COV-2 RDRP RESP QL NAA+PROBE: NOT DETECTED
SODIUM SERPL-SCNC: 141 MMOL/L (ref 135–144)
SP GR UR STRIP: 1.03 (ref 1.01–1.02)
SPECIMEN DESCRIPTION: NORMAL
TROPONIN I SERPL HS-MCNC: 8 NG/L (ref 0–22)
TROPONIN I SERPL HS-MCNC: 8 NG/L (ref 0–22)
UROBILINOGEN UR STRIP-ACNC: NORMAL EU/DL (ref 0–1)
WBC #/AREA URNS HPF: ABNORMAL /HPF (ref 0–4)

## 2023-10-18 PROCEDURE — 83605 ASSAY OF LACTIC ACID: CPT

## 2023-10-18 PROCEDURE — 96375 TX/PRO/DX INJ NEW DRUG ADDON: CPT

## 2023-10-18 PROCEDURE — 2580000003 HC RX 258: Performed by: EMERGENCY MEDICINE

## 2023-10-18 PROCEDURE — 80053 COMPREHEN METABOLIC PANEL: CPT

## 2023-10-18 PROCEDURE — 93005 ELECTROCARDIOGRAM TRACING: CPT | Performed by: EMERGENCY MEDICINE

## 2023-10-18 PROCEDURE — 81001 URINALYSIS AUTO W/SCOPE: CPT

## 2023-10-18 PROCEDURE — 2709999900 CT ABDOMEN PELVIS W IV CONTRAST

## 2023-10-18 PROCEDURE — 87635 SARS-COV-2 COVID-19 AMP PRB: CPT

## 2023-10-18 PROCEDURE — 96361 HYDRATE IV INFUSION ADD-ON: CPT

## 2023-10-18 PROCEDURE — 87040 BLOOD CULTURE FOR BACTERIA: CPT

## 2023-10-18 PROCEDURE — 6360000002 HC RX W HCPCS: Performed by: EMERGENCY MEDICINE

## 2023-10-18 PROCEDURE — 84484 ASSAY OF TROPONIN QUANT: CPT

## 2023-10-18 PROCEDURE — 87804 INFLUENZA ASSAY W/OPTIC: CPT

## 2023-10-18 PROCEDURE — 99285 EMERGENCY DEPT VISIT HI MDM: CPT

## 2023-10-18 PROCEDURE — 96374 THER/PROPH/DIAG INJ IV PUSH: CPT

## 2023-10-18 PROCEDURE — 36415 COLL VENOUS BLD VENIPUNCTURE: CPT

## 2023-10-18 PROCEDURE — 6360000004 HC RX CONTRAST MEDICATION: Performed by: EMERGENCY MEDICINE

## 2023-10-18 RX ORDER — 0.9 % SODIUM CHLORIDE 0.9 %
1000 INTRAVENOUS SOLUTION INTRAVENOUS ONCE
Status: COMPLETED | OUTPATIENT
Start: 2023-10-18 | End: 2023-10-18

## 2023-10-18 RX ORDER — KETOROLAC TROMETHAMINE 30 MG/ML
30 INJECTION, SOLUTION INTRAMUSCULAR; INTRAVENOUS ONCE
Status: COMPLETED | OUTPATIENT
Start: 2023-10-18 | End: 2023-10-18

## 2023-10-18 RX ORDER — ONDANSETRON 2 MG/ML
4 INJECTION INTRAMUSCULAR; INTRAVENOUS ONCE
Status: COMPLETED | OUTPATIENT
Start: 2023-10-18 | End: 2023-10-18

## 2023-10-18 RX ADMIN — KETOROLAC TROMETHAMINE 30 MG: 30 INJECTION, SOLUTION INTRAMUSCULAR at 14:00

## 2023-10-18 RX ADMIN — ONDANSETRON 4 MG: 2 INJECTION INTRAMUSCULAR; INTRAVENOUS at 13:56

## 2023-10-18 RX ADMIN — SODIUM CHLORIDE 1000 ML: 9 INJECTION, SOLUTION INTRAVENOUS at 12:48

## 2023-10-18 RX ADMIN — SODIUM CHLORIDE 1000 ML: 9 INJECTION, SOLUTION INTRAVENOUS at 10:39

## 2023-10-18 RX ADMIN — IOPAMIDOL 100 ML: 755 INJECTION, SOLUTION INTRAVENOUS at 11:17

## 2023-10-18 RX ADMIN — ONDANSETRON 4 MG: 2 INJECTION INTRAMUSCULAR; INTRAVENOUS at 09:32

## 2023-10-18 RX ADMIN — SODIUM CHLORIDE 1000 ML: 9 INJECTION, SOLUTION INTRAVENOUS at 09:15

## 2023-10-18 ASSESSMENT — ENCOUNTER SYMPTOMS
SHORTNESS OF BREATH: 0
CONSTIPATION: 0
BLOOD IN STOOL: 0
NAUSEA: 1
TROUBLE SWALLOWING: 0
ABDOMINAL PAIN: 1
BACK PAIN: 0
WHEEZING: 0
DIARRHEA: 0
SORE THROAT: 0
VOMITING: 1

## 2023-10-18 ASSESSMENT — PAIN - FUNCTIONAL ASSESSMENT: PAIN_FUNCTIONAL_ASSESSMENT: NONE - DENIES PAIN

## 2023-10-18 ASSESSMENT — PAIN SCALES - GENERAL: PAINLEVEL_OUTOF10: 6

## 2023-10-18 ASSESSMENT — LIFESTYLE VARIABLES: HOW OFTEN DO YOU HAVE A DRINK CONTAINING ALCOHOL: NEVER

## 2023-10-18 ASSESSMENT — PAIN DESCRIPTION - LOCATION: LOCATION: ABDOMEN

## 2023-10-18 ASSESSMENT — PAIN DESCRIPTION - ORIENTATION: ORIENTATION: MID

## 2023-10-18 NOTE — ED PROVIDER NOTES
[facility]  eMERGENCY dEPARTMENT eNCOUnter      Pt Name: Gagan Banda  MRN: [id number]  [address] [facility] 1980  Date of evaluation: 10/18/2023      CHIEF COMPLAINT       Chief Complaint   Patient presents with    Diarrhea     Episode of diarrhea, became weak, felt like passing out. Hx of seizure         HISTORY OF PRESENT ILLNESS    Gagan Banda is a 39 y.o. male who presents with chief complaint of diarrhea patient said he got up and had diarrhea he sat on the toilet for almost an hour he became very diaphoretic felt like he was going to pass out he initially thought he could drive himself here but with a feeling like his get a pass out he thought he come in by squad there is been no injury he has some abdominal pain no chest pain no cough or congestion he has felt feverish and sweaty he has remote history of C. difficile colitis has not been on any antibiotics recently      REVIEW OF SYSTEMS         Review of Systems   Constitutional:  Positive for appetite change, chills, diaphoresis and fatigue. Negative for fever. HENT:  Negative for congestion, dental problem, sore throat and trouble swallowing. Eyes:  Negative for visual disturbance. Respiratory:  Negative for shortness of breath and wheezing. Cardiovascular:  Negative for chest pain, palpitations and leg swelling. Gastrointestinal:  Positive for abdominal pain, nausea and vomiting. Negative for blood in stool, constipation and diarrhea. Genitourinary:  Negative for difficulty urinating, dysuria and testicular pain. Musculoskeletal:  Negative for back pain, joint swelling and neck pain. Skin:  Negative for rash. Neurological:  Positive for light-headedness. Negative for dizziness, syncope, weakness and headaches. Hematological:  Negative for adenopathy. Does not bruise/bleed easily. Psychiatric/Behavioral:  Negative for confusion and suicidal ideas.           PAST MEDICAL HISTORY    has a past medical history of

## 2023-10-19 ENCOUNTER — CARE COORDINATION (OUTPATIENT)
Dept: CARE COORDINATION | Age: 43
End: 2023-10-19

## 2023-10-19 NOTE — CARE COORDINATION
Shyam Benoit was referred for ACM from office. He missed PCP F/U apt 10/3/2023.      10/4/2023- 1:19 pm  Left HIPAA compliant voice message requesting return call @ [phone] to assist with R/S apt with PCP and assess for ACM. 10/6/2023- 11:12 am  Left HIPAA compliant voice message requesting return call @ [phone].   10/10/2023- no response from Patient. Letter to be mailed 10/12/2023 requesting return call. [facility] ED 10/18/2023- nonconvulsive epilepsy, diarrhea, dehydration    10/19/2023- No response from Patient to previous messages and letter mailed. 11:52 am final attempt to reach Patient by phone- Unable to reach- \"mailbox was full\".      Future Appointments   Date Time Provider [address] [place] [address]   31/8/5788  8:63 PM Esteban Agrawal MD [facility]   3/4/2024 11:30 AM Antonella Sierra, CLAUDIA - CNP [facility]

## 2023-10-22 LAB
MICROORGANISM SPEC CULT: NORMAL
MICROORGANISM SPEC CULT: NORMAL
SERVICE CMNT-IMP: NORMAL
SERVICE CMNT-IMP: NORMAL
SPECIMEN DESCRIPTION: NORMAL
SPECIMEN DESCRIPTION: NORMAL

## 2023-11-08 ENCOUNTER — OFFICE VISIT (OUTPATIENT)
Dept: NEUROLOGY | Age: 43
End: 2023-11-08
Payer: COMMERCIAL

## 2023-11-08 VITALS
HEART RATE: 84 BPM | OXYGEN SATURATION: 97 % | BODY MASS INDEX: 31.96 KG/M2 | RESPIRATION RATE: 16 BRPM | WEIGHT: 198 LBS | SYSTOLIC BLOOD PRESSURE: 128 MMHG | DIASTOLIC BLOOD PRESSURE: 72 MMHG

## 2023-11-08 DIAGNOSIS — Z72.0 TOBACCO USE: ICD-10-CM

## 2023-11-08 DIAGNOSIS — G47.9 SLEEP DIFFICULTIES: ICD-10-CM

## 2023-11-08 DIAGNOSIS — F41.9 ANXIETY, MILD: ICD-10-CM

## 2023-11-08 DIAGNOSIS — F34.1 DYSTHYMIA: ICD-10-CM

## 2023-11-08 DIAGNOSIS — G40.909 SEIZURE DISORDER (HCC): Primary | ICD-10-CM

## 2023-11-08 DIAGNOSIS — K21.9 GASTROESOPHAGEAL REFLUX DISEASE WITHOUT ESOPHAGITIS: ICD-10-CM

## 2023-11-08 DIAGNOSIS — M54.12 CERVICAL RADICULOPATHY: ICD-10-CM

## 2023-11-08 DIAGNOSIS — I48.91 ATRIAL FIBRILLATION WITH RVR (HCC): ICD-10-CM

## 2023-11-08 DIAGNOSIS — F44.5 PSYCHOGENIC NONEPILEPTIC SEIZURE: ICD-10-CM

## 2023-11-08 DIAGNOSIS — G40.009 PARTIAL IDIOPATHIC EPILEPSY WITH SEIZURES OF LOCALIZED ONSET, NOT INTRACTABLE, WITHOUT STATUS EPILEPTICUS (HCC): ICD-10-CM

## 2023-11-08 DIAGNOSIS — Z86.73 H/O TRANSIENT ISCHEMIC ATTACK INVOLVING ANTERIOR CIRCULATION: ICD-10-CM

## 2023-11-08 DIAGNOSIS — R42 DIZZINESS: ICD-10-CM

## 2023-11-08 DIAGNOSIS — Z91.81 H/O FALL: ICD-10-CM

## 2023-11-08 DIAGNOSIS — G43.009 MIGRAINE WITHOUT AURA AND WITHOUT STATUS MIGRAINOSUS, NOT INTRACTABLE: ICD-10-CM

## 2023-11-08 PROCEDURE — 4004F PT TOBACCO SCREEN RCVD TLK: CPT | Performed by: PSYCHIATRY & NEUROLOGY

## 2023-11-08 PROCEDURE — G8483 FLU IMM NO ADMIN DOC REA: HCPCS | Performed by: PSYCHIATRY & NEUROLOGY

## 2023-11-08 PROCEDURE — G8417 CALC BMI ABV UP PARAM F/U: HCPCS | Performed by: PSYCHIATRY & NEUROLOGY

## 2023-11-08 PROCEDURE — G8427 DOCREV CUR MEDS BY ELIG CLIN: HCPCS | Performed by: PSYCHIATRY & NEUROLOGY

## 2023-11-08 PROCEDURE — 99214 OFFICE O/P EST MOD 30 MIN: CPT | Performed by: PSYCHIATRY & NEUROLOGY

## 2023-11-08 RX ORDER — LAMOTRIGINE 100 MG/1
TABLET ORAL
Qty: 30 TABLET | Refills: 5 | Status: SHIPPED | OUTPATIENT
Start: 2023-11-08

## 2023-11-08 RX ORDER — DIVALPROEX SODIUM 250 MG/1
TABLET, DELAYED RELEASE ORAL
Qty: 180 TABLET | Refills: 5 | Status: SHIPPED | OUTPATIENT
Start: 2023-11-08

## 2023-11-08 ASSESSMENT — ENCOUNTER SYMPTOMS
CONSTIPATION: 0
ABDOMINAL DISTENTION: 0
TROUBLE SWALLOWING: 0
EYE ITCHING: 0
BLOOD IN STOOL: 0
VOICE CHANGE: 0
WHEEZING: 0
CHOKING: 0
CHEST TIGHTNESS: 0
APNEA: 0
SHORTNESS OF BREATH: 0
FACIAL SWELLING: 0
COLOR CHANGE: 0
CHANGE IN BOWEL HABIT: 0
DIARRHEA: 0
EYE DISCHARGE: 0

## 2023-11-08 ASSESSMENT — PATIENT HEALTH QUESTIONNAIRE - PHQ9
SUM OF ALL RESPONSES TO PHQ QUESTIONS 1-9: 0
1. LITTLE INTEREST OR PLEASURE IN DOING THINGS: 0
2. FEELING DOWN, DEPRESSED OR HOPELESS: 0
SUM OF ALL RESPONSES TO PHQ9 QUESTIONS 1 & 2: 0
SUM OF ALL RESPONSES TO PHQ QUESTIONS 1-9: 0

## 2023-11-08 NOTE — PROGRESS NOTES
is 1 piece of fruit, 1 cup of vegetables, or 2 cups of leafy, raw vegetables. Have an apple or some carrot sticks as an afternoon snack instead of a candy bar. Try to have fruits and/or vegetables at every meal.  Make exercise part of your daily routine. You may want to start with simple activities, such as walking, bicycling, or slow swimming. Try to be active 30 to 60 minutes every day. You do not need to do all 30 to 60 minutes all at once. For example, you can exercise 3 times a day for 10 or 20 minutes. Moderate exercise is safe for most people, but it is always a good idea to talk to your doctor before starting an exercise program.  Keep moving. [employer] the lawn, work in the garden, or [employer]. Take the stairs instead of the elevator at work. If you smoke, quit. People who smoke have an increased risk for heart attack, stroke, cancer, and other lung illnesses. Quitting is hard, but there are ways to boost your chance of quitting tobacco for good. Use nicotine gum, patches, or lozenges. Ask your doctor about stop-smoking programs and medicines. Keep trying. In addition to reducing your risk of diseases in the future, you will notice some benefits soon after you stop using tobacco. If you have shortness of breath or asthma symptoms, they will likely get better within a few weeks after you quit. Limit how much alcohol you drink. Moderate amounts of alcohol (up to 2 drinks a day for men, 1 drink a day for women) are okay. But drinking too much can lead to liver problems, high blood pressure, and other health problems. Family health  If you have a family, there are many things you can do together to improve your health. Eat meals together as a family as often as possible. Eat healthy foods. This includes fruits, vegetables, lean meats and dairy, and whole grains. Include your family in your fitness plan.  Most people think of activities such as jogging or tennis as the way to fitness, but there are

## 2023-11-16 ENCOUNTER — HOSPITAL ENCOUNTER (OUTPATIENT)
Dept: GENERAL RADIOLOGY | Age: 43
Discharge: HOME | End: 2023-11-18
Payer: COMMERCIAL

## 2023-11-16 ENCOUNTER — OFFICE VISIT (OUTPATIENT)
Dept: PODIATRY | Age: 43
End: 2023-11-16
Payer: COMMERCIAL

## 2023-11-16 VITALS
RESPIRATION RATE: 20 BRPM | DIASTOLIC BLOOD PRESSURE: 80 MMHG | WEIGHT: 202.3 LBS | HEART RATE: 80 BPM | BODY MASS INDEX: 32.65 KG/M2 | SYSTOLIC BLOOD PRESSURE: 124 MMHG

## 2023-11-16 DIAGNOSIS — M19.079 INFLAMMATION OF FOOT JOINT: ICD-10-CM

## 2023-11-16 DIAGNOSIS — M79.671 RIGHT FOOT PAIN: ICD-10-CM

## 2023-11-16 DIAGNOSIS — M72.2 PLANTAR FASCIITIS, RIGHT: ICD-10-CM

## 2023-11-16 DIAGNOSIS — M19.079 INFLAMMATION OF FOOT JOINT: Primary | ICD-10-CM

## 2023-11-16 PROCEDURE — G8417 CALC BMI ABV UP PARAM F/U: HCPCS | Performed by: PODIATRIST

## 2023-11-16 PROCEDURE — 73630 X-RAY EXAM OF FOOT: CPT

## 2023-11-16 PROCEDURE — 99203 OFFICE O/P NEW LOW 30 MIN: CPT | Performed by: PODIATRIST

## 2023-11-16 PROCEDURE — 4004F PT TOBACCO SCREEN RCVD TLK: CPT | Performed by: PODIATRIST

## 2023-11-16 PROCEDURE — G8427 DOCREV CUR MEDS BY ELIG CLIN: HCPCS | Performed by: PODIATRIST

## 2023-11-16 PROCEDURE — G8483 FLU IMM NO ADMIN DOC REA: HCPCS | Performed by: PODIATRIST

## 2023-11-16 RX ORDER — METHYLPREDNISOLONE 4 MG/1
TABLET ORAL
Qty: 1 KIT | Refills: 0 | Status: SHIPPED | OUTPATIENT
Start: 2023-11-16

## 2023-11-16 NOTE — PROGRESS NOTES
Subjective:    Leonora Medina is a 39 y.o. male who presents to the office today complaining of Right arch and foot pain. Symptoms began 1 year(s) ago. Getting worse over time. Patient relates pain is Present upon arising from bed in the morning and after periods of rest and then standing. The pain progresses throughout the day. Pain is rated 4 out of 10 and is described as waxing and waning, moderate. Treatments prior to today's visit include: none. Currently denies F/C/N/V. No Known Allergies    Past Medical History:   Diagnosis Date    Anxiety, mild     Colon polyps     Dizziness     Eczema     Epilepsy ([address])     GERD (gastroesophageal reflux disease)     H/O fall     Head ache     Head injury     Hiatal hernia     Inguinal hernia     Right. Low back pain     Lumbar sprain     Migraine     Plantar fasciitis, bilateral     Psychogenic nonepileptic seizure 11/16/2022    Seizure disorder ([address])     Sigmoid diverticulosis     Sleep difficulties     Status migrainosus     TIA (transient ischemic attack) 09/2017    Tobacco use     Chronic. Prior to Admission medications    Medication Sig Start Date End Date Taking? Authorizing Provider   methylPREDNISolone (MEDROL DOSEPACK) 4 MG tablet Take by mouth. 11/16/23  Yes Diego Calles DPM   lamoTRIgine (LAMICTAL) 100 MG tablet 1 TABLET DAILY AT BEDTIME 06/0/86  Yes Neelam Manjarrez MD   divalproex (DEPAKOTE) 250 MG DR tablet take 3 tablets by mouth twice a day 49/4/28  Yes Neelam Manjarrez MD   nitroGLYCERIN (NITROSTAT) 0.4 MG SL tablet Place 1 tablet under the tongue every 5 minutes as needed for Chest pain up to max of 3 total doses.  If no relief after 1 dose, call 911. 9/12/23  Yes CLAUDIA Novoa - CNP   dabigatran (PRADAXA) 150 MG capsule Take 1 capsule by mouth 2 times daily 9/8/23  Yes Yolis Cotto MD   metoprolol tartrate (LOPRESSOR) 25 MG tablet Take 1 tablet by mouth 2 times daily 9/6/23  Yes Yolis Cotto MD

## 2023-12-13 ENCOUNTER — HOSPITAL ENCOUNTER (INPATIENT)
Age: 43
LOS: 1 days | Discharge: HOME | End: 2023-12-14
Attending: EMERGENCY MEDICINE | Admitting: INTERNAL MEDICINE
Payer: COMMERCIAL

## 2023-12-13 ENCOUNTER — APPOINTMENT (OUTPATIENT)
Dept: CT IMAGING | Age: 43
End: 2023-12-13
Payer: COMMERCIAL

## 2023-12-13 DIAGNOSIS — N30.01 ACUTE CYSTITIS WITH HEMATURIA: ICD-10-CM

## 2023-12-13 DIAGNOSIS — R19.7 DIARRHEA, UNSPECIFIED TYPE: Primary | ICD-10-CM

## 2023-12-13 DIAGNOSIS — I49.9 CARDIAC ARRHYTHMIA, UNSPECIFIED CARDIAC ARRHYTHMIA TYPE: ICD-10-CM

## 2023-12-13 DIAGNOSIS — I48.92 ATRIAL FLUTTER WITH RAPID VENTRICULAR RESPONSE (HCC): ICD-10-CM

## 2023-12-13 PROBLEM — K52.9 GASTROENTERITIS: Status: ACTIVE | Noted: 2023-12-13

## 2023-12-13 PROBLEM — A04.72 COLITIS DUE TO CLOSTRIDIOIDES DIFFICILE: Status: RESOLVED | Noted: 2022-10-28 | Resolved: 2023-12-13

## 2023-12-13 PROBLEM — Z91.51 HISTORY OF SUICIDE ATTEMPT: Status: RESOLVED | Noted: 2022-10-28 | Resolved: 2023-12-13

## 2023-12-13 LAB
ALBUMIN SERPL-MCNC: 5.2 G/DL (ref 3.5–5.2)
ALBUMIN/GLOB SERPL: 1.6 {RATIO} (ref 1–2.5)
ALP SERPL-CCNC: 118 U/L (ref 40–129)
ALT SERPL-CCNC: 28 U/L (ref 5–41)
AMPHET UR QL SCN: NEGATIVE
AMPHET UR QL SCN: NEGATIVE
ANION GAP SERPL CALCULATED.3IONS-SCNC: 17 MMOL/L (ref 9–17)
APAP SERPL-MCNC: <5 UG/ML (ref 10–30)
AST SERPL-CCNC: 21 U/L
BACTERIA URNS QL MICRO: ABNORMAL
BACTERIA URNS QL MICRO: ABNORMAL
BARBITURATES UR QL SCN: NEGATIVE
BARBITURATES UR QL SCN: NEGATIVE
BASOPHILS # BLD: 0.04 K/UL (ref 0–0.2)
BASOPHILS NFR BLD: 0 % (ref 0–2)
BENZODIAZ UR QL: NEGATIVE
BENZODIAZ UR QL: NEGATIVE
BILIRUB SERPL-MCNC: 0.6 MG/DL (ref 0.3–1.2)
BILIRUB UR QL STRIP: NEGATIVE
BILIRUB UR QL STRIP: NEGATIVE
BNP SERPL-MCNC: 47 PG/ML
BUN SERPL-MCNC: 16 MG/DL (ref 6–20)
BUN/CREAT SERPL: 15 (ref 9–20)
C DIFF GDH + TOXINS A+B STL QL IA.RAPID: NEGATIVE
CALCIUM SERPL-MCNC: 10.5 MG/DL (ref 8.6–10.4)
CANNABINOIDS UR QL SCN: NEGATIVE
CANNABINOIDS UR QL SCN: NEGATIVE
CASTS #/AREA URNS LPF: ABNORMAL /LPF (ref 0–2)
CHLORIDE SERPL-SCNC: 99 MMOL/L (ref 98–107)
CLARITY UR: CLEAR
CLARITY UR: CLEAR
CO2 SERPL-SCNC: 23 MMOL/L (ref 20–31)
COCAINE UR QL SCN: NEGATIVE
COCAINE UR QL SCN: NEGATIVE
COLOR UR: YELLOW
COLOR UR: YELLOW
COMMENT: ABNORMAL
CREAT SERPL-MCNC: 1.1 MG/DL (ref 0.7–1.2)
DATE, STOOL #1: ABNORMAL
EKG ATRIAL RATE: 100 BPM
EKG ATRIAL RATE: 129 BPM
EKG ATRIAL RATE: 173 BPM
EKG P AXIS: 26 DEGREES
EKG P AXIS: 38 DEGREES
EKG P AXIS: 42 DEGREES
EKG P-R INTERVAL: 128 MS
EKG P-R INTERVAL: 158 MS
EKG P-R INTERVAL: 168 MS
EKG Q-T INTERVAL: 266 MS
EKG Q-T INTERVAL: 342 MS
EKG Q-T INTERVAL: 380 MS
EKG QRS DURATION: 64 MS
EKG QRS DURATION: 66 MS
EKG QRS DURATION: 66 MS
EKG QTC CALCULATION (BAZETT): 441 MS
EKG QTC CALCULATION (BAZETT): 451 MS
EKG QTC CALCULATION (BAZETT): 556 MS
EKG R AXIS: 26 DEGREES
EKG R AXIS: 43 DEGREES
EKG R AXIS: 8 DEGREES
EKG T AXIS: 44 DEGREES
EKG T AXIS: 55 DEGREES
EKG T AXIS: 63 DEGREES
EKG VENTRICULAR RATE: 100 BPM
EKG VENTRICULAR RATE: 129 BPM
EKG VENTRICULAR RATE: 173 BPM
EOSINOPHIL # BLD: 0.08 K/UL (ref 0–0.44)
EOSINOPHILS RELATIVE PERCENT: 1 % (ref 1–4)
EPI CELLS #/AREA URNS HPF: ABNORMAL /HPF (ref 0–5)
EPI CELLS #/AREA URNS HPF: ABNORMAL /HPF (ref 0–5)
ERYTHROCYTE [DISTWIDTH] IN BLOOD BY AUTOMATED COUNT: 12.7 % (ref 11.8–14.4)
ETHANOLAMINE SERPL-MCNC: <10 MG/DL
FENTANYL UR QL: NEGATIVE
FENTANYL UR QL: POSITIVE
GFR SERPL CREATININE-BSD FRML MDRD: >60 ML/MIN/1.73M2
GLUCOSE SERPL-MCNC: 127 MG/DL (ref 70–99)
GLUCOSE UR STRIP-MCNC: NEGATIVE MG/DL
GLUCOSE UR STRIP-MCNC: NEGATIVE MG/DL
HCT VFR BLD AUTO: 52.4 % (ref 40.7–50.3)
HEMOCCULT SP1 STL QL: POSITIVE
HGB BLD-MCNC: 18.9 G/DL (ref 13–17)
HGB UR QL STRIP.AUTO: ABNORMAL
HGB UR QL STRIP.AUTO: NEGATIVE
IMM GRANULOCYTES # BLD AUTO: 0.09 K/UL (ref 0–0.3)
IMM GRANULOCYTES NFR BLD: 1 %
INR PPP: 0.9
KETONES UR STRIP-MCNC: ABNORMAL MG/DL
KETONES UR STRIP-MCNC: NEGATIVE MG/DL
LACTATE BLDV-SCNC: 1.9 MMOL/L (ref 0.5–1.9)
LAMOTRIGINE SERPL-MCNC: <1 UG/ML (ref 3–15)
LEUKOCYTE ESTERASE UR QL STRIP: ABNORMAL
LEUKOCYTE ESTERASE UR QL STRIP: NEGATIVE
LIPASE SERPL-CCNC: 57 U/L (ref 13–60)
LYMPHOCYTES NFR BLD: 2.22 K/UL (ref 1.1–3.7)
LYMPHOCYTES RELATIVE PERCENT: 19 % (ref 24–43)
MAGNESIUM SERPL-MCNC: 1.6 MG/DL (ref 1.6–2.6)
MCH RBC QN AUTO: 34.4 PG (ref 25.2–33.5)
MCHC RBC AUTO-ENTMCNC: 36.1 G/DL (ref 25.2–33.5)
MCV RBC AUTO: 95.3 FL (ref 82.6–102.9)
METHADONE UR QL: NEGATIVE
METHADONE UR QL: NEGATIVE
MONOCYTES NFR BLD: 0.67 K/UL (ref 0.1–1.2)
MONOCYTES NFR BLD: 6 % (ref 3–12)
NEUTROPHILS NFR BLD: 73 % (ref 36–65)
NEUTS SEG NFR BLD: 8.47 K/UL (ref 1.5–8.1)
NITRITE UR QL STRIP: NEGATIVE
NITRITE UR QL STRIP: NEGATIVE
NRBC BLD-RTO: 0 PER 100 WBC
OPIATES UR QL SCN: NEGATIVE
OPIATES UR QL SCN: NEGATIVE
OXYCODONE UR QL SCN: NEGATIVE
OXYCODONE UR QL SCN: NEGATIVE
PARTIAL THROMBOPLASTIN TIME: 24.6 SEC (ref 23.9–33.8)
PCP UR QL SCN: NEGATIVE
PCP UR QL SCN: NEGATIVE
PH UR STRIP: 5.5 [PH] (ref 5–6)
PH UR STRIP: 7.5 [PH] (ref 5–6)
PLATELET # BLD AUTO: 373 K/UL (ref 138–453)
PMV BLD AUTO: 9.7 FL (ref 8.1–13.5)
POC TROPONIN I: <0.02 NG/ML (ref 0–0.08)
POC TROPONIN INTERP: NORMAL
POTASSIUM SERPL-SCNC: 3.5 MMOL/L (ref 3.7–5.3)
PROT SERPL-MCNC: 8.4 G/DL (ref 6.4–8.3)
PROT UR STRIP-MCNC: ABNORMAL MG/DL
PROT UR STRIP-MCNC: ABNORMAL MG/DL
PROTHROMBIN TIME: 12.1 SEC (ref 11.5–14.2)
RBC # BLD AUTO: 5.5 M/UL (ref 4.21–5.77)
RBC #/AREA URNS HPF: ABNORMAL /HPF (ref 0–4)
RBC #/AREA URNS HPF: ABNORMAL /HPF (ref 0–4)
SALICYLATES SERPL-MCNC: <1 MG/DL (ref 3–10)
SODIUM SERPL-SCNC: 139 MMOL/L (ref 135–144)
SP GR UR STRIP: 1.01 (ref 1.01–1.02)
SP GR UR STRIP: 1.01 (ref 1.01–1.02)
SPECIMEN DESCRIPTION: NORMAL
TEST INFORMATION: ABNORMAL
TEST INFORMATION: NORMAL
TIME, STOOL #1: 1204
TOXIC TRICYCLIC SC,BLOOD: NEGATIVE
TROPONIN I SERPL HS-MCNC: 12 NG/L (ref 0–22)
TSH SERPL DL<=0.05 MIU/L-ACNC: 2.61 UIU/ML (ref 0.3–5)
UROBILINOGEN UR STRIP-ACNC: NORMAL EU/DL (ref 0–1)
UROBILINOGEN UR STRIP-ACNC: NORMAL EU/DL (ref 0–1)
VALPROATE SERPL-MCNC: 31 UG/ML (ref 50–125)
WBC #/AREA URNS HPF: ABNORMAL /HPF (ref 0–4)
WBC #/AREA URNS HPF: ABNORMAL /HPF (ref 0–4)
WBC OTHER # BLD: 11.6 K/UL (ref 3.5–11.3)

## 2023-12-13 PROCEDURE — 80179 DRUG ASSAY SALICYLATE: CPT

## 2023-12-13 PROCEDURE — 82274 ASSAY TEST FOR BLOOD FECAL: CPT

## 2023-12-13 PROCEDURE — 99222 1ST HOSP IP/OBS MODERATE 55: CPT | Performed by: INTERNAL MEDICINE

## 2023-12-13 PROCEDURE — 96361 HYDRATE IV INFUSION ADD-ON: CPT

## 2023-12-13 PROCEDURE — 93005 ELECTROCARDIOGRAM TRACING: CPT | Performed by: EMERGENCY MEDICINE

## 2023-12-13 PROCEDURE — 83690 ASSAY OF LIPASE: CPT

## 2023-12-13 PROCEDURE — 83880 ASSAY OF NATRIURETIC PEPTIDE: CPT

## 2023-12-13 PROCEDURE — 80175 DRUG SCREEN QUAN LAMOTRIGINE: CPT

## 2023-12-13 PROCEDURE — 96368 THER/DIAG CONCURRENT INF: CPT

## 2023-12-13 PROCEDURE — 84443 ASSAY THYROID STIM HORMONE: CPT

## 2023-12-13 PROCEDURE — 6360000004 HC RX CONTRAST MEDICATION: Performed by: EMERGENCY MEDICINE

## 2023-12-13 PROCEDURE — 2060000000 HC ICU INTERMEDIATE R&B

## 2023-12-13 PROCEDURE — 96372 THER/PROPH/DIAG INJ SC/IM: CPT

## 2023-12-13 PROCEDURE — 2709999900 CTA CHEST ABDOMEN PELVIS W CONTRAST

## 2023-12-13 PROCEDURE — 84484 ASSAY OF TROPONIN QUANT: CPT

## 2023-12-13 PROCEDURE — 83735 ASSAY OF MAGNESIUM: CPT

## 2023-12-13 PROCEDURE — 96366 THER/PROPH/DIAG IV INF ADDON: CPT

## 2023-12-13 PROCEDURE — 96375 TX/PRO/DX INJ NEW DRUG ADDON: CPT

## 2023-12-13 PROCEDURE — 87506 IADNA-DNA/RNA PROBE TQ 6-11: CPT

## 2023-12-13 PROCEDURE — 6360000002 HC RX W HCPCS: Performed by: INTERNAL MEDICINE

## 2023-12-13 PROCEDURE — 80053 COMPREHEN METABOLIC PANEL: CPT

## 2023-12-13 PROCEDURE — 2580000003 HC RX 258: Performed by: INTERNAL MEDICINE

## 2023-12-13 PROCEDURE — 80164 ASSAY DIPROPYLACETIC ACD TOT: CPT

## 2023-12-13 PROCEDURE — G0480 DRUG TEST DEF 1-7 CLASSES: HCPCS

## 2023-12-13 PROCEDURE — 6370000000 HC RX 637 (ALT 250 FOR IP): Performed by: INTERNAL MEDICINE

## 2023-12-13 PROCEDURE — 83605 ASSAY OF LACTIC ACID: CPT

## 2023-12-13 PROCEDURE — 6360000002 HC RX W HCPCS: Performed by: EMERGENCY MEDICINE

## 2023-12-13 PROCEDURE — 96365 THER/PROPH/DIAG IV INF INIT: CPT

## 2023-12-13 PROCEDURE — 87324 CLOSTRIDIUM AG IA: CPT

## 2023-12-13 PROCEDURE — 85025 COMPLETE CBC W/AUTO DIFF WBC: CPT

## 2023-12-13 PROCEDURE — 99291 CRITICAL CARE FIRST HOUR: CPT

## 2023-12-13 PROCEDURE — 36415 COLL VENOUS BLD VENIPUNCTURE: CPT

## 2023-12-13 PROCEDURE — 87449 NOS EACH ORGANISM AG IA: CPT

## 2023-12-13 PROCEDURE — 87040 BLOOD CULTURE FOR BACTERIA: CPT

## 2023-12-13 PROCEDURE — 94760 N-INVAS EAR/PLS OXIMETRY 1: CPT

## 2023-12-13 PROCEDURE — 85730 THROMBOPLASTIN TIME PARTIAL: CPT

## 2023-12-13 PROCEDURE — 85610 PROTHROMBIN TIME: CPT

## 2023-12-13 PROCEDURE — 2580000003 HC RX 258: Performed by: EMERGENCY MEDICINE

## 2023-12-13 PROCEDURE — 81001 URINALYSIS AUTO W/SCOPE: CPT

## 2023-12-13 PROCEDURE — 80307 DRUG TEST PRSMV CHEM ANLYZR: CPT

## 2023-12-13 PROCEDURE — 93005 ELECTROCARDIOGRAM TRACING: CPT | Performed by: INTERNAL MEDICINE

## 2023-12-13 PROCEDURE — 80143 DRUG ASSAY ACETAMINOPHEN: CPT

## 2023-12-13 PROCEDURE — 2500000003 HC RX 250 WO HCPCS

## 2023-12-13 PROCEDURE — 6370000000 HC RX 637 (ALT 250 FOR IP)

## 2023-12-13 PROCEDURE — 87086 URINE CULTURE/COLONY COUNT: CPT

## 2023-12-13 RX ORDER — DILTIAZEM HYDROCHLORIDE 5 MG/ML
INJECTION INTRAVENOUS
Status: COMPLETED
Start: 2023-12-13 | End: 2023-12-13

## 2023-12-13 RX ORDER — POTASSIUM CHLORIDE 7.45 MG/ML
10 INJECTION INTRAVENOUS ONCE
Status: COMPLETED | OUTPATIENT
Start: 2023-12-13 | End: 2023-12-13

## 2023-12-13 RX ORDER — DILTIAZEM HYDROCHLORIDE 5 MG/ML
10 INJECTION INTRAVENOUS ONCE
Status: COMPLETED | OUTPATIENT
Start: 2023-12-13 | End: 2023-12-13

## 2023-12-13 RX ORDER — HEPARIN SODIUM 1000 [USP'U]/ML
5000 INJECTION, SOLUTION INTRAVENOUS; SUBCUTANEOUS ONCE
Status: CANCELLED | OUTPATIENT
Start: 2023-12-13

## 2023-12-13 RX ORDER — HEPARIN SODIUM 10000 [USP'U]/100ML
12 INJECTION, SOLUTION INTRAVENOUS CONTINUOUS
Status: CANCELLED | OUTPATIENT
Start: 2023-12-13

## 2023-12-13 RX ORDER — LAMOTRIGINE 100 MG/1
100 TABLET ORAL
Status: DISCONTINUED | OUTPATIENT
Start: 2023-12-13 | End: 2023-12-14 | Stop reason: HOSPADM

## 2023-12-13 RX ORDER — SODIUM CHLORIDE 0.9 % (FLUSH) 0.9 %
10 SYRINGE (ML) INJECTION PRN
Status: DISCONTINUED | OUTPATIENT
Start: 2023-12-13 | End: 2023-12-14 | Stop reason: HOSPADM

## 2023-12-13 RX ORDER — FENTANYL CITRATE 50 UG/ML
50 INJECTION, SOLUTION INTRAMUSCULAR; INTRAVENOUS ONCE
Status: COMPLETED | OUTPATIENT
Start: 2023-12-13 | End: 2023-12-13

## 2023-12-13 RX ORDER — 0.9 % SODIUM CHLORIDE 0.9 %
1000 INTRAVENOUS SOLUTION INTRAVENOUS
Status: COMPLETED | OUTPATIENT
Start: 2023-12-13 | End: 2023-12-13

## 2023-12-13 RX ORDER — DIVALPROEX SODIUM 250 MG/1
750 TABLET, DELAYED RELEASE ORAL EVERY 12 HOURS SCHEDULED
Status: DISCONTINUED | OUTPATIENT
Start: 2023-12-13 | End: 2023-12-14 | Stop reason: HOSPADM

## 2023-12-13 RX ORDER — 0.9 % SODIUM CHLORIDE 0.9 %
30 INTRAVENOUS SOLUTION INTRAVENOUS ONCE
Status: COMPLETED | OUTPATIENT
Start: 2023-12-13 | End: 2023-12-13

## 2023-12-13 RX ORDER — SODIUM CHLORIDE 9 MG/ML
INJECTION, SOLUTION INTRAVENOUS PRN
Status: DISCONTINUED | OUTPATIENT
Start: 2023-12-13 | End: 2023-12-14 | Stop reason: HOSPADM

## 2023-12-13 RX ORDER — MAGNESIUM SULFATE IN WATER 40 MG/ML
2000 INJECTION, SOLUTION INTRAVENOUS ONCE
Status: DISCONTINUED | OUTPATIENT
Start: 2023-12-13 | End: 2023-12-13 | Stop reason: RX

## 2023-12-13 RX ORDER — ACETAMINOPHEN 325 MG/1
650 TABLET ORAL EVERY 4 HOURS PRN
Status: DISCONTINUED | OUTPATIENT
Start: 2023-12-13 | End: 2023-12-14 | Stop reason: HOSPADM

## 2023-12-13 RX ORDER — MAGNESIUM SULFATE 1 G/100ML
1000 INJECTION INTRAVENOUS
Status: COMPLETED | OUTPATIENT
Start: 2023-12-13 | End: 2023-12-13

## 2023-12-13 RX ORDER — ADENOSINE 3 MG/ML
6 INJECTION, SOLUTION INTRAVENOUS ONCE
Status: COMPLETED | OUTPATIENT
Start: 2023-12-13 | End: 2023-12-13

## 2023-12-13 RX ORDER — ALBUTEROL SULFATE 2.5 MG/3ML
2.5 SOLUTION RESPIRATORY (INHALATION)
Status: DISCONTINUED | OUTPATIENT
Start: 2023-12-13 | End: 2023-12-14 | Stop reason: HOSPADM

## 2023-12-13 RX ORDER — IPRATROPIUM BROMIDE AND ALBUTEROL SULFATE 2.5; .5 MG/3ML; MG/3ML
1 SOLUTION RESPIRATORY (INHALATION)
Status: DISCONTINUED | OUTPATIENT
Start: 2023-12-13 | End: 2023-12-13

## 2023-12-13 RX ORDER — SODIUM CHLORIDE FOR INHALATION 0.9 %
3 VIAL, NEBULIZER (ML) INHALATION
Status: DISCONTINUED | OUTPATIENT
Start: 2023-12-13 | End: 2023-12-13

## 2023-12-13 RX ORDER — HEPARIN SODIUM 1000 [USP'U]/ML
30 INJECTION, SOLUTION INTRAVENOUS; SUBCUTANEOUS PRN
Status: CANCELLED | OUTPATIENT
Start: 2023-12-13

## 2023-12-13 RX ORDER — ATORVASTATIN CALCIUM 40 MG/1
40 TABLET, FILM COATED ORAL NIGHTLY
Status: DISCONTINUED | OUTPATIENT
Start: 2023-12-13 | End: 2023-12-14 | Stop reason: HOSPADM

## 2023-12-13 RX ORDER — SODIUM CHLORIDE 9 MG/ML
INJECTION, SOLUTION INTRAVENOUS CONTINUOUS
Status: DISCONTINUED | OUTPATIENT
Start: 2023-12-13 | End: 2023-12-14 | Stop reason: HOSPADM

## 2023-12-13 RX ORDER — POTASSIUM CHLORIDE 20 MEQ/1
40 TABLET, EXTENDED RELEASE ORAL ONCE
Status: COMPLETED | OUTPATIENT
Start: 2023-12-13 | End: 2023-12-13

## 2023-12-13 RX ORDER — ONDANSETRON 2 MG/ML
4 INJECTION INTRAMUSCULAR; INTRAVENOUS ONCE
Status: COMPLETED | OUTPATIENT
Start: 2023-12-13 | End: 2023-12-13

## 2023-12-13 RX ORDER — SODIUM CHLORIDE 0.9 % (FLUSH) 0.9 %
10 SYRINGE (ML) INJECTION EVERY 12 HOURS SCHEDULED
Status: DISCONTINUED | OUTPATIENT
Start: 2023-12-13 | End: 2023-12-14 | Stop reason: HOSPADM

## 2023-12-13 RX ORDER — LOPERAMIDE HYDROCHLORIDE 2 MG/1
2 CAPSULE ORAL 4 TIMES DAILY PRN
Status: DISCONTINUED | OUTPATIENT
Start: 2023-12-13 | End: 2023-12-14 | Stop reason: HOSPADM

## 2023-12-13 RX ORDER — ONDANSETRON 2 MG/ML
4 INJECTION INTRAMUSCULAR; INTRAVENOUS EVERY 6 HOURS PRN
Status: DISCONTINUED | OUTPATIENT
Start: 2023-12-13 | End: 2023-12-14 | Stop reason: HOSPADM

## 2023-12-13 RX ORDER — DICYCLOMINE HYDROCHLORIDE 10 MG/ML
20 INJECTION INTRAMUSCULAR ONCE
Status: COMPLETED | OUTPATIENT
Start: 2023-12-13 | End: 2023-12-13

## 2023-12-13 RX ORDER — DABIGATRAN ETEXILATE 75 MG/1
150 CAPSULE ORAL 2 TIMES DAILY
Status: DISCONTINUED | OUTPATIENT
Start: 2023-12-13 | End: 2023-12-14 | Stop reason: HOSPADM

## 2023-12-13 RX ORDER — NICOTINE 21 MG/24HR
1 PATCH, TRANSDERMAL 24 HOURS TRANSDERMAL DAILY
Status: DISCONTINUED | OUTPATIENT
Start: 2023-12-13 | End: 2023-12-14 | Stop reason: HOSPADM

## 2023-12-13 RX ORDER — KETOROLAC TROMETHAMINE 30 MG/ML
30 INJECTION, SOLUTION INTRAMUSCULAR; INTRAVENOUS ONCE
Status: COMPLETED | OUTPATIENT
Start: 2023-12-13 | End: 2023-12-13

## 2023-12-13 RX ADMIN — DIVALPROEX SODIUM 750 MG: 250 TABLET, DELAYED RELEASE ORAL at 22:20

## 2023-12-13 RX ADMIN — DILTIAZEM HYDROCHLORIDE 10 MG: 5 INJECTION INTRAVENOUS at 10:34

## 2023-12-13 RX ADMIN — ADENOSINE 6 MG: 3 INJECTION, SOLUTION INTRAVENOUS at 10:45

## 2023-12-13 RX ADMIN — SODIUM CHLORIDE 2721 ML: 9 INJECTION, SOLUTION INTRAVENOUS at 10:35

## 2023-12-13 RX ADMIN — LOPERAMIDE HYDROCHLORIDE 2 MG: 2 CAPSULE ORAL at 22:20

## 2023-12-13 RX ADMIN — DICYCLOMINE HYDROCHLORIDE 20 MG: 10 INJECTION, SOLUTION INTRAMUSCULAR at 11:47

## 2023-12-13 RX ADMIN — DABIGATRAN ETEXILATE MESYLATE 150 MG: 75 CAPSULE ORAL at 22:19

## 2023-12-13 RX ADMIN — ONDANSETRON 4 MG: 2 INJECTION INTRAMUSCULAR; INTRAVENOUS at 11:11

## 2023-12-13 RX ADMIN — SODIUM CHLORIDE: 9 INJECTION, SOLUTION INTRAVENOUS at 19:38

## 2023-12-13 RX ADMIN — MAGNESIUM SULFATE HEPTAHYDRATE 1000 MG: 1 INJECTION, SOLUTION INTRAVENOUS at 14:06

## 2023-12-13 RX ADMIN — FENTANYL CITRATE 50 MCG: 50 INJECTION, SOLUTION INTRAMUSCULAR; INTRAVENOUS at 11:12

## 2023-12-13 RX ADMIN — IOPAMIDOL 100 ML: 755 INJECTION, SOLUTION INTRAVENOUS at 12:46

## 2023-12-13 RX ADMIN — ATORVASTATIN CALCIUM 40 MG: 40 TABLET, FILM COATED ORAL at 20:39

## 2023-12-13 RX ADMIN — ACETAMINOPHEN 650 MG: 325 TABLET ORAL at 18:25

## 2023-12-13 RX ADMIN — POTASSIUM CHLORIDE 40 MEQ: 1500 TABLET, EXTENDED RELEASE ORAL at 22:21

## 2023-12-13 RX ADMIN — CEFTRIAXONE 1000 MG: 1 INJECTION, POWDER, FOR SOLUTION INTRAMUSCULAR; INTRAVENOUS at 13:19

## 2023-12-13 RX ADMIN — SODIUM CHLORIDE 1000 ML: 9 INJECTION, SOLUTION INTRAVENOUS at 16:55

## 2023-12-13 RX ADMIN — POTASSIUM CHLORIDE 10 MEQ: 7.46 INJECTION, SOLUTION INTRAVENOUS at 13:13

## 2023-12-13 RX ADMIN — KETOROLAC TROMETHAMINE 30 MG: 30 INJECTION, SOLUTION INTRAMUSCULAR; INTRAVENOUS at 14:40

## 2023-12-13 RX ADMIN — MAGNESIUM SULFATE HEPTAHYDRATE 1000 MG: 1 INJECTION, SOLUTION INTRAVENOUS at 13:08

## 2023-12-13 RX ADMIN — ONDANSETRON 4 MG: 2 INJECTION INTRAMUSCULAR; INTRAVENOUS at 20:29

## 2023-12-13 RX ADMIN — SODIUM CHLORIDE, PRESERVATIVE FREE 10 ML: 5 INJECTION INTRAVENOUS at 22:21

## 2023-12-13 RX ADMIN — METOPROLOL TARTRATE 25 MG: 25 TABLET, FILM COATED ORAL at 20:39

## 2023-12-13 RX ADMIN — LAMOTRIGINE 100 MG: 100 TABLET ORAL at 22:19

## 2023-12-13 RX ADMIN — SODIUM CHLORIDE 1000 ML: 9 INJECTION, SOLUTION INTRAVENOUS at 18:01

## 2023-12-13 ASSESSMENT — PAIN DESCRIPTION - LOCATION
LOCATION: ABDOMEN
LOCATION: HEAD
LOCATION: HEAD

## 2023-12-13 ASSESSMENT — PAIN SCALES - GENERAL
PAINLEVEL_OUTOF10: 3
PAINLEVEL_OUTOF10: 8
PAINLEVEL_OUTOF10: 7
PAINLEVEL_OUTOF10: 2
PAINLEVEL_OUTOF10: 6
PAINLEVEL_OUTOF10: 8

## 2023-12-13 ASSESSMENT — PAIN DESCRIPTION - DESCRIPTORS
DESCRIPTORS: ACHING
DESCRIPTORS: CRAMPING
DESCRIPTORS: SHARP

## 2023-12-13 ASSESSMENT — PAIN - FUNCTIONAL ASSESSMENT: PAIN_FUNCTIONAL_ASSESSMENT: 0-10

## 2023-12-13 ASSESSMENT — PAIN DESCRIPTION - PAIN TYPE: TYPE: ACUTE PAIN

## 2023-12-13 ASSESSMENT — LIFESTYLE VARIABLES: HOW OFTEN DO YOU HAVE A DRINK CONTAINING ALCOHOL: NEVER

## 2023-12-13 NOTE — PROGRESS NOTES
[facility]ilma Gillis, [facility]. Atrial flutter with rapid ventricular response (HCC) [I48.92]  Acute cystitis with hematuria [N30.01]  Cardiac arrhythmia, unspecified cardiac arrhythmia type [I49.9]  Diarrhea, unspecified type [R19.7]  Gastroenteritis [K52.9] . Vitals:    12/13/23 1500   BP: 114/70   Pulse: 99   Resp: 24   Temp:    SpO2: 94%   . Patients home meds are   Prior to Admission medications    Medication Sig Start Date End Date Taking? Authorizing Provider   methylPREDNISolone (MEDROL DOSEPACK) 4 MG tablet Take by mouth. 11/16/23   Mary Koch DPM   lamoTRIgine (LAMICTAL) 100 MG tablet 1 TABLET DAILY AT BEDTIME 44/1/78   Guy Manjarrez MD   divalproex (DEPAKOTE) 250 MG DR tablet take 3 tablets by mouth twice a day 57/3/38   Guy Manjarrez MD   nitroGLYCERIN (NITROSTAT) 0.4 MG SL tablet Place 1 tablet under the tongue every 5 minutes as needed for Chest pain up to max of 3 total doses.  If no relief after 1 dose, call 911. 9/12/23   CLAUDIA Cullen CNP   dabigatran (PRADAXA) 150 MG capsule Take 1 capsule by mouth 2 times daily 9/8/23   Artis Lawrence MD   metoprolol tartrate (LOPRESSOR) 25 MG tablet Take 1 tablet by mouth 2 times daily 9/6/23   Artis Lawrence MD   atorvastatin (LIPITOR) 40 MG tablet Take 1 tablet by mouth nightly 9/6/23   Artis Lawrence MD   meloxicam RAJINDER BETANCOURT [facility]) 15 MG tablet Take 1 tablet by mouth daily 8/25/23   Bella Hampton MD   promethazine (PHENERGAN) 25 MG tablet take 1 tablet by mouth every 8 hours if needed for nausea 1/36/82   Guy Manjarrez MD   SUMAtriptan (IMITREX) 50 MG tablet take 1 tablet by mouth once daily if needed for MIGRAINE 5/36/57   Guy Manjarrez MD   metoprolol succinate (TOPROL XL) 100 MG extended release tablet Take 1 tablet by mouth at bedtime 11/7/22   Javier Vargas DO   lisinopril (PRINIVIL;ZESTRIL) 20 MG tablet  9/24/21   Provider, MD Jey   .  Recent Surgical History: None = 0

## 2023-12-13 NOTE — ED PROVIDER NOTES
[facility] ED  [address]  [place] [address] [place] [address]  Phone: [phone]       Pt Name: Iza Ruano  MRN: [id number]  [facility] 1980  Date of evaluation: 12/13/23  PCP:  Masood Blum MD    CHIEF COMPLAINT       Chief Complaint   Patient presents with    Abdominal Pain       HISTORY OF PRESENT ILLNESS  (Location/Symptom, Timing/Onset, Context/Setting, Quality, Duration, Modifying Factors, Severity.)    Iza Ruano is a 39 y.o. male who has a past medical history of atrial fibrillation, follows with Dr. Teo Orta, not currently on any anticoagulation medication but does take antiplatelet medication who presents emergency department with epigastric and periumbilical abdominal pain associated with nonbloody diarrhea, nausea but no vomiting. States that he has had both a colonoscopy as well as EGD in the past by Dr. Angelica Pa with no abnormal findings. He is status post hernia repair, however denies any other prior abdominal surgeries. He states that he has felt somewhat dizzy and lightheaded, had palpitations, was sweaty. However he contributed this to his diarrhea. He has not had any actual chest pain or shortness of breath. PAST MEDICAL / SURGICAL / SOCIAL / FAMILY HISTORY    has a past medical history of Anxiety, mild, Colon polyps, Dizziness, Eczema, Epilepsy ([address]), GERD (gastroesophageal reflux disease), H/O fall, Head ache, Head injury, Hiatal hernia, Inguinal hernia, Low back pain, Lumbar sprain, Migraine, Plantar fasciitis, bilateral, Psychogenic nonepileptic seizure, Seizure disorder ([address]), Sigmoid diverticulosis, Sleep difficulties, Status migrainosus, TIA (transient ischemic attack), and Tobacco use.     has a past surgical history that includes Vasectomy; Knee arthroscopy (Left); Gastric fundoplication (74/04/7564); Upper gastrointestinal endoscopy (03/05/2010); Colonoscopy (04/29/2011); Colonoscopy (06/04/2010); Colonoscopy (08/28/2009); Colonoscopy (05/27/2008);

## 2023-12-14 VITALS
BODY MASS INDEX: 34.3 KG/M2 | WEIGHT: 213.4 LBS | HEIGHT: 66 IN | HEART RATE: 84 BPM | OXYGEN SATURATION: 97 % | SYSTOLIC BLOOD PRESSURE: 124 MMHG | TEMPERATURE: 98.3 F | RESPIRATION RATE: 16 BRPM | DIASTOLIC BLOOD PRESSURE: 95 MMHG

## 2023-12-14 LAB
ANION GAP SERPL CALCULATED.3IONS-SCNC: 7 MMOL/L (ref 9–17)
BASOPHILS # BLD: <0.03 K/UL (ref 0–0.2)
BASOPHILS NFR BLD: 0 % (ref 0–2)
BUN SERPL-MCNC: 8 MG/DL (ref 6–20)
BUN/CREAT SERPL: 10 (ref 9–20)
CALCIUM SERPL-MCNC: 8.1 MG/DL (ref 8.6–10.4)
CAMPYLOBACTER DNA SPEC NAA+PROBE: NORMAL
CHLORIDE SERPL-SCNC: 116 MMOL/L (ref 98–107)
CO2 SERPL-SCNC: 20 MMOL/L (ref 20–31)
CREAT SERPL-MCNC: 0.8 MG/DL (ref 0.7–1.2)
EOSINOPHIL # BLD: 0.09 K/UL (ref 0–0.44)
EOSINOPHILS RELATIVE PERCENT: 2 % (ref 1–4)
ERYTHROCYTE [DISTWIDTH] IN BLOOD BY AUTOMATED COUNT: 13.2 % (ref 11.8–14.4)
ETEC ELTA+ESTB GENES STL QL NAA+PROBE: NORMAL
GFR SERPL CREATININE-BSD FRML MDRD: >60 ML/MIN/1.73M2
GLUCOSE SERPL-MCNC: 79 MG/DL (ref 70–99)
HCT VFR BLD AUTO: 32.8 % (ref 40.7–50.3)
HGB BLD-MCNC: 11.5 G/DL (ref 13–17)
IMM GRANULOCYTES # BLD AUTO: <0.03 K/UL (ref 0–0.3)
IMM GRANULOCYTES NFR BLD: 0 %
LYMPHOCYTES NFR BLD: 2.13 K/UL (ref 1.1–3.7)
LYMPHOCYTES RELATIVE PERCENT: 41 % (ref 24–43)
MAGNESIUM SERPL-MCNC: 1.9 MG/DL (ref 1.6–2.6)
MCH RBC QN AUTO: 34 PG (ref 25.2–33.5)
MCHC RBC AUTO-ENTMCNC: 35.1 G/DL (ref 25.2–33.5)
MCV RBC AUTO: 97 FL (ref 82.6–102.9)
MICROORGANISM SPEC CULT: NO GROWTH
MONOCYTES NFR BLD: 0.42 K/UL (ref 0.1–1.2)
MONOCYTES NFR BLD: 8 % (ref 3–12)
NEUTROPHILS NFR BLD: 48 % (ref 36–65)
NEUTS SEG NFR BLD: 2.49 K/UL (ref 1.5–8.1)
NRBC BLD-RTO: 0 PER 100 WBC
P SHIGELLOIDES DNA STL QL NAA+PROBE: NORMAL
PLATELET # BLD AUTO: 193 K/UL (ref 138–453)
PMV BLD AUTO: 9.9 FL (ref 8.1–13.5)
POTASSIUM SERPL-SCNC: 4.5 MMOL/L (ref 3.7–5.3)
RBC # BLD AUTO: 3.38 M/UL (ref 4.21–5.77)
SALMONELLA DNA SPEC QL NAA+PROBE: NORMAL
SHIGA TOXIN STX GENE SPEC NAA+PROBE: NORMAL
SHIGELLA DNA SPEC QL NAA+PROBE: NORMAL
SODIUM SERPL-SCNC: 143 MMOL/L (ref 135–144)
SPECIMEN DESCRIPTION: NORMAL
SPECIMEN DESCRIPTION: NORMAL
V CHOL+PARA RFBL+TRKH+TNAA STL QL NAA+PR: NORMAL
WBC OTHER # BLD: 5.2 K/UL (ref 3.5–11.3)
Y ENTERO RECN STL QL NAA+PROBE: NORMAL

## 2023-12-14 PROCEDURE — 36415 COLL VENOUS BLD VENIPUNCTURE: CPT

## 2023-12-14 PROCEDURE — 80048 BASIC METABOLIC PNL TOTAL CA: CPT

## 2023-12-14 PROCEDURE — 6370000000 HC RX 637 (ALT 250 FOR IP): Performed by: INTERNAL MEDICINE

## 2023-12-14 PROCEDURE — 2580000003 HC RX 258: Performed by: INTERNAL MEDICINE

## 2023-12-14 PROCEDURE — 6370000000 HC RX 637 (ALT 250 FOR IP)

## 2023-12-14 PROCEDURE — 83735 ASSAY OF MAGNESIUM: CPT

## 2023-12-14 PROCEDURE — 99238 HOSP IP/OBS DSCHRG MGMT 30/<: CPT | Performed by: INTERNAL MEDICINE

## 2023-12-14 PROCEDURE — 85025 COMPLETE CBC W/AUTO DIFF WBC: CPT

## 2023-12-14 RX ORDER — METAXALONE 800 MG/1
800 TABLET ORAL 3 TIMES DAILY
Status: DISCONTINUED | OUTPATIENT
Start: 2023-12-14 | End: 2023-12-14 | Stop reason: HOSPADM

## 2023-12-14 RX ORDER — LOPERAMIDE HYDROCHLORIDE 2 MG/1
2 CAPSULE ORAL 4 TIMES DAILY PRN
Qty: 28 CAPSULE | Refills: 0 | COMMUNITY
Start: 2023-12-14 | End: 2023-12-21

## 2023-12-14 RX ORDER — NICOTINE 21 MG/24HR
1 PATCH, TRANSDERMAL 24 HOURS TRANSDERMAL DAILY
Qty: 30 PATCH | Refills: 0 | COMMUNITY
Start: 2023-12-15

## 2023-12-14 RX ORDER — METAXALONE 800 MG/1
800 TABLET ORAL ONCE
Status: COMPLETED | OUTPATIENT
Start: 2023-12-14 | End: 2023-12-14

## 2023-12-14 RX ADMIN — METAXALONE 800 MG: 800 TABLET ORAL at 02:24

## 2023-12-14 RX ADMIN — METAXALONE 800 MG: 800 TABLET ORAL at 08:52

## 2023-12-14 RX ADMIN — LOPERAMIDE HYDROCHLORIDE 2 MG: 2 CAPSULE ORAL at 02:23

## 2023-12-14 RX ADMIN — DIVALPROEX SODIUM 750 MG: 250 TABLET, DELAYED RELEASE ORAL at 08:52

## 2023-12-14 RX ADMIN — ACETAMINOPHEN 650 MG: 325 TABLET ORAL at 02:21

## 2023-12-14 RX ADMIN — SODIUM CHLORIDE: 9 INJECTION, SOLUTION INTRAVENOUS at 02:27

## 2023-12-14 RX ADMIN — METOPROLOL TARTRATE 25 MG: 25 TABLET, FILM COATED ORAL at 08:53

## 2023-12-14 RX ADMIN — ACETAMINOPHEN 650 MG: 325 TABLET ORAL at 08:51

## 2023-12-14 RX ADMIN — SODIUM CHLORIDE: 9 INJECTION, SOLUTION INTRAVENOUS at 08:56

## 2023-12-14 RX ADMIN — SODIUM CHLORIDE, PRESERVATIVE FREE 10 ML: 5 INJECTION INTRAVENOUS at 08:54

## 2023-12-14 RX ADMIN — DABIGATRAN ETEXILATE MESYLATE 150 MG: 75 CAPSULE ORAL at 08:52

## 2023-12-14 ASSESSMENT — PAIN - FUNCTIONAL ASSESSMENT
PAIN_FUNCTIONAL_ASSESSMENT: ACTIVITIES ARE NOT PREVENTED
PAIN_FUNCTIONAL_ASSESSMENT: PREVENTS OR INTERFERES SOME ACTIVE ACTIVITIES AND ADLS

## 2023-12-14 ASSESSMENT — PAIN DESCRIPTION - DESCRIPTORS
DESCRIPTORS: STABBING
DESCRIPTORS: STABBING

## 2023-12-14 ASSESSMENT — PAIN DESCRIPTION - LOCATION
LOCATION: NECK
LOCATION: NECK

## 2023-12-14 ASSESSMENT — PAIN SCALES - GENERAL
PAINLEVEL_OUTOF10: 0
PAINLEVEL_OUTOF10: 8
PAINLEVEL_OUTOF10: 7

## 2023-12-14 ASSESSMENT — PAIN DESCRIPTION - ORIENTATION
ORIENTATION: LEFT
ORIENTATION: LEFT

## 2023-12-14 NOTE — CARE COORDINATION
DISCHARGE BARRIERS       Reason for Referral:  SW completed a Psychosocial Assessment for evaluation of patient's mental health, social status, and functional capacity within the community. Sherman Zhao is a 39 y.o. male admitted due to <principal problem not specified>. Patient alone. SW provided supportive listening while patient discussed past medical history and events leading up to hospitalization. Mental Status:  Alert, oriented, and engaging during assessment. Decision Making:  Makes own decisions. Family/Social/Home Environment: lives with their spouse    Support: Discussed a good social support network     Current Services:  None    Current DMEs: None    PCP: Kendy Yo MD and repots no issues affording medication.  status:   None     ADLs and means of transportation: Independent in ADLs prior to hospitalization and able to transport self. Food insecurity or needed financial assistance: Denies any food insecurity or financial concerns at this time. ACP and Code Status:  SW discussed an Advance Directive which included the patient's choices for care and treatment in the case of a health event that adversely affects decision-making abilities. SW provided education and resources. Sherman Zhao has no questions at this time and has agreed to keep me up-to-date should anything change. Sherman Zhao is a Full Code status and has NO advanced directive - not interested in additional information. Collaborative List of SNF/ECF/HH were provided: offered, declined No discharge order at this time. Anticipated Needs/Discharge Plan:  Spoke with patient/family/representative about discharge plan. Patient/Family/Representative verbalizes understanding of the plan of care and denies discharge needs or further services at this time. SW provided business card. SW will continue to monitor needs and assist as appropriate.         Electronically signed by Vivien Oneill

## 2023-12-14 NOTE — CARE COORDINATION
Case Management Assessment  Initial Evaluation    Date/Time of Evaluation: 12/14/2023 10:01 AM  Assessment Completed by: Reyes Finely, RN    If patient is discharged prior to next notation, then this note serves as note for discharge by case management. Patient Name: Katia Hebert                   YOB: 1980  Diagnosis: Atrial flutter with rapid ventricular response ([address]) [I48.92]  Acute cystitis with hematuria [N30.01]  Cardiac arrhythmia, unspecified cardiac arrhythmia type [I49.9]  Diarrhea, unspecified type [R19.7]  Gastroenteritis [K52.9]                   Date / Time: 12/13/2023 10:25 AM    Patient Admission Status: Inpatient   Readmission Risk (Low < 19, Mod (19-27), High > 27): Readmission Risk Score: 10    Current PCP: Parminder Tapia MD  PCP verified by CM? Yes    Chart Reviewed: Yes      History Provided by: Patient  Patient Orientation: Alert and Oriented    Patient Cognition: Alert    Hospitalization in the last 30 days (Readmission):  No    If yes, Readmission Assessment in CM Navigator will be completed. Advance Directives:      Code Status: Full Code   Patient's Primary Decision Maker is: Legal Next of Kin    Primary Decision Maker: Kael Benjaminjuan - [phone]    Discharge Planning:    Patient lives with: Spouse/Significant Other Type of Home: House  Primary Care Giver: Self  Patient Support Systems include: Spouse/Significant Other   Current Financial resources: Medicare  Current community resources: None  Current services prior to admission: None            Current DME:              Type of Home Care services:  None    ADLS  Prior functional level: Independent in ADLs/IADLs  Current functional level: Independent in ADLs/IADLs    PT AM-PAC:   /24  OT AM-PAC:   /24    Family can provide assistance at DC: Yes  Would you like Case Management to discuss the discharge plan with any other family members/significant others, and if so, who?     Plans to Return to

## 2023-12-14 NOTE — FLOWSHEET NOTE
Patient called out to talk to his nurse. Patient was standing at his bedside. States \"I don't feel good. My neck and shoulder hurts, I am freezing and I have diarrhea. \" Patient given a warm blanket and his neck and shoulder was wrapped. NP notified. Given Skelaxin, Tylenol and Imodium. Patient took the blanket off after a few minutes and states it didn't help.

## 2023-12-14 NOTE — PLAN OF CARE
Problem: Discharge Planning  Goal: Discharge to home or other facility with appropriate resources  Outcome: Progressing     Problem: Pain  Goal: Verbalizes/displays adequate comfort level or baseline comfort level  Outcome: Progressing     Problem: Safety - Adult  Goal: Free from fall injury  Outcome: Progressing     Problem: Cardiovascular - Adult  Goal: Maintains optimal cardiac output and hemodynamic stability  Outcome: Progressing  Goal: Absence of cardiac dysrhythmias or at baseline  Outcome: Progressing     Problem: Metabolic/Fluid and Electrolytes - Adult  Goal: Electrolytes maintained within normal limits  Outcome: Progressing  Goal: Hemodynamic stability and optimal renal function maintained  Outcome: Progressing  Goal: Glucose maintained within prescribed range  Outcome: Progressing

## 2023-12-14 NOTE — PROGRESS NOTES
Physician Progress Note      PATIENT:               Sarai Blackwood  CSN #:                  [id number]  :                       1980  ADMIT DATE:       2023 10:25 AM  [facility] DATE:  RESPONDING  PROVIDER #:        Eric Hilliard MD          QUERY TEXT:    Dr Daron Councilman,    Pt admitted with Diarrhea & sinus tachycardia. Per ED: Mild hypokalemia with   borderline hypomagnesemia, acute cystitis, & dehydration . If possible,   please document in progress notes and discharge summary:    The medical record reflects the following:  Risk Factors: diarrhea  Clinical Indicators: Per ED:  Mild hypokalemia with borderline hypomagnesemia,   acute cystitis, & dehydration  Treatment: IV Rocephin,  IV magnesium as well as IV potassium. Irma HALLN, RN  Options provided:  -- Mild hypokalemia with borderline hypomagnesemia, acute cystitis, &   dehydration confirmed present on admission  -- Mild hypokalemia with borderline hypomagnesemia, acute cystitis, &   dehydration ruled out  -- Other - I will add my own diagnosis  -- Disagree - Not applicable / Not valid  -- Disagree - Clinically unable to determine / Unknown  -- Refer to Clinical Documentation Reviewer    PROVIDER RESPONSE TEXT:    The diagnosis of Mild hypokalemia with borderline hypomagnesemia, acute   cystitis, & dehydration was ruled out.     Query created by: Micheal Garcia on 2023 8:53 AM      Electronically signed by:  Eric Hilliard MD 2023 11:48 AM

## 2023-12-14 NOTE — PLAN OF CARE
Problem: Discharge Planning  Goal: Discharge to home or other facility with appropriate resources  12/14/2023 1252 by Boris Freitas RN  Outcome: Completed  12/14/2023 1215 by Boris Freitas RN  Outcome: Progressing     Problem: Pain  Goal: Verbalizes/displays adequate comfort level or baseline comfort level  12/14/2023 1252 by Boris Freitas RN  Outcome: Completed  12/14/2023 1215 by Boris Freitas RN  Outcome: Progressing     Problem: Safety - Adult  Goal: Free from fall injury  12/14/2023 1252 by Boris Freitas RN  Outcome: Completed  12/14/2023 1215 by Boris Freitas RN  Outcome: Progressing     Problem: Cardiovascular - Adult  Goal: Maintains optimal cardiac output and hemodynamic stability  12/14/2023 1252 by Boris Freitas RN  Outcome: Completed  12/14/2023 1215 by Boris Freitas RN  Outcome: Progressing  Goal: Absence of cardiac dysrhythmias or at baseline  12/14/2023 1252 by Boris Freitas RN  Outcome: Completed  12/14/2023 1215 by Boris Freitas RN  Outcome: Progressing     Problem: Metabolic/Fluid and Electrolytes - Adult  Goal: Electrolytes maintained within normal limits  12/14/2023 1252 by Boris Freitas RN  Outcome: Completed  12/14/2023 1215 by Boris Freitas RN  Outcome: Progressing  Goal: Hemodynamic stability and optimal renal function maintained  12/14/2023 1252 by Boris Freitas RN  Outcome: Completed  12/14/2023 1215 by Boris Freitas RN  Outcome: Progressing  Goal: Glucose maintained within prescribed range  12/14/2023 1252 by Boris Freitas RN  Outcome: Completed  12/14/2023 1215 by Boris Freitas RN  Outcome: Progressing

## 2023-12-14 NOTE — H&P
HOSPITALIST ADMISSION H&P      REASON FOR ADMISSION:  Gastroenteritis, diarrhea  ESTIMATED LENGTH OF STAY:> 2 midnights, 3-4 days    ATTENDING/ADMITTING PHYSICIAN: Jazz Weber MD  PCP: Marylou Duque MD    HISTORY OF PRESENT ILLNESS:      The patient is a 39 y.o. male patient of Marylou Duque MD who presents from ER with c/o severe abdominal pain. Patient reports he presented to ER because he had \"diarrhea real bad\". Diarrhea started last night, patient took an imodium and went to bed around 9 pm, woke up around 130 am felt anxious, thought he had to have a bowel movement but was unable at that time. Went back to bed and around 730 am his stomach started to hurt \"real bad\" and when he went to the bathroom it was \"straight diarrhea\", reports 6-7 episodes prior to coming to ER. Patient reports last week he, his wife and his sister in law all had respiratory symptoms, his wife tested positive for Influenza A, patient and his sister-in -law were not tested. Patients sister-in-law also had nausea, vomiting, and diarrhea with her respiratory symptoms. Patient c/o nausea, vomiting, diarrhea, and occasional productive cough of clear to brown tinted phlegm \"probably because I smoke\". Encouraged to quit smoking. Patient denies shortness of breath or chest pain. Seizures: Lamictal, Depakote    Atrial fib: Currently sinus rhythm. In ER:   EKG #1:   Sinus tachycardia--129  Otherwise normal ECG. EKG #2:   Normal sinus rhythm--100  Nonspecific T wave abnormality  Abnormal ECG. CTA chest/abd/pel w:   IMPRESSION:  No aortic dissection or aneurysm. No acute pulmonary process identified. Atherosclerotic calcifications involving the coronary arteries and abdominal  aorta which is somewhat prominent for the patient's age. Diverticulosis without evidence of diverticulitis. Multiple fluid-filled bowel loops possibly related to gastroenteritis. No  evidence of bowel obstruction. Hepatic steatosis.       Wounds and Chest pain--9.18.2013--tachycardia--PVCs                                                                                     MI ruled out--9.19.2913                                                                                     2-D ECHO---9.19.2013                             MI ruled out--2010            2-D ECHO--2010--EF 45-50%      Tachycardia--2010                                                                                   Migraine headaches  Chronic pain syndrome    GERD--HH  Depression---anxiety--suicide attempt--2022  Tobacco abuse--1/3  PPD     PMH:   see above, eczema, diverticulosis, dehydration--2013, gastroenteritis--9.15.2013             anxiety--9.15.2013, unifocal PVCs--9.15.2013, sigmoid diverticulosis eczema,               back pain, headaches, colon polyps, dizziness, plantar fasciitis  PSH:   left knee arthroscopy, vasectomy, Nissen fundoplication--2004, paraesophageal              hernia repair--2010, bilateral inguinal hernia repair--2013, EGD--2010--2016,               colonoscopy--2008---2009---2010---2011 anal fissure--2014--2016, LIH--RIH--multiple,              LRA--BIH--mesh--umbilical hernia BETJUY--[id number], pilonidal cystectomy--2020,     Allergies:    NKDA    Attending Supervising Physician's Attestation Statement  I performed a history and physical examination on the patient and discussed the management with the nurse practitioner. I reviewed and agree with the findings and plan as documented in her note . Electronically signed by Kp Sidhu MD on 12/14/23 at 12:19 PM EST   PLAN:    1. Gastroenteritis: MIVF, Stool studies, Imodium, Clear liquids advance as tolerated, I&O.  2. DVT prophylaxis: Home pradaxa. 3. Seizures: Home depakote, lamictal, seizure precautions. Daily weight, VS per unit protocol, Telemetry monitoring, Monitor labs.   Home medications reviewed  See orders     Note that over 55 minutes was spent in evaluation of the

## 2023-12-14 NOTE — DISCHARGE INSTR - DIET
Good nutrition is important when healing from an illness, injury, or surgery. Follow any nutrition recommendations given to you during your hospital stay. If you were given an oral nutrition supplement while in the hospital, continue to take this supplement at home. You can take it with meals, in-between meals, and/or before bedtime. These supplements can be purchased at most local grocery stores, pharmacies, and chain [employer]-stores. If you have any questions about your diet or nutrition, call the hospital and ask for the dietitian.   Regular diet

## 2023-12-14 NOTE — PROGRESS NOTES
rounding on PCU. Assessment: Patient came in for stomach pain, but says the pain in his neck is worse than his stomach, but since he did not come in for neck pain, they cannot treat that. He is in a lot of pain and very tired. Intervention: Engaged in conversation and prayer. Patient expressed gratitude for visit and offer of continued prayer. Plan: Chaplains are available 24/7 to help with spiritual and emotional concerns.

## 2023-12-15 ENCOUNTER — CARE COORDINATION (OUTPATIENT)
Dept: CASE MANAGEMENT | Age: 43
End: 2023-12-15

## 2023-12-15 ENCOUNTER — FOLLOWUP TELEPHONE ENCOUNTER (OUTPATIENT)
Dept: INPATIENT UNIT | Age: 43
End: 2023-12-15

## 2023-12-15 NOTE — CARE COORDINATION
Care Transitions Outreach Attempt #1  Initial 24 hour call    Call within 2 business days of discharge: Yes   Attempted to reach patient for transitions of care follow up. Unable to reach patient. HIPAA compliant message left on  requesting a return call. Patient: Guera Sena Patient : 1980 MRN: [id number]    Last Discharge Facility       Date Complaint Diagnosis Description Type Department Provider    23 Abdominal Pain Diarrhea, unspecified type . .. ED to Hosp-Admission (Discharged) (ADMITTED) ROSE Perdomo MD; Gurvinder Martinez. .. Was this an external facility discharge?  No Discharge Facility: [address]    Noted following upcoming appointments from discharge chart review:   [id number],[place] [address] follow up appointment(s):   Future Appointments   Date Time Provider [address]   3/4/2024 11:30 AM CLAUDIA Odom - CNP [facility]   [id number]  2:45 PM Tawanda Mayo MD [facility]       [facility]  Care Transition Nurse

## 2023-12-15 NOTE — DISCHARGE SUMMARY
[address]                 [address] [facility], [address]                               DISCHARGE SUMMARY    PATIENT NAME: Timmy Kohler                  :        1980  MED REC NO:   [id number]                             ROOM:       0203  ACCOUNT NO:   [de-identified]                           ADMIT DATE: 2023  PROVIDER:     Joe Mederos. Dileep Radford MD                  DISCHARGE DATE:  2023    ATTENDING PHYSICIAN OF HOSPITALIZATION:  Jose Fulton MD    PERSONAL PHYSICIAN:  Bing Mattson, [facility]. DIAGNOSES:  1. Gastroenteritis with diarrhea, 2023, resolved. 2.  Hypokalemia, corrected. 3.  Hypomagnesemia, corrected. 4.  Lactic acid 1.9. ProBNP 47. Troponin less than 0.02.  5.  Tachycardia, fluid responsive. CTA chest, abdomen and pelvis  2023, no dissection or aneurysm. 6.  Coronary artery disease calcifications. 7.  Diverticulosis without diverticulitis, multiple fluid-filled loops  of bowel with no obstruction, hepatic steatosis. 8.  Seizure disorder, controlled with known psychogenic nonepileptic  seizures, pseudoseizures. 9.  Atrial fibrillation history, currently sinus rhythm. EKG #2, sinus  rhythm, rate 100, nonspecific T changes. EKG #1, sinus tachycardia,  rate 129.  10.  Hypertension, blood pressure 117/76. 11.  Hyperlipidemia. 12.  Chronic kidney disease, stage II. 13.  Cerebrovascular disease, TIA history. 14.  Coronary artery disease. Cardiac catheterization, 2023, mild  disease, mild calcification, LM, 20% LAD, normal D1, 20% left  circumflex, normal OM1, 20% OM2, proximal aneurysmal segment, 20%  diffuse RCA. Nuclear stress test 2021, no infarction, no  ischemia, normal wall motion, EF 71. Prior 2D echoes _____  with an  EF of 45% to 50%. 15.  Migraine headaches. 16.  Chronic pain syndrome, multiple complaints of musculoskeletal pain,  particularly back and neck.   17.  GERD, hiatal

## 2023-12-18 ENCOUNTER — TELEPHONE (OUTPATIENT)
Dept: FAMILY MEDICINE CLINIC | Age: 43
End: 2023-12-18

## 2023-12-18 NOTE — TELEPHONE ENCOUNTER
Care Transitions Initial Follow Up Call    Outreach made within 2 business days of discharge: Yes    Patient: Josh Beyer Patient : 1980   MRN: [id number]  Reason for Admission: gastroenteritis w/ diarrhea  Discharge Date: 23       [facility]    Discharge department/facility: [place], [place]

## 2024-02-20 NOTE — TELEPHONE ENCOUNTER
Josh called requesting a refill of the below medication which has been pended for you: left message for patient to call back to schedule appt    Requested Prescriptions     Pending Prescriptions Disp Refills    meloxicam (MOBIC) 15 MG tablet [Pharmacy Med Name: MELOXICAM 15 MG TABLET] 30 tablet 2     Sig: take 1 tablet by mouth once daily       Last Appointment Date: 8/29/2022  Next Appointment Date: Visit date not found    No Known Allergies

## 2024-02-21 ENCOUNTER — OFFICE VISIT (OUTPATIENT)
Dept: PRIMARY CARE CLINIC | Age: 44
End: 2024-02-21
Payer: COMMERCIAL

## 2024-02-21 VITALS
SYSTOLIC BLOOD PRESSURE: 121 MMHG | BODY MASS INDEX: 31.82 KG/M2 | HEART RATE: 98 BPM | WEIGHT: 198 LBS | OXYGEN SATURATION: 97 % | DIASTOLIC BLOOD PRESSURE: 85 MMHG | TEMPERATURE: 97.9 F | HEIGHT: 66 IN

## 2024-02-21 DIAGNOSIS — M25.572 ARTHRALGIA OF TOE OF LEFT FOOT: Primary | ICD-10-CM

## 2024-02-21 DIAGNOSIS — L82.1 SEBORRHEIC KERATOSIS: ICD-10-CM

## 2024-02-21 PROCEDURE — G8483 FLU IMM NO ADMIN DOC REA: HCPCS

## 2024-02-21 PROCEDURE — G8417 CALC BMI ABV UP PARAM F/U: HCPCS

## 2024-02-21 PROCEDURE — 4004F PT TOBACCO SCREEN RCVD TLK: CPT

## 2024-02-21 PROCEDURE — G8427 DOCREV CUR MEDS BY ELIG CLIN: HCPCS

## 2024-02-21 PROCEDURE — 99214 OFFICE O/P EST MOD 30 MIN: CPT

## 2024-02-21 RX ORDER — TRAMADOL HYDROCHLORIDE 50 MG/1
50 TABLET ORAL EVERY 6 HOURS PRN
Qty: 12 TABLET | Refills: 0 | Status: SHIPPED | OUTPATIENT
Start: 2024-02-21 | End: 2024-02-24

## 2024-02-21 RX ORDER — MELOXICAM 15 MG/1
15 TABLET ORAL DAILY
Qty: 30 TABLET | Refills: 0 | Status: SHIPPED | OUTPATIENT
Start: 2024-02-21 | End: 2024-02-21 | Stop reason: SINTOL

## 2024-02-21 RX ORDER — MELOXICAM 15 MG/1
15 TABLET ORAL DAILY
Qty: 30 TABLET | Refills: 2 | OUTPATIENT
Start: 2024-02-21

## 2024-02-21 ASSESSMENT — ENCOUNTER SYMPTOMS: BACK PAIN: 0

## 2024-02-21 NOTE — PATIENT INSTRUCTIONS
Take medication as prescribed  Follow up with PCP to discuss mobic and possible different alternatives  Return for continued or worsening symptoms

## 2024-02-21 NOTE — PROGRESS NOTES
[facility]  [address] [facility] [address]  Phone: [phone]  Fax: [phone]      Josh Beyer is a 43 y.o. male who presents to the [facility] today for his medical conditions/complaints as noted below. Josh Beyer is c/o of Medication Refill (Pt needs refill on mobic can't get into pcp. Arthritis pain and has spots on his back. )          HPI:     Medication Refill  This is a chronic (known arthritis throughout joints) problem. The current episode started in the past 7 days (x3 days acute- has had pain \"for years\"). Associated symptoms include arthralgias and myalgias. Pertinent negatives include no fever or joint swelling. The symptoms are aggravated by standing. Treatments tried: normally takes mobic- ran out approximately 1.5 weeks ago. The treatment provided significant relief.       Past Medical History:   Diagnosis Date    Anxiety, mild     Colitis due to Clostridioides difficile 10/28/2022    Colon polyps     Depression     Dizziness     Eczema     Epilepsy (HCC)     GERD (gastroesophageal reflux disease)     H/O fall     Head ache     Head injury     Hiatal hernia     History of suicide attempt 10/28/2022    Inguinal hernia     Right.    Low back pain     Lumbar sprain     Migraine     Plantar fasciitis, bilateral     Psychogenic nonepileptic seizure 11/16/2022    Seizure disorder (HCC)     Sigmoid diverticulosis     Sleep difficulties     Status migrainosus     TIA (transient ischemic attack) 09/2017    Tobacco use     Chronic.        No Known Allergies    Wt Readings from Last 3 Encounters:   02/21/24 89.8 kg (198 lb)   12/14/23 96.8 kg (213 lb 6.4 oz)   11/16/23 91.8 kg (202 lb 4.8 oz)     BP Readings from Last 3 Encounters:   02/21/24 121/85   12/14/23 (!) 124/95   11/16/23 124/80      Temp Readings from Last 3 Encounters:   02/21/24 97.9 °F (36.6 °C) (Tympanic)   12/14/23 98.3 °F (36.8 °C) (Tympanic)

## 2024-02-28 ENCOUNTER — OFFICE VISIT (OUTPATIENT)
Dept: FAMILY MEDICINE CLINIC | Age: 44
End: 2024-02-28

## 2024-02-28 VITALS
SYSTOLIC BLOOD PRESSURE: 134 MMHG | WEIGHT: 197.8 LBS | BODY MASS INDEX: 31.79 KG/M2 | HEART RATE: 120 BPM | HEIGHT: 66 IN | OXYGEN SATURATION: 98 % | DIASTOLIC BLOOD PRESSURE: 80 MMHG

## 2024-02-28 DIAGNOSIS — E78.00 PURE HYPERCHOLESTEROLEMIA: ICD-10-CM

## 2024-02-28 DIAGNOSIS — I48.91 ATRIAL FIBRILLATION WITH RVR (HCC): ICD-10-CM

## 2024-02-28 DIAGNOSIS — Z00.00 ENCOUNTER FOR PREVENTIVE HEALTH EXAMINATION: Primary | ICD-10-CM

## 2024-02-28 DIAGNOSIS — K21.9 GASTROESOPHAGEAL REFLUX DISEASE WITHOUT ESOPHAGITIS: ICD-10-CM

## 2024-02-28 DIAGNOSIS — F41.9 ANXIETY, MILD: ICD-10-CM

## 2024-02-28 DIAGNOSIS — Z72.0 TOBACCO USE: ICD-10-CM

## 2024-02-28 DIAGNOSIS — G40.909 SEIZURE DISORDER (HCC): ICD-10-CM

## 2024-02-28 SDOH — ECONOMIC STABILITY: HOUSING INSECURITY
IN THE LAST 12 MONTHS, WAS THERE A TIME WHEN YOU DID NOT HAVE A STEADY PLACE TO SLEEP OR SLEPT IN A SHELTER (INCLUDING NOW)?: NO

## 2024-02-28 SDOH — ECONOMIC STABILITY: FOOD INSECURITY: WITHIN THE PAST 12 MONTHS, THE FOOD YOU BOUGHT JUST DIDN'T LAST AND YOU DIDN'T HAVE MONEY TO GET MORE.: NEVER TRUE

## 2024-02-28 SDOH — ECONOMIC STABILITY: FOOD INSECURITY: WITHIN THE PAST 12 MONTHS, YOU WORRIED THAT YOUR FOOD WOULD RUN OUT BEFORE YOU GOT MONEY TO BUY MORE.: NEVER TRUE

## 2024-02-28 SDOH — ECONOMIC STABILITY: INCOME INSECURITY: HOW HARD IS IT FOR YOU TO PAY FOR THE VERY BASICS LIKE FOOD, HOUSING, MEDICAL CARE, AND HEATING?: NOT HARD AT ALL

## 2024-02-28 ASSESSMENT — PATIENT HEALTH QUESTIONNAIRE - PHQ9
SUM OF ALL RESPONSES TO PHQ QUESTIONS 1-9: 0
9. THOUGHTS THAT YOU WOULD BE BETTER OFF DEAD, OR OF HURTING YOURSELF: 0
SUM OF ALL RESPONSES TO PHQ QUESTIONS 1-9: 0
SUM OF ALL RESPONSES TO PHQ9 QUESTIONS 1 & 2: 0
7. TROUBLE CONCENTRATING ON THINGS, SUCH AS READING THE NEWSPAPER OR WATCHING TELEVISION: 0
2. FEELING DOWN, DEPRESSED OR HOPELESS: 0
1. LITTLE INTEREST OR PLEASURE IN DOING THINGS: 0
4. FEELING TIRED OR HAVING LITTLE ENERGY: 0
6. FEELING BAD ABOUT YOURSELF - OR THAT YOU ARE A FAILURE OR HAVE LET YOURSELF OR YOUR FAMILY DOWN: 0
8. MOVING OR SPEAKING SO SLOWLY THAT OTHER PEOPLE COULD HAVE NOTICED. OR THE OPPOSITE, BEING SO FIGETY OR RESTLESS THAT YOU HAVE BEEN MOVING AROUND A LOT MORE THAN USUAL: 0
5. POOR APPETITE OR OVEREATING: 0
SUM OF ALL RESPONSES TO PHQ QUESTIONS 1-9: 0
SUM OF ALL RESPONSES TO PHQ QUESTIONS 1-9: 0
3. TROUBLE FALLING OR STAYING ASLEEP: 0
10. IF YOU CHECKED OFF ANY PROBLEMS, HOW DIFFICULT HAVE THESE PROBLEMS MADE IT FOR YOU TO DO YOUR WORK, TAKE CARE OF THINGS AT HOME, OR GET ALONG WITH OTHER PEOPLE: 0

## 2024-02-28 ASSESSMENT — ENCOUNTER SYMPTOMS
GASTROINTESTINAL NEGATIVE: 1
RESPIRATORY NEGATIVE: 1
EYES NEGATIVE: 1

## 2024-02-28 NOTE — PROGRESS NOTES
Genitourinary: Negative.    Musculoskeletal: Negative.    Skin: Negative.    Neurological: Negative.    Psychiatric/Behavioral:  Positive for sleep disturbance. Negative for behavioral problems and dysphoric mood.        Objective:   Physical Exam  Constitutional:       Appearance: He is well-developed.   HENT:      Head: Normocephalic and atraumatic.   Eyes:      Pupils: Pupils are equal, round, and reactive to light.   Cardiovascular:      Rate and Rhythm: Regular rhythm. Tachycardia present.      Heart sounds: Normal heart sounds. No murmur heard.  Pulmonary:      Effort: Pulmonary effort is normal. No respiratory distress.      Breath sounds: Normal breath sounds. No wheezing or rales.   Abdominal:      General: There is no distension.      Palpations: Abdomen is soft. There is no mass.      Tenderness: There is no abdominal tenderness.   Musculoskeletal:         General: No tenderness. Normal range of motion.      Cervical back: Normal range of motion and neck supple.   Skin:     General: Skin is warm.      Findings: No erythema or rash.   Neurological:      Mental Status: He is alert.   Psychiatric:         Behavior: Behavior normal.         Thought Content: Thought content normal.         Judgment: Judgment normal.       /80 (Site: Right Upper Arm, Position: Sitting, Cuff Size: Large Adult)   Pulse (!) 120   Ht 1.676 m (5' 6\")   Wt 89.7 kg (197 lb 12.8 oz)   SpO2 98%   BMI 31.93 kg/m²     Assessment:      Encounter Diagnoses   Name Primary?    Encounter for preventive health examination Yes    Atrial fibrillation with RVR (HCC)     Seizure disorder (HCC)     Tobacco use     Gastroesophageal reflux disease without esophagitis     Anxiety, mild            Plan:      Rec. Updated labs.      Atrial fib.. unaware of palpitations. Sinus tach. Today on exam.  Cup of coffee this am, otherwise no stimulants.  Has cardiology follow up.  Consider increasing toprol if pulse remains elevated. Rec. Smart watch

## 2024-03-01 ENCOUNTER — HOSPITAL ENCOUNTER (EMERGENCY)
Age: 44
Discharge: HOME | End: 2024-03-01
Attending: SPECIALIST
Payer: COMMERCIAL

## 2024-03-01 ENCOUNTER — APPOINTMENT (OUTPATIENT)
Dept: CT IMAGING | Age: 44
End: 2024-03-01
Payer: COMMERCIAL

## 2024-03-01 VITALS
RESPIRATION RATE: 17 BRPM | SYSTOLIC BLOOD PRESSURE: 120 MMHG | TEMPERATURE: 98.8 F | OXYGEN SATURATION: 96 % | WEIGHT: 197 LBS | DIASTOLIC BLOOD PRESSURE: 92 MMHG | BODY MASS INDEX: 31.8 KG/M2 | HEART RATE: 93 BPM

## 2024-03-01 DIAGNOSIS — K57.32 DIVERTICULITIS OF ASCENDING COLON: Primary | ICD-10-CM

## 2024-03-01 LAB
ALBUMIN SERPL-MCNC: 4.3 G/DL (ref 3.5–5.2)
ALBUMIN/GLOB SERPL: 1.6 {RATIO} (ref 1–2.5)
ALP SERPL-CCNC: 105 U/L (ref 40–129)
ALT SERPL-CCNC: 69 U/L (ref 5–41)
ANION GAP SERPL CALCULATED.3IONS-SCNC: 14 MMOL/L (ref 9–17)
AST SERPL-CCNC: 41 U/L
BASOPHILS # BLD: 0.05 K/UL (ref 0–0.2)
BASOPHILS NFR BLD: 0 % (ref 0–2)
BILIRUB SERPL-MCNC: 0.4 MG/DL (ref 0.3–1.2)
BILIRUB UR QL STRIP: NEGATIVE
BUN SERPL-MCNC: 14 MG/DL (ref 6–20)
BUN/CREAT SERPL: 13 (ref 9–20)
CALCIUM SERPL-MCNC: 9.3 MG/DL (ref 8.6–10.4)
CHLORIDE SERPL-SCNC: 102 MMOL/L (ref 98–107)
CLARITY UR: CLEAR
CO2 SERPL-SCNC: 24 MMOL/L (ref 20–31)
COLOR UR: YELLOW
COMMENT: ABNORMAL
CREAT SERPL-MCNC: 1.1 MG/DL (ref 0.7–1.2)
EOSINOPHIL # BLD: 0.14 K/UL (ref 0–0.44)
EOSINOPHILS RELATIVE PERCENT: 1 % (ref 1–4)
ERYTHROCYTE [DISTWIDTH] IN BLOOD BY AUTOMATED COUNT: 12.6 % (ref 11.8–14.4)
FLUAV AG SPEC QL: NEGATIVE
FLUBV AG SPEC QL: NEGATIVE
GFR SERPL CREATININE-BSD FRML MDRD: >60 ML/MIN/1.73M2
GLUCOSE SERPL-MCNC: 110 MG/DL (ref 70–99)
GLUCOSE UR STRIP-MCNC: NEGATIVE MG/DL
HCT VFR BLD AUTO: 41.3 % (ref 40.7–50.3)
HGB BLD-MCNC: 14.9 G/DL (ref 13–17)
HGB UR QL STRIP.AUTO: NEGATIVE
IMM GRANULOCYTES # BLD AUTO: 0.05 K/UL (ref 0–0.3)
IMM GRANULOCYTES NFR BLD: 0 %
KETONES UR STRIP-MCNC: NEGATIVE MG/DL
LACTATE BLDV-SCNC: 1.9 MMOL/L (ref 0.5–2.2)
LEUKOCYTE ESTERASE UR QL STRIP: NEGATIVE
LIPASE SERPL-CCNC: 52 U/L (ref 13–60)
LYMPHOCYTES NFR BLD: 2.53 K/UL (ref 1.1–3.7)
LYMPHOCYTES RELATIVE PERCENT: 21 % (ref 24–43)
MAGNESIUM SERPL-MCNC: 1.6 MG/DL (ref 1.6–2.6)
MCH RBC QN AUTO: 34.7 PG (ref 25.2–33.5)
MCHC RBC AUTO-ENTMCNC: 36.1 G/DL (ref 25.2–33.5)
MCV RBC AUTO: 96 FL (ref 82.6–102.9)
MONOCYTES NFR BLD: 1.23 K/UL (ref 0.1–1.2)
MONOCYTES NFR BLD: 10 % (ref 3–12)
NEUTROPHILS NFR BLD: 68 % (ref 36–65)
NEUTS SEG NFR BLD: 7.85 K/UL (ref 1.5–8.1)
NITRITE UR QL STRIP: NEGATIVE
NRBC BLD-RTO: 0 PER 100 WBC
PH UR STRIP: 6.5 [PH] (ref 5–6)
PLATELET # BLD AUTO: 239 K/UL (ref 138–453)
PMV BLD AUTO: 9.5 FL (ref 8.1–13.5)
POTASSIUM SERPL-SCNC: 4.2 MMOL/L (ref 3.7–5.3)
PROT SERPL-MCNC: 7 G/DL (ref 6.4–8.3)
PROT UR STRIP-MCNC: NEGATIVE MG/DL
RBC # BLD AUTO: 4.3 M/UL (ref 4.21–5.77)
SODIUM SERPL-SCNC: 140 MMOL/L (ref 135–144)
SP GR UR STRIP: 1.02 (ref 1.01–1.02)
UROBILINOGEN UR STRIP-ACNC: NORMAL EU/DL (ref 0–1)
WBC OTHER # BLD: 11.9 K/UL (ref 3.5–11.3)

## 2024-03-01 PROCEDURE — 6360000002 HC RX W HCPCS: Performed by: SPECIALIST

## 2024-03-01 PROCEDURE — 2709999900 CT ABDOMEN PELVIS W IV CONTRAST

## 2024-03-01 PROCEDURE — 87804 INFLUENZA ASSAY W/OPTIC: CPT

## 2024-03-01 PROCEDURE — 85025 COMPLETE CBC W/AUTO DIFF WBC: CPT

## 2024-03-01 PROCEDURE — 6360000004 HC RX CONTRAST MEDICATION: Performed by: SPECIALIST

## 2024-03-01 PROCEDURE — 83605 ASSAY OF LACTIC ACID: CPT

## 2024-03-01 PROCEDURE — 81003 URINALYSIS AUTO W/O SCOPE: CPT

## 2024-03-01 PROCEDURE — 96365 THER/PROPH/DIAG IV INF INIT: CPT

## 2024-03-01 PROCEDURE — 83690 ASSAY OF LIPASE: CPT

## 2024-03-01 PROCEDURE — 99285 EMERGENCY DEPT VISIT HI MDM: CPT

## 2024-03-01 PROCEDURE — 2580000003 HC RX 258: Performed by: SPECIALIST

## 2024-03-01 PROCEDURE — 80053 COMPREHEN METABOLIC PANEL: CPT

## 2024-03-01 PROCEDURE — 83735 ASSAY OF MAGNESIUM: CPT

## 2024-03-01 PROCEDURE — 96375 TX/PRO/DX INJ NEW DRUG ADDON: CPT

## 2024-03-01 PROCEDURE — 96361 HYDRATE IV INFUSION ADD-ON: CPT

## 2024-03-01 RX ORDER — AMOXICILLIN AND CLAVULANATE POTASSIUM 875; 125 MG/1; MG/1
1 TABLET, FILM COATED ORAL 2 TIMES DAILY
Qty: 20 TABLET | Refills: 0 | Status: SHIPPED | OUTPATIENT
Start: 2024-03-01 | End: 2024-03-11

## 2024-03-01 RX ORDER — TRAMADOL HYDROCHLORIDE 50 MG/1
50 TABLET ORAL EVERY 6 HOURS PRN
COMMUNITY

## 2024-03-01 RX ORDER — ONDANSETRON 2 MG/ML
4 INJECTION INTRAMUSCULAR; INTRAVENOUS ONCE
Status: COMPLETED | OUTPATIENT
Start: 2024-03-01 | End: 2024-03-01

## 2024-03-01 RX ORDER — 0.9 % SODIUM CHLORIDE 0.9 %
1000 INTRAVENOUS SOLUTION INTRAVENOUS ONCE
Status: COMPLETED | OUTPATIENT
Start: 2024-03-01 | End: 2024-03-01

## 2024-03-01 RX ORDER — KETOROLAC TROMETHAMINE 30 MG/ML
15 INJECTION, SOLUTION INTRAMUSCULAR; INTRAVENOUS ONCE
Status: COMPLETED | OUTPATIENT
Start: 2024-03-01 | End: 2024-03-01

## 2024-03-01 RX ADMIN — ONDANSETRON 4 MG: 2 INJECTION INTRAMUSCULAR; INTRAVENOUS at 04:28

## 2024-03-01 RX ADMIN — IOPAMIDOL 100 ML: 755 INJECTION, SOLUTION INTRAVENOUS at 05:07

## 2024-03-01 RX ADMIN — PIPERACILLIN AND TAZOBACTAM 4500 MG: 4; .5 INJECTION, POWDER, FOR SOLUTION INTRAVENOUS at 06:00

## 2024-03-01 RX ADMIN — SODIUM CHLORIDE 1000 ML: 9 INJECTION, SOLUTION INTRAVENOUS at 04:27

## 2024-03-01 RX ADMIN — KETOROLAC TROMETHAMINE 15 MG: 30 INJECTION INTRAMUSCULAR; INTRAVENOUS at 04:28

## 2024-03-01 ASSESSMENT — PAIN SCALES - GENERAL
PAINLEVEL_OUTOF10: 3
PAINLEVEL_OUTOF10: 7
PAINLEVEL_OUTOF10: 6

## 2024-03-01 ASSESSMENT — PAIN DESCRIPTION - PAIN TYPE: TYPE: ACUTE PAIN

## 2024-03-01 ASSESSMENT — PAIN - FUNCTIONAL ASSESSMENT: PAIN_FUNCTIONAL_ASSESSMENT: 0-10

## 2024-03-01 ASSESSMENT — ENCOUNTER SYMPTOMS
ABDOMINAL PAIN: 1
VOMITING: 0
SORE THROAT: 0
SHORTNESS OF BREATH: 0
DIARRHEA: 1
BLOOD IN STOOL: 0
COUGH: 0
NAUSEA: 1

## 2024-03-01 ASSESSMENT — PAIN DESCRIPTION - LOCATION
LOCATION: ABDOMEN

## 2024-03-01 ASSESSMENT — PAIN DESCRIPTION - ORIENTATION: ORIENTATION: MID

## 2024-03-01 NOTE — DISCHARGE INSTRUCTIONS
PLEASE RETURN TO THE EMERGENCY DEPARTMENT IMMEDIATELY if your symptoms worsen in anyway or in 8-12 hours if not improved for re-evaluation.  You should immediately return to the ER for symptoms such as increasing pain, bloody stool, fever, a feeling of passing out, light headed, dizziness, chest pain, shortness of breath, persistent nausea and/or vomiting, numbness or weakness to the arms or legs, coolness or color change of the arms or legs.      Take your medication as indicated and prescribed.  If you are given an antibiotic then, make sure you get the prescription filled and take the antibiotics until finished.      Please understand that at this time there is no evidence for a more serious underlying process, but that early in the process of an illness or injury, an emergency department workup can be falsely reassuring.  You should contact your family doctor within the next 24 hours for a follow up appointment    THANK YOU!!!    From [facility] and [facility]    On behalf of the Emergency Department staff at [facility], I would like to thank you for giving us the opportunity to address your health care needs and concerns.    We hope that during your visit, our service was delivered in a professional and caring manner. Please keep [facility] in mind as we walk with you down the path to your own personal wellness.     Please expect an automated text message or email from us so we can ask a few questions about your health and progress. Based on your answers, a clinician may call you back to offer help and instructions.    Please understand that early in the process of an illness or injury, an emergency department workup can be falsely reassuring.  If you notice any worsening, changing or persistent symptoms please call your family doctor or return to the ER immediately.     Tell us how we did during your visit at [email or web address]   and let us know about your experience

## 2024-03-01 NOTE — ED NOTES
Pt to ED with c/o abdominal pain increasing over the last few days. Pt states he has nausea as well without vomiting, pain is mid upper abd pain, denies any cp, sob or trouble urinating. Pt in

## 2024-03-01 NOTE — ED PROVIDER NOTES
[facility]  EMERGENCY DEPARTMENT ENCOUNTER      Pt Name: Josh Beyer  MRN: [id number]  Birthdate 1980  Date of evaluation: 3/1/2024      CHIEF COMPLAINT       Chief Complaint   Patient presents with    Abdominal Pain     Mid upper abd pain         HISTORY OF PRESENT ILLNESS    Josh Beyer is a 43 y.o. male who presents to the emergency department for evaluation of epigastric abdominal pain above the umbilical area sharp and burning in nature since last 2 and half to 3 days 7 out of 10 in intensity nonradiating associate with nausea.  Patient has had 1 episode of diarrhea 3 days ago and another episode of diarrhea prior to the onset of abdominal pain and diarrhea has stopped with Imodium.  He denies any vomiting and denies any melena or hematochezia.  No history of fever or chills.  His appetite has been fair.  He denies any urinary frequency, urgency, dysuria or hematuria.  Last bowel movement was prior to arrival and it was soft stool.  He denies any cough, runny nose or sore throat and denies any urinary frequency, urgency, dysuria or hematuria.  He denies any chest pain or back pain.  He denies consumption of unusual food or fluids or recent travels and denies any sick contacts.  His previous abdominal surgeries include fundoplication for hiatal hernia more than 10 years ago, umbilical hernia repair and bilateral inguinal hernia repair.  There are no exacerbating or relieving factors and patient has not taken any medication for the pain or nausea prior to arrival.      REVIEW OF SYSTEMS       Review of Systems   Constitutional:  Negative for chills and fever.   HENT:  Negative for congestion and sore throat.    Respiratory:  Negative for cough and shortness of breath.    Cardiovascular:  Negative for chest pain and palpitations.   Gastrointestinal:  Positive for abdominal pain, diarrhea and nausea. Negative for blood in stool and vomiting.   Genitourinary:  Negative for

## 2024-03-04 ENCOUNTER — OFFICE VISIT (OUTPATIENT)
Dept: CARDIOLOGY | Age: 44
End: 2024-03-04
Payer: COMMERCIAL

## 2024-03-04 VITALS
DIASTOLIC BLOOD PRESSURE: 62 MMHG | HEART RATE: 98 BPM | SYSTOLIC BLOOD PRESSURE: 104 MMHG | WEIGHT: 195.8 LBS | BODY MASS INDEX: 31.6 KG/M2

## 2024-03-04 DIAGNOSIS — I48.0 PAF (PAROXYSMAL ATRIAL FIBRILLATION) (HCC): Primary | ICD-10-CM

## 2024-03-04 DIAGNOSIS — I25.10 CORONARY ARTERY DISEASE INVOLVING NATIVE CORONARY ARTERY OF NATIVE HEART WITHOUT ANGINA PECTORIS: ICD-10-CM

## 2024-03-04 PROCEDURE — 4004F PT TOBACCO SCREEN RCVD TLK: CPT | Performed by: NURSE PRACTITIONER

## 2024-03-04 PROCEDURE — 93000 ELECTROCARDIOGRAM COMPLETE: CPT | Performed by: NURSE PRACTITIONER

## 2024-03-04 PROCEDURE — G8483 FLU IMM NO ADMIN DOC REA: HCPCS | Performed by: NURSE PRACTITIONER

## 2024-03-04 PROCEDURE — G8417 CALC BMI ABV UP PARAM F/U: HCPCS | Performed by: NURSE PRACTITIONER

## 2024-03-04 PROCEDURE — 99214 OFFICE O/P EST MOD 30 MIN: CPT | Performed by: NURSE PRACTITIONER

## 2024-03-04 PROCEDURE — G8427 DOCREV CUR MEDS BY ELIG CLIN: HCPCS | Performed by: NURSE PRACTITIONER

## 2024-03-04 NOTE — PROGRESS NOTES
Today's Date: 3/4/2024  Patient's Name: Josh Beyer  Patient's age: 43 y.o., 1980    Subjective:  Josh Beyer is being seen in clinic today regarding follow-up on PAF, HTN, CAD    He is doing well from a cardiac standpoint. No chest pain, no dyspnea, no PND, no syncope or pre-syncope, no orthopnea. He is not taking taking any cardiac medication. He continues to smoking      Past Medical History:   has a past medical history of Anxiety, mild, Colitis due to Clostridioides difficile, Colon polyps, Depression, Dizziness, Eczema, Epilepsy ([facility]), GERD (gastroesophageal reflux disease), H/O fall, Head ache, Head injury, Hiatal hernia, History of suicide attempt, Inguinal hernia, Low back pain, Lumbar sprain, Migraine, Plantar fasciitis, bilateral, Psychogenic nonepileptic seizure, Seizure disorder ([facility]), Sigmoid diverticulosis, Sleep difficulties, Status migrainosus, TIA (transient ischemic attack), and Tobacco use.    Past Surgical History:   has a past surgical history that includes Vasectomy; Knee arthroscopy (Left); Gastric fundoplication (03/19/2010); Upper gastrointestinal endoscopy (03/05/2010); Colonoscopy (04/29/2011); Colonoscopy (06/04/2010); Colonoscopy (08/28/2009); Colonoscopy (05/27/2008); Colonoscopy (11/19/2014); Inguinal hernia repair (Right, 05/30/2013); Inguinal hernia repair (Left, 01/24/2013); Inguinal hernia repair (Right, 09/24/2015); Colonoscopy (06/01/2016); Upper gastrointestinal endoscopy (06/01/2016); hernia repair (Left, 11/30/2017); Colonoscopy (04/19/2019); Pilonidal cyst excision (N/A, 07/23/2020); Esophagogastroduodenoscopy (02/13/2023); Cardiac catheterization (03/07/2023); and hernia repair (Bilateral, 3/24/2023).    Home Medications:  Prior to Admission medications    Medication Sig Start Date End Date Taking? Authorizing Provider   traMADol (ULTRAM) 50 MG tablet Take 1 tablet by mouth every 6 hours as needed for Pain.   Yes Provider, MD Jey

## 2024-03-05 ENCOUNTER — CARE COORDINATION (OUTPATIENT)
Dept: CARE COORDINATION | Age: 44
End: 2024-03-05

## 2024-03-05 NOTE — CARE COORDINATION
Ambulatory Care Coordination  ED Follow up Call    [facility] ED 3/1/2024     3/5/2024- 1:31 pm  Left HIPAA compliant voice message requesting return call @ [phone] re: ED F/U call.     Reason for ED visit:  diverticulitis    Future Appointments   Date Time Provider Department Center   5/8/2024  2:30 PM Ambrose Manjarrez MD [facility]   7/22/2024  3:00 PM Justin Fagan PA-C [facility]   8/28/2024  8:40 AM Corbin Smith MD [facility]   9/18/2024 10:45 AM David Nguyen MD [facility]

## 2024-03-06 ENCOUNTER — CARE COORDINATION (OUTPATIENT)
Dept: CARE COORDINATION | Age: 44
End: 2024-03-06

## 2024-03-06 NOTE — CARE COORDINATION
[facility] ED 3/1/2024      3/5/2024- 1:31 pm  Left HIPAA compliant voice message requesting return call @ [phone] re: ED F/U call.   3/6/2024- 10:55 am  Left final HIPAA compliant voice message requesting return call @ [phone].       Reason for ED visit:  diverticulitis    Future Appointments   Date Time Provider Department Center   5/8/2024  2:30 PM Ambrose Manjarrez MD [facility]   7/22/2024  3:00 PM [facility]   8/28/2024  8:40 AM Corbin Smith MD [facility]   9/18/2024 10:45 AM David Nguyen MD [facility]

## 2024-03-06 NOTE — CARE COORDINATION
Ambulatory Care Coordination  ED Follow up Call    [facility] ED 3/1/2024      3/5/2024- 1:31 pm  Left HIPAA compliant voice message requesting return call @ [phone] re: ED F/U call.   3/6/2024- 10:55 am  Left final HIPAA compliant voice message requesting return call @ [phone].     Josh returned call. He stated he is doing much better. He declined apt with PCP for F/U. He is aware of Urgent Care/Walk In care. He stated he would reach out to PCP if needed.   He is aware that he can reach out to this writer if needed.     Reason for ED visit:  diverticulitis    Status:     significantly improved    Did you call your PCP prior to going to the ED?  No      Did you receive a discharge instructions from the Emergency Room? Yes  Review of Instructions:     Understands what to report/when to return?:  Yes   Understands discharge instructions?:  Yes   Following discharge instructions?:  Yes      Are there any new complaints of pain? No  New Pain Meds? N/A    Constipation prophylaxis needed?  N/A    If you have a wound is the dressing clean, dry, and intact? N/A  Understands wound care regimen? N/A    Are there any other complaints/concerns that you wish to tell your provider?   No    FU appts/Provider:    Future Appointments   Date Time Provider Department Center   5/8/2024  2:30 PM [facility]   7/22/2024  3:00 PM Justin Fagan PA-C [facility]   8/28/2024  8:40 AM [facility]   9/18/2024 10:45 AM David Nguyen MD [facility]

## 2024-05-08 ENCOUNTER — TELEMEDICINE (OUTPATIENT)
Dept: NEUROLOGY | Age: 44
End: 2024-05-08
Payer: COMMERCIAL

## 2024-05-08 DIAGNOSIS — M54.12 CERVICAL RADICULOPATHY: ICD-10-CM

## 2024-05-08 DIAGNOSIS — G40.009 PARTIAL IDIOPATHIC EPILEPSY WITH SEIZURES OF LOCALIZED ONSET, NOT INTRACTABLE, WITHOUT STATUS EPILEPTICUS (HCC): Primary | ICD-10-CM

## 2024-05-08 DIAGNOSIS — G40.909 SEIZURE DISORDER (HCC): ICD-10-CM

## 2024-05-08 DIAGNOSIS — Z86.73 HISTORY OF STROKE: ICD-10-CM

## 2024-05-08 DIAGNOSIS — R42 DIZZINESS: ICD-10-CM

## 2024-05-08 DIAGNOSIS — Z91.81 H/O FALL: ICD-10-CM

## 2024-05-08 DIAGNOSIS — F34.1 DYSTHYMIA: ICD-10-CM

## 2024-05-08 DIAGNOSIS — G47.9 SLEEP DIFFICULTIES: ICD-10-CM

## 2024-05-08 DIAGNOSIS — S09.90XS INJURY OF HEAD, SEQUELA: ICD-10-CM

## 2024-05-08 DIAGNOSIS — K21.9 GASTROESOPHAGEAL REFLUX DISEASE WITHOUT ESOPHAGITIS: ICD-10-CM

## 2024-05-08 DIAGNOSIS — G43.009 MIGRAINE WITHOUT AURA AND WITHOUT STATUS MIGRAINOSUS, NOT INTRACTABLE: ICD-10-CM

## 2024-05-08 DIAGNOSIS — F41.9 ANXIETY, MILD: ICD-10-CM

## 2024-05-08 DIAGNOSIS — F44.5 PSYCHOGENIC NONEPILEPTIC SEIZURE: ICD-10-CM

## 2024-05-08 DIAGNOSIS — I48.91 ATRIAL FIBRILLATION WITH RVR (HCC): ICD-10-CM

## 2024-05-08 DIAGNOSIS — Z86.73 H/O TRANSIENT ISCHEMIC ATTACK INVOLVING ANTERIOR CIRCULATION: ICD-10-CM

## 2024-05-08 DIAGNOSIS — Z72.0 TOBACCO USE: ICD-10-CM

## 2024-05-08 DIAGNOSIS — G47.9 DISTURBANCE, SLEEP: ICD-10-CM

## 2024-05-08 PROCEDURE — 99212 OFFICE O/P EST SF 10 MIN: CPT | Performed by: PSYCHIATRY & NEUROLOGY

## 2024-05-08 RX ORDER — SUMATRIPTAN 50 MG/1
TABLET, FILM COATED ORAL
Qty: 30 TABLET | Refills: 2 | Status: SHIPPED | OUTPATIENT
Start: 2024-05-08

## 2024-05-08 RX ORDER — PROMETHAZINE HYDROCHLORIDE 25 MG/1
TABLET ORAL
Qty: 60 TABLET | Refills: 2 | Status: SHIPPED | OUTPATIENT
Start: 2024-05-08

## 2024-05-08 RX ORDER — LAMOTRIGINE 100 MG/1
TABLET ORAL
Qty: 30 TABLET | Refills: 5 | Status: SHIPPED | OUTPATIENT
Start: 2024-05-08

## 2024-05-08 RX ORDER — DIVALPROEX SODIUM 250 MG/1
TABLET, DELAYED RELEASE ORAL
Qty: 180 TABLET | Refills: 5 | Status: SHIPPED | OUTPATIENT
Start: 2024-05-08

## 2024-05-08 ASSESSMENT — ENCOUNTER SYMPTOMS
CHOKING: 0
COLOR CHANGE: 0
DIARRHEA: 0
APNEA: 0
CONSTIPATION: 0
CHEST TIGHTNESS: 0
CHANGE IN BOWEL HABIT: 0
FACIAL SWELLING: 0
ABDOMINAL DISTENTION: 0
WHEEZING: 0
EYE DISCHARGE: 0
BLOOD IN STOOL: 0
VOICE CHANGE: 0
TROUBLE SWALLOWING: 0
EYE ITCHING: 0
SHORTNESS OF BREATH: 0

## 2024-05-08 NOTE — PROGRESS NOTES
Subjective:        Patient ID: Josh Beyer is a 43 y.o.RIGHT   HANDED male.        Seizures  This is a chronic problem. Episode onset: SINCE    2014. The problem occurs intermittently. The problem has been waxing and waning. Pertinent negatives include no arthralgias, change in bowel habit, chest pain, chills, congestion, diaphoresis, fatigue, joint swelling, myalgias, rash or urinary symptoms. Exacerbated by: LACK OF  SLEEP,  PROLONGED   TV ,   STRESS,   LOUD  NOISES   AND  BRIGHT  LIGHTS   Treatments tried: ANTI  EPILEPTIC  MEDICATION. The treatment provided moderate relief.   Migraine   This is a chronic problem. Episode onset: MORE  THAN   10  YEARS. The problem occurs intermittently. The problem has been waxing and waning. The pain is located in the Right unilateral and frontal region. The pain does not radiate. The pain quality is similar to prior headaches. The quality of the pain is described as aching and throbbing. The pain is at a severity of 3/10. The pain is mild. Associated symptoms include seizures. The symptoms are aggravated by unknown. He has tried triptans and NSAIDs (TOPAMAX) for the symptoms. The treatment provided moderate relief.               History obtained from  The patient          and other  available medical records were  Also  reviewed.                1)      KNOWN    H/O   CHRONIC  SEVERE MIGRAINES                         FOR     10  YEARS                           -     WELL  CONTROLLED                      -   ON  IMITREX,   PHENERGAN   AS  NEEDED               2)     H/O   CHRONIC   TENSION  HEADACHE                       --   BETTER  CONTROLLED                            3)       KNOWN   H/O  SEIZURE  DISORDER /  EPILEPSY    SINCE      2014                        -    BETTER      SEIZURE  CONTROL                                        4)        PREVIOUS  H/O     INTOLERANCE  TO  KEPPRA                    DUE  TO  IRRITABILITY  AND MOOD PROBLEMS            5)       PREVIOUSLY

## 2024-06-17 ENCOUNTER — HOSPITAL ENCOUNTER (EMERGENCY)
Age: 44
Discharge: HOME | End: 2024-06-17
Attending: EMERGENCY MEDICINE
Payer: COMMERCIAL

## 2024-06-17 VITALS
TEMPERATURE: 98.8 F | SYSTOLIC BLOOD PRESSURE: 144 MMHG | DIASTOLIC BLOOD PRESSURE: 107 MMHG | HEART RATE: 98 BPM | RESPIRATION RATE: 18 BRPM | OXYGEN SATURATION: 95 %

## 2024-06-17 DIAGNOSIS — G43.909 MIGRAINE WITHOUT STATUS MIGRAINOSUS, NOT INTRACTABLE, UNSPECIFIED MIGRAINE TYPE: Primary | ICD-10-CM

## 2024-06-17 PROCEDURE — 6360000002 HC RX W HCPCS: Performed by: EMERGENCY MEDICINE

## 2024-06-17 PROCEDURE — 96372 THER/PROPH/DIAG INJ SC/IM: CPT

## 2024-06-17 PROCEDURE — 99284 EMERGENCY DEPT VISIT MOD MDM: CPT

## 2024-06-17 RX ORDER — KETOROLAC TROMETHAMINE 30 MG/ML
30 INJECTION, SOLUTION INTRAMUSCULAR; INTRAVENOUS ONCE
Status: COMPLETED | OUTPATIENT
Start: 2024-06-17 | End: 2024-06-17

## 2024-06-17 RX ORDER — DIPHENHYDRAMINE HYDROCHLORIDE 50 MG/ML
25 INJECTION INTRAMUSCULAR; INTRAVENOUS ONCE
Status: COMPLETED | OUTPATIENT
Start: 2024-06-17 | End: 2024-06-17

## 2024-06-17 RX ORDER — PROMETHAZINE HYDROCHLORIDE 25 MG/ML
25 INJECTION, SOLUTION INTRAMUSCULAR; INTRAVENOUS ONCE
Status: COMPLETED | OUTPATIENT
Start: 2024-06-17 | End: 2024-06-17

## 2024-06-17 RX ADMIN — PROMETHAZINE HYDROCHLORIDE 25 MG: 25 INJECTION INTRAMUSCULAR; INTRAVENOUS at 12:39

## 2024-06-17 RX ADMIN — DIPHENHYDRAMINE HYDROCHLORIDE 25 MG: 50 INJECTION INTRAMUSCULAR; INTRAVENOUS at 12:39

## 2024-06-17 RX ADMIN — KETOROLAC TROMETHAMINE 30 MG: 30 INJECTION, SOLUTION INTRAMUSCULAR; INTRAVENOUS at 12:38

## 2024-06-17 ASSESSMENT — ENCOUNTER SYMPTOMS
SORE THROAT: 0
DIARRHEA: 0
WHEEZING: 0
ABDOMINAL PAIN: 0
BACK PAIN: 0
CONSTIPATION: 0
SHORTNESS OF BREATH: 0
TROUBLE SWALLOWING: 0
NAUSEA: 1
VOMITING: 1
BLOOD IN STOOL: 0

## 2024-06-17 ASSESSMENT — PAIN DESCRIPTION - FREQUENCY: FREQUENCY: CONTINUOUS

## 2024-06-17 ASSESSMENT — PAIN DESCRIPTION - ONSET: ONSET: GRADUAL

## 2024-06-17 ASSESSMENT — PAIN - FUNCTIONAL ASSESSMENT: PAIN_FUNCTIONAL_ASSESSMENT: INTOLERABLE, UNABLE TO DO ANY ACTIVE OR PASSIVE ACTIVITIES

## 2024-06-17 ASSESSMENT — LIFESTYLE VARIABLES: HOW OFTEN DO YOU HAVE A DRINK CONTAINING ALCOHOL: NEVER

## 2024-06-17 ASSESSMENT — PAIN DESCRIPTION - PAIN TYPE: TYPE: ACUTE PAIN

## 2024-06-17 ASSESSMENT — PAIN DESCRIPTION - LOCATION
LOCATION: HEAD
LOCATION: HEAD

## 2024-06-17 ASSESSMENT — PAIN SCALES - GENERAL
PAINLEVEL_OUTOF10: 10
PAINLEVEL_OUTOF10: 10
PAINLEVEL_OUTOF10: 3

## 2024-06-17 ASSESSMENT — PAIN DESCRIPTION - DESCRIPTORS: DESCRIPTORS: THROBBING

## 2024-06-17 NOTE — ED PROVIDER NOTES
[facility]  eMERGENCY dEPARTMENT eNCOUnter      Pt Name: Josh Beyer  MRN: [id number]  Birthdate 1980  Date of evaluation: 6/17/2024      CHIEF COMPLAINT       Chief Complaint   Patient presents with    Migraine     Started a couple hours ago, took Imitrex with no relief         HISTORY OF PRESENT ILLNESS    Josh Beyer is a 43 y.o. male who presents with chief complaint of headache he says he has a history of migraines he has not had this bad of 1 in about 6 months started out as a low headache in school Tylenol but then it got worse since he went to work its behind the eyes it is throbbing he tried his Phenergan but vomited it there is been no fevers chills cough no focal deficits this is not the worst headache of his life but it is a bad wound he is here with a  he says normally if he has to come in the migraine cocktail works      REVIEW OF SYSTEMS         Review of Systems   Constitutional:  Negative for chills and fever.   HENT:  Negative for congestion, dental problem, sore throat and trouble swallowing.    Eyes:  Negative for visual disturbance.   Respiratory:  Negative for shortness of breath and wheezing.    Cardiovascular:  Negative for chest pain, palpitations and leg swelling.   Gastrointestinal:  Positive for nausea and vomiting. Negative for abdominal pain, blood in stool, constipation and diarrhea.   Genitourinary:  Negative for difficulty urinating, dysuria and testicular pain.   Musculoskeletal:  Negative for back pain, joint swelling and neck pain.   Skin:  Negative for rash.   Neurological:  Positive for headaches. Negative for dizziness, syncope and weakness.   Hematological:  Negative for adenopathy. Does not bruise/bleed easily.   Psychiatric/Behavioral:  Negative for confusion and suicidal ideas.          PAST MEDICAL HISTORY    has a past medical history of Anxiety, mild, Colitis due to Clostridioides difficile, Colon polyps, Depression, Dizziness, Eczema,

## 2024-07-11 ENCOUNTER — HOSPITAL ENCOUNTER (OUTPATIENT)
Age: 44
Discharge: HOME | End: 2024-07-11
Payer: COMMERCIAL

## 2024-07-11 DIAGNOSIS — G40.909 SEIZURE DISORDER (HCC): ICD-10-CM

## 2024-07-11 LAB
LAMOTRIGINE SERPL-MCNC: 4.3 UG/ML (ref 3–15)
VALPROATE SERPL-MCNC: 84 UG/ML (ref 50–125)

## 2024-07-11 PROCEDURE — 80164 ASSAY DIPROPYLACETIC ACD TOT: CPT

## 2024-07-11 PROCEDURE — 36415 COLL VENOUS BLD VENIPUNCTURE: CPT

## 2024-07-11 PROCEDURE — 80175 DRUG SCREEN QUAN LAMOTRIGINE: CPT

## 2024-08-26 ENCOUNTER — HOSPITAL ENCOUNTER (OUTPATIENT)
Age: 44
Discharge: HOME | End: 2024-08-26
Payer: COMMERCIAL

## 2024-08-26 DIAGNOSIS — Z00.00 ENCOUNTER FOR PREVENTIVE HEALTH EXAMINATION: ICD-10-CM

## 2024-08-26 DIAGNOSIS — E78.00 PURE HYPERCHOLESTEROLEMIA: ICD-10-CM

## 2024-08-26 LAB
ALBUMIN SERPL-MCNC: 4.3 G/DL (ref 3.5–5.2)
ALBUMIN/GLOB SERPL: 1.7 {RATIO} (ref 1–2.5)
ALP SERPL-CCNC: 85 U/L (ref 40–129)
ALT SERPL-CCNC: 21 U/L (ref 5–41)
ANION GAP SERPL CALCULATED.3IONS-SCNC: 10 MMOL/L (ref 9–17)
AST SERPL-CCNC: 17 U/L
BASOPHILS # BLD: 0.04 K/UL (ref 0–0.2)
BASOPHILS NFR BLD: 1 % (ref 0–2)
BILIRUB SERPL-MCNC: 0.6 MG/DL (ref 0.3–1.2)
BUN SERPL-MCNC: 14 MG/DL (ref 6–20)
BUN/CREAT SERPL: 12 (ref 9–20)
CALCIUM SERPL-MCNC: 9.7 MG/DL (ref 8.6–10.4)
CHLORIDE SERPL-SCNC: 102 MMOL/L (ref 98–107)
CHOLEST SERPL-MCNC: 122 MG/DL (ref 0–199)
CHOLESTEROL/HDL RATIO: 5
CO2 SERPL-SCNC: 26 MMOL/L (ref 20–31)
CREAT SERPL-MCNC: 1.2 MG/DL (ref 0.7–1.2)
EOSINOPHIL # BLD: 0.09 K/UL (ref 0–0.44)
EOSINOPHILS RELATIVE PERCENT: 1 % (ref 1–4)
ERYTHROCYTE [DISTWIDTH] IN BLOOD BY AUTOMATED COUNT: 12.5 % (ref 11.8–14.4)
GFR, ESTIMATED: 76 ML/MIN/1.73M2
GLUCOSE SERPL-MCNC: 91 MG/DL (ref 70–99)
HCT VFR BLD AUTO: 43.9 % (ref 40.7–50.3)
HDLC SERPL-MCNC: 27 MG/DL
HGB BLD-MCNC: 15.6 G/DL (ref 13–17)
IMM GRANULOCYTES # BLD AUTO: 0.04 K/UL (ref 0–0.3)
IMM GRANULOCYTES NFR BLD: 1 %
LDLC SERPL CALC-MCNC: 67 MG/DL (ref 0–100)
LYMPHOCYTES NFR BLD: 2.97 K/UL (ref 1.1–3.7)
LYMPHOCYTES RELATIVE PERCENT: 38 % (ref 24–43)
MCH RBC QN AUTO: 34.7 PG (ref 25.2–33.5)
MCHC RBC AUTO-ENTMCNC: 35.5 G/DL (ref 25.2–33.5)
MCV RBC AUTO: 97.8 FL (ref 82.6–102.9)
MONOCYTES NFR BLD: 0.66 K/UL (ref 0.1–1.2)
MONOCYTES NFR BLD: 8 % (ref 3–12)
NEUTROPHILS NFR BLD: 51 % (ref 36–65)
NEUTS SEG NFR BLD: 4.05 K/UL (ref 1.5–8.1)
NRBC BLD-RTO: 0 PER 100 WBC
PLATELET # BLD AUTO: 238 K/UL (ref 138–453)
PMV BLD AUTO: 9.8 FL (ref 8.1–13.5)
POTASSIUM SERPL-SCNC: 4.6 MMOL/L (ref 3.7–5.3)
PROT SERPL-MCNC: 6.9 G/DL (ref 6.4–8.3)
RBC # BLD AUTO: 4.49 M/UL (ref 4.21–5.77)
SODIUM SERPL-SCNC: 138 MMOL/L (ref 135–144)
TRIGL SERPL-MCNC: 142 MG/DL
VLDLC SERPL CALC-MCNC: 28 MG/DL
WBC OTHER # BLD: 7.9 K/UL (ref 3.5–11.3)

## 2024-08-26 PROCEDURE — 36415 COLL VENOUS BLD VENIPUNCTURE: CPT

## 2024-08-26 PROCEDURE — 80061 LIPID PANEL: CPT

## 2024-08-26 PROCEDURE — 85025 COMPLETE CBC W/AUTO DIFF WBC: CPT

## 2024-08-26 PROCEDURE — 80053 COMPREHEN METABOLIC PANEL: CPT

## 2024-08-28 ENCOUNTER — OFFICE VISIT (OUTPATIENT)
Dept: FAMILY MEDICINE CLINIC | Age: 44
End: 2024-08-28
Payer: COMMERCIAL

## 2024-08-28 VITALS
SYSTOLIC BLOOD PRESSURE: 130 MMHG | BODY MASS INDEX: 31.34 KG/M2 | WEIGHT: 195 LBS | OXYGEN SATURATION: 96 % | HEART RATE: 102 BPM | HEIGHT: 66 IN | DIASTOLIC BLOOD PRESSURE: 80 MMHG

## 2024-08-28 DIAGNOSIS — F41.9 ANXIETY, MILD: ICD-10-CM

## 2024-08-28 DIAGNOSIS — K21.9 GASTROESOPHAGEAL REFLUX DISEASE WITHOUT ESOPHAGITIS: ICD-10-CM

## 2024-08-28 DIAGNOSIS — G40.909 SEIZURE DISORDER (HCC): ICD-10-CM

## 2024-08-28 DIAGNOSIS — E78.00 PURE HYPERCHOLESTEROLEMIA: ICD-10-CM

## 2024-08-28 DIAGNOSIS — Z72.0 TOBACCO USE: ICD-10-CM

## 2024-08-28 DIAGNOSIS — K44.9 HIATAL HERNIA: ICD-10-CM

## 2024-08-28 DIAGNOSIS — I48.91 ATRIAL FIBRILLATION WITH RVR (HCC): Primary | ICD-10-CM

## 2024-08-28 DIAGNOSIS — R42 VERTIGO: ICD-10-CM

## 2024-08-28 PROCEDURE — G8417 CALC BMI ABV UP PARAM F/U: HCPCS | Performed by: FAMILY MEDICINE

## 2024-08-28 PROCEDURE — G8427 DOCREV CUR MEDS BY ELIG CLIN: HCPCS | Performed by: FAMILY MEDICINE

## 2024-08-28 PROCEDURE — 99214 OFFICE O/P EST MOD 30 MIN: CPT | Performed by: FAMILY MEDICINE

## 2024-08-28 PROCEDURE — 4004F PT TOBACCO SCREEN RCVD TLK: CPT | Performed by: FAMILY MEDICINE

## 2024-08-28 ASSESSMENT — ENCOUNTER SYMPTOMS
RESPIRATORY NEGATIVE: 1
GASTROINTESTINAL NEGATIVE: 1
EYES NEGATIVE: 1

## 2024-08-28 NOTE — PROGRESS NOTES
Subjective:      Patient ID: Josh Beyer is a 43 y.o. male.    HPI    Routine follow up on chronic medical conditions, refills, and review of updated labs.   I have reviewed the patient's medical history in detail and updated the computerized patient record.  More fatigue at present.  Trouble sleeping through the night.  Frequent awakenings.  Trouble shutting the brain off.  Fatigue during the day.  Not napping.  Recurrent vertigo reported.  Several recent episodes.  Mood stable.  No rash issues.  No seizure issues.  No other acute or chronic concerns.  Not working at present.  He blames the vertigo.    Past Medical History:   Diagnosis Date    Anxiety, mild     Colitis due to Clostridioides difficile 10/28/2022    Colon polyps     Depression     Dizziness     Eczema     Epilepsy (HCC)     GERD (gastroesophageal reflux disease)     H/O fall     Head ache     Head injury     Hiatal hernia     History of suicide attempt 10/28/2022    Inguinal hernia     Right.    Low back pain     Lumbar sprain     Migraine     Plantar fasciitis, bilateral     Psychogenic nonepileptic seizure 11/16/2022    Seizure disorder (HCC)     Sigmoid diverticulosis     Sleep difficulties     Status migrainosus     TIA (transient ischemic attack) 09/2017    Tobacco use     Chronic.     Past Surgical History:   Procedure Laterality Date    CARDIAC CATHETERIZATION  03/07/2023    Mild coronary artery disease.    COLONOSCOPY  04/29/2011    Internal hemorrhoids, possible anal fissure, few scattered diverticula.    COLONOSCOPY  06/04/2010    Mild sigmoid diverticulosis.    COLONOSCOPY  08/28/2009    Sigmoid diverticulosis, internal hemorrhoids.    COLONOSCOPY  05/27/2008    Internal hemorrhoids.    COLONOSCOPY  11/19/2014    polyp in rectum    COLONOSCOPY  06/01/2016    sigmoid diverticulosis, colon polyp, internal hemorrhoids    COLONOSCOPY  04/19/2019    [id number]    ESOPHAGOGASTRODUODENOSCOPY  02/13/2023    [facility] with biopsy,

## 2024-08-28 NOTE — PATIENT INSTRUCTIONS
Hospital Outpatient Visit on 08/26/2024   Component Date Value Ref Range Status    Cholesterol, Total 08/26/2024 122  0 - 199 mg/dL Final    Comment:    Cholesterol Guidelines:      <200  Desirable   200-240  Borderline      >240  Undesirable         HDL 08/26/2024 27 (L)  >40 mg/dL Final    Comment:    HDL Guidelines:    <40     Undesirable   40-59    Borderline    >59     Desirable         LDL Cholesterol 08/26/2024 67  0 - 100 mg/dL Final    Comment:    LDL Guidelines:     <100    Desirable   100-129   Near to/above Desirable   130-159   Borderline      >159   Undesirable     Direct (measured) LDL and calculated LDL are not interchangeable tests.      Chol/HDL Ratio 08/26/2024 5.0   Final    Triglycerides 08/26/2024 142  <150 mg/dL Final    Comment:    Triglyceride Guidelines:     <150   Desirable   150-199  Borderline   200-499  High     >499   Very high   Based on AHA Guidelines for fasting triglyceride, October 2012.         VLDL 08/26/2024 28  mg/dL Final    Sodium 08/26/2024 138  135 - 144 mmol/L Final    Potassium 08/26/2024 4.6  3.7 - 5.3 mmol/L Final    Chloride 08/26/2024 102  98 - 107 mmol/L Final    CO2 08/26/2024 26  20 - 31 mmol/L Final    Anion Gap 08/26/2024 10  9 - 17 mmol/L Final    Glucose 08/26/2024 91  70 - 99 mg/dL Final    BUN 08/26/2024 14  6 - 20 mg/dL Final    Creatinine 08/26/2024 1.2  0.7 - 1.2 mg/dL Final    Est, Glom Filt Rate 08/26/2024 76  >60 mL/min/1.73m2 Final    Comment:       These results are not intended for use in patients <18 years of age.        eGFR results are calculated without a race factor using the 2021 CKD-EPI equation.  Careful clinical correlation is recommended, particularly when comparing to results   calculated using previous equations.  The CKD-EPI equation is less accurate in patients with extremes of muscle mass, extra-renal   metabolism of creatine, excessive creatine ingestion, or following therapy that affects   renal tubular secretion.       BUN/Creatinine Ratio 08/26/2024 12  9 - 20 Final    Calcium 08/26/2024 9.7  8.6 - 10.4 mg/dL Final    Total Protein 08/26/2024 6.9  6.4 - 8.3 g/dL Final    Albumin 08/26/2024 4.3  3.5 - 5.2 g/dL Final    Albumin/Globulin Ratio 08/26/2024 1.7  1.0 - 2.5 Final    Total Bilirubin 08/26/2024 0.6  0.3 - 1.2 mg/dL Final    Alkaline Phosphatase 08/26/2024 85  40 - 129 U/L Final    ALT 08/26/2024 21  5 - 41 U/L Final    AST 08/26/2024 17  <40 U/L Final    WBC 08/26/2024 7.9  3.5 - 11.3 k/uL Final    RBC 08/26/2024 4.49  4.21 - 5.77 m/uL Final    Hemoglobin 08/26/2024 15.6  13.0 - 17.0 g/dL Final    Hematocrit 08/26/2024 43.9  40.7 - 50.3 % Final    MCV 08/26/2024 97.8  82.6 - 102.9 fL Final    MCH 08/26/2024 34.7 (H)  25.2 - 33.5 pg Final    MCHC 08/26/2024 35.5 (H)  25.2 - 33.5 g/dL Final    RDW 08/26/2024 12.5  11.8 - 14.4 % Final    Platelets 08/26/2024 238  138 - 453 k/uL Final    MPV 08/26/2024 9.8  8.1 - 13.5 fL Final    NRBC Automated 08/26/2024 0.0  0.0 per 100 WBC Final    Neutrophils % 08/26/2024 51  36 - 65 % Final    Lymphocytes % 08/26/2024 38  24 - 43 % Final    Monocytes % 08/26/2024 8  3 - 12 % Final    Eosinophils % 08/26/2024 1  1 - 4 % Final    Basophils % 08/26/2024 1  0 - 2 % Final    Immature Granulocytes % 08/26/2024 1 (H)  0 % Final    Neutrophils Absolute 08/26/2024 4.05  1.50 - 8.10 k/uL Final    Lymphocytes Absolute 08/26/2024 2.97  1.10 - 3.70 k/uL Final    Monocytes Absolute 08/26/2024 0.66  0.10 - 1.20 k/uL Final    Eosinophils Absolute 08/26/2024 0.09  0.00 - 0.44 k/uL Final    Basophils Absolute 08/26/2024 0.04  0.00 - 0.20 k/uL Final    Immature Granulocytes Absolute 08/26/2024 0.04  0.00 - 0.30 k/uL Final

## 2024-09-11 ENCOUNTER — OFFICE VISIT (OUTPATIENT)
Dept: CARDIOLOGY | Age: 44
End: 2024-09-11
Payer: COMMERCIAL

## 2024-09-11 VITALS
SYSTOLIC BLOOD PRESSURE: 102 MMHG | HEART RATE: 100 BPM | DIASTOLIC BLOOD PRESSURE: 90 MMHG | BODY MASS INDEX: 31.18 KG/M2 | WEIGHT: 194 LBS | HEIGHT: 66 IN

## 2024-09-11 DIAGNOSIS — I25.10 CORONARY ARTERY DISEASE INVOLVING NATIVE CORONARY ARTERY OF NATIVE HEART WITHOUT ANGINA PECTORIS: ICD-10-CM

## 2024-09-11 DIAGNOSIS — E78.5 DYSLIPIDEMIA: ICD-10-CM

## 2024-09-11 DIAGNOSIS — R42 DIZZINESS: ICD-10-CM

## 2024-09-11 DIAGNOSIS — I10 PRIMARY HYPERTENSION: ICD-10-CM

## 2024-09-11 DIAGNOSIS — I48.0 PAF (PAROXYSMAL ATRIAL FIBRILLATION) (HCC): Primary | ICD-10-CM

## 2024-09-11 PROCEDURE — 3074F SYST BP LT 130 MM HG: CPT | Performed by: NURSE PRACTITIONER

## 2024-09-11 PROCEDURE — 99214 OFFICE O/P EST MOD 30 MIN: CPT | Performed by: NURSE PRACTITIONER

## 2024-09-11 PROCEDURE — 4004F PT TOBACCO SCREEN RCVD TLK: CPT | Performed by: NURSE PRACTITIONER

## 2024-09-11 PROCEDURE — G8427 DOCREV CUR MEDS BY ELIG CLIN: HCPCS | Performed by: NURSE PRACTITIONER

## 2024-09-11 PROCEDURE — G8417 CALC BMI ABV UP PARAM F/U: HCPCS | Performed by: NURSE PRACTITIONER

## 2024-09-11 PROCEDURE — 93000 ELECTROCARDIOGRAM COMPLETE: CPT | Performed by: NURSE PRACTITIONER

## 2024-09-11 PROCEDURE — 3079F DIAST BP 80-89 MM HG: CPT | Performed by: NURSE PRACTITIONER

## 2024-09-11 RX ORDER — BLOOD PRESSURE TEST KIT
1 KIT MISCELLANEOUS DAILY
Qty: 1 KIT | Refills: 0 | Status: SHIPPED | OUTPATIENT
Start: 2024-09-11

## 2024-10-07 ENCOUNTER — HOSPITAL ENCOUNTER (OUTPATIENT)
Age: 44
Discharge: HOME | End: 2024-10-09
Payer: COMMERCIAL

## 2024-10-07 DIAGNOSIS — R42 DIZZINESS: ICD-10-CM

## 2024-10-07 PROCEDURE — 93225 XTRNL ECG REC<48 HRS REC: CPT

## 2024-10-21 ENCOUNTER — TELEPHONE (OUTPATIENT)
Dept: CARDIOLOGY | Age: 44
End: 2024-10-21

## 2024-10-21 NOTE — TELEPHONE ENCOUNTER
Interpretation Summary    Sinus, heart rate of 87, sinus bradycardia to 46, sinus tachycardia 145.  Rare PVCs  Rare PACs  Short run of PAT, only 3 beats long.

## 2024-10-22 NOTE — TELEPHONE ENCOUNTER
Notified patient of holter results. Instructed patient to keep upcoming appointment with cardiologist and to call our office for any questions. Patient also instructed to go to ER for any chest pain / shortness of breath

## 2024-11-07 ENCOUNTER — OFFICE VISIT (OUTPATIENT)
Dept: NEUROLOGY | Age: 44
End: 2024-11-07
Payer: COMMERCIAL

## 2024-11-07 VITALS
OXYGEN SATURATION: 98 % | RESPIRATION RATE: 18 BRPM | DIASTOLIC BLOOD PRESSURE: 78 MMHG | SYSTOLIC BLOOD PRESSURE: 118 MMHG | BODY MASS INDEX: 31.15 KG/M2 | HEART RATE: 106 BPM | WEIGHT: 193 LBS

## 2024-11-07 DIAGNOSIS — Z72.0 TOBACCO USE: ICD-10-CM

## 2024-11-07 DIAGNOSIS — F34.1 DYSTHYMIA: ICD-10-CM

## 2024-11-07 DIAGNOSIS — G43.009 MIGRAINE WITHOUT AURA AND WITHOUT STATUS MIGRAINOSUS, NOT INTRACTABLE: ICD-10-CM

## 2024-11-07 DIAGNOSIS — G43.709 CHRONIC MIGRAINE WITHOUT AURA WITHOUT STATUS MIGRAINOSUS, NOT INTRACTABLE: ICD-10-CM

## 2024-11-07 DIAGNOSIS — G47.9 SLEEP DIFFICULTIES: ICD-10-CM

## 2024-11-07 DIAGNOSIS — G40.009 PARTIAL IDIOPATHIC EPILEPSY WITH SEIZURES OF LOCALIZED ONSET, NOT INTRACTABLE, WITHOUT STATUS EPILEPTICUS (HCC): ICD-10-CM

## 2024-11-07 DIAGNOSIS — K21.9 GASTROESOPHAGEAL REFLUX DISEASE WITHOUT ESOPHAGITIS: ICD-10-CM

## 2024-11-07 DIAGNOSIS — I48.91 ATRIAL FIBRILLATION WITH RVR (HCC): ICD-10-CM

## 2024-11-07 DIAGNOSIS — Z86.73 H/O TRANSIENT ISCHEMIC ATTACK INVOLVING ANTERIOR CIRCULATION: ICD-10-CM

## 2024-11-07 DIAGNOSIS — M54.12 CERVICAL RADICULOPATHY: ICD-10-CM

## 2024-11-07 DIAGNOSIS — F44.5 PSYCHOGENIC NONEPILEPTIC SEIZURE: ICD-10-CM

## 2024-11-07 DIAGNOSIS — G40.909 SEIZURE DISORDER (HCC): Primary | ICD-10-CM

## 2024-11-07 DIAGNOSIS — F41.9 ANXIETY, MILD: ICD-10-CM

## 2024-11-07 DIAGNOSIS — G45.0 VBI (VERTEBROBASILAR INSUFFICIENCY): ICD-10-CM

## 2024-11-07 DIAGNOSIS — R42 DIZZINESS: ICD-10-CM

## 2024-11-07 DIAGNOSIS — Z91.199 NON COMPLIANCE WITH MEDICAL TREATMENT: ICD-10-CM

## 2024-11-07 DIAGNOSIS — Z91.81 H/O FALL: ICD-10-CM

## 2024-11-07 DIAGNOSIS — Z86.73 HISTORY OF STROKE: ICD-10-CM

## 2024-11-07 PROCEDURE — 99215 OFFICE O/P EST HI 40 MIN: CPT | Performed by: PSYCHIATRY & NEUROLOGY

## 2024-11-07 PROCEDURE — G8483 FLU IMM NO ADMIN DOC REA: HCPCS | Performed by: PSYCHIATRY & NEUROLOGY

## 2024-11-07 PROCEDURE — 4004F PT TOBACCO SCREEN RCVD TLK: CPT | Performed by: PSYCHIATRY & NEUROLOGY

## 2024-11-07 PROCEDURE — G8427 DOCREV CUR MEDS BY ELIG CLIN: HCPCS | Performed by: PSYCHIATRY & NEUROLOGY

## 2024-11-07 PROCEDURE — G8417 CALC BMI ABV UP PARAM F/U: HCPCS | Performed by: PSYCHIATRY & NEUROLOGY

## 2024-11-07 RX ORDER — SUMATRIPTAN 50 MG/1
TABLET, FILM COATED ORAL
Qty: 30 TABLET | Refills: 2 | Status: SHIPPED | OUTPATIENT
Start: 2024-11-07

## 2024-11-07 RX ORDER — LAMOTRIGINE 100 MG/1
TABLET ORAL
Qty: 30 TABLET | Refills: 5 | Status: SHIPPED | OUTPATIENT
Start: 2024-11-07

## 2024-11-07 RX ORDER — DIVALPROEX SODIUM 250 MG/1
TABLET, DELAYED RELEASE ORAL
Qty: 180 TABLET | Refills: 5 | Status: SHIPPED | OUTPATIENT
Start: 2024-11-07

## 2024-11-07 RX ORDER — PROMETHAZINE HYDROCHLORIDE 25 MG/1
TABLET ORAL
Qty: 60 TABLET | Refills: 2 | Status: SHIPPED | OUTPATIENT
Start: 2024-11-07

## 2024-11-07 ASSESSMENT — ENCOUNTER SYMPTOMS
VOICE CHANGE: 0
CHEST TIGHTNESS: 0
APNEA: 0
WHEEZING: 0
CHANGE IN BOWEL HABIT: 0
CONSTIPATION: 0
EYE ITCHING: 0
FACIAL SWELLING: 0
TROUBLE SWALLOWING: 0
EYE DISCHARGE: 0
COLOR CHANGE: 0
SHORTNESS OF BREATH: 0
CHOKING: 0
ABDOMINAL DISTENTION: 0
DIARRHEA: 0
BLOOD IN STOOL: 0

## 2024-11-07 NOTE — PATIENT INSTRUCTIONS
*   SEIZURE  PRECAUTIONS.                 A)  Avoid  Working  At   Heights.          B)  Avoid  Working  With  Heavy machinery.          C)  Avoid   Swimming,  Climbing  A  Ladder   Unattended.          D)  Avoid   Driving   If  You   Have  A  Seizure.          E)  Must   Be  Seizure  Free   For  At   Least   6 months,  Before   You  Can drive.                                                                                                                                    F) Some times  Your  May  Feel  Seizure coming  Before  It  Begins.  You  May feel             Strange smell or funny  Feeling  In  Your  Stomach,  Which is  Called   Aura.                     TIPS  TO  REDUCE/ PREVENT  SEIZURES         1.  Take  Your  Anti seizure  Medications   As   Recommended.       2. Get   Enough   Sleep.   Sleep  Deprivation   Can  Trigger  A  Seizure.       3. If   You   Have  A fever,  Treat  It  At  Once,  And  Contact   Your  Primary  Care Providers.                                                                                                                           4. Avoid   Alcohol.       5. Avoid  Flashing  Lights,  Loud  Noises and  TV  And  Video  Games,           As   These  May  Trigger   Your  Seizures       6.  Control  Your  Stress  And   Have  Adequate  Rest.       7.   If  You  Feel  A  Seizure  Coming   On :           A) warn people  Who  Are  With  You           B)  Make  Sure  There  Are  No  Sharp or  Hard  Objects  Around you.           C)  Lay down  On  Your  Side  And  Relax.            *   ADEQUATE   FLUID  INTAKE   AND  ELECTROLYTE  BALANCE             * KEEP  DAIRY  OF   THE  NEUROLOGICAL  SYMPTOMS          *  TO  MAINTAIN  REGULAR  SLEEP  WAKE  CYCLES.     *   TO  HAVE  ADEQUATE  REST  AND   SLEEP    HOURS.          *    AVOID  USAGE OF   TOBACCO,  EXCESSIVE  ALCOHOL                AND   ILLEGAL   SUBSTANCES,  IF  ANY          *  Maintain   Healthy  Life Style    With   Periodic  Monitoring

## 2024-11-07 NOTE — PROGRESS NOTES
PHENYTOIN <0.8 09/28/2017 04:24 PM    PHENYTOIN <0.8 04/04/2016 02:23 PM    VALPROATE 84 07/11/2024 08:48 AM    VALPROATE 31 12/13/2023 10:48 AM           Review of Systems   Constitutional:  Negative for appetite change, chills, diaphoresis, fatigue and unexpected weight change.   HENT:  Negative for congestion, dental problem, drooling, ear discharge, facial swelling, mouth sores, nosebleeds, postnasal drip, trouble swallowing and voice change.    Eyes:  Negative for discharge, itching and visual disturbance.   Respiratory:  Negative for apnea, choking, chest tightness, shortness of breath and wheezing.    Cardiovascular:  Negative for chest pain, palpitations and leg swelling.   Gastrointestinal:  Negative for abdominal distention, blood in stool, change in bowel habit, constipation and diarrhea.   Endocrine: Negative for cold intolerance, heat intolerance, polydipsia, polyphagia and polyuria.   Musculoskeletal:  Negative for arthralgias, joint swelling, myalgias and neck stiffness.   Skin:  Negative for color change, rash and wound.   Allergic/Immunologic: Negative for environmental allergies, food allergies and immunocompromised state.   Neurological:  Positive for seizures.   Hematological:  Negative for adenopathy. Does not bruise/bleed easily.   Psychiatric/Behavioral:  Positive for decreased concentration and sleep disturbance. Negative for agitation, behavioral problems, dysphoric mood, hallucinations, self-injury and suicidal ideas. The patient is not hyperactive.        Objective:         Physical Exam  Constitutional:       Appearance: He is well-developed.   HENT:      Head: Normocephalic and atraumatic. No raccoon eyes or Vaughn's sign.      Right Ear: External ear normal.      Left Ear: External ear normal.      Nose: Nose normal.   Eyes:      Conjunctiva/sclera: Conjunctivae normal.      Pupils: Pupils are equal, round, and reactive to light.   Neck:      Thyroid: No thyroid mass or thyromegaly.

## 2025-03-18 ENCOUNTER — OFFICE VISIT (OUTPATIENT)
Dept: PRIMARY CARE CLINIC | Age: 45
End: 2025-03-18
Payer: MEDICARE

## 2025-03-18 ENCOUNTER — HOSPITAL ENCOUNTER (OUTPATIENT)
Age: 45
Setting detail: SPECIMEN
Discharge: HOME | End: 2025-03-18
Payer: MEDICARE

## 2025-03-18 VITALS
HEIGHT: 66 IN | DIASTOLIC BLOOD PRESSURE: 72 MMHG | RESPIRATION RATE: 12 BRPM | WEIGHT: 203.4 LBS | TEMPERATURE: 97.6 F | OXYGEN SATURATION: 96 % | BODY MASS INDEX: 32.69 KG/M2 | SYSTOLIC BLOOD PRESSURE: 130 MMHG | HEART RATE: 138 BPM

## 2025-03-18 DIAGNOSIS — L02.214 INGUINAL ABSCESS: Primary | ICD-10-CM

## 2025-03-18 DIAGNOSIS — L02.214 INGUINAL ABSCESS: ICD-10-CM

## 2025-03-18 DIAGNOSIS — R10.32 LEFT INGUINAL PAIN: ICD-10-CM

## 2025-03-18 PROCEDURE — 99212 OFFICE O/P EST SF 10 MIN: CPT | Performed by: FAMILY MEDICINE

## 2025-03-18 PROCEDURE — 87070 CULTURE OTHR SPECIMN AEROBIC: CPT

## 2025-03-18 PROCEDURE — 10060 I&D ABSCESS SIMPLE/SINGLE: CPT | Performed by: FAMILY MEDICINE

## 2025-03-18 PROCEDURE — 87205 SMEAR GRAM STAIN: CPT

## 2025-03-18 RX ORDER — DOXYCYCLINE HYCLATE 100 MG
100 TABLET ORAL 2 TIMES DAILY
Qty: 20 TABLET | Refills: 0 | Status: SHIPPED | OUTPATIENT
Start: 2025-03-18

## 2025-03-18 RX ORDER — LIDOCAINE HYDROCHLORIDE 10 MG/ML
3 INJECTION, SOLUTION EPIDURAL; INFILTRATION; INTRACAUDAL; PERINEURAL ONCE
Status: COMPLETED | OUTPATIENT
Start: 2025-03-18 | End: 2025-03-18

## 2025-03-18 RX ADMIN — LIDOCAINE HYDROCHLORIDE 3 ML: 10 INJECTION, SOLUTION EPIDURAL; INFILTRATION; INTRACAUDAL; PERINEURAL at 08:48

## 2025-03-18 SDOH — ECONOMIC STABILITY: FOOD INSECURITY: WITHIN THE PAST 12 MONTHS, YOU WORRIED THAT YOUR FOOD WOULD RUN OUT BEFORE YOU GOT MONEY TO BUY MORE.: NEVER TRUE

## 2025-03-18 SDOH — ECONOMIC STABILITY: FOOD INSECURITY: WITHIN THE PAST 12 MONTHS, THE FOOD YOU BOUGHT JUST DIDN'T LAST AND YOU DIDN'T HAVE MONEY TO GET MORE.: NEVER TRUE

## 2025-03-18 ASSESSMENT — PATIENT HEALTH QUESTIONNAIRE - PHQ9
10. IF YOU CHECKED OFF ANY PROBLEMS, HOW DIFFICULT HAVE THESE PROBLEMS MADE IT FOR YOU TO DO YOUR WORK, TAKE CARE OF THINGS AT HOME, OR GET ALONG WITH OTHER PEOPLE: NOT DIFFICULT AT ALL
3. TROUBLE FALLING OR STAYING ASLEEP: NOT AT ALL
8. MOVING OR SPEAKING SO SLOWLY THAT OTHER PEOPLE COULD HAVE NOTICED. OR THE OPPOSITE, BEING SO FIGETY OR RESTLESS THAT YOU HAVE BEEN MOVING AROUND A LOT MORE THAN USUAL: NOT AT ALL
SUM OF ALL RESPONSES TO PHQ QUESTIONS 1-9: 0
SUM OF ALL RESPONSES TO PHQ QUESTIONS 1-9: 0
1. LITTLE INTEREST OR PLEASURE IN DOING THINGS: NOT AT ALL
2. FEELING DOWN, DEPRESSED OR HOPELESS: NOT AT ALL
7. TROUBLE CONCENTRATING ON THINGS, SUCH AS READING THE NEWSPAPER OR WATCHING TELEVISION: NOT AT ALL
4. FEELING TIRED OR HAVING LITTLE ENERGY: NOT AT ALL
SUM OF ALL RESPONSES TO PHQ QUESTIONS 1-9: 0
9. THOUGHTS THAT YOU WOULD BE BETTER OFF DEAD, OR OF HURTING YOURSELF: NOT AT ALL
5. POOR APPETITE OR OVEREATING: NOT AT ALL
SUM OF ALL RESPONSES TO PHQ QUESTIONS 1-9: 0
6. FEELING BAD ABOUT YOURSELF - OR THAT YOU ARE A FAILURE OR HAVE LET YOURSELF OR YOUR FAMILY DOWN: NOT AT ALL

## 2025-03-18 ASSESSMENT — ENCOUNTER SYMPTOMS: COLOR CHANGE: 1

## 2025-03-18 NOTE — PROGRESS NOTES
3/18/2025     Josh Beyer (:  1980) is a 44 y.o. male, here for evaluation of the following medical concerns:    Groin Pain  Primary symptoms comment: patient notes that over the last few days has developed red painful swollen area to the left inguinal region. This is a new problem. The current episode started in the past 7 days. The problem occurs constantly. The problem has been gradually worsening. Pertinent negatives include no chills, fever or flank pain. Associated symptoms comments: Has had similar issues in the past but typically can get the lesion to drain.  Does note that this one seems to be getting worse.  Has had some slight symptoms of illness noting that he feels achy and fatigued, but hasn't had a measurable temperature. He has tried OTC analgesics for the symptoms. The treatment provided no relief.     Did review patient's med list, allergies, social history,pmhx and pshx today as noted in the record.      Review of Systems   Constitutional:  Positive for fatigue. Negative for chills and fever.   Genitourinary:  Negative for flank pain.        Pain and swelling in the left groin   Musculoskeletal:  Positive for myalgias.   Skin:  Positive for color change and wound.       Prior to Visit Medications    Medication Sig Taking? Authorizing Provider   doxycycline hyclate (VIBRA-TABS) 100 MG tablet Take 1 tablet by mouth 2 times daily Yes Shanice Lawton W,    divalproex (DEPAKOTE) 250 MG DR tablet take 3 tablets by mouth twice a day Yes Ambrose Manjarrez MD   lamoTRIgine (LAMICTAL) 100 MG tablet 1 TABLET DAILY AT BEDTIME Yes Ambrose Manjarrez MD   promethazine (PHENERGAN) 25 MG tablet take 1 tablet by mouth every 8 hours if needed for nausea Yes Ambrose Manjarrez MD   SUMAtriptan (IMITREX) 50 MG tablet take 1 tablet by mouth once daily if needed for MIGRAINE Yes Ambrose Manjarrez MD   Blood Pressure KIT 1 each by Does not apply route daily Yes Doreen Burnham, APRN

## 2025-03-20 LAB
MICROORGANISM SPEC CULT: NORMAL
MICROORGANISM/AGENT SPEC: NORMAL
MICROORGANISM/AGENT SPEC: NORMAL
SPECIMEN DESCRIPTION: NORMAL

## 2025-03-21 ENCOUNTER — RESULTS FOLLOW-UP (OUTPATIENT)
Dept: PRIMARY CARE CLINIC | Age: 45
End: 2025-03-21

## 2025-04-09 ENCOUNTER — OFFICE VISIT (OUTPATIENT)
Dept: CARDIOLOGY | Age: 45
End: 2025-04-09

## 2025-04-09 VITALS
SYSTOLIC BLOOD PRESSURE: 124 MMHG | BODY MASS INDEX: 32.62 KG/M2 | OXYGEN SATURATION: 96 % | HEIGHT: 66 IN | HEART RATE: 110 BPM | WEIGHT: 203 LBS | DIASTOLIC BLOOD PRESSURE: 58 MMHG

## 2025-04-09 DIAGNOSIS — I25.10 CORONARY ARTERY DISEASE INVOLVING NATIVE CORONARY ARTERY OF NATIVE HEART WITHOUT ANGINA PECTORIS: Primary | ICD-10-CM

## 2025-04-09 DIAGNOSIS — E78.5 DYSLIPIDEMIA: ICD-10-CM

## 2025-04-09 DIAGNOSIS — F17.200 SMOKER: ICD-10-CM

## 2025-04-09 DIAGNOSIS — I48.0 PAF (PAROXYSMAL ATRIAL FIBRILLATION) (HCC): ICD-10-CM

## 2025-04-09 DIAGNOSIS — I10 PRIMARY HYPERTENSION: ICD-10-CM

## 2025-04-09 RX ORDER — ATORVASTATIN CALCIUM 40 MG/1
40 TABLET, FILM COATED ORAL NIGHTLY
Qty: 90 TABLET | Refills: 3 | Status: SHIPPED | OUTPATIENT
Start: 2025-04-09

## 2025-04-09 NOTE — PROGRESS NOTES
reports that it caused diarrhea. Counseled for importance of anticoagulation and stroke prophylaxis. He reports that he will take it again. If he is intolerant than coumadin vs ASA needs to be considered. I will arrange for 2 weeks follow up with NP to address this issue.  -HTN-Continue metoprolol. Monitor BP at home  -H/o CVA  -DL  -H/o loop s/p explant  -Seizure disorder  -Chronic anxiety  -Counseled to quit smoking. I spend significant amount of time counseling patient to quit smoking.  I also discussed pharmacological and non-pharmacological methods to quit smoking.  Discussed use of nicotine patch along with nicotine gum/lozenge.  Also discussed hypnosis and acupuncture to quit smoking. Time spent 4 minutes      David Stan Nguyen MD  [facility]  [phone]

## 2025-04-16 ENCOUNTER — HOSPITAL ENCOUNTER (OUTPATIENT)
Age: 45
Discharge: HOME | End: 2025-04-16
Payer: COMMERCIAL

## 2025-04-16 ENCOUNTER — OFFICE VISIT (OUTPATIENT)
Dept: NEUROLOGY | Age: 45
End: 2025-04-16
Payer: COMMERCIAL

## 2025-04-16 VITALS
SYSTOLIC BLOOD PRESSURE: 112 MMHG | OXYGEN SATURATION: 96 % | HEART RATE: 88 BPM | HEIGHT: 66 IN | WEIGHT: 202.4 LBS | DIASTOLIC BLOOD PRESSURE: 58 MMHG | BODY MASS INDEX: 32.53 KG/M2

## 2025-04-16 DIAGNOSIS — G40.909 SEIZURE DISORDER (HCC): Primary | ICD-10-CM

## 2025-04-16 DIAGNOSIS — M54.12 CERVICAL RADICULOPATHY: ICD-10-CM

## 2025-04-16 DIAGNOSIS — G43.709 CHRONIC MIGRAINE WITHOUT AURA WITHOUT STATUS MIGRAINOSUS, NOT INTRACTABLE: ICD-10-CM

## 2025-04-16 DIAGNOSIS — Z72.0 TOBACCO USE: ICD-10-CM

## 2025-04-16 DIAGNOSIS — F34.1 DYSTHYMIA: ICD-10-CM

## 2025-04-16 DIAGNOSIS — Z91.81 H/O FALL: ICD-10-CM

## 2025-04-16 DIAGNOSIS — G43.009 MIGRAINE WITHOUT AURA AND WITHOUT STATUS MIGRAINOSUS, NOT INTRACTABLE: ICD-10-CM

## 2025-04-16 DIAGNOSIS — G45.0 VBI (VERTEBROBASILAR INSUFFICIENCY): ICD-10-CM

## 2025-04-16 DIAGNOSIS — K21.9 GASTROESOPHAGEAL REFLUX DISEASE WITHOUT ESOPHAGITIS: ICD-10-CM

## 2025-04-16 DIAGNOSIS — F44.5 PSYCHOGENIC NONEPILEPTIC SEIZURE: ICD-10-CM

## 2025-04-16 DIAGNOSIS — G47.9 DISTURBANCE, SLEEP: ICD-10-CM

## 2025-04-16 DIAGNOSIS — G47.9 SLEEP DIFFICULTIES: ICD-10-CM

## 2025-04-16 DIAGNOSIS — F41.9 ANXIETY, MILD: ICD-10-CM

## 2025-04-16 DIAGNOSIS — G40.009 PARTIAL IDIOPATHIC EPILEPSY WITH SEIZURES OF LOCALIZED ONSET, NOT INTRACTABLE, WITHOUT STATUS EPILEPTICUS: ICD-10-CM

## 2025-04-16 DIAGNOSIS — I48.91 ATRIAL FIBRILLATION WITH RVR (HCC): ICD-10-CM

## 2025-04-16 DIAGNOSIS — Z91.199 NON COMPLIANCE WITH MEDICAL TREATMENT: ICD-10-CM

## 2025-04-16 DIAGNOSIS — G40.909 SEIZURE DISORDER (HCC): ICD-10-CM

## 2025-04-16 DIAGNOSIS — Z86.73 HISTORY OF STROKE: ICD-10-CM

## 2025-04-16 DIAGNOSIS — D68.69 SECONDARY HYPERCOAGULABLE STATE: ICD-10-CM

## 2025-04-16 DIAGNOSIS — S09.90XS INJURY OF HEAD, SEQUELA: ICD-10-CM

## 2025-04-16 DIAGNOSIS — Z86.73 H/O TRANSIENT ISCHEMIC ATTACK INVOLVING ANTERIOR CIRCULATION: ICD-10-CM

## 2025-04-16 DIAGNOSIS — R42 DIZZINESS: ICD-10-CM

## 2025-04-16 LAB
ANION GAP SERPL CALCULATED.3IONS-SCNC: 13 MMOL/L (ref 9–16)
CHLORIDE SERPL-SCNC: 102 MMOL/L (ref 98–107)
CO2 SERPL-SCNC: 24 MMOL/L (ref 20–31)
DATE LAST DOSE: 4
ERYTHROCYTE [DISTWIDTH] IN BLOOD BY AUTOMATED COUNT: 12.8 % (ref 11.8–14.4)
HCT VFR BLD AUTO: 44.7 % (ref 40.7–50.3)
HGB BLD-MCNC: 15.8 G/DL (ref 13–17)
LAMOTRIGINE SERPL-MCNC: 4.6 UG/ML (ref 3–15)
MCH RBC QN AUTO: 34.1 PG (ref 25.2–33.5)
MCHC RBC AUTO-ENTMCNC: 35.3 G/DL (ref 25.2–33.5)
MCV RBC AUTO: 96.3 FL (ref 82.6–102.9)
NRBC BLD-RTO: 0 PER 100 WBC
PLATELET # BLD AUTO: 250 K/UL (ref 138–453)
PMV BLD AUTO: 9.6 FL (ref 8.1–13.5)
POTASSIUM SERPL-SCNC: 4.7 MMOL/L (ref 3.7–5.3)
RBC # BLD AUTO: 4.64 M/UL (ref 4.21–5.77)
SODIUM SERPL-SCNC: 139 MMOL/L (ref 136–145)
TME LAST DOSE: 2200 H
VALPROATE SERPL-MCNC: 46 UG/ML (ref 50–125)
VANCOMYCIN DOSE: 200 MG
WBC OTHER # BLD: 8.2 K/UL (ref 3.5–11.3)

## 2025-04-16 PROCEDURE — G8417 CALC BMI ABV UP PARAM F/U: HCPCS | Performed by: PSYCHIATRY & NEUROLOGY

## 2025-04-16 PROCEDURE — 4004F PT TOBACCO SCREEN RCVD TLK: CPT | Performed by: PSYCHIATRY & NEUROLOGY

## 2025-04-16 PROCEDURE — 85027 COMPLETE CBC AUTOMATED: CPT

## 2025-04-16 PROCEDURE — G8427 DOCREV CUR MEDS BY ELIG CLIN: HCPCS | Performed by: PSYCHIATRY & NEUROLOGY

## 2025-04-16 PROCEDURE — 80175 DRUG SCREEN QUAN LAMOTRIGINE: CPT

## 2025-04-16 PROCEDURE — 99215 OFFICE O/P EST HI 40 MIN: CPT | Performed by: PSYCHIATRY & NEUROLOGY

## 2025-04-16 PROCEDURE — 80051 ELECTROLYTE PANEL: CPT

## 2025-04-16 PROCEDURE — 80164 ASSAY DIPROPYLACETIC ACD TOT: CPT

## 2025-04-16 PROCEDURE — 36415 COLL VENOUS BLD VENIPUNCTURE: CPT

## 2025-04-16 RX ORDER — SUMATRIPTAN 50 MG/1
TABLET, FILM COATED ORAL
Qty: 30 TABLET | Refills: 2 | Status: SHIPPED | OUTPATIENT
Start: 2025-04-16

## 2025-04-16 RX ORDER — CLONAZEPAM 0.5 MG/1
0.5 TABLET ORAL 2 TIMES DAILY
Qty: 60 TABLET | Refills: 2 | Status: SHIPPED | OUTPATIENT
Start: 2025-04-16 | End: 2025-05-17

## 2025-04-16 RX ORDER — PROMETHAZINE HYDROCHLORIDE 25 MG/1
TABLET ORAL
Qty: 60 TABLET | Refills: 2 | Status: SHIPPED | OUTPATIENT
Start: 2025-04-16

## 2025-04-16 RX ORDER — DIVALPROEX SODIUM 250 MG/1
TABLET, DELAYED RELEASE ORAL
Qty: 180 TABLET | Refills: 5 | Status: SHIPPED | OUTPATIENT
Start: 2025-04-16

## 2025-04-16 RX ORDER — LAMOTRIGINE 100 MG/1
TABLET ORAL
Qty: 30 TABLET | Refills: 5 | Status: SHIPPED | OUTPATIENT
Start: 2025-04-16

## 2025-04-16 ASSESSMENT — ENCOUNTER SYMPTOMS
WHEEZING: 0
COLOR CHANGE: 0
DIARRHEA: 0
BLOOD IN STOOL: 0
FACIAL SWELLING: 0
VOICE CHANGE: 0
CHANGE IN BOWEL HABIT: 0
APNEA: 0
EYE ITCHING: 0
CHOKING: 0
CONSTIPATION: 0
ABDOMINAL DISTENTION: 0
TROUBLE SWALLOWING: 0
CHEST TIGHTNESS: 0
EYE DISCHARGE: 0
SHORTNESS OF BREATH: 0

## 2025-04-16 NOTE — PROGRESS NOTES
Subjective:        Patient ID: Josh Beyer is a 44 y.o.RIGHT   HANDED male.        Seizures  This is a chronic problem. Episode onset: SINCE    2014. The problem occurs intermittently. The problem has been waxing and waning. Pertinent negatives include no arthralgias, change in bowel habit, chest pain, chills, congestion, diaphoresis, fatigue, joint swelling, myalgias, rash or urinary symptoms. Exacerbated by: LACK OF  SLEEP,  PROLONGED   TV ,   STRESS,   LOUD  NOISES   AND  BRIGHT  LIGHTS   Treatments tried: ANTI  EPILEPTIC  MEDICATION. The treatment provided moderate relief.   Migraine   This is a chronic problem. Episode onset: MORE  THAN   10  YEARS. The problem occurs intermittently. The problem has been waxing and waning. The pain is located in the Right unilateral and frontal region. The pain does not radiate. The pain quality is similar to prior headaches. The quality of the pain is described as aching and throbbing. The pain is at a severity of 3/10. The pain is mild. Associated symptoms include seizures. The symptoms are aggravated by unknown. He has tried triptans and NSAIDs (TOPAMAX) for the symptoms. The treatment provided moderate relief.               History obtained from  The patient     AND    HIS  WIFE        and other  available medical records were  Also  reviewed.                1)      KNOWN    H/O   CHRONIC  SEVERE MIGRAINES                         FOR     10  YEARS                           -     WELL  CONTROLLED                      -   ON  IMITREX,   PHENERGAN   AS  NEEDED               2)     H/O   CHRONIC   TENSION  HEADACHE                       --   BETTER  CONTROLLED                            3)       KNOWN   H/O  SEIZURE  DISORDER /  EPILEPSY    SINCE      2014                        -    BETTER      SEIZURE  CONTROL                                        4)        PREVIOUS  H/O     INTOLERANCE  TO  KEPPRA                    DUE  TO  IRRITABILITY  AND MOOD

## 2025-04-16 NOTE — PROGRESS NOTES
Home Health referral faxed to [facility] per patient's request    Psychology/Psychiatry referrals faxed to [facility]

## 2025-04-23 ENCOUNTER — OFFICE VISIT (OUTPATIENT)
Dept: CARDIOLOGY | Age: 45
End: 2025-04-23
Payer: COMMERCIAL

## 2025-04-23 VITALS
WEIGHT: 202 LBS | HEIGHT: 66 IN | DIASTOLIC BLOOD PRESSURE: 90 MMHG | SYSTOLIC BLOOD PRESSURE: 122 MMHG | OXYGEN SATURATION: 98 % | HEART RATE: 111 BPM | BODY MASS INDEX: 32.47 KG/M2

## 2025-04-23 DIAGNOSIS — I25.10 CORONARY ARTERY DISEASE INVOLVING NATIVE CORONARY ARTERY OF NATIVE HEART WITHOUT ANGINA PECTORIS: ICD-10-CM

## 2025-04-23 DIAGNOSIS — I48.0 PAF (PAROXYSMAL ATRIAL FIBRILLATION) (HCC): ICD-10-CM

## 2025-04-23 DIAGNOSIS — Z72.0 TOBACCO USE: ICD-10-CM

## 2025-04-23 DIAGNOSIS — I10 PRIMARY HYPERTENSION: Primary | ICD-10-CM

## 2025-04-23 DIAGNOSIS — R00.0 TACHYCARDIA: ICD-10-CM

## 2025-04-23 DIAGNOSIS — R42 DIZZINESS: ICD-10-CM

## 2025-04-23 PROCEDURE — 4004F PT TOBACCO SCREEN RCVD TLK: CPT | Performed by: NURSE PRACTITIONER

## 2025-04-23 PROCEDURE — G8417 CALC BMI ABV UP PARAM F/U: HCPCS | Performed by: NURSE PRACTITIONER

## 2025-04-23 PROCEDURE — 93000 ELECTROCARDIOGRAM COMPLETE: CPT | Performed by: NURSE PRACTITIONER

## 2025-04-23 PROCEDURE — 99214 OFFICE O/P EST MOD 30 MIN: CPT | Performed by: NURSE PRACTITIONER

## 2025-04-23 PROCEDURE — 3074F SYST BP LT 130 MM HG: CPT | Performed by: NURSE PRACTITIONER

## 2025-04-23 PROCEDURE — 99406 BEHAV CHNG SMOKING 3-10 MIN: CPT | Performed by: NURSE PRACTITIONER

## 2025-04-23 PROCEDURE — 3080F DIAST BP >= 90 MM HG: CPT | Performed by: NURSE PRACTITIONER

## 2025-04-23 PROCEDURE — G8427 DOCREV CUR MEDS BY ELIG CLIN: HCPCS | Performed by: NURSE PRACTITIONER

## 2025-04-23 RX ORDER — METOPROLOL TARTRATE 25 MG/1
25 TABLET, FILM COATED ORAL 2 TIMES DAILY
Qty: 180 TABLET | Refills: 3 | Status: SHIPPED | OUTPATIENT
Start: 2025-04-23

## 2025-04-23 NOTE — PROGRESS NOTES
Cardiology Follow Up.      Josh Beyer  1980  [id number]    Today: 4/23/25    CC: Patient is here for follow up    HPI:   Josh Beyer is here for a follow up. Patient with a history of CAD, atrial fibrillation, TIA and PVCs. Patient is here for a 2 week follow up- last appt patient was being roomed and he was starting to feel short of breath, dizzy and a little confused- patient does have a seizure history.     Patient seen and examined in office today. Denies anginal type chest pain. Intermittent shortness of breath and dyspnea on exertion. Patient reports few episodes of lightheadedness and dizziness.  No nausea/vomiting, diaphoresis, or palpitations. Patient reports that he feels his heart beating out of his chest at times.  Denies any noticeable peripheral edema or unexplained sudden weight gain. Medical history reviewed.       Past Medical:  Past Medical History:   Diagnosis Date    Anxiety, mild     Colitis due to Clostridioides difficile 10/28/2022    Colon polyps     Depression     Dizziness     Eczema     Epilepsy     GERD (gastroesophageal reflux disease)     H/O fall     Head ache     Head injury     Hiatal hernia     History of suicide attempt 10/28/2022    Inguinal hernia     Right.    Low back pain     Lumbar sprain     Migraine     Plantar fasciitis, bilateral     Psychogenic nonepileptic seizure 11/16/2022    Seizure disorder (HCC)     Sigmoid diverticulosis     Sleep difficulties     Status migrainosus     TIA (transient ischemic attack) 09/2017    Tobacco use     Chronic.         Past Surgical:  Past Surgical History:   Procedure Laterality Date    CARDIAC CATHETERIZATION  03/07/2023    Mild coronary artery disease.    COLONOSCOPY  04/29/2011    Internal hemorrhoids, possible anal fissure, few scattered diverticula.    COLONOSCOPY  06/04/2010    Mild sigmoid diverticulosis.    COLONOSCOPY  08/28/2009    Sigmoid diverticulosis, internal hemorrhoids.    COLONOSCOPY  05/27/2008

## 2025-04-24 ENCOUNTER — TELEPHONE (OUTPATIENT)
Dept: FAMILY MEDICINE CLINIC | Age: 45
End: 2025-04-24

## 2025-04-24 DIAGNOSIS — Z86.73 PERSONAL HISTORY OF TRANSIENT CEREBRAL ISCHEMIA: ICD-10-CM

## 2025-04-24 DIAGNOSIS — S09.90XS INJURY OF HEAD, SEQUELA: ICD-10-CM

## 2025-04-24 DIAGNOSIS — Z91.199 PERSONAL HISTORY OF NONCOMPLIANCE WITH MEDICAL TREATMENT, PRESENTING HAZARDS TO HEALTH: ICD-10-CM

## 2025-04-24 DIAGNOSIS — G40.909 SEIZURE DISORDER (HCC): Primary | ICD-10-CM

## 2025-04-24 NOTE — TELEPHONE ENCOUNTER
Olga with [facility] calling stating pt is getting HH care but needs a written order so he can get a HH aid, please fax order to [phone]

## 2025-04-25 ENCOUNTER — APPOINTMENT (OUTPATIENT)
Dept: CT IMAGING | Age: 45
End: 2025-04-25
Payer: COMMERCIAL

## 2025-04-25 ENCOUNTER — HOSPITAL ENCOUNTER (EMERGENCY)
Age: 45
Discharge: HOME | End: 2025-04-25
Attending: EMERGENCY MEDICINE
Payer: COMMERCIAL

## 2025-04-25 ENCOUNTER — APPOINTMENT (OUTPATIENT)
Dept: GENERAL RADIOLOGY | Age: 45
End: 2025-04-25
Payer: COMMERCIAL

## 2025-04-25 VITALS
OXYGEN SATURATION: 97 % | SYSTOLIC BLOOD PRESSURE: 126 MMHG | TEMPERATURE: 98.1 F | HEART RATE: 80 BPM | RESPIRATION RATE: 16 BRPM | DIASTOLIC BLOOD PRESSURE: 80 MMHG

## 2025-04-25 DIAGNOSIS — R10.9 ABDOMINAL PAIN, UNSPECIFIED ABDOMINAL LOCATION: Primary | ICD-10-CM

## 2025-04-25 LAB
ALBUMIN SERPL-MCNC: 4.1 G/DL (ref 3.5–5.2)
ALBUMIN/GLOB SERPL: 1.6 {RATIO} (ref 1–2.5)
ALP SERPL-CCNC: 74 U/L (ref 40–129)
ALT SERPL-CCNC: 25 U/L (ref 10–50)
ANION GAP SERPL CALCULATED.3IONS-SCNC: 14 MMOL/L (ref 9–16)
AST SERPL-CCNC: 26 U/L (ref 10–50)
BASOPHILS # BLD: <0.03 K/UL (ref 0–0.2)
BASOPHILS NFR BLD: 0 % (ref 0–2)
BILIRUB DIRECT SERPL-MCNC: 0.3 MG/DL (ref 0–0.2)
BILIRUB INDIRECT SERPL-MCNC: 0.2 MG/DL (ref 0–1)
BILIRUB SERPL-MCNC: 0.5 MG/DL (ref 0–1.2)
BILIRUB UR QL STRIP: NEGATIVE
BUN SERPL-MCNC: 15 MG/DL (ref 6–20)
BUN/CREAT SERPL: 15 (ref 9–20)
CALCIUM SERPL-MCNC: 8.7 MG/DL (ref 8.6–10.4)
CHLORIDE SERPL-SCNC: 102 MMOL/L (ref 98–107)
CLARITY UR: CLEAR
CO2 SERPL-SCNC: 21 MMOL/L (ref 20–31)
COLOR UR: YELLOW
COMMENT: ABNORMAL
CREAT SERPL-MCNC: 1 MG/DL (ref 0.7–1.2)
EOSINOPHIL # BLD: 0.06 K/UL (ref 0–0.44)
EOSINOPHILS RELATIVE PERCENT: 1 % (ref 1–4)
ERYTHROCYTE [DISTWIDTH] IN BLOOD BY AUTOMATED COUNT: 12.8 % (ref 11.8–14.4)
GFR, ESTIMATED: >90 ML/MIN/1.73M2
GLUCOSE SERPL-MCNC: 99 MG/DL (ref 74–99)
GLUCOSE UR STRIP-MCNC: NEGATIVE MG/DL
HCT VFR BLD AUTO: 42.1 % (ref 40.7–50.3)
HGB BLD-MCNC: 15.2 G/DL (ref 13–17)
HGB UR QL STRIP.AUTO: NEGATIVE
IMM GRANULOCYTES # BLD AUTO: 0.06 K/UL (ref 0–0.3)
IMM GRANULOCYTES NFR BLD: 1 %
INR PPP: 1.2
KETONES UR STRIP-MCNC: ABNORMAL MG/DL
LEUKOCYTE ESTERASE UR QL STRIP: NEGATIVE
LIPASE SERPL-CCNC: 53 U/L (ref 13–60)
LYMPHOCYTES NFR BLD: 1.25 K/UL (ref 1.1–3.7)
LYMPHOCYTES RELATIVE PERCENT: 18 % (ref 24–43)
MCH RBC QN AUTO: 34.6 PG (ref 25.2–33.5)
MCHC RBC AUTO-ENTMCNC: 36.1 G/DL (ref 25.2–33.5)
MCV RBC AUTO: 95.9 FL (ref 82.6–102.9)
MONOCYTES NFR BLD: 0.77 K/UL (ref 0.1–1.2)
MONOCYTES NFR BLD: 11 % (ref 3–12)
NEUTROPHILS NFR BLD: 69 % (ref 36–65)
NEUTS SEG NFR BLD: 4.92 K/UL (ref 1.5–8.1)
NITRITE UR QL STRIP: NEGATIVE
NRBC BLD-RTO: 0 PER 100 WBC
PARTIAL THROMBOPLASTIN TIME: 35.1 SEC (ref 23.9–33.8)
PH UR STRIP: 6 [PH] (ref 5–6)
PLATELET # BLD AUTO: 203 K/UL (ref 138–453)
PMV BLD AUTO: 9.7 FL (ref 8.1–13.5)
POTASSIUM SERPL-SCNC: 3.9 MMOL/L (ref 3.7–5.3)
PROT SERPL-MCNC: 6.6 G/DL (ref 6.6–8.7)
PROT UR STRIP-MCNC: NEGATIVE MG/DL
PROTHROMBIN TIME: 15.3 SEC (ref 11.5–14.2)
RBC # BLD AUTO: 4.39 M/UL (ref 4.21–5.77)
SODIUM SERPL-SCNC: 137 MMOL/L (ref 136–145)
SP GR UR STRIP: 1.02 (ref 1.01–1.02)
TROPONIN I SERPL HS-MCNC: 6 NG/L (ref 0–22)
UROBILINOGEN UR STRIP-ACNC: NORMAL EU/DL (ref 0–1)
WBC OTHER # BLD: 7.1 K/UL (ref 3.5–11.3)

## 2025-04-25 PROCEDURE — 85610 PROTHROMBIN TIME: CPT

## 2025-04-25 PROCEDURE — 83690 ASSAY OF LIPASE: CPT

## 2025-04-25 PROCEDURE — 81003 URINALYSIS AUTO W/O SCOPE: CPT

## 2025-04-25 PROCEDURE — 6360000002 HC RX W HCPCS: Performed by: EMERGENCY MEDICINE

## 2025-04-25 PROCEDURE — 85025 COMPLETE CBC W/AUTO DIFF WBC: CPT

## 2025-04-25 PROCEDURE — 71045 X-RAY EXAM CHEST 1 VIEW: CPT

## 2025-04-25 PROCEDURE — 6360000004 HC RX CONTRAST MEDICATION: Performed by: EMERGENCY MEDICINE

## 2025-04-25 PROCEDURE — 80048 BASIC METABOLIC PNL TOTAL CA: CPT

## 2025-04-25 PROCEDURE — 84484 ASSAY OF TROPONIN QUANT: CPT

## 2025-04-25 PROCEDURE — 96375 TX/PRO/DX INJ NEW DRUG ADDON: CPT

## 2025-04-25 PROCEDURE — 80076 HEPATIC FUNCTION PANEL: CPT

## 2025-04-25 PROCEDURE — 85730 THROMBOPLASTIN TIME PARTIAL: CPT

## 2025-04-25 PROCEDURE — 96374 THER/PROPH/DIAG INJ IV PUSH: CPT

## 2025-04-25 PROCEDURE — 99285 EMERGENCY DEPT VISIT HI MDM: CPT

## 2025-04-25 PROCEDURE — 2709999900 CT ABDOMEN PELVIS W IV CONTRAST

## 2025-04-25 RX ORDER — ONDANSETRON 2 MG/ML
4 INJECTION INTRAMUSCULAR; INTRAVENOUS ONCE
Status: COMPLETED | OUTPATIENT
Start: 2025-04-25 | End: 2025-04-25

## 2025-04-25 RX ORDER — MORPHINE SULFATE 4 MG/ML
4 INJECTION, SOLUTION INTRAMUSCULAR; INTRAVENOUS ONCE
Refills: 0 | Status: COMPLETED | OUTPATIENT
Start: 2025-04-25 | End: 2025-04-25

## 2025-04-25 RX ORDER — PANTOPRAZOLE SODIUM 20 MG/1
20 TABLET, DELAYED RELEASE ORAL
Qty: 30 TABLET | Refills: 0 | Status: SHIPPED | OUTPATIENT
Start: 2025-04-25

## 2025-04-25 RX ORDER — IOPAMIDOL 755 MG/ML
100 INJECTION, SOLUTION INTRAVASCULAR
Status: COMPLETED | OUTPATIENT
Start: 2025-04-25 | End: 2025-04-25

## 2025-04-25 RX ADMIN — MORPHINE SULFATE 4 MG: 4 INJECTION, SOLUTION INTRAMUSCULAR; INTRAVENOUS at 12:43

## 2025-04-25 RX ADMIN — IOPAMIDOL 100 ML: 755 INJECTION, SOLUTION INTRAVENOUS at 13:13

## 2025-04-25 RX ADMIN — ONDANSETRON 4 MG: 2 INJECTION, SOLUTION INTRAMUSCULAR; INTRAVENOUS at 12:42

## 2025-04-25 ASSESSMENT — PAIN SCALES - GENERAL
PAINLEVEL_OUTOF10: 9
PAINLEVEL_OUTOF10: 5

## 2025-04-25 ASSESSMENT — ENCOUNTER SYMPTOMS
COUGH: 0
CONSTIPATION: 1
ABDOMINAL PAIN: 0
CHEST TIGHTNESS: 0
DIARRHEA: 1
EYE REDNESS: 0
VOMITING: 1
NAUSEA: 1
SHORTNESS OF BREATH: 0

## 2025-04-25 ASSESSMENT — PAIN - FUNCTIONAL ASSESSMENT
PAIN_FUNCTIONAL_ASSESSMENT: NONE - DENIES PAIN
PAIN_FUNCTIONAL_ASSESSMENT: 0-10

## 2025-04-25 ASSESSMENT — PAIN DESCRIPTION - FREQUENCY: FREQUENCY: CONTINUOUS

## 2025-04-25 ASSESSMENT — PAIN DESCRIPTION - DIRECTION: RADIATING_TOWARDS: BILAT FLANKS

## 2025-04-25 ASSESSMENT — PAIN DESCRIPTION - LOCATION: LOCATION: ABDOMEN

## 2025-04-25 ASSESSMENT — LIFESTYLE VARIABLES
HOW MANY STANDARD DRINKS CONTAINING ALCOHOL DO YOU HAVE ON A TYPICAL DAY: PATIENT DOES NOT DRINK
HOW OFTEN DO YOU HAVE A DRINK CONTAINING ALCOHOL: NEVER

## 2025-04-25 ASSESSMENT — PAIN DESCRIPTION - PAIN TYPE: TYPE: ACUTE PAIN

## 2025-04-25 NOTE — DISCHARGE INSTRUCTIONS
If given narcotics (opiates) during this Emergency Department visit, please do not drink, drive or operate any machinery for at least 4 - 6 hours.    Avoid eating any spicy food, milk type products or drinks that have caffeine in it.  Take all medications as prescribed.  For pain use ibuprofen (Motrin) or acetaminophen (Tylenol), unless prescribed medications that have acetaminophen in it.  You can take over the counter acetaminophen tablets (1 - 2 tablets of the 500-mg strength every 6 hours) or ibuprofen tablets (2 tablets every 4 hours).    PLEASE RETURN TO THE EMERGENCY DEPARTMENT IMMEDIATELY for worsening symptoms, or if you develop any concerning symptoms such as: high fever not relieved by acetaminophen (Tylenol) and/or ibuprofen (Motrin), chills, shortness of breath, chest pain, persistent nausea and/or vomiting, numbness, weakness or tingling in the arms or legs or change in color of the extremities, changes in mental status, persistent headache, blurry vision.    Return within 8 - 12 hours if you have any of the following: worsening of pain in your abdomen, no food sounds good to you, you continue to vomit, pain goes to your back or testicles, have pain in the abdomen when going over a bump in the car or when you jump up and down, inability to urinate, unable to follow up with your physician, or other any other care or concern.    If you had imaging today, your results are:  XR CHEST PORTABLE   Final Result   No acute cardiopulmonary process.         CT ABDOMEN PELVIS W IV CONTRAST Additional Contrast? None   Final Result   No acute abdominal or pelvic abnormality.      Uncomplicated colonic diverticulosis.             Please understand that at this time there is no evidence for a more serious underlying process, but that early in the process of an illness or injury, an emergency department workup can be falsely reassuring.  You should contact your family doctor within the next 24 hours for a follow up

## 2025-04-25 NOTE — ED PROVIDER NOTES
6 0 - 22 ng/L   CBC with Auto Differential   Result Value Ref Range    WBC 7.1 3.5 - 11.3 k/uL    RBC 4.39 4.21 - 5.77 m/uL    Hemoglobin 15.2 13.0 - 17.0 g/dL    Hematocrit 42.1 40.7 - 50.3 %    MCV 95.9 82.6 - 102.9 fL    MCH 34.6 (H) 25.2 - 33.5 pg    MCHC 36.1 (H) 25.2 - 33.5 g/dL    RDW 12.8 11.8 - 14.4 %    Platelets 203 138 - 453 k/uL    MPV 9.7 8.1 - 13.5 fL    NRBC Automated 0.0 0.0 per 100 WBC    Neutrophils % 69 (H) 36 - 65 %    Lymphocytes % 18 (L) 24 - 43 %    Monocytes % 11 3 - 12 %    Eosinophils % 1 1 - 4 %    Basophils % 0 0 - 2 %    Immature Granulocytes % 1 (H) 0 %    Neutrophils Absolute 4.92 1.50 - 8.10 k/uL    Lymphocytes Absolute 1.25 1.10 - 3.70 k/uL    Monocytes Absolute 0.77 0.10 - 1.20 k/uL    Eosinophils Absolute 0.06 0.00 - 0.44 k/uL    Basophils Absolute <0.03 0.00 - 0.20 k/uL    Immature Granulocytes Absolute 0.06 0.00 - 0.30 k/uL       RADIOLOGY:   XR CHEST PORTABLE  Result Date: 4/25/2025  EXAMINATION: ONE XRAY VIEW OF THE CHEST 4/25/2025 1:24 pm COMPARISON: None. HISTORY: ORDERING SYSTEM PROVIDED HISTORY: abd pain TECHNOLOGIST PROVIDED HISTORY: abd pain Reason for Exam: Abdominal pain FINDINGS: There is no acute consolidation or effusion.  There is no pneumothorax.  The mediastinal structures are unremarkable.  The upper abdomen is unremarkable. The extrathoracic soft tissues are unremarkable.  There is no acute osseous abnormality.     No acute cardiopulmonary process.     CT ABDOMEN PELVIS W IV CONTRAST Additional Contrast? None  Result Date: 4/25/2025  EXAMINATION: CT OF THE ABDOMEN AND PELVIS WITH CONTRAST 4/25/2025 1:14 pm TECHNIQUE: CT of the abdomen and pelvis was performed with the administration of intravenous contrast. Multiplanar reformatted images are provided for review. Automated exposure control, iterative reconstruction, and/or weight based adjustment of the mA/kV was utilized to reduce the radiation dose to as low as reasonably achievable. COMPARISON: None. HISTORY:

## 2025-04-28 ENCOUNTER — TELEPHONE (OUTPATIENT)
Dept: FAMILY MEDICINE CLINIC | Age: 45
End: 2025-04-28
Payer: COMMERCIAL

## 2025-04-28 DIAGNOSIS — I10 ESSENTIAL HYPERTENSION, MALIGNANT: ICD-10-CM

## 2025-04-28 DIAGNOSIS — I48.92 ATRIAL FLUTTER, UNSPECIFIED TYPE (HCC): ICD-10-CM

## 2025-04-28 DIAGNOSIS — I25.10 ATHEROSCLEROSIS OF NATIVE CORONARY ARTERY OF NATIVE HEART WITHOUT ANGINA PECTORIS: ICD-10-CM

## 2025-04-28 DIAGNOSIS — K57.30 DIVERTICULOSIS OF LARGE INTESTINE WITHOUT DIVERTICULITIS: ICD-10-CM

## 2025-04-28 DIAGNOSIS — F32.A DEPRESSION, UNSPECIFIED DEPRESSION TYPE: ICD-10-CM

## 2025-04-28 DIAGNOSIS — J44.9 CHRONIC OBSTRUCTIVE PULMONARY DISEASE, UNSPECIFIED COPD TYPE (HCC): ICD-10-CM

## 2025-04-28 DIAGNOSIS — F17.210 CIGARETTE SMOKER: ICD-10-CM

## 2025-04-28 DIAGNOSIS — M54.12 RADICULOPATHY, CERVICAL: ICD-10-CM

## 2025-04-28 DIAGNOSIS — F41.9 ANXIETY DISORDER OF CHILDHOOD OR ADOLESCENCE: ICD-10-CM

## 2025-04-28 DIAGNOSIS — Z79.01 LONG TERM (CURRENT) USE OF ANTICOAGULANTS: ICD-10-CM

## 2025-04-28 DIAGNOSIS — Z86.73 PERSONAL HISTORY OF TRANSIENT CEREBRAL ISCHEMIA: ICD-10-CM

## 2025-04-28 DIAGNOSIS — I48.91 ATRIAL FIBRILLATION, UNSPECIFIED TYPE (HCC): ICD-10-CM

## 2025-04-28 DIAGNOSIS — G43.709 CHRONIC MIGRAINE WITHOUT AURA WITHOUT STATUS MIGRAINOSUS, NOT INTRACTABLE: Primary | ICD-10-CM

## 2025-04-28 DIAGNOSIS — D68.69 SECONDARY HYPERCOAGULABLE STATE: ICD-10-CM

## 2025-04-28 DIAGNOSIS — F34.1 DYSTHYMIC DISORDER: ICD-10-CM

## 2025-04-28 DIAGNOSIS — Z86.0100 HX OF COLONIC POLYPS: ICD-10-CM

## 2025-04-28 DIAGNOSIS — M54.40 ACUTE BACK PAIN WITH SCIATICA, UNSPECIFIED LATERALITY: ICD-10-CM

## 2025-04-28 DIAGNOSIS — G47.9 REPETITIVE INTRUSIONS OF SLEEP: ICD-10-CM

## 2025-04-28 PROCEDURE — G0180 MD CERTIFICATION HHA PATIENT: HCPCS | Performed by: FAMILY MEDICINE

## 2025-04-28 NOTE — TELEPHONE ENCOUNTER
Home health certification and plan of care done 04/28/2025 on patient for date services 04/21/2025-06/19/2025. Verified medications. Physician time spent is 15 minutes for activities to coordinate services, documenting, medical decision making, and review of reports, treatment plans, and test results.

## 2025-05-16 DIAGNOSIS — I48.0 PAF (PAROXYSMAL ATRIAL FIBRILLATION) (HCC): ICD-10-CM

## 2025-05-16 NOTE — TELEPHONE ENCOUNTER
Patient refill request for Pradaxa 150mg. If agreeable please sign and send to [facility].

## 2025-05-19 RX ORDER — DABIGATRAN ETEXILATE 150 MG/1
150 CAPSULE ORAL 2 TIMES DAILY
Qty: 180 CAPSULE | Refills: 3 | Status: SHIPPED | OUTPATIENT
Start: 2025-05-19

## 2025-06-13 RX ORDER — PANTOPRAZOLE SODIUM 20 MG/1
20 TABLET, DELAYED RELEASE ORAL
Qty: 90 TABLET | Refills: 1 | Status: SHIPPED | OUTPATIENT
Start: 2025-06-13

## 2025-07-02 ENCOUNTER — TELEPHONE (OUTPATIENT)
Dept: FAMILY MEDICINE CLINIC | Age: 45
End: 2025-07-02
Payer: COMMERCIAL

## 2025-07-02 DIAGNOSIS — I48.91 ATRIAL FIBRILLATION, UNSPECIFIED TYPE (HCC): ICD-10-CM

## 2025-07-02 DIAGNOSIS — Z86.73 PERSONAL HISTORY OF TRANSIENT CEREBRAL ISCHEMIA: ICD-10-CM

## 2025-07-02 DIAGNOSIS — F17.210 CIGARETTE SMOKER: ICD-10-CM

## 2025-07-02 DIAGNOSIS — M54.40 LOW BACK PAIN WITH SCIATICA, SCIATICA LATERALITY UNSPECIFIED, UNSPECIFIED BACK PAIN LATERALITY, UNSPECIFIED CHRONICITY: ICD-10-CM

## 2025-07-02 DIAGNOSIS — I48.92 ATRIAL FLUTTER, UNSPECIFIED TYPE (HCC): ICD-10-CM

## 2025-07-02 DIAGNOSIS — G40.009 BENIGN FOCAL EPILEPSY OF CHILDHOOD (HCC): Primary | ICD-10-CM

## 2025-07-02 DIAGNOSIS — Z86.0100 HX OF COLONIC POLYPS: ICD-10-CM

## 2025-07-02 DIAGNOSIS — I25.10 ATHEROSCLEROSIS OF NATIVE CORONARY ARTERY WITHOUT ANGINA PECTORIS, UNSPECIFIED WHETHER NATIVE OR TRANSPLANTED HEART: ICD-10-CM

## 2025-07-02 DIAGNOSIS — F32.A DEPRESSIVE TYPE PSYCHOSIS: ICD-10-CM

## 2025-07-02 DIAGNOSIS — K57.30 DIVERTICULOSIS OF LARGE INTESTINE WITHOUT DIVERTICULITIS: ICD-10-CM

## 2025-07-02 DIAGNOSIS — K21.9 GASTROESOPHAGEAL REFLUX DISEASE WITHOUT ESOPHAGITIS: ICD-10-CM

## 2025-07-02 DIAGNOSIS — J44.9 VANISHING LUNG (HCC): ICD-10-CM

## 2025-07-02 DIAGNOSIS — D68.69 SECONDARY HYPERCOAGULABLE STATE: ICD-10-CM

## 2025-07-02 DIAGNOSIS — F41.9 ANXIETY DISORDER OF CHILDHOOD OR ADOLESCENCE: ICD-10-CM

## 2025-07-02 DIAGNOSIS — G43.709 CHRONIC MIGRAINE WITHOUT AURA WITHOUT STATUS MIGRAINOSUS, NOT INTRACTABLE: ICD-10-CM

## 2025-07-02 DIAGNOSIS — G47.9 REPETITIVE INTRUSIONS OF SLEEP: ICD-10-CM

## 2025-07-02 DIAGNOSIS — M54.12 BRACHIAL NEURITIS: ICD-10-CM

## 2025-07-02 DIAGNOSIS — I10 ESSENTIAL HYPERTENSION, MALIGNANT: ICD-10-CM

## 2025-07-02 DIAGNOSIS — Z79.01 LONG TERM (CURRENT) USE OF ANTICOAGULANTS: ICD-10-CM

## 2025-07-02 PROCEDURE — G0179 MD RECERTIFICATION HHA PT: HCPCS | Performed by: FAMILY MEDICINE

## 2025-07-02 RX ORDER — ESCITALOPRAM OXALATE 10 MG/1
10 TABLET ORAL DAILY
COMMUNITY

## 2025-07-02 NOTE — TELEPHONE ENCOUNTER
Home health certification and plan of care done 07/02/2025 on patient for date services 06/20/2025-08/18/2025. Verified medications. Physician time spent is 15 minutes for activities to coordinate services, documenting, medical decision making, and review of reports, treatment plans, and test results.

## 2025-07-16 ENCOUNTER — OFFICE VISIT (OUTPATIENT)
Dept: NEUROLOGY | Age: 45
End: 2025-07-16
Payer: COMMERCIAL

## 2025-07-16 VITALS
DIASTOLIC BLOOD PRESSURE: 78 MMHG | WEIGHT: 215.2 LBS | OXYGEN SATURATION: 93 % | SYSTOLIC BLOOD PRESSURE: 100 MMHG | HEART RATE: 104 BPM | BODY MASS INDEX: 34.73 KG/M2

## 2025-07-16 DIAGNOSIS — Z72.0 TOBACCO USE: ICD-10-CM

## 2025-07-16 DIAGNOSIS — I48.91 ATRIAL FIBRILLATION WITH RVR (HCC): ICD-10-CM

## 2025-07-16 DIAGNOSIS — K21.9 GASTROESOPHAGEAL REFLUX DISEASE WITHOUT ESOPHAGITIS: ICD-10-CM

## 2025-07-16 DIAGNOSIS — G47.9 SLEEP DIFFICULTIES: ICD-10-CM

## 2025-07-16 DIAGNOSIS — G47.9 DISTURBANCE, SLEEP: ICD-10-CM

## 2025-07-16 DIAGNOSIS — G40.009 PARTIAL IDIOPATHIC EPILEPSY WITH SEIZURES OF LOCALIZED ONSET, NOT INTRACTABLE, WITHOUT STATUS EPILEPTICUS (HCC): ICD-10-CM

## 2025-07-16 DIAGNOSIS — G43.709 CHRONIC MIGRAINE WITHOUT AURA WITHOUT STATUS MIGRAINOSUS, NOT INTRACTABLE: ICD-10-CM

## 2025-07-16 DIAGNOSIS — Z91.81 H/O FALL: ICD-10-CM

## 2025-07-16 DIAGNOSIS — F41.9 ANXIETY, MILD: ICD-10-CM

## 2025-07-16 DIAGNOSIS — G43.009 MIGRAINE WITHOUT AURA AND WITHOUT STATUS MIGRAINOSUS, NOT INTRACTABLE: ICD-10-CM

## 2025-07-16 DIAGNOSIS — Z86.73 H/O TRANSIENT ISCHEMIC ATTACK INVOLVING ANTERIOR CIRCULATION: ICD-10-CM

## 2025-07-16 DIAGNOSIS — Z86.73 HISTORY OF STROKE: ICD-10-CM

## 2025-07-16 DIAGNOSIS — R42 DIZZINESS: ICD-10-CM

## 2025-07-16 DIAGNOSIS — F44.5 PSYCHOGENIC NONEPILEPTIC SEIZURE: ICD-10-CM

## 2025-07-16 DIAGNOSIS — G93.89 BRAIN DYSFUNCTION: ICD-10-CM

## 2025-07-16 DIAGNOSIS — G45.0 VBI (VERTEBROBASILAR INSUFFICIENCY): ICD-10-CM

## 2025-07-16 DIAGNOSIS — F34.1 DYSTHYMIA: ICD-10-CM

## 2025-07-16 DIAGNOSIS — R53.83 OTHER FATIGUE: ICD-10-CM

## 2025-07-16 DIAGNOSIS — G40.909 SEIZURE DISORDER (HCC): Primary | ICD-10-CM

## 2025-07-16 DIAGNOSIS — Z91.199 NON COMPLIANCE WITH MEDICAL TREATMENT: ICD-10-CM

## 2025-07-16 DIAGNOSIS — G40.909 RECURRENT SEIZURES (HCC): ICD-10-CM

## 2025-07-16 PROCEDURE — 99215 OFFICE O/P EST HI 40 MIN: CPT | Performed by: PSYCHIATRY & NEUROLOGY

## 2025-07-16 PROCEDURE — 4004F PT TOBACCO SCREEN RCVD TLK: CPT | Performed by: PSYCHIATRY & NEUROLOGY

## 2025-07-16 PROCEDURE — G8417 CALC BMI ABV UP PARAM F/U: HCPCS | Performed by: PSYCHIATRY & NEUROLOGY

## 2025-07-16 PROCEDURE — G8427 DOCREV CUR MEDS BY ELIG CLIN: HCPCS | Performed by: PSYCHIATRY & NEUROLOGY

## 2025-07-16 RX ORDER — DIVALPROEX SODIUM 250 MG/1
TABLET, DELAYED RELEASE ORAL
Qty: 180 TABLET | Refills: 5 | Status: SHIPPED | OUTPATIENT
Start: 2025-07-16

## 2025-07-16 RX ORDER — SUMATRIPTAN 50 MG/1
TABLET, FILM COATED ORAL
Qty: 30 TABLET | Refills: 2 | Status: SHIPPED | OUTPATIENT
Start: 2025-07-16 | End: 2025-07-16 | Stop reason: SINTOL

## 2025-07-16 RX ORDER — LAMOTRIGINE 100 MG/1
TABLET ORAL
Qty: 30 TABLET | Refills: 5 | Status: SHIPPED | OUTPATIENT
Start: 2025-07-16

## 2025-07-16 RX ORDER — PROMETHAZINE HYDROCHLORIDE 25 MG/1
TABLET ORAL
Qty: 60 TABLET | Refills: 2 | Status: SHIPPED | OUTPATIENT
Start: 2025-07-16

## 2025-07-16 ASSESSMENT — ENCOUNTER SYMPTOMS
WHEEZING: 0
EYE ITCHING: 0
FACIAL SWELLING: 0
EYE DISCHARGE: 0
BLOOD IN STOOL: 0
VOICE CHANGE: 0
SHORTNESS OF BREATH: 0
CHOKING: 0
APNEA: 0
DIARRHEA: 0
CHANGE IN BOWEL HABIT: 0
CHEST TIGHTNESS: 0
ABDOMINAL DISTENTION: 0
COLOR CHANGE: 0
TROUBLE SWALLOWING: 0
CONSTIPATION: 0

## 2025-07-16 NOTE — PROGRESS NOTES
Pt having increased seizures, last one with no warning or  aura which he did have a fall from.Pt reports having tremors in his hands and seems to be moving slower tyhan usual.Feels very exhausted. Pt does admit he has forgotten to take his medication 7 doses in the past 2.5 weeks.  
03/01/2024 04:20 AM     Lab Results   Component Value Date/Time    AST 26 04/25/2025 12:13 PM    ALT 25 04/25/2025 12:13 PM         Lab Results   Component Value Date/Time    CKTOTAL 67 09/19/2013 09:50 AM     Lab Results   Component Value Date/Time    XOJNTECZ80 590 01/25/2019 03:08 PM         Drug Levels  Lab Results   Component Value Date/Time    PHENYTOIN <0.8 09/28/2017 04:24 PM    PHENYTOIN <0.8 04/04/2016 02:23 PM    VALPROATE 46 04/16/2025 10:22 AM    VALPROATE 84 07/11/2024 08:48 AM           Review of Systems   Constitutional:  Negative for appetite change, chills, diaphoresis, fatigue and unexpected weight change.   HENT:  Negative for congestion, dental problem, drooling, ear discharge, facial swelling, mouth sores, nosebleeds, postnasal drip, trouble swallowing and voice change.    Eyes:  Negative for discharge, itching and visual disturbance.   Respiratory:  Negative for apnea, choking, chest tightness, shortness of breath and wheezing.    Cardiovascular:  Negative for chest pain, palpitations and leg swelling.   Gastrointestinal:  Negative for abdominal distention, blood in stool, change in bowel habit, constipation and diarrhea.   Endocrine: Negative for cold intolerance, heat intolerance, polydipsia, polyphagia and polyuria.   Musculoskeletal:  Negative for arthralgias, joint swelling, myalgias and neck stiffness.   Skin:  Negative for color change, rash and wound.   Allergic/Immunologic: Negative for environmental allergies, food allergies and immunocompromised state.   Neurological:  Positive for seizures.   Hematological:  Negative for adenopathy. Does not bruise/bleed easily.   Psychiatric/Behavioral:  Positive for decreased concentration and sleep disturbance. Negative for agitation, behavioral problems, dysphoric mood, hallucinations, self-injury and suicidal ideas. The patient is not hyperactive.        Objective:         Physical Exam  Constitutional:       Appearance: He is well-developed.

## 2025-07-23 ENCOUNTER — OFFICE VISIT (OUTPATIENT)
Dept: CARDIOLOGY | Age: 45
End: 2025-07-23
Payer: COMMERCIAL

## 2025-07-23 ENCOUNTER — OFFICE VISIT (OUTPATIENT)
Dept: FAMILY MEDICINE CLINIC | Age: 45
End: 2025-07-23
Payer: COMMERCIAL

## 2025-07-23 VITALS
HEIGHT: 65 IN | WEIGHT: 205 LBS | DIASTOLIC BLOOD PRESSURE: 86 MMHG | SYSTOLIC BLOOD PRESSURE: 118 MMHG | OXYGEN SATURATION: 98 % | BODY MASS INDEX: 34.16 KG/M2 | HEART RATE: 88 BPM

## 2025-07-23 VITALS
HEART RATE: 87 BPM | WEIGHT: 205.8 LBS | DIASTOLIC BLOOD PRESSURE: 86 MMHG | SYSTOLIC BLOOD PRESSURE: 118 MMHG | OXYGEN SATURATION: 97 % | HEIGHT: 66 IN | BODY MASS INDEX: 33.07 KG/M2

## 2025-07-23 DIAGNOSIS — R60.0 SALIVARY GLAND SWELLING: Primary | ICD-10-CM

## 2025-07-23 DIAGNOSIS — G47.8 UNREFRESHED BY SLEEP: ICD-10-CM

## 2025-07-23 DIAGNOSIS — E78.5 DYSLIPIDEMIA: ICD-10-CM

## 2025-07-23 DIAGNOSIS — I25.10 CORONARY ARTERY DISEASE INVOLVING NATIVE CORONARY ARTERY OF NATIVE HEART WITHOUT ANGINA PECTORIS: ICD-10-CM

## 2025-07-23 DIAGNOSIS — F17.200 SMOKER: ICD-10-CM

## 2025-07-23 DIAGNOSIS — I48.0 PAF (PAROXYSMAL ATRIAL FIBRILLATION) (HCC): Primary | ICD-10-CM

## 2025-07-23 PROCEDURE — G8417 CALC BMI ABV UP PARAM F/U: HCPCS | Performed by: INTERNAL MEDICINE

## 2025-07-23 PROCEDURE — 99214 OFFICE O/P EST MOD 30 MIN: CPT | Performed by: INTERNAL MEDICINE

## 2025-07-23 PROCEDURE — G8417 CALC BMI ABV UP PARAM F/U: HCPCS | Performed by: FAMILY MEDICINE

## 2025-07-23 PROCEDURE — 99214 OFFICE O/P EST MOD 30 MIN: CPT | Performed by: FAMILY MEDICINE

## 2025-07-23 PROCEDURE — 93000 ELECTROCARDIOGRAM COMPLETE: CPT | Performed by: INTERNAL MEDICINE

## 2025-07-23 PROCEDURE — 4004F PT TOBACCO SCREEN RCVD TLK: CPT | Performed by: FAMILY MEDICINE

## 2025-07-23 PROCEDURE — G8427 DOCREV CUR MEDS BY ELIG CLIN: HCPCS | Performed by: FAMILY MEDICINE

## 2025-07-23 PROCEDURE — 99406 BEHAV CHNG SMOKING 3-10 MIN: CPT | Performed by: INTERNAL MEDICINE

## 2025-07-23 PROCEDURE — G8427 DOCREV CUR MEDS BY ELIG CLIN: HCPCS | Performed by: INTERNAL MEDICINE

## 2025-07-23 PROCEDURE — 4004F PT TOBACCO SCREEN RCVD TLK: CPT | Performed by: INTERNAL MEDICINE

## 2025-07-23 RX ORDER — ESCITALOPRAM OXALATE 20 MG/1
20 TABLET ORAL DAILY
COMMUNITY
Start: 2025-07-22

## 2025-07-23 ASSESSMENT — ENCOUNTER SYMPTOMS
WHEEZING: 0
EYES NEGATIVE: 1
COUGH: 1
SHORTNESS OF BREATH: 0
SINUS PRESSURE: 1
GASTROINTESTINAL NEGATIVE: 1

## 2025-07-23 NOTE — PROGRESS NOTES
Subjective:      Patient ID: Josh Beyer is a 44 y.o. male.    HPI  acute visit made for jaw pain.  Felt a lump under the mandibular angle on the left.  Was tender.  Improved a few days later.  Pain improved.  Found another skin knot left posterior neck.  Parents both had hodgkins lymphoma.     Some cough and sinus symptoms.  Reports a fever for one day about a week ago. Active smoker.  Symptoms of malaise after the fever.  Resolved.  Spouse treated for bronchitis around that time.     Crowded oral pharynx .  Sleeping excessively on review.  Unrefreshed by sleep in the last 6 months.    Past Medical History:   Diagnosis Date    Anxiety, mild     Colitis due to Clostridioides difficile 10/28/2022    Colon polyps     Depression     Dizziness     Eczema     Epilepsy (HCC)     GERD (gastroesophageal reflux disease)     H/O fall     Head ache     Head injury     Hiatal hernia     History of suicide attempt 10/28/2022    Inguinal hernia     Right.    Low back pain     Lumbar sprain     Migraine     Plantar fasciitis, bilateral     Psychogenic nonepileptic seizure 11/16/2022    Seizure disorder (HCC)     Sigmoid diverticulosis     Sleep difficulties     Status migrainosus     TIA (transient ischemic attack) 09/2017    Tobacco use     Chronic.     Past Surgical History:   Procedure Laterality Date    CARDIAC CATHETERIZATION N/A 03/07/2023    Mild coronary artery disease.    COLONOSCOPY N/A 04/29/2011    Internal hemorrhoids, possible anal fissure, few scattered diverticula.    COLONOSCOPY N/A 06/04/2010    Mild sigmoid diverticulosis.    COLONOSCOPY N/A 08/28/2009    Sigmoid diverticulosis, internal hemorrhoids.    COLONOSCOPY N/A 05/27/2008    Internal hemorrhoids.    COLONOSCOPY N/A 11/19/2014    polyp in rectum    COLONOSCOPY N/A 06/01/2016    sigmoid diverticulosis, colon polyp, internal hemorrhoids    COLONOSCOPY N/A 04/19/2019    [id number]    ESOPHAGOGASTRODUODENOSCOPY N/A 02/13/2023    [facility]

## 2025-07-23 NOTE — PROGRESS NOTES
cardiac death.No for premature CAD    REVIEW OF SYSTEMS:    Constitutional: there has been no unanticipated weight loss. There's been No change in energy level, No change in activity level.     Eyes: No visual changes or diplopia. No scleral icterus.  ENT: No Headaches, hearing loss or vertigo. No mouth sores or sore throat.  Cardiovascular: see above  Respiratory: see above  Gastrointestinal: No abdominal pain, appetite loss, blood in stools.   Genitourinary: No dysuria, trouble voiding, or hematuria.  Musculoskeletal:  No gait disturbance, No weakness or joint complaints.  Integumentary: No rash or pruritis.  Neurological: No headache or diplopia. No tingling  Psychiatric: No anxiety, or depression.  Endocrine: No temperature intolerance.   Hematologic/Lymphatic: No abnormal bruising or bleeding, blood clots or swollen lymph nodes.  Allergic/Immunologic: No nasal congestion or hives.      PHYSICAL EXAM:      /86   Pulse 88   Ht 1.651 m (5' 5\")   Wt 93 kg (205 lb)   SpO2 98%   BMI 34.11 kg/m²    Constitutional and General Appearance: alert, cooperative, no distress and appears stated age  HEENT: PERRL, no cervical lymphadenopathy. No masses palpable. Normal oral mucosa  Respiratory:  Normal excursion and expansion without use of accessory muscles  Resp Auscultation: Good respiratory effort. No for increased work of breathing. On auscultation: clear to auscultation bilaterally  Cardiovascular:  Heart tones are crisp and normal. regular S1 and S2.  Jugular venous pulsation Normal  The carotid upstroke is normal in amplitude and contour without delay or bruit   Abdomen:   soft  Bowel sounds present  Extremities:   No edema  Neurological:  Alert and oriented.    Cardiac Data:  EKG: NSR, NL ECG    Labs:     CBC: No results for input(s): \"WBC\", \"HGB\", \"HCT\", \"PLT\" in the last 72 hours.  BMP: No results for input(s): \"NA\", \"K\", \"CO2\", \"BUN\", \"CREATININE\", \"LABGLOM\", \"GLUCOSE\" in the last 72 hours.  PT/INR: No

## 2025-08-12 ENCOUNTER — HOSPITAL ENCOUNTER (EMERGENCY)
Age: 45
Discharge: HOME | End: 2025-08-12
Attending: EMERGENCY MEDICINE
Payer: COMMERCIAL

## 2025-08-12 VITALS
OXYGEN SATURATION: 98 % | HEIGHT: 65 IN | WEIGHT: 205 LBS | RESPIRATION RATE: 18 BRPM | BODY MASS INDEX: 34.16 KG/M2 | TEMPERATURE: 97.2 F | DIASTOLIC BLOOD PRESSURE: 98 MMHG | HEART RATE: 95 BPM | SYSTOLIC BLOOD PRESSURE: 126 MMHG

## 2025-08-12 DIAGNOSIS — S61.012A LACERATION OF LEFT THUMB WITHOUT FOREIGN BODY WITHOUT DAMAGE TO NAIL, INITIAL ENCOUNTER: Primary | ICD-10-CM

## 2025-08-12 PROCEDURE — 12001 RPR S/N/AX/GEN/TRNK 2.5CM/<: CPT

## 2025-08-12 PROCEDURE — 99282 EMERGENCY DEPT VISIT SF MDM: CPT

## 2025-08-12 ASSESSMENT — PAIN DESCRIPTION - LOCATION: LOCATION: OTHER (COMMENT)

## 2025-08-12 ASSESSMENT — PAIN DESCRIPTION - DESCRIPTORS: DESCRIPTORS: ACHING

## 2025-08-12 ASSESSMENT — PAIN - FUNCTIONAL ASSESSMENT
PAIN_FUNCTIONAL_ASSESSMENT: 0-10
PAIN_FUNCTIONAL_ASSESSMENT: ACTIVITIES ARE NOT PREVENTED

## 2025-08-12 ASSESSMENT — PAIN DESCRIPTION - PAIN TYPE: TYPE: ACUTE PAIN

## 2025-08-12 ASSESSMENT — PAIN DESCRIPTION - ORIENTATION: ORIENTATION: LEFT

## 2025-08-19 ENCOUNTER — HOSPITAL ENCOUNTER (OUTPATIENT)
Dept: SLEEP CENTER | Age: 45
Discharge: HOME | End: 2025-08-21
Attending: FAMILY MEDICINE
Payer: COMMERCIAL

## 2025-08-19 DIAGNOSIS — G47.8 UNREFRESHED BY SLEEP: ICD-10-CM

## 2025-08-19 PROCEDURE — 95810 POLYSOM 6/> YRS 4/> PARAM: CPT

## 2025-08-22 ENCOUNTER — HOSPITAL ENCOUNTER (OUTPATIENT)
Dept: LAB | Age: 45
Discharge: HOME | End: 2025-08-22
Payer: COMMERCIAL

## 2025-08-22 ENCOUNTER — HOSPITAL ENCOUNTER (OUTPATIENT)
Dept: NEUROLOGY | Age: 45
Discharge: HOME | End: 2025-08-22
Payer: COMMERCIAL

## 2025-08-22 ENCOUNTER — HOSPITAL ENCOUNTER (OUTPATIENT)
Dept: CT IMAGING | Age: 45
Discharge: HOME | End: 2025-08-24
Payer: COMMERCIAL

## 2025-08-22 DIAGNOSIS — Z91.81 H/O FALL: ICD-10-CM

## 2025-08-22 DIAGNOSIS — G93.89 BRAIN DYSFUNCTION: ICD-10-CM

## 2025-08-22 DIAGNOSIS — G43.009 MIGRAINE WITHOUT AURA AND WITHOUT STATUS MIGRAINOSUS, NOT INTRACTABLE: ICD-10-CM

## 2025-08-22 DIAGNOSIS — Z86.73 H/O TRANSIENT ISCHEMIC ATTACK INVOLVING ANTERIOR CIRCULATION: ICD-10-CM

## 2025-08-22 DIAGNOSIS — I48.91 ATRIAL FIBRILLATION WITH RVR (HCC): ICD-10-CM

## 2025-08-22 DIAGNOSIS — G40.909 SEIZURE DISORDER (HCC): ICD-10-CM

## 2025-08-22 DIAGNOSIS — R42 DIZZINESS: ICD-10-CM

## 2025-08-22 DIAGNOSIS — Z86.73 HISTORY OF STROKE: ICD-10-CM

## 2025-08-22 LAB
LAMOTRIGINE SERPL-MCNC: 2.6 UG/ML (ref 3–15)
VALPROATE SERPL-MCNC: 39 UG/ML (ref 50–125)

## 2025-08-22 PROCEDURE — 80175 DRUG SCREEN QUAN LAMOTRIGINE: CPT

## 2025-08-22 PROCEDURE — 95813 EEG EXTND MNTR 61-119 MIN: CPT

## 2025-08-22 PROCEDURE — 80164 ASSAY DIPROPYLACETIC ACD TOT: CPT

## 2025-08-22 PROCEDURE — 36415 COLL VENOUS BLD VENIPUNCTURE: CPT

## 2025-08-22 PROCEDURE — 70450 CT HEAD/BRAIN W/O DYE: CPT

## (undated) DEVICE — STANDARD HYPODERMIC NEEDLE,POLYPROPYLENE HUB: Brand: MONOJECT

## (undated) DEVICE — PAD,ARMBOARD,CONV,FOAM,2X8X20",12PR/CS: Brand: MEDLINE

## (undated) DEVICE — Device

## (undated) DEVICE — SPONGE LAP W18XL18IN WHT COT 4 PLY FLD STRUNG RADPQ DISP ST

## (undated) DEVICE — SUTURE ETHLN SZ 2-0 L18IN NONABSORBABLE BLK L26MM FS 3/8 664G

## (undated) DEVICE — BLADELESS OBTURATOR: Brand: WECK VISTA

## (undated) DEVICE — BLADE ES ELASTOMERIC COAT INSUL DURABLE BEND UPTO 90DEG

## (undated) DEVICE — GLOVE ORANGE PI 7 1/2   MSG9075

## (undated) DEVICE — SUTURE MCRYL SZ 4-0 L18IN ABSRB UD L19MM PS-2 3/8 CIR PRIM Y496G

## (undated) DEVICE — INSUFFLATION NEEDLE TO ESTABLISH PNEUMOPERITONEUM.: Brand: INSUFFLATION NEEDLE

## (undated) DEVICE — SOLUTION SCRB 4OZ 7.5% POVIDONE IOD FLIP TOP CAP

## (undated) DEVICE — PROCEDURE PACK TRENGUARD 450

## (undated) DEVICE — CONVERTED USE 280781 SYRINGE EAR ULCER 2OZ BL LTX FR

## (undated) DEVICE — GAUZE,SPONGE,4"X4",16PLY,XRAY,STRL,LF: Brand: MEDLINE

## (undated) DEVICE — MARKER W/PRE PRINTED CUSTOM LABEL

## (undated) DEVICE — SOLUTION IV IRRIG POUR BRL 0.9% SODIUM CHL 2F7124

## (undated) DEVICE — GLOVE SURG SZ 6 THK91MIL LTX FREE SYN POLYISOPRENE ANTI

## (undated) DEVICE — GOWN,SIRUS,POLYRNF,BRTHSLV,LG,30/CS: Brand: MEDLINE

## (undated) DEVICE — GLOVE SURG SZ 65 THK91MIL LTX FREE SYN POLYISOPRENE

## (undated) DEVICE — PACK SURG PROC REMINDER N WVN DISPOSABLE BEAC TIME OUT

## (undated) DEVICE — PAD,ABDOMINAL,5"X9",ST,LF,25/BX: Brand: MEDLINE INDUSTRIES, INC.

## (undated) DEVICE — TUBING, SUCTION, 3/16" X 20', STRAIGHT: Brand: MEDLINE

## (undated) DEVICE — INSUFFLATION TUBING SET, ENDOFLATOR 50: Brand: N.A.

## (undated) DEVICE — SUTURE VCRL SZ 3-0 L27IN ABSRB UD L26MM SH 1/2 CIR J416H

## (undated) DEVICE — CANNULA SEAL

## (undated) DEVICE — MASTISOL ADHESIVE LIQ 2/3ML

## (undated) DEVICE — SUTURE V-LOC 90 3-0 L9IN ABSRB VLT L26MM V-20 1/2 CIR TAPR VLOCM0644

## (undated) DEVICE — SOLUTION PREP 4OZ 10% POVIDONE IOD GEL

## (undated) DEVICE — SOLUTION IRRIG 1000ML 0.9% SOD CHL USP POUR PLAS BTL

## (undated) DEVICE — SUTURE VCRL SZ 3-0 L27IN ABSRB VLT L26MM SH 1/2 CIR J316H

## (undated) DEVICE — GLOVE ORANGE PI 7   MSG9070

## (undated) DEVICE — GENERAL ROBOTIC PACK: Brand: MEDLINE INDUSTRIES, INC.

## (undated) DEVICE — 4-PORT MANIFOLD: Brand: NEPTUNE 2

## (undated) DEVICE — COUNTER NDL 10 COUNT HLD 20 FOAM BLK SGL MAG

## (undated) DEVICE — 9165 UNIVERSAL PATIENT PLATE: Brand: 3M™

## (undated) DEVICE — SUTURE VCRL SZ 0 L27IN ABSRB VLT L26MM CT-2 1/2 CIR J334H

## (undated) DEVICE — Z DISCONTINUED USE 2624852 GLOVE SURG 7 PF TEXT NEOPRNE BRN STRL NEOLON 2G LF

## (undated) DEVICE — GOWN,AURORA,NONRNF,XL,30/CS: Brand: MEDLINE

## (undated) DEVICE — BLANKET WRM W29.9XL79.1IN UP BODY FORC AIR MISTRAL-AIR

## (undated) DEVICE — GAUZE,SPONGE,FLUFF,6"X6.75",STRL,5/TRAY: Brand: MEDLINE

## (undated) DEVICE — Z DISCONTINUED USE 2273271 SOLUTION PREP 4OZ 10% POVIDONE IOD BTL FLIP TOP CAP

## (undated) DEVICE — MEDI-VAC NON-CONDUCTIVE SUCTION TUBING: Brand: CARDINAL HEALTH

## (undated) DEVICE — GARMENT,MEDLINE,DVT,INT,CALF,MED, GEN2: Brand: MEDLINE

## (undated) DEVICE — SMOKE EVACUATION FILTER, SET WITH TUBE: Brand: N.A.

## (undated) DEVICE — BASIN SET: Brand: MEDLINE INDUSTRIES, INC.

## (undated) DEVICE — TOTAL TRAY, DB, 100% SILI FOLEY, 16FR 10: Brand: MEDLINE

## (undated) DEVICE — GAUZE,SPONGE,4"X4",12PLY,STERILE,LF,2'S: Brand: MEDLINE

## (undated) DEVICE — 3M™ TEGADERM™ TRANSPARENT FILM DRESSING FRAME STYLE, 1624W, 2-3/8 IN X 2-3/4 IN (6 CM X 7 CM), 100/CT 4CT/CASE: Brand: 3M™ TEGADERM™

## (undated) DEVICE — ARM DRAPE

## (undated) DEVICE — TOWEL,OR,DSP,ST,BLUE,STD,4/PK,20PK/CS: Brand: MEDLINE

## (undated) DEVICE — SKIN AFFIX SURG ADHESIVE 72/CS 0.55ML: Brand: MEDLINE

## (undated) DEVICE — TIP COVER ACCESSORY

## (undated) DEVICE — Z DISCONTINUED BY MEDLINE USE 2711682 TRAY SKIN PREP DRY W/ PREM GLV

## (undated) DEVICE — TROCAR: Brand: KII SHIELDED BLADED ACCESS SYSTEM

## (undated) DEVICE — GARMENT COMPR STD FOR 17IN CALF UNIF THER FLOTRN

## (undated) DEVICE — 1200CC GUARDIAN II: Brand: GUARDIAN

## (undated) DEVICE — SUTURE V-LOC 90 SZ 3-0 L6IN ABSRB VLT V-20 L26MM 1/2 CIR VLOCM0604

## (undated) DEVICE — BLADE CLIPPER GEN PURP NS

## (undated) DEVICE — COVER LT HNDL BLU PLAS

## (undated) DEVICE — SUTURE DEV SZ 0 L6IN N ABSORBABLE

## (undated) DEVICE — JELLY LUBRICATING 4OZ FLIP TOP TB E Z

## (undated) DEVICE — Z DISCONTINUED NO SUB IDED DRAIN PENROSE L12IN 0.25IN USED TO PROMOTE DRNAGE IN OPN

## (undated) DEVICE — SOLUTION ANTIFOG VIS SYS CLEARIFY LAPSCP

## (undated) DEVICE — KITTNER: Brand: DEROYAL

## (undated) DEVICE — PACK,LAPAROTOMY,PK IV,AURORA: Brand: MEDLINE

## (undated) DEVICE — TUBING SET, SUCTION LAP, S-PILOT: Brand: N.A.

## (undated) DEVICE — SYRINGE,EAR/ULCER, 2 OZ, STERILE: Brand: MEDLINE

## (undated) DEVICE — SUTURE NRLN SZ 0 L18IN NONABSORBABLE BLK L26MM CT-2 1/2 CIR C527D

## (undated) DEVICE — YANKAUER,BULB TIP,W/O VENT,RIGID,STERILE: Brand: MEDLINE

## (undated) DEVICE — GAUZE,SPONGE,2"X2",8PLY,STERILE,LF,2'S: Brand: MEDLINE

## (undated) DEVICE — INTENDED FOR TISSUE SEPARATION, AND OTHER PROCEDURES THAT REQUIRE A SHARP SURGICAL BLADE TO PUNCTURE OR CUT.: Brand: BARD-PARKER ® CARBON RIB-BACK BLADES

## (undated) DEVICE — SUTURE DEV SZ 2-0 WND CLSR ABSRB GS-22 VLOC COVIDIEN VLOCM2145

## (undated) DEVICE — POOLE SUCTION HANDLE: Brand: CARDINAL HEALTH